# Patient Record
Sex: FEMALE | Race: ASIAN | Employment: UNEMPLOYED | URBAN - METROPOLITAN AREA
[De-identification: names, ages, dates, MRNs, and addresses within clinical notes are randomized per-mention and may not be internally consistent; named-entity substitution may affect disease eponyms.]

---

## 2017-06-22 ENCOUNTER — LAB ENCOUNTER (OUTPATIENT)
Dept: LAB | Age: 57
End: 2017-06-22
Attending: FAMILY MEDICINE
Payer: COMMERCIAL

## 2017-06-22 DIAGNOSIS — R10.84 ABDOMINAL PAIN, GENERALIZED: Primary | ICD-10-CM

## 2017-06-22 DIAGNOSIS — D64.9 ANEMIA: ICD-10-CM

## 2017-06-22 PROCEDURE — 80053 COMPREHEN METABOLIC PANEL: CPT

## 2017-06-22 PROCEDURE — 82150 ASSAY OF AMYLASE: CPT

## 2017-06-22 PROCEDURE — 80061 LIPID PANEL: CPT

## 2017-06-22 PROCEDURE — 83690 ASSAY OF LIPASE: CPT

## 2017-06-22 PROCEDURE — 85025 COMPLETE CBC W/AUTO DIFF WBC: CPT

## 2017-06-22 PROCEDURE — 36415 COLL VENOUS BLD VENIPUNCTURE: CPT

## 2017-06-22 PROCEDURE — 84443 ASSAY THYROID STIM HORMONE: CPT

## 2018-12-05 ENCOUNTER — LAB ENCOUNTER (OUTPATIENT)
Dept: LAB | Age: 58
End: 2018-12-05
Attending: FAMILY MEDICINE
Payer: COMMERCIAL

## 2018-12-05 ENCOUNTER — HOSPITAL ENCOUNTER (OUTPATIENT)
Dept: ULTRASOUND IMAGING | Age: 58
Discharge: HOME | End: 2018-12-05
Attending: FAMILY MEDICINE
Payer: COMMERCIAL

## 2018-12-05 DIAGNOSIS — R10.9 ABDOMINAL PAIN: ICD-10-CM

## 2018-12-05 DIAGNOSIS — I10 HTN (HYPERTENSION): Primary | ICD-10-CM

## 2018-12-05 PROCEDURE — 80053 COMPREHEN METABOLIC PANEL: CPT

## 2018-12-05 PROCEDURE — 76700 US EXAM ABDOM COMPLETE: CPT | Performed by: FAMILY MEDICINE

## 2018-12-05 PROCEDURE — 85025 COMPLETE CBC W/AUTO DIFF WBC: CPT

## 2018-12-05 PROCEDURE — 80061 LIPID PANEL: CPT

## 2019-12-03 RX ORDER — LISINOPRIL 10 MG/1
TABLET ORAL
Qty: 90 TABLET | Refills: 2 | Status: SHIPPED | OUTPATIENT
Start: 2019-12-03 | End: 2019-12-26

## 2019-12-03 RX ORDER — ATORVASTATIN CALCIUM 10 MG/1
TABLET, FILM COATED ORAL
Qty: 90 TABLET | Refills: 2 | Status: SHIPPED | OUTPATIENT
Start: 2019-12-03 | End: 2019-12-26

## 2019-12-03 NOTE — TELEPHONE ENCOUNTER
LOV Never seen in our office    LAST LAB    LAST RX no record of Rx ever given    Next OV No future appointments.       PROTOCOL  Cholesterol Medication Protocol [id number] 1:33 AM   Appointment within past 12 or next 3 months    ALT < 80    ALT resulted wi

## 2019-12-26 ENCOUNTER — HOSPITAL ENCOUNTER (OUTPATIENT)
Dept: GENERAL RADIOLOGY | Age: 59
Discharge: HOME | End: 2019-12-26
Attending: FAMILY MEDICINE
Payer: MEDICAID

## 2019-12-26 ENCOUNTER — LAB ENCOUNTER (OUTPATIENT)
Dept: LAB | Age: 59
End: 2019-12-26
Attending: FAMILY MEDICINE
Payer: MEDICAID

## 2019-12-26 ENCOUNTER — OFFICE VISIT (OUTPATIENT)
Dept: FAMILY MEDICINE CLINIC | Facility: CLINIC | Age: 59
End: 2019-12-26
Payer: MEDICAID

## 2019-12-26 VITALS
TEMPERATURE: 98 F | DIASTOLIC BLOOD PRESSURE: 70 MMHG | HEART RATE: 74 BPM | HEIGHT: 59 IN | WEIGHT: 177 LBS | SYSTOLIC BLOOD PRESSURE: 116 MMHG | BODY MASS INDEX: 35.68 KG/M2 | RESPIRATION RATE: 14 BRPM

## 2019-12-26 DIAGNOSIS — Z12.31 ENCOUNTER FOR SCREENING MAMMOGRAM FOR BREAST CANCER: ICD-10-CM

## 2019-12-26 DIAGNOSIS — I10 ESSENTIAL HYPERTENSION: ICD-10-CM

## 2019-12-26 DIAGNOSIS — M19.90 ARTHRITIS: Primary | ICD-10-CM

## 2019-12-26 DIAGNOSIS — Z12.11 SCREEN FOR COLON CANCER: ICD-10-CM

## 2019-12-26 DIAGNOSIS — M17.0 TRICOMPARTMENT OSTEOARTHRITIS OF BOTH KNEES: Primary | ICD-10-CM

## 2019-12-26 DIAGNOSIS — M19.90 ARTHRITIS: ICD-10-CM

## 2019-12-26 PROBLEM — K21.9 GASTROESOPHAGEAL REFLUX DISEASE WITHOUT ESOPHAGITIS: Status: ACTIVE | Noted: 2019-12-26

## 2019-12-26 PROBLEM — E78.2 MIXED HYPERLIPIDEMIA: Status: ACTIVE | Noted: 2019-12-26

## 2019-12-26 PROCEDURE — 99203 OFFICE O/P NEW LOW 30 MIN: CPT | Performed by: FAMILY MEDICINE

## 2019-12-26 PROCEDURE — 86038 ANTINUCLEAR ANTIBODIES: CPT

## 2019-12-26 PROCEDURE — 84439 ASSAY OF FREE THYROXINE: CPT

## 2019-12-26 PROCEDURE — 82306 VITAMIN D 25 HYDROXY: CPT

## 2019-12-26 PROCEDURE — 86140 C-REACTIVE PROTEIN: CPT

## 2019-12-26 PROCEDURE — 86431 RHEUMATOID FACTOR QUANT: CPT

## 2019-12-26 PROCEDURE — 84550 ASSAY OF BLOOD/URIC ACID: CPT

## 2019-12-26 PROCEDURE — 84443 ASSAY THYROID STIM HORMONE: CPT

## 2019-12-26 PROCEDURE — 85652 RBC SED RATE AUTOMATED: CPT

## 2019-12-26 PROCEDURE — 80053 COMPREHEN METABOLIC PANEL: CPT

## 2019-12-26 PROCEDURE — 72110 X-RAY EXAM L-2 SPINE 4/>VWS: CPT | Performed by: FAMILY MEDICINE

## 2019-12-26 PROCEDURE — 36415 COLL VENOUS BLD VENIPUNCTURE: CPT

## 2019-12-26 PROCEDURE — 73560 X-RAY EXAM OF KNEE 1 OR 2: CPT | Performed by: FAMILY MEDICINE

## 2019-12-26 PROCEDURE — 80061 LIPID PANEL: CPT

## 2019-12-26 PROCEDURE — 86200 CCP ANTIBODY: CPT

## 2019-12-26 PROCEDURE — 85025 COMPLETE CBC W/AUTO DIFF WBC: CPT

## 2019-12-26 RX ORDER — PANTOPRAZOLE SODIUM 20 MG/1
20 TABLET, DELAYED RELEASE ORAL
Qty: 90 TABLET | Refills: 2 | Status: SHIPPED | OUTPATIENT
Start: 2019-12-26 | End: 2020-09-10

## 2019-12-26 RX ORDER — TRAMADOL HYDROCHLORIDE 50 MG/1
50 TABLET ORAL EVERY 6 HOURS PRN
COMMUNITY
End: 2020-01-08

## 2019-12-26 RX ORDER — LISINOPRIL 10 MG/1
TABLET ORAL
Qty: 90 TABLET | Refills: 2 | Status: SHIPPED | OUTPATIENT
Start: 2019-12-26 | End: 2020-01-08 | Stop reason: ALTCHOICE

## 2019-12-26 RX ORDER — ATORVASTATIN CALCIUM 10 MG/1
TABLET, FILM COATED ORAL
Qty: 90 TABLET | Refills: 2 | Status: SHIPPED | OUTPATIENT
Start: 2019-12-26 | End: 2020-05-18

## 2019-12-26 RX ORDER — PANTOPRAZOLE SODIUM 20 MG/1
20 TABLET, DELAYED RELEASE ORAL
COMMUNITY
End: 2019-12-26

## 2019-12-26 RX ORDER — MELOXICAM 15 MG/1
15 TABLET ORAL DAILY
Qty: 90 TABLET | Refills: 1 | Status: SHIPPED | OUTPATIENT
Start: 2019-12-26 | End: 2020-01-13

## 2019-12-26 NOTE — H&P
Wellness Exam    REASON FOR VISIT:    Lu Che is a 60year old female who presents for an [address]     Current Complaints: Ms. Selina Becker is a pleasant 59 y/o F here for her wellness exam  Flu shot: see immunization record  Health Maintenance for: A1C  Glucose (mg/dL)   Date Value   12/05/2018 104 (H)         Gonorrhea Screening if high risk No results found for: GONOCOCCUS    HIV Screening For all adults age 22-65, older adults at increased risk No results found for: HIV    Syphilis Screening visit. No LMP recorded. Constitutional: She appears her stated age, nourished, and pleasant. Vital signs reviewed as noted  Head: Normocephalic and atraumatic. Nose: Nose normal.   Eyes: EOM are normal. Pupils are equal, round, and reactive to light. barriers to learning. Medical education done. Outcome: Patient verbalizes understanding. Educated by: MD     The patient indicates understanding of these issues and agrees to the plan.     SUGGESTED VACCINATIONS - Influenza, Pneumococcal, Zoster, Tetanu

## 2019-12-26 NOTE — PATIENT INSTRUCTIONS
Thank you for choosing [facility] at [place] [address]  To Do: Darrin Gerber  1. Please see age appropriate health prevention below    [facility] is located in [address]. Monday, Tuesday & Friday – 8 a.m. to 4 p.m.   Wednesday, Thursday benefits outweigh those potential risks and we strive to make you healthier and to improve your quality of life.     Referrals, and Radiology Information:    If your insurance requires a referral to a specialist, please allow 5 business days to process your age 39 and women without symptoms at any age who are overweight or obese and have 1 or more additional risk factors for diabetes.  At  least every 3 years   Type 2 diabetes or prediabetes All women diagnosed with gestational diabetes Lifelong testing every provider about your health history.    Depression All women in this age group At routine exams   Gonorrhea Sexually active women at increased risk for infection At routine exams   Hepatitis C Anyone at increased risk; 1 time for those born between 80 and Meningococcal Women at increased risk for infection – talk with your healthcare provider 1 or more doses   Pneumococcal conjugate vaccine (PCV13) and pneumococcal polysaccharide vaccine (PPSV23) Women at increased risk for infection – talk with your heal arthritis. They include:   · Osteoarthritis  · Rheumatoid arthritis  · Gout  · Lupus  If your symptoms are mild, medicines may be enough to ease pain and swelling. For more severe arthritis, you may need surgery.  This can improve the condition of the joint

## 2019-12-26 NOTE — H&P
[facility], [address] [place] [address]    History and Physical    Darrin Gerber Patient Status:  No patient class for patient encounter    1960 MRN [id number]   Location [facility] , [facility] Attending No att. provid Problems Father    • No Known Problems Mother      Social History:  Social History    Tobacco Use      Smoking status: Never Smoker      Smokeless tobacco: Never Used    Alcohol use: Never      Frequency: Never    Drug use: Never    Allergies/Medications: deficit. Psychiatric: She has a normal mood and affect.  Thought content normal.         Results:     Lab Results   Component Value Date    WBC 9.0 12/05/2018    HGB 11.6 (L) 12/05/2018    HCT 34.2 12/05/2018    .0 12/05/2018    CREATSERUM 1.08 (H)

## 2019-12-30 ENCOUNTER — TELEPHONE (OUTPATIENT)
Dept: FAMILY MEDICINE CLINIC | Facility: CLINIC | Age: 59
End: 2019-12-30

## 2019-12-30 NOTE — TELEPHONE ENCOUNTER
Spoke to daughter, only new medication started Meloxicam. She states pt has NOT started the Levothyroxine. Daughter states pt is having swelling since yesterday and INCREASED swelling today. Swelling is under both eyes and in feet and ankles.  States the

## 2019-12-30 NOTE — TELEPHONE ENCOUNTER
Daughter is calling, patient started new medication- Meloxicam. Patient started with swelling of feet and legs. Daughter also says patient has facial swelling. Please call daughter.

## 2019-12-30 NOTE — TELEPHONE ENCOUNTER
Called daughter, spoke to son in law, he states pt is not in distress. He gave me the daughter's cell. Attempted to reach daughter, Iris Castillo, on [phone] twice, but there was only silence, no connection.  Called Son in law back, he states pt and Iris Jonathan are

## 2019-12-30 NOTE — TELEPHONE ENCOUNTER
Additional per Dr. Adwoa Langford: elevated Sed Rate, CRP, can be due to Osteoarthritis, probably not RA. Informed daughter of Dr. Sonu Stinson reviews and confirmed recommendations, daughter expressed understanding and agreement. Task completed.

## 2020-01-08 ENCOUNTER — TELEPHONE (OUTPATIENT)
Dept: ORTHOPEDICS CLINIC | Facility: CLINIC | Age: 60
End: 2020-01-08

## 2020-01-08 ENCOUNTER — TELEPHONE (OUTPATIENT)
Dept: FAMILY MEDICINE CLINIC | Facility: CLINIC | Age: 60
End: 2020-01-08

## 2020-01-08 RX ORDER — HYDROCHLOROTHIAZIDE 25 MG/1
25 TABLET ORAL DAILY
Qty: 90 TABLET | Refills: 0 | Status: SHIPPED | OUTPATIENT
Start: 2020-01-08 | End: 2020-04-02

## 2020-01-08 RX ORDER — GABAPENTIN 300 MG/1
300 CAPSULE ORAL 2 TIMES DAILY
Qty: 60 CAPSULE | Refills: 0 | Status: SHIPPED | OUTPATIENT
Start: 2020-01-08 | End: 2020-05-26

## 2020-01-08 NOTE — TELEPHONE ENCOUNTER
Spoke with pt's daughter, informed of MD recommendations below. Verbalized understanding and agreement. Rx for HCTZ sent to pharmacy. Pt's daughter asking for recommendation on pain.  Pt tried Tramadol but it did not work and has now stopped Meloxicam. P

## 2020-01-08 NOTE — TELEPHONE ENCOUNTER
Attempted to reach pt's daughter, [facility] informing Rx for Gabapentin 300mg sent to pharmacy. Rx sent.

## 2020-01-08 NOTE — TELEPHONE ENCOUNTER
Future Appointments   Date Time Provider Alva Yolanda   1/13/2020  3:00 PM Brittney Friedman MD [facility]   4/16/2020  3:30 PM Serina Reilly DO [id number] [address]

## 2020-01-08 NOTE — TELEPHONE ENCOUNTER
Let us stop lisinopril also. Recommend hydrochlorothiazide 25 mg daily. Continue monitor blood pressure.

## 2020-01-08 NOTE — TELEPHONE ENCOUNTER
Patient stopped Meloxicam and swelling has improved but there is still some facial swelling. Patient blood pressure is elevated, B/P 164/92  Patient is taking Lisinopril 10 mg daily. Reports usual pain in knees, pt has arthritis but no increased pain.  No h

## 2020-01-08 NOTE — TELEPHONE ENCOUNTER
S/w Leonoria, daughter, and informed her that Ishaan Paredes will need x-rays completed prior to her appointment with Dr. Leigh Ann Nogueira on 1/16/2020.   Cristy Arciniega verbalized understanding and stated that she will need to call the office back to move the date of Darrin's michele

## 2020-01-13 ENCOUNTER — OFFICE VISIT (OUTPATIENT)
Dept: ORTHOPEDICS CLINIC | Facility: CLINIC | Age: 60
End: 2020-01-13
Payer: MEDICAID

## 2020-01-13 ENCOUNTER — HOSPITAL ENCOUNTER (OUTPATIENT)
Dept: GENERAL RADIOLOGY | Age: 60
Discharge: HOME | End: 2020-01-13
Attending: ORTHOPAEDIC SURGERY
Payer: MEDICAID

## 2020-01-13 VITALS
WEIGHT: 177 LBS | HEIGHT: 59 IN | OXYGEN SATURATION: 97 % | SYSTOLIC BLOOD PRESSURE: 121 MMHG | BODY MASS INDEX: 35.68 KG/M2 | HEART RATE: 68 BPM | RESPIRATION RATE: 16 BRPM | DIASTOLIC BLOOD PRESSURE: 72 MMHG

## 2020-01-13 DIAGNOSIS — M25.561 PAIN IN BOTH KNEES, UNSPECIFIED CHRONICITY: ICD-10-CM

## 2020-01-13 DIAGNOSIS — M70.61 TROCHANTERIC BURSITIS OF BOTH HIPS: ICD-10-CM

## 2020-01-13 DIAGNOSIS — M25.562 PAIN IN BOTH KNEES, UNSPECIFIED CHRONICITY: ICD-10-CM

## 2020-01-13 DIAGNOSIS — M17.0 PRIMARY OSTEOARTHRITIS OF BOTH KNEES: Primary | ICD-10-CM

## 2020-01-13 DIAGNOSIS — M70.62 TROCHANTERIC BURSITIS OF BOTH HIPS: ICD-10-CM

## 2020-01-13 PROCEDURE — 20610 DRAIN/INJ JOINT/BURSA W/O US: CPT | Performed by: ORTHOPAEDIC SURGERY

## 2020-01-13 PROCEDURE — 73564 X-RAY EXAM KNEE 4 OR MORE: CPT | Performed by: ORTHOPAEDIC SURGERY

## 2020-01-13 PROCEDURE — 99203 OFFICE O/P NEW LOW 30 MIN: CPT | Performed by: ORTHOPAEDIC SURGERY

## 2020-01-13 RX ORDER — TRIAMCINOLONE ACETONIDE 40 MG/ML
80 INJECTION, SUSPENSION INTRA-ARTICULAR; INTRAMUSCULAR ONCE
Status: COMPLETED | OUTPATIENT
Start: 2020-01-13 | End: 2020-01-13

## 2020-01-13 RX ADMIN — TRIAMCINOLONE ACETONIDE 80 MG: 40 INJECTION, SUSPENSION INTRA-ARTICULAR; INTRAMUSCULAR at 15:45:00

## 2020-01-13 NOTE — PATIENT INSTRUCTIONS
What Is Arthritis? Arthritis is a disease that affects the joints. Joints are the parts where bones meet and move. It can affect any joint in your body. There are many types of arthritis.  They include:   · Osteoarthritis  · Rheumatoid arthritis  · Gout  · Cartilage is a smooth substance that protects the ends of your bones and provides cushioning. When you have arthritis, this cartilage breaks down and can no longer protect your bones. This can happen from an autoimmune disease.  Or it can happen from wear a · Strengthening muscles around the joint to reduce the strain on the joint  · Using hot and cold packs on your joints  · Using over-the-counter and prescription medicines  Talk with your healthcare provider about the best treatments for your condition. In people with arthritis, it offers all of those benefits and it can:  · Lessen pain and stiffness  · Strengthen muscles that support your joints  · Help you to be able to do the things you enjoy  A complete program consists of the following 3 types of exe Most people should exercise for at least 30 minutes, most days of the week. You don't have to exercise all at once. Try exercising for 10 minutes, 3 times a day, for example.     Strengthening exercises  Strengthening your muscles helps to protect your join ? Knee bend. Sit in a chair with your legs bent at the knees in front of you. Straighten one leg as much as you can, then bring it back to the floor. Repeat this 5 to 10 times. Then do the same thing with the other leg. ? Ankle stretch.  Sit with your fee · Large  for pencils, garden tools, and other handheld objects    Use mobility and other aids   People with arthritis and other joint problems often use mobility aids to help with walking. For example, they may use canes or walkers.  They may also use · Corticosteroid or steroid injections may ease swelling and pain. The medicine is injected into the joint—for example, the knee or hip. Steroid injections do have risks, so healthcare providers limit the number of injections used in any one joint.    · [place] location at [facility]: [facility] [address]; phone [phone]  o Website: [email or web address]      Date Last Reviewed: 6/1/2018 © 2000-2019 [employer] [address].  [address], [place]

## 2020-01-13 NOTE — PROGRESS NOTES
Per Dr. Leigh Ann Nogueira, draw up 4 mL of 0.5% Marcaine and 1 mL of Kenalog 40 for injection to bilateral knee. SL  Patient provided education handout for cortisone injection.

## 2020-01-13 NOTE — H&P
[facility] New Patient Note    CC: Patient presents with:  Consult: bilateral knee pain x several years- increasing over the past few years. R>L.  pain travels down the hips into the knee. patient describes a stabbing pain.   hx prior aspiration 201 TABLET BY MOUTH IN THE MORNING 90 tablet 2   • Pantoprazole Sodium 20 MG Oral Tab EC Take 1 tablet (20 mg total) by mouth every morning before breakfast. 90 tablet 2     No Known Allergies  Family History   Problem Relation Age of Onset   • No Known Proble and radiology report read. Demonstrates end-stage varus osteoarthritis of the right knee, with complete loss of medial joint space/bone-on-bone, subchondral sclerosis and cysts, osteophyte formation.   Patient does have moderate valgus osteoarthritis on th of trochanteric bursitis, however she may have some contribution from back issues. Lumbar spine x-rays do demonstrate some L4 on L5 anterolisthesis. We did discuss initiation of physical therapy for her hips and her back.   She and her daughter expressed

## 2020-01-13 NOTE — PROCEDURES
After informed consent, the patient's right and left knees were marked, locally anesthetized with skin refrigerant, prepped with topical antiseptic, and injected with a mixture of 1mL 40mg/mL Kenalog and 4mL 0.5% marcaine through the inferolateral portal.

## 2020-01-13 NOTE — PROGRESS NOTES
Per Dr. Jair Green, draw up 4 mL of 0.5% Marcaine and 1 mL of Kenalog 40 for injection to right knee. SL  Patient provided education handout for cortisone injection.       Per Dr. Jair Green, draw up 4 mL of 0.5% Marcaine and 1 mL of Kenalog 40 for injection to

## 2020-01-20 ENCOUNTER — APPOINTMENT (OUTPATIENT)
Dept: PHYSICAL THERAPY | Age: 60
End: 2020-01-20
Attending: ORTHOPAEDIC SURGERY
Payer: MEDICAID

## 2020-01-20 NOTE — PROGRESS NOTES
LOWER EXTREMITY EVALUATION:   Referring Physician: Dr. Qian Vargas  Diagnosis:   Trochanteric bursitis of both hips (M70.61,M70.62)  Primary osteoarthritis of both knees (M17.0)  Date of Service: 1/20/2020     PATIENT SUMMARY   Ehsan Stout is a 63year old L ***/5    DF: R ***/5; L ***/5  PF: R ***/5; L ***/5  INV: R ***/5; L ***/5  EV: R ***/5; L ***/5  Great toe ext: R ***/5; L ***/5     Special tests:   ***    Gait: pt ambulates on level ground with {pt/ot/slp gait:0103}  Balance: SLS: R *** sec, L *** se Date____________________    Certification From: 8/38/7906  To:4/19/2020

## 2020-01-21 ENCOUNTER — APPOINTMENT (OUTPATIENT)
Dept: PHYSICAL THERAPY | Age: 60
End: 2020-01-21
Attending: ORTHOPAEDIC SURGERY
Payer: MEDICAID

## 2020-04-02 RX ORDER — HYDROCHLOROTHIAZIDE 25 MG/1
TABLET ORAL
Qty: 90 TABLET | Refills: 0 | Status: SHIPPED | OUTPATIENT
Start: 2020-04-02 | End: 2020-05-18

## 2020-04-02 NOTE — TELEPHONE ENCOUNTER
Name from pharmacy: HYDROCHLOROTHIAZIDE 25MG TABLETS          Will file in chart as: HYDROCHLOROTHIAZIDE 25 MG Oral Tab         Sig: TAKE 1 TABLET BY MOUTH DAILY    Disp:  90 tablet    Refills:  0 (Pharmacy requested: Not specified)    Start: 4/2/2020    C

## 2020-05-04 ENCOUNTER — TELEPHONE (OUTPATIENT)
Dept: FAMILY MEDICINE CLINIC | Facility: CLINIC | Age: 60
End: 2020-05-04

## 2020-05-04 NOTE — TELEPHONE ENCOUNTER
PA for pantoprazole 20mg has been submitted through [facility].  Waiting on denial/approval

## 2020-05-18 ENCOUNTER — VIRTUAL PHONE E/M (OUTPATIENT)
Dept: FAMILY MEDICINE CLINIC | Facility: CLINIC | Age: 60
End: 2020-05-18
Payer: MEDICAID

## 2020-05-18 DIAGNOSIS — I10 ESSENTIAL HYPERTENSION: Primary | ICD-10-CM

## 2020-05-18 PROCEDURE — 99213 OFFICE O/P EST LOW 20 MIN: CPT | Performed by: FAMILY MEDICINE

## 2020-05-18 RX ORDER — HYDROCHLOROTHIAZIDE 25 MG/1
TABLET ORAL
Qty: 90 TABLET | Refills: 0 | OUTPATIENT
Start: 2020-05-18

## 2020-05-18 RX ORDER — ATORVASTATIN CALCIUM 10 MG/1
TABLET, FILM COATED ORAL
Qty: 90 TABLET | Refills: 1 | Status: SHIPPED | OUTPATIENT
Start: 2020-05-18 | End: 2021-06-23

## 2020-05-18 RX ORDER — HYDROCHLOROTHIAZIDE 25 MG/1
25 TABLET ORAL DAILY
Qty: 90 TABLET | Refills: 1 | Status: SHIPPED | OUTPATIENT
Start: 2020-05-18 | End: 2020-12-29

## 2020-05-18 NOTE — TELEPHONE ENCOUNTER
Please schedule a tele visit with Dr Diego Palacios for medication follow up/refill          Return in about 4 weeks (around 1/23/2020), or if symptoms worsen or fail to improve, for wellness/30.

## 2020-05-18 NOTE — PROGRESS NOTES
HPI:    Patient ID: Brittney Perdomo is a 63year old female. HPI  Ms. Gerber is a 58-year-old female with history of hypertension hyperlipidemia presenting for a follow-up phone visit. She has been doing well and has been taking her medications compliantly. Signed Prescriptions Disp Refills   • hydrochlorothiazide 25 MG Oral Tab 90 tablet 1     Sig: Take 1 tablet (25 mg total) by mouth daily.    • atorvastatin 10 MG Oral Tab 90 tablet 1     Sig: TAKE 1 TABLET BY MOUTH IN THE MORNING       [facility]

## 2020-05-18 NOTE — TELEPHONE ENCOUNTER
Patient has a telepone visit with Dr. Deb Downing today. Patient verbally consents to a virtual/telephone check-in service for the date and time noted above.  Patient understands and accepts financial responsibility for any deductible, co-insurance, and co-pa

## 2020-05-18 NOTE — PROGRESS NOTES
Virtual Telephone Check-In    ALEC Gerber verbally consents to a Virtual/Telephone Check-In visit on 05/18/20. Patient understands and accepts financial responsibility for any deductible, co-insurance and/or co-pays associated with this service.     Dura

## 2020-05-26 RX ORDER — GABAPENTIN 300 MG/1
CAPSULE ORAL
Qty: 180 CAPSULE | Refills: 0 | Status: SHIPPED | OUTPATIENT
Start: 2020-05-26 | End: 2020-07-14 | Stop reason: ALTCHOICE

## 2020-05-26 NOTE — TELEPHONE ENCOUNTER
Requesting : GABAPENTIN 300 MG   LOV: 5/18/20  RTC:   Last Relevant Labs: 12/26/19  Filled: 1/8/20 # 60 with 0 refills    No future appointments.

## 2020-06-09 ENCOUNTER — TELEPHONE (OUTPATIENT)
Dept: ORTHOPEDICS CLINIC | Facility: CLINIC | Age: 60
End: 2020-06-09

## 2020-06-09 NOTE — TELEPHONE ENCOUNTER
Daughter lmom for office to schedule appointment for knee injections for mother. Called back and received voice mail. LMOM for her to call after 8 am in the morning to schedule an appointment with us.

## 2020-06-12 ENCOUNTER — TELEPHONE (OUTPATIENT)
Dept: ORTHOPEDICS CLINIC | Facility: CLINIC | Age: 60
End: 2020-06-12

## 2020-06-12 RX ORDER — TRAMADOL HYDROCHLORIDE 50 MG/1
50 TABLET ORAL EVERY 6 HOURS PRN
Qty: 40 TABLET | Refills: 0 | Status: SHIPPED | OUTPATIENT
Start: 2020-06-12 | End: 2020-12-08

## 2020-06-12 NOTE — TELEPHONE ENCOUNTER
Patient daughter states that her mother is not doing well with the naproxen Ozzy Able), an earlier appointment was suggested, per pts daughter it was refused. They are requesting a stronger medication for the pain.     Future Appointments   Date Time Provider

## 2020-07-06 ENCOUNTER — HOSPITAL ENCOUNTER (EMERGENCY)
Facility: HOSPITAL | Age: 60
Discharge: HOME | End: 2020-07-06
Attending: EMERGENCY MEDICINE
Payer: MEDICAID

## 2020-07-06 ENCOUNTER — HOSPITAL ENCOUNTER (OUTPATIENT)
Dept: GENERAL RADIOLOGY | Facility: HOSPITAL | Age: 60
Discharge: HOME | End: 2020-07-06
Attending: ORTHOPAEDIC SURGERY
Payer: MEDICAID

## 2020-07-06 ENCOUNTER — OFFICE VISIT (OUTPATIENT)
Dept: ORTHOPEDICS CLINIC | Facility: CLINIC | Age: 60
End: 2020-07-06
Payer: MEDICAID

## 2020-07-06 ENCOUNTER — APPOINTMENT (OUTPATIENT)
Dept: MRI IMAGING | Facility: HOSPITAL | Age: 60
End: 2020-07-06
Attending: EMERGENCY MEDICINE
Payer: MEDICAID

## 2020-07-06 VITALS
RESPIRATION RATE: 20 BRPM | TEMPERATURE: 97 F | OXYGEN SATURATION: 94 % | BODY MASS INDEX: 32.28 KG/M2 | SYSTOLIC BLOOD PRESSURE: 167 MMHG | HEIGHT: 61 IN | HEART RATE: 73 BPM | WEIGHT: 171 LBS | DIASTOLIC BLOOD PRESSURE: 87 MMHG

## 2020-07-06 VITALS — HEART RATE: 80 BPM | SYSTOLIC BLOOD PRESSURE: 126 MMHG | DIASTOLIC BLOOD PRESSURE: 80 MMHG | OXYGEN SATURATION: 96 %

## 2020-07-06 DIAGNOSIS — M48.00 SPINAL STENOSIS, UNSPECIFIED SPINAL REGION: Primary | ICD-10-CM

## 2020-07-06 DIAGNOSIS — M25.512 LEFT SHOULDER PAIN, UNSPECIFIED CHRONICITY: ICD-10-CM

## 2020-07-06 DIAGNOSIS — M17.0 PRIMARY OSTEOARTHRITIS OF BOTH KNEES: Primary | ICD-10-CM

## 2020-07-06 DIAGNOSIS — M54.40 BACK PAIN OF LUMBAR REGION WITH SCIATICA: ICD-10-CM

## 2020-07-06 PROCEDURE — 99285 EMERGENCY DEPT VISIT HI MDM: CPT

## 2020-07-06 PROCEDURE — 99284 EMERGENCY DEPT VISIT MOD MDM: CPT

## 2020-07-06 PROCEDURE — 20610 DRAIN/INJ JOINT/BURSA W/O US: CPT | Performed by: ORTHOPAEDIC SURGERY

## 2020-07-06 PROCEDURE — 73030 X-RAY EXAM OF SHOULDER: CPT | Performed by: ORTHOPAEDIC SURGERY

## 2020-07-06 PROCEDURE — 72148 MRI LUMBAR SPINE W/O DYE: CPT | Performed by: EMERGENCY MEDICINE

## 2020-07-06 PROCEDURE — 99213 OFFICE O/P EST LOW 20 MIN: CPT | Performed by: ORTHOPAEDIC SURGERY

## 2020-07-06 RX ORDER — TRIAMCINOLONE ACETONIDE 40 MG/ML
80 INJECTION, SUSPENSION INTRA-ARTICULAR; INTRAMUSCULAR ONCE
Status: COMPLETED | OUTPATIENT
Start: 2020-07-06 | End: 2020-07-06

## 2020-07-06 RX ORDER — CYCLOBENZAPRINE HCL 10 MG
10 TABLET ORAL 3 TIMES DAILY PRN
Qty: 15 TABLET | Refills: 0 | Status: SHIPPED | OUTPATIENT
Start: 2020-07-06

## 2020-07-06 RX ORDER — PREDNISONE 20 MG/1
40 TABLET ORAL ONCE
Status: COMPLETED | OUTPATIENT
Start: 2020-07-06 | End: 2020-07-06

## 2020-07-06 RX ORDER — PREDNISONE 20 MG/1
40 TABLET ORAL DAILY
Qty: 10 TABLET | Refills: 0 | Status: SHIPPED | OUTPATIENT
Start: 2020-07-06 | End: 2020-07-11

## 2020-07-06 RX ADMIN — TRIAMCINOLONE ACETONIDE 80 MG: 40 INJECTION, SUSPENSION INTRA-ARTICULAR; INTRAMUSCULAR at 15:15:00

## 2020-07-06 NOTE — ED INITIAL ASSESSMENT (HPI)
Pt presents to ED with complaint of lower baack pain radiating down both legs. Pt also reports lower extremity swelling x1 month and reports numbness when standing for short periods of time. , Denies loss of bowel/bladder.  Denies weakness

## 2020-07-06 NOTE — PROGRESS NOTES
[facility] Progress Note    Subjective: Patient returns to clinic for bilateral knee injection. She also reports left shoulder pain. For the knees, she had previous injection that gave 3 to 4 months of relief. This was done in January.   She wants tendinitis and subacromial bursitis/impingement, possible referred pain from the neck/cervical spine. Would advise obtaining shoulder x-rays first.  Depending on results, can consider initiation of therapy, possible subacromial injection.   Patient and fam

## 2020-07-06 NOTE — PROCEDURES
After informed consent, the patient's right and left knees were marked, locally anesthetized with skin refrigerant, prepped with topical antiseptic, and injected with a mixture of 1mL 40mg/mL Kenalog, 2mL 1% lidocaine and 2mL 0.5% marcaine through the infe

## 2020-07-07 ENCOUNTER — TELEPHONE (OUTPATIENT)
Dept: SURGERY | Facility: CLINIC | Age: 60
End: 2020-07-07

## 2020-07-07 NOTE — TELEPHONE ENCOUNTER
Discussed with Kenton Mcneil PA-C. Patient can see Kenton Mcneil PA-C today. Otherwise will have to follow up next week with an available PA. Thank you.

## 2020-07-08 ENCOUNTER — TELEPHONE (OUTPATIENT)
Dept: ORTHOPEDICS CLINIC | Facility: CLINIC | Age: 60
End: 2020-07-08

## 2020-07-08 NOTE — TELEPHONE ENCOUNTER
Spoke w daughter over the phone - patient with maintained glenohumeral joint, has some cystic appearance to bone near [facility] insertion. Would advise initiating treatment for pain with direction at shoulder - physical therapy +/- injection (subacromial).  Mahogany Espinosa

## 2020-07-14 ENCOUNTER — OFFICE VISIT (OUTPATIENT)
Dept: SURGERY | Facility: CLINIC | Age: 60
End: 2020-07-14
Payer: MEDICAID

## 2020-07-14 VITALS
DIASTOLIC BLOOD PRESSURE: 68 MMHG | HEIGHT: 59 IN | SYSTOLIC BLOOD PRESSURE: 122 MMHG | BODY MASS INDEX: 35.28 KG/M2 | HEART RATE: 74 BPM | WEIGHT: 175 LBS

## 2020-07-14 DIAGNOSIS — M54.12 CERVICAL RADICULOPATHY: ICD-10-CM

## 2020-07-14 DIAGNOSIS — M51.36 DDD (DEGENERATIVE DISC DISEASE), LUMBAR: ICD-10-CM

## 2020-07-14 DIAGNOSIS — M43.16 SPONDYLOLISTHESIS OF LUMBAR REGION: ICD-10-CM

## 2020-07-14 DIAGNOSIS — M40.202 KYPHOSIS OF CERVICAL REGION, UNSPECIFIED KYPHOSIS TYPE: ICD-10-CM

## 2020-07-14 DIAGNOSIS — R20.2 NUMBNESS AND TINGLING IN LEFT HAND: ICD-10-CM

## 2020-07-14 DIAGNOSIS — M47.816 ARTHRITIS OF FACET JOINT OF LUMBAR SPINE: ICD-10-CM

## 2020-07-14 DIAGNOSIS — M53.3 SACROILIAC PAIN: ICD-10-CM

## 2020-07-14 DIAGNOSIS — M50.30 DDD (DEGENERATIVE DISC DISEASE), CERVICAL: ICD-10-CM

## 2020-07-14 DIAGNOSIS — M48.062 SPINAL STENOSIS, LUMBAR REGION WITH NEUROGENIC CLAUDICATION: ICD-10-CM

## 2020-07-14 DIAGNOSIS — R20.0 NUMBNESS AND TINGLING IN LEFT HAND: ICD-10-CM

## 2020-07-14 DIAGNOSIS — M48.061 LUMBAR FORAMINAL STENOSIS: ICD-10-CM

## 2020-07-14 DIAGNOSIS — M54.16 LUMBAR RADICULOPATHY: Primary | ICD-10-CM

## 2020-07-14 PROCEDURE — 99204 OFFICE O/P NEW MOD 45 MIN: CPT | Performed by: PHYSICIAN ASSISTANT

## 2020-07-14 RX ORDER — GABAPENTIN 100 MG/1
100 CAPSULE ORAL 3 TIMES DAILY
Qty: 90 CAPSULE | Refills: 1 | Status: SHIPPED | OUTPATIENT
Start: 2020-07-14 | End: 2020-10-12 | Stop reason: DRUGHIGH

## 2020-07-14 NOTE — H&P
Patient: Yoselyn Villasenor  Medical Record Number: [id number]  YOB: 1960  PCP: Nedra King MD    Referring Physician: Dr. Citlalli Lovett  Reason for visit: ED follow up, new consult, sciatica    Dear Dr. Citlalli Lovett,  Thank you very much for requesting th flat surfaces, no gait instability, unless having a flare of sciatica. This affects how she walks. Not dropping items. No difficulty opening jars or buttoning a shirt. Patient states takes Aspirin every other day. No blood thinners.  No pacemaker or defib before breakfast. Recheck labs in 6-8 wks 60 tablet 0   • Pantoprazole Sodium 20 MG Oral Tab EC Take 1 tablet (20 mg total) by mouth every morning before breakfast. 90 tablet 2        REVIEW OF SYSTEMS   Comprehensive review of systems done.  Negative excep fat suppression sequences. Images acquired in sagittal and axial planes. PATIENT STATED HISTORY: (As transcribed by Technologist)  Chronic lower back pain with bilateral lower extremity pain. FINDINGS:    There is normal lumbar lordosis.   Grade Paravertebral soft tissues are unremarkable in appearance. CONCLUSION:  There are multilevel degenerative changes present as described above. Findings appear most prominent at L3-4 and L4-5 as described above.   Moderate central canal stenosis is pre with kyphosis and possible spinal stenosis. - Ordered today:    - XR lumbar spine:     - Flex/ext, assess for subluxation    - MRI cervical spine:     - Assess cervical DDD, kyphosis and possible stenosis visualized on  MRI lumbar spine.  Most likel

## 2020-07-14 NOTE — PROGRESS NOTES
Location of Pain:  Pt states that she has issues with lower back pain with radaiitng pain in the bilateral legs. Pt states that she has issues with numbness and tingling in the bilateral feet. Pt states that she has no issues with weakness.  Pt states that

## 2020-07-21 ENCOUNTER — TELEPHONE (OUTPATIENT)
Dept: NEUROLOGY | Facility: CLINIC | Age: 60
End: 2020-07-21

## 2020-07-21 NOTE — TELEPHONE ENCOUNTER
Cervical MRI ([id number]) Denied   Case Number: [id number]    Denial Reason:    Based on [employer] Guidelines Section: SP 14.1 Spinal Deformities (e.g. Scoliosis/Kyphosis), we cannot approve this request. Your records show that you have a curve in y

## 2020-07-22 NOTE — TELEPHONE ENCOUNTER
Per DOE Chavarria at [address] [place] [address] on 714/20:    \"Presents today for a complaint of: ED follow up, new consult, sciatica  Onset: Symptoms began years prior, progressing recently.    Patient seen in the ED 7/6/20, was reporting radiation of pain down the backs concerning signs or symptoms\"    Routed to provider as peer to peer inquiry.

## 2020-07-22 NOTE — TELEPHONE ENCOUNTER
Spoke with Cristina and scheduled peer to peer for   7/27/2020 at 11:30 central standard time with Lethanimason Sandhu. Case Number: [id number]    If any changes need to be made to peer to peer may call Cristina back at phone #: [phone] option #1.

## 2020-07-22 NOTE — TELEPHONE ENCOUNTER
Please try for Monday, the 27th, at 11:30am    Otherwise 3-3:15pm Tuesday the 28th. If 3pm Tuesday, please have Jeannine Russell block my clinic at 3pm time slot.  Thanks, appreciative for assistance

## 2020-07-27 ENCOUNTER — HOSPITAL ENCOUNTER (OUTPATIENT)
Dept: MRI IMAGING | Age: 60
Discharge: HOME | End: 2020-07-27
Attending: PHYSICIAN ASSISTANT
Payer: MEDICAID

## 2020-07-27 ENCOUNTER — HOSPITAL ENCOUNTER (OUTPATIENT)
Dept: GENERAL RADIOLOGY | Age: 60
Discharge: HOME | End: 2020-07-27
Attending: PHYSICIAN ASSISTANT
Payer: MEDICAID

## 2020-07-27 ENCOUNTER — TELEPHONE (OUTPATIENT)
Dept: SURGERY | Facility: CLINIC | Age: 60
End: 2020-07-27

## 2020-07-27 ENCOUNTER — TELEPHONE (OUTPATIENT)
Dept: FAMILY MEDICINE CLINIC | Facility: CLINIC | Age: 60
End: 2020-07-27

## 2020-07-27 DIAGNOSIS — M50.30 DDD (DEGENERATIVE DISC DISEASE), CERVICAL: ICD-10-CM

## 2020-07-27 DIAGNOSIS — R20.2 NUMBNESS AND TINGLING IN LEFT HAND: ICD-10-CM

## 2020-07-27 DIAGNOSIS — R20.0 NUMBNESS AND TINGLING IN LEFT HAND: ICD-10-CM

## 2020-07-27 DIAGNOSIS — M47.816 ARTHRITIS OF FACET JOINT OF LUMBAR SPINE: ICD-10-CM

## 2020-07-27 DIAGNOSIS — M40.202 KYPHOSIS OF CERVICAL REGION, UNSPECIFIED KYPHOSIS TYPE: ICD-10-CM

## 2020-07-27 DIAGNOSIS — M43.16 SPONDYLOLISTHESIS OF LUMBAR REGION: ICD-10-CM

## 2020-07-27 DIAGNOSIS — M48.061 LUMBAR FORAMINAL STENOSIS: ICD-10-CM

## 2020-07-27 DIAGNOSIS — M54.12 CERVICAL RADICULOPATHY: ICD-10-CM

## 2020-07-27 DIAGNOSIS — M51.36 DDD (DEGENERATIVE DISC DISEASE), LUMBAR: ICD-10-CM

## 2020-07-27 DIAGNOSIS — M48.062 SPINAL STENOSIS, LUMBAR REGION WITH NEUROGENIC CLAUDICATION: ICD-10-CM

## 2020-07-27 DIAGNOSIS — M54.16 LUMBAR RADICULOPATHY: ICD-10-CM

## 2020-07-27 PROCEDURE — 72114 X-RAY EXAM L-S SPINE BENDING: CPT | Performed by: PHYSICIAN ASSISTANT

## 2020-07-27 PROCEDURE — 72141 MRI NECK SPINE W/O DYE: CPT | Performed by: PHYSICIAN ASSISTANT

## 2020-07-27 RX ORDER — NICOTINE POLACRILEX 4 MG/1
20 GUM, CHEWING ORAL DAILY
Qty: 90 TABLET | Refills: 2 | Status: SHIPPED | OUTPATIENT
Start: 2020-07-27 | End: 2020-08-04

## 2020-07-27 NOTE — TELEPHONE ENCOUNTER
Peer to peer for MRI cervical spine:    Dr. Maggy Stevens  Physician to provide recommendation, awaiting clinical determination if authorized. Physician to provide further documentation to support MRI cervical spine.       Please advise patient to hold o

## 2020-07-27 NOTE — TELEPHONE ENCOUNTER
Informed pt's daughter that a prescription for omeprazole has been sent to the pharmacy, verbalizes understanding. Encourage to call the office back if this medication is not covered.

## 2020-07-28 NOTE — TELEPHONE ENCOUNTER
Patient completed MRI on 07/27/20 at [facility]     Will wait on determination from P2P

## 2020-07-29 NOTE — TELEPHONE ENCOUNTER
I see that patient completed MRI. She was advised at appointment to wait to complete MRI for approval from authorization team that insurance approved. It is also with her patient instructions.  See TE 7/27 as well that MRI had not been approved but was pend

## 2020-07-29 NOTE — TELEPHONE ENCOUNTER
Peer to Peer Denied for Cervical MRI ([id number]) Denied   Case Number: [id number]     Next Option would be for Appeal (See Below)     APPEALS:     You, your doctor, or someone that you name to act for you, including an , can ask us to change our decisi

## 2020-08-03 NOTE — TELEPHONE ENCOUNTER
Patient will need to fill out the form in order for us to send the appeal to the insurance company, please advise    Letter pended. Will continue to work on this.

## 2020-08-03 NOTE — TELEPHONE ENCOUNTER
Appeal \"Authorized Representative Designation\" form at nurses' desk, initiated and left at  for patient to complete.   Called patient's daughter and informed her that form will be available at  with PSR's for patient to sign and to com

## 2020-08-03 NOTE — TELEPHONE ENCOUNTER
Spoke with patient's daughter Ryan Pinon who returned call and informed her that appeal/peer to peer is to be arranged with DOE Deutsch and [employer] provider. Ryan Pinon also asked about upcoming appointments with Dr. Reginald Conner and Mary Deutsch.   Appointm

## 2020-08-04 ENCOUNTER — OFFICE VISIT (OUTPATIENT)
Dept: PAIN CLINIC | Facility: CLINIC | Age: 60
End: 2020-08-04
Payer: MEDICAID

## 2020-08-04 ENCOUNTER — TELEPHONE (OUTPATIENT)
Dept: PAIN CLINIC | Facility: CLINIC | Age: 60
End: 2020-08-04

## 2020-08-04 ENCOUNTER — TELEPHONE (OUTPATIENT)
Dept: FAMILY MEDICINE CLINIC | Facility: CLINIC | Age: 60
End: 2020-08-04

## 2020-08-04 VITALS — WEIGHT: 175 LBS | BODY MASS INDEX: 34.36 KG/M2 | HEIGHT: 60 IN

## 2020-08-04 DIAGNOSIS — M54.16 LUMBAR RADICULITIS: Primary | ICD-10-CM

## 2020-08-04 PROCEDURE — 99204 OFFICE O/P NEW MOD 45 MIN: CPT | Performed by: ANESTHESIOLOGY

## 2020-08-04 PROCEDURE — 3008F BODY MASS INDEX DOCD: CPT | Performed by: ANESTHESIOLOGY

## 2020-08-04 RX ORDER — NICOTINE POLACRILEX 4 MG/1
20 GUM, CHEWING ORAL DAILY
Qty: 90 TABLET | Refills: 2 | Status: SHIPPED | OUTPATIENT
Start: 2020-08-04 | End: 2020-08-17

## 2020-08-04 NOTE — TELEPHONE ENCOUNTER
Patient daughter's asking for Omeprazole prescription to be resent to [facility] on file. Previous prescription was to sent to [facility], does not use [facility]?

## 2020-08-04 NOTE — PROGRESS NOTES
PHYSICAL EXAM:   Physical Exam   Constitutional:           Patient presents in office today with reported pain in lower back, hips, legs, and numbness in feet    Current pain level reported = 4/10    Last reported dose of traMADol HCl 50 MG Oral Tab glory

## 2020-08-04 NOTE — TELEPHONE ENCOUNTER
Medical clearance needed- No    Implanted cardiac device/SCS/PNS: NA    Pt seen in OV today by Dr. Jerris Cranker and recommended for LESI (X 1). Please begin PA process for procedure(s).      Laterality: NA  Level(s): L5-S1    Pt informed callback will be given whe

## 2020-08-05 ENCOUNTER — TELEPHONE (OUTPATIENT)
Dept: PAIN CLINIC | Facility: CLINIC | Age: 60
End: 2020-08-05

## 2020-08-05 DIAGNOSIS — M54.16 LUMBAR RADICULITIS: Primary | ICD-10-CM

## 2020-08-05 NOTE — PROGRESS NOTES
Name: Ehsan Stout   : 1960   DOS: 2020     Chief complaint: Low back pain    History of present illness:  Ehsan Stout is a 63year old female complaining of  pain in the lower back for 3 years.   The patient underwent physical therapy for 6 weeks (20 mg total) by mouth every morning before breakfast. 90 tablet 2   • Omeprazole 20 MG Oral Tab EC Take 20 mg by mouth daily.  90 tablet 2     Past Surgical History:   Procedure Laterality Date   • EXCIS INFRATENT BRAIN TUMOR        Family History   Proble thyromegaly or lymphadenopathy. No tenderness over the cervical paravertebral muscle and trapezius muscle. Flexion, extension and and lateral rotation of the spine doesn't make any difference in the pain. Spurling’s test is negative.  Gera maneuver is n 5  Gastrocsoleus:     5    5    Deep Tendon Reflexes:             (R)   (L)   Patella:    2   2   Ankle:    1   1  Pathological Reflexes:              (R)   (L)   Babinski:               N                          N   Rodrigez:    N    N   Ankle Clonus:

## 2020-08-05 NOTE — TELEPHONE ENCOUNTER
Prior authorization request completed for: Douglas De Anda #[id number]    Authorization dates: 08/05/20 - 02/01/21  CPT codes approved: 77602  Number of visits/dates of service approved: 1  Physician: Jerris Cranker     Patient can be scheduled

## 2020-08-05 NOTE — TELEPHONE ENCOUNTER
Spoke with Iris Castillo, pts daughter to advise on insurance auth and ability to schedule. Patient scheduled for a pain management procedure, pre-procedure instructions reviewed. Iris Castillo advised of new process for \"virtual check-in\".  Patient advised to call Arya Garrison ? Check in at [facility] ([address] [place] [address]., [place], [place]) outpatient registration in the [facility]. ? Please note-No prescriptions will be written by Pain Clinic in [place] on the day of procedure.  If you require a refill of medications, please c Most insurances are now requiring a preauthorization for all procedures. In the event that your insurance does not authorize your procedure within 48 hours of the scheduled date, your procedure will be cancelled and rescheduled to a later date.   Please c

## 2020-08-12 ENCOUNTER — HOSPITAL ENCOUNTER (OUTPATIENT)
Facility: HOSPITAL | Age: 60
Setting detail: HOSPITAL OUTPATIENT SURGERY
Discharge: HOME | End: 2020-08-12
Attending: ANESTHESIOLOGY | Admitting: ANESTHESIOLOGY
Payer: MEDICAID

## 2020-08-12 ENCOUNTER — APPOINTMENT (OUTPATIENT)
Dept: GENERAL RADIOLOGY | Facility: HOSPITAL | Age: 60
End: 2020-08-12
Attending: ANESTHESIOLOGY
Payer: MEDICAID

## 2020-08-12 VITALS
HEART RATE: 71 BPM | SYSTOLIC BLOOD PRESSURE: 130 MMHG | TEMPERATURE: 98 F | RESPIRATION RATE: 18 BRPM | DIASTOLIC BLOOD PRESSURE: 67 MMHG | OXYGEN SATURATION: 100 %

## 2020-08-12 DIAGNOSIS — M54.16 LUMBAR RADICULITIS: ICD-10-CM

## 2020-08-12 PROCEDURE — 3E0S33Z INTRODUCTION OF ANTI-INFLAMMATORY INTO EPIDURAL SPACE, PERCUTANEOUS APPROACH: ICD-10-PCS | Performed by: ANESTHESIOLOGY

## 2020-08-12 RX ORDER — 0.9 % SODIUM CHLORIDE 0.9 %
VIAL (ML) INJECTION AS NEEDED
Status: DISCONTINUED | OUTPATIENT
Start: 2020-08-12 | End: 2020-08-12

## 2020-08-12 RX ORDER — DIPHENHYDRAMINE HYDROCHLORIDE 50 MG/ML
50 INJECTION INTRAMUSCULAR; INTRAVENOUS ONCE AS NEEDED
Status: DISCONTINUED | OUTPATIENT
Start: 2020-08-12 | End: 2020-08-12

## 2020-08-12 RX ORDER — LIDOCAINE HYDROCHLORIDE 10 MG/ML
INJECTION, SOLUTION EPIDURAL; INFILTRATION; INTRACAUDAL; PERINEURAL AS NEEDED
Status: DISCONTINUED | OUTPATIENT
Start: 2020-08-12 | End: 2020-08-12

## 2020-08-12 RX ORDER — DEXAMETHASONE SODIUM PHOSPHATE 10 MG/ML
INJECTION, SOLUTION INTRAMUSCULAR; INTRAVENOUS AS NEEDED
Status: DISCONTINUED | OUTPATIENT
Start: 2020-08-12 | End: 2020-08-12

## 2020-08-12 RX ORDER — ONDANSETRON 2 MG/ML
4 INJECTION INTRAMUSCULAR; INTRAVENOUS ONCE AS NEEDED
Status: DISCONTINUED | OUTPATIENT
Start: 2020-08-12 | End: 2020-08-12

## 2020-08-12 NOTE — OPERATIVE REPORT
[facility]  Operative Report  2020     Aaron Aguilar Patient Status:  Hospital Outpatient Surgery    1960 MRN [id number]   [facility] ENDOSCOPY Attending No att. providers found   [facility] Day # 0 PCP MD Flash Weems flushed with 1 mL lidocaine. The needle was withdrawn with the stylet intact in situ. The needle's tip was intact. The patient tolerated the procedure very well without significant immediate complication.   The patient's back was cleaned and sterile dres

## 2020-08-12 NOTE — H&P (VIEW-ONLY)
History & Physical Examination    Patient Name: Brandon Berrios  MRN: [id number]  CSN: [id number]  YOB: 1960    Pre-Operative Diagnosis:  Lumbar radiculitis [M54.16]    Present Illness: 56-year-old female patient with chronic low back pain failed c Alcohol use: Never      Frequency: Never      SYSTEM Check if Review is Normal Check if Physical Exam is Normal If not normal, please explain:   HEENT [x ] [x ]    NECK & BACK [x ] [x ]    HEART [x ] [x ]    LUNGS [x ] [x ]    ABDOMEN [x ] [x ]    Edu Reich

## 2020-08-12 NOTE — H&P
History & Physical Examination    Patient Name: Anamaria Moya  MRN: [id number]  CSN: [id number]  YOB: 1960    Pre-Operative Diagnosis:  Lumbar radiculitis [M54.16]    Present Illness: 79-year-old female patient with chronic low back pain failed c Alcohol use: Never      Frequency: Never      SYSTEM Check if Review is Normal Check if Physical Exam is Normal If not normal, please explain:   HEENT [x ] [x ]    NECK & BACK [x ] [x ]    HEART [x ] [x ]    LUNGS [x ] [x ]    ABDOMEN [x ] [x ]    Irineo Hampton

## 2020-08-14 NOTE — TELEPHONE ENCOUNTER
Omeprazole 20 MG Capsules     [facility] DR[facility]ORE [facility], [address] [place] [address],[address] AT [address], [phone], [phone]  _______________________________________    Need capsules not tablets per insurance coverage.    Need als

## 2020-08-17 RX ORDER — OMEPRAZOLE 20 MG/1
20 CAPSULE, DELAYED RELEASE ORAL
Qty: 90 CAPSULE | Refills: 2 | Status: SHIPPED | OUTPATIENT
Start: 2020-08-17 | End: 2020-09-10

## 2020-08-17 NOTE — TELEPHONE ENCOUNTER
New script pended for the omeprazole 20 mg capsules. Insurance will not cover the tablets.   Script for the tablets canc'd    Routed to PCP for approval.

## 2020-08-28 ENCOUNTER — TELEPHONE (OUTPATIENT)
Dept: SURGERY | Facility: CLINIC | Age: 60
End: 2020-08-28

## 2020-08-28 RX ORDER — GABAPENTIN 300 MG/1
CAPSULE ORAL
Qty: 180 CAPSULE | Refills: 0 | Status: SHIPPED | OUTPATIENT
Start: 2020-08-28 | End: 2020-10-09

## 2020-08-28 NOTE — TELEPHONE ENCOUNTER
Patient's daughter called back .  States patient did not experience any relief from injections on 8/12/20     Pain % relief - 0% - 5% pain relief     Functional status- no change     Medication use- no change

## 2020-08-28 NOTE — TELEPHONE ENCOUNTER
Dr. Evita Vila had recommended up to a series of 3  lumbar epidural steroid injections L5-S1 so I think she should get at least 1 more. Can we see if we can get 1 more authorized.   Please let her know it often takes more than 1 to calm down the symptoms suffici

## 2020-08-28 NOTE — TELEPHONE ENCOUNTER
LMTCB       Patient completed one LUMBAR INTERLAMINAR EPIDURAL STEROID INJECTION, LUMBAR 5-SACRAL 1, WITHOUT SEDATION 8/12/20     Per Dr Angel Luis Bella - need to complete 2 more and per Patient's Insurance - requires documentation on Pain % relief, functional status

## 2020-08-28 NOTE — TELEPHONE ENCOUNTER
Pt's daughter Chau Moreno states pt hasn't had any relief from inj 8/12/20. Chau Moreno would like to schedule another [facility] [employer] wants to limit the OVs due to it being a far drive for them. Pls advise.

## 2020-08-28 NOTE — TELEPHONE ENCOUNTER
Requested Prescriptions     Pending Prescriptions Disp Refills   • GABAPENTIN 300 MG Oral Cap [Pharmacy Med Name: GABAPENTIN 300MG CAPSULES] 180 capsule 0     Sig: TAKE ONE CAPSULE BY MOUTH TWICE DAILY       LOV: 5/18/2020   RTC: Return if symptoms worsen

## 2020-08-30 NOTE — LETTER
POSTPARTUM PROGRESS NOTE    PATIENT ID:  NAME:  Tiffanie Rush  MRN:               [id number]  YOB: 1993     27 y.o. female  at 41w1d PO s/p , c/b COVID + from which she is asymptoamtic      Subjective: Patient reports she is well  this morning.  She is tolerating PO without significant nausea or vomiting, she is breastfeeding but hasn't gotten milk yet, voiding, and lochia is less than menses.  She ambulating without presyncopal symptoms and her pain is well controlled.  She denies headache, shortness of breath, fever/chills, urinary symptoms, or vaginal bleeding. She plans to use copper IUD for contraception.    Objective:    Vitals:    20 1230 20 1730 20 2200 20 0600   BP: 108/71 114/67 115/62 116/68   Pulse: 88 72 82 70   Resp: 20 18 18 18   Temp: 36.7 °C (98 °F) 36.3 °C (97.4 °F) 36.6 °C (97.8 °F) 36.7 °C (98 °F)   TempSrc: Oral Oral Oral Oral   SpO2: 98% 98% 98% 96%   Weight:       Height:         General: No acute distress, resting comfortably in bed.  HEENT: normocephalic, nontraumatic, PERRLA, EOMI  Cardiovascular: Heart RRR with no murmurs, rubs or gallops. Distal Pulses 2+  Respiratory: symmetric chest expansion, lungs CTA bilaterally with no wheezes rales or rhonci  Abdomen: soft, mildly tender around incision which is clean, dry and intact, fundus firm, +BS  Genitourinary: lochia light, denies excessive vaginal bleeding  Musculoskeletal: strength 5/5 in four extremities  Neuro: non focal with no numbness, tingling or changes in sensation      Recent Labs     20  0905 20  1209   WBC 8.9 11.8*   RBC 4.38 4.31   HEMOGLOBIN 12.9 12.8   HEMATOCRIT 39.4 38.3   MCV 90.0 88.9   MCH 29.5 29.7   RDW 42.5 41.1   PLATELETCT 215 197   MPV 10.7 10.3   NEUTSPOLYS 71.60  --    LYMPHOCYTES 19.30*  --    MONOCYTES 7.20  --    EOSINOPHILS 0.90  --    BASOPHILS 0.30  --      No results for input(s): SODIUM, POTASSIUM, CHLORIDE, CO2, GLUCOSE, BUN, CPKTOTAL in the  Suri Olivas [address]  [address] [facility] [address], [address] [facility]  Authorization for Surgical Operation and Procedure     Date:___________                                                                                                         Time:__________ last 72 hours.    Current Meds:   Current Facility-Administered Medications   Medication Dose Frequency Provider Last Rate Last Dose   • ondansetron (ZOFRAN ODT) dispertab 4 mg  4 mg Q6HRS PRN Shaw Arreola M.D.        Or   • ondansetron (ZOFRAN) syringe/vial injection 4 mg  4 mg Q6HRS PRN Shaw Arreola M.D.       • oxytocin (PITOCIN) infusion (for postpartum)   mL/hr Continuous Shaw Arreola M.D.   Stopped at 20 1100   • acetaminophen (TYLENOL) tablet 325 mg  325 mg Q4HRS PRN Shaw Arreola M.D.       • acetaminophen (TYLENOL) tablet 650 mg  650 mg Q4HRS PRN hSaw Arreola M.D.   650 mg at 20 0202   • oxyCODONE immediate-release (ROXICODONE) tablet 5 mg  5 mg Q4HRS PRN Shaw Arreola M.D.   5 mg at 20 0220   • LR infusion   PRN Nitin Antoine M.D.       • tetanus-dipth-acell pertussis (Tdap) inj 0.5 mL  0.5 mL Once PRN Nitin Antoine M.D.       • PRN oxytocin (PITOCIN) (20 Units/1000 mL) PRN for excessive uterine bleeding - See Admin Instr  125-999 mL/hr Once PRN Nitin Antoine M.D.       • miSOPROStol (CYTOTEC) tablet 600 mcg  600 mcg Once PRN Nitin Antoine M.D.       • methylergonovine (METHERGINE) injection 0.2 mg  0.2 mg Once PRN Nitin Antoine M.D.       • docusate sodium (COLACE) capsule 100 mg  100 mg BID PRN Nitin Antoine M.D.       • prenatal plus vitamin (STUARTNATAL 1+1) 27-1 MG tablet 1 Tab  1 Tab Daily-08 Nitin Antoine M.D.   1 Tab at 20 0959   • ibuprofen (MOTRIN) tablet 600 mg  600 mg Q6HRS PRN Nitin Antoine M.D.   600 mg at 20 022   Last reviewed on 2020  9:40 AM by Margarita Parsons R.N.          Assessment/Plan:    27 y.o.  is PPD 1 s/p  after IOL for post dates, c/b COVID+. Doing well, meeting post partum milestones.  #Continue routine post partum care.  ?Advance diet as tolerated to regular  ?Pain control: Continue narcotic, Tylenol, and ibuprofen PRN.  Encourage taking ibuprofen 600mg q 6hr alternating  potential risks that can occur: fever and allergic reactions, hemolytic reactions, transmission of diseases such as Hepatitis, AIDS and Cytomegalovirus (CMV) and fluid overload.   In the event that I wish to have an autologous transfusion of my own blood, o attending physician will determine when the applicable recovery period ends for purposes of reinstating the DNAR order.   10. Patients having a sterilization procedure: I understand that if the procedure is successful the results will be permanent and it wi with Tylenol 650mg q 6hrs (so one med is taken every 3 hrs) and using narcotic as necessary for break through post op pain  ?DVT prophylaxis: ambulate as much as possible, SCDs while in bed, enoxaparin  --infant feeding: Bottlefeeding by choice and plans Breastfeeding- lactation consult if necessary  ?Urinating without difficulty  #COVID+.  Asymptomtic, patient desires repeat testing given she has others in the home and would like to know how careful she needs to be - has been >14 d since test positve and >3wks since any symptoms.  Will repeat test.  --enoxaparin prophy  #Immune status: Rubella/Rh/GBS   ?MMR/Rhogam/TDaP as indicated  # well being  ?continue breast feeding/pumping upon infant request, encourage skin-skin  #Post partum birth control method: She was counselled about the various options including OCPs, patch, ring, IUDs, and implant.  --desires copper IUD     a. Allow the anesthesiologist (anesthesia doctor) to give me medicine and do additional procedures as necessary.  Some examples are: Starting or using an “IV” to give me medicine, fluids or blood during my procedure, and having a breathing tube placed to he 7. Regional Anesthesia (“spinal”, “epidural”, & “nerve blocks”): I understand that rare but potential complications include headache, bleeding, infection, seizure, irregular heart rhythms, and nerve injury.     I can change my mind about having anesthesia

## 2020-08-30 NOTE — TELEPHONE ENCOUNTER
Typically, we would ask her to try at least one more LESI but if you think that due to her insurance plan that this will not be approved, we can schedule her for the bilateral SI joint injection.   Thanks

## 2020-08-31 ENCOUNTER — TELEPHONE (OUTPATIENT)
Dept: PAIN CLINIC | Facility: CLINIC | Age: 60
End: 2020-08-31

## 2020-08-31 DIAGNOSIS — M46.1 SACROILIITIS (HCC): Primary | ICD-10-CM

## 2020-08-31 DIAGNOSIS — M47.817 SPONDYLOSIS OF LUMBOSACRAL REGION, UNSPECIFIED SPINAL OSTEOARTHRITIS COMPLICATION STATUS: ICD-10-CM

## 2020-08-31 NOTE — TELEPHONE ENCOUNTER
Medical clearance needed- NO     Implanted cardiac device/SCS/PNS: n/a     Pt recommended for Bilateral SIJI with Dr Katerin Barton . Please begin PA process for procedure(s).      SIJI  Laterality: Bilateral   Level(s): n/a     Pt informed callback will be given wh

## 2020-08-31 NOTE — TELEPHONE ENCOUNTER
Basia Warner, APRN     11:21 AM   Note      Typically, we would ask her to try at least one more LESI but if you think that due to her insurance plan that this will not be approved, we can schedule her for the bilateral SI joint injection.   Thanks

## 2020-09-01 NOTE — TELEPHONE ENCOUNTER
Prior authorization request completed for: Nazia Solis    Authorization #[id number]    Authorization dates: 09/01/20 - 02/28/21  CPT codes approved: 76063  Number of visits/dates of service approved: 1  Physician: Dia Lawrence     Patient can be scheduled

## 2020-09-01 NOTE — TELEPHONE ENCOUNTER
Spoke with dtr Josh Johnson and advised of insurance approval to proceed with injection and is agreeable to scheduling. Patient scheduled for procedure, pre-procedure instructions reviewed. Patient prefers local sedation.  Reviewed sedation instructions including ? Please note-No prescriptions will be written by Pain Clinic in [place] on the day of procedure. If you require a refill of medications, please contact the office 48 hours prior to your procedure.   ? If you have an implanted Spinal Cord or Peripheral Nerve Sti Please contact your insurance carrier to determine what your financial responsibility will be for the procedure(s).       Cancellation/Rescheduling Appointment:   In the event you need to cancel or reschedule your appointment, you must notify the office 24

## 2020-09-02 ENCOUNTER — HOSPITAL ENCOUNTER (OUTPATIENT)
Facility: HOSPITAL | Age: 60
Setting detail: HOSPITAL OUTPATIENT SURGERY
Discharge: HOME | End: 2020-09-02
Attending: ANESTHESIOLOGY | Admitting: ANESTHESIOLOGY
Payer: MEDICAID

## 2020-09-02 ENCOUNTER — TELEPHONE (OUTPATIENT)
Dept: ORTHOPEDICS CLINIC | Facility: CLINIC | Age: 60
End: 2020-09-02

## 2020-09-02 ENCOUNTER — APPOINTMENT (OUTPATIENT)
Dept: GENERAL RADIOLOGY | Facility: HOSPITAL | Age: 60
End: 2020-09-02
Attending: ANESTHESIOLOGY
Payer: MEDICAID

## 2020-09-02 VITALS
TEMPERATURE: 98 F | OXYGEN SATURATION: 96 % | SYSTOLIC BLOOD PRESSURE: 116 MMHG | RESPIRATION RATE: 16 BRPM | DIASTOLIC BLOOD PRESSURE: 62 MMHG | HEART RATE: 77 BPM

## 2020-09-02 DIAGNOSIS — M46.1 SACROILIITIS (HCC): ICD-10-CM

## 2020-09-02 DIAGNOSIS — M47.817 SPONDYLOSIS OF LUMBOSACRAL REGION, UNSPECIFIED SPINAL OSTEOARTHRITIS COMPLICATION STATUS: ICD-10-CM

## 2020-09-02 PROCEDURE — 3E0U3BZ INTRODUCTION OF ANESTHETIC AGENT INTO JOINTS, PERCUTANEOUS APPROACH: ICD-10-PCS | Performed by: ANESTHESIOLOGY

## 2020-09-02 PROCEDURE — 3E0U33Z INTRODUCTION OF ANTI-INFLAMMATORY INTO JOINTS, PERCUTANEOUS APPROACH: ICD-10-PCS | Performed by: ANESTHESIOLOGY

## 2020-09-02 RX ORDER — METHYLPREDNISOLONE ACETATE 40 MG/ML
INJECTION, SUSPENSION INTRA-ARTICULAR; INTRALESIONAL; INTRAMUSCULAR; SOFT TISSUE AS NEEDED
Status: DISCONTINUED | OUTPATIENT
Start: 2020-09-02 | End: 2020-09-02

## 2020-09-02 RX ORDER — LIDOCAINE HYDROCHLORIDE 10 MG/ML
INJECTION, SOLUTION EPIDURAL; INFILTRATION; INTRACAUDAL; PERINEURAL AS NEEDED
Status: DISCONTINUED | OUTPATIENT
Start: 2020-09-02 | End: 2020-09-02

## 2020-09-02 RX ORDER — DIPHENHYDRAMINE HYDROCHLORIDE 50 MG/ML
50 INJECTION INTRAMUSCULAR; INTRAVENOUS ONCE AS NEEDED
Status: DISCONTINUED | OUTPATIENT
Start: 2020-09-02 | End: 2020-09-02

## 2020-09-02 RX ORDER — ONDANSETRON 2 MG/ML
4 INJECTION INTRAMUSCULAR; INTRAVENOUS ONCE AS NEEDED
Status: DISCONTINUED | OUTPATIENT
Start: 2020-09-02 | End: 2020-09-02

## 2020-09-02 NOTE — TELEPHONE ENCOUNTER
Nonsurgical treatments for arthritis include steroid injections, physical therapy, anti-inflammatory medications. If those are not providing sufficient relief then it may be worthwhile to discuss knee replacement surgery.  If they would like to discuss that

## 2020-09-02 NOTE — TELEPHONE ENCOUNTER
Spoke to patient daughter Enrico Weinstein, provided her with all the recommendations below. She verbalized understanding and will call back and let us know how they want to proceed.

## 2020-09-02 NOTE — INTERVAL H&P NOTE
Pre-op Diagnosis: Sacroiliitis (HCC) [M46.1]  Spondylosis of lumbosacral region, unspecified spinal osteoarthritis complication status [V54.142]    The above referenced H&P was reviewed by Derrick Mckeon MD on 9/2/2020, the patient was examined and no si

## 2020-09-02 NOTE — TELEPHONE ENCOUNTER
Patient daughter states that pts right knee is not doing any better, last shots were 7.6. 20. Patient daughter is asking what are the next steps.

## 2020-09-02 NOTE — OPERATIVE REPORT
[facility]  Operative Report  2020     Refugia Nita Patient Status:  Hospital Outpatient Surgery    1960 MRN [id number]   [facility] ENDOSCOPY Attending Mariam Townsend MD   Hosp Day # 0 PCP Brandt Waldron MD     Indication: sacroiliac joint. There was a nice sacroiliac joint arthrogram revealed. After that methylprednisolone 40 mg with 2 cc of 1% lidocaine was injected. The needle was flushed with 1 cc of 1% lidocaine. The needles were removed with stylet in situ.   The injec

## 2020-09-10 ENCOUNTER — TELEPHONE (OUTPATIENT)
Dept: FAMILY MEDICINE CLINIC | Facility: CLINIC | Age: 60
End: 2020-09-10

## 2020-09-10 RX ORDER — FAMOTIDINE 20 MG/1
20 TABLET ORAL 2 TIMES DAILY
Qty: 60 TABLET | Refills: 1 | Status: SHIPPED | OUTPATIENT
Start: 2020-09-10 | End: 2021-03-17 | Stop reason: ALTCHOICE

## 2020-09-10 NOTE — TELEPHONE ENCOUNTER
Pt's daughter is not happy with the recommendation for good Rx and would like a medication in a different class than omeprazole ordered for pt. Will speak to Dr. Frederick Alvarado and call back back.

## 2020-09-10 NOTE — TELEPHONE ENCOUNTER
See TE, pt has not been on Naproxen, will find out what refill is being requested when daughter calls back.

## 2020-09-10 NOTE — TELEPHONE ENCOUNTER
Patient's daughter states the Omeprazole is not covered, it needs to be changed to another medication in a different class. She also states she needs a refill for Naproxen, please advise.

## 2020-09-10 NOTE — TELEPHONE ENCOUNTER
Spoke with Ar Ornelas at the pharmacy regarding omeprazole not being covered. Ar Ornelas stated with the state BCBS they typically only 120 pills in 365 days. No PPIs are covered by [employer] per Ar Ornelas. Good Rx is $12.13 at [facility] for a 90 day supply.

## 2020-10-09 RX ORDER — GABAPENTIN 300 MG/1
CAPSULE ORAL
Qty: 180 CAPSULE | Refills: 0 | Status: SHIPPED | OUTPATIENT
Start: 2020-10-09 | End: 2021-04-13

## 2020-10-09 NOTE — TELEPHONE ENCOUNTER
Name from pharmacy: GABAPENTIN 300MG CAPSULES          Will file in chart as: GABAPENTIN 300 MG Oral Cap    Sig: TAKE ONE CAPSULE BY MOUTH TWICE DAILY    Disp:  180 capsule    Refills:  0 (Pharmacy requested: Not specified)    Start: 10/9/2020    Class: No

## 2020-10-12 RX ORDER — GABAPENTIN 100 MG/1
CAPSULE ORAL
Qty: 90 CAPSULE | Refills: 1 | OUTPATIENT
Start: 2020-10-12

## 2020-10-15 ENCOUNTER — OFFICE VISIT (OUTPATIENT)
Dept: ORTHOPEDICS CLINIC | Facility: CLINIC | Age: 60
End: 2020-10-15
Payer: MEDICAID

## 2020-10-15 VITALS — DIASTOLIC BLOOD PRESSURE: 78 MMHG | OXYGEN SATURATION: 99 % | HEART RATE: 80 BPM | SYSTOLIC BLOOD PRESSURE: 124 MMHG

## 2020-10-15 DIAGNOSIS — M17.0 PRIMARY OSTEOARTHRITIS OF BOTH KNEES: Primary | ICD-10-CM

## 2020-10-15 PROCEDURE — 3074F SYST BP LT 130 MM HG: CPT | Performed by: ORTHOPAEDIC SURGERY

## 2020-10-15 PROCEDURE — 3078F DIAST BP <80 MM HG: CPT | Performed by: ORTHOPAEDIC SURGERY

## 2020-10-15 PROCEDURE — 20610 DRAIN/INJ JOINT/BURSA W/O US: CPT | Performed by: ORTHOPAEDIC SURGERY

## 2020-10-15 RX ORDER — TRIAMCINOLONE ACETONIDE 40 MG/ML
40 INJECTION, SUSPENSION INTRA-ARTICULAR; INTRAMUSCULAR ONCE
Status: DISCONTINUED | OUTPATIENT
Start: 2020-10-15 | End: 2020-10-15

## 2020-10-15 RX ORDER — TRIAMCINOLONE ACETONIDE 40 MG/ML
80 INJECTION, SUSPENSION INTRA-ARTICULAR; INTRAMUSCULAR ONCE
Status: COMPLETED | OUTPATIENT
Start: 2020-10-15 | End: 2020-10-15

## 2020-10-15 RX ADMIN — TRIAMCINOLONE ACETONIDE 80 MG: 40 INJECTION, SUSPENSION INTRA-ARTICULAR; INTRAMUSCULAR at 10:33:00

## 2020-10-15 NOTE — PROCEDURES
Patient had great relief from left knee injection performed 3 months ago up until now. For her right knee, she only had 1 week of relief. Wants to repeat injections for both knees today.     After informed consent, the patient's right and left knees were

## 2020-11-09 NOTE — TELEPHONE ENCOUNTER
Member Name: Ritakev Park  Member ID: [id number]  Tracking Number: [id number]      Dear Ephraim Nick:      We have reviewed the appeal received on 8/4/2020 for the approval of Magnetic Resonance Imaging (MRI),  a special kind of picture of your neck without c

## 2020-12-07 ENCOUNTER — TELEPHONE (OUTPATIENT)
Dept: PAIN CLINIC | Facility: CLINIC | Age: 60
End: 2020-12-07

## 2020-12-07 DIAGNOSIS — M47.812 ARTHROPATHY OF CERVICAL FACET JOINT: ICD-10-CM

## 2020-12-07 DIAGNOSIS — M19.011 PRIMARY OSTEOARTHRITIS OF BOTH SHOULDERS: ICD-10-CM

## 2020-12-07 DIAGNOSIS — M54.16 LUMBAR RADICULITIS: Primary | ICD-10-CM

## 2020-12-07 DIAGNOSIS — M19.012 PRIMARY OSTEOARTHRITIS OF BOTH SHOULDERS: ICD-10-CM

## 2020-12-07 NOTE — TELEPHONE ENCOUNTER
Spoke with patient's daughter, Katie Patel . She stated patient has on and off shoulder pain and has been experiencing new pain in her neck for the past few weeks. Per daughter pain worse at night.      No radiating pain, no reports of numbness or tingling

## 2020-12-07 NOTE — TELEPHONE ENCOUNTER
Pt's daughter calling stating that pt is experiencing new, sever neck pain and that her mother has trouble moving her head.  Before scheduling an appt, she would like to know if this is a result of her mother's stenosis, and if she needs another injection o

## 2020-12-08 RX ORDER — TRAMADOL HYDROCHLORIDE 50 MG/1
50 TABLET ORAL EVERY 6 HOURS PRN
Qty: 90 TABLET | Refills: 0 | Status: SHIPPED | OUTPATIENT
Start: 2020-12-08

## 2020-12-08 NOTE — TELEPHONE ENCOUNTER
Brandy Mtz MD  You 9 hours ago (9:57 PM)     Olga Rush, you can send a prescription for tramadol 50 mg every 6 as needed.  I also need to see the patient for follow-up visit to discuss the injection for the neck.  I injected her lower back in the past.

## 2020-12-09 NOTE — TELEPHONE ENCOUNTER
Left a detailed message on patient's daughter's voicemail informing Dr Ashia Fair response below.      Tramadol 50 mg tab sent to [facility]     Receipt confirmed by pharmacy (12/8/2020 10:52 PM CST)      When returns call please help patient schedule

## 2020-12-23 ENCOUNTER — TELEPHONE (OUTPATIENT)
Dept: PAIN CLINIC | Facility: CLINIC | Age: 60
End: 2020-12-23

## 2020-12-23 RX ORDER — METHOCARBAMOL 500 MG/1
500 TABLET, FILM COATED ORAL 3 TIMES DAILY
Qty: 90 TABLET | Refills: 0 | Status: SHIPPED | OUTPATIENT
Start: 2020-12-23

## 2020-12-23 RX ORDER — METHYLPREDNISOLONE 4 MG/1
TABLET ORAL
Qty: 1 KIT | Refills: 0 | Status: SHIPPED | OUTPATIENT
Start: 2020-12-23 | End: 2021-01-04 | Stop reason: ALTCHOICE

## 2020-12-23 RX ORDER — MELOXICAM 15 MG/1
15 TABLET ORAL DAILY
Qty: 30 TABLET | Refills: 0 | Status: SHIPPED | OUTPATIENT
Start: 2020-12-23 | End: 2021-01-04

## 2020-12-23 NOTE — TELEPHONE ENCOUNTER
Pt's daughter calling stating that her mother is in too much pain to wait until the 7th. Daughter is requesting that her mother skip the OV and be scheduled immediately for a shoulder injection. I informed pt of Dr. Elson Gosselin response in that because this is n

## 2020-12-24 NOTE — TELEPHONE ENCOUNTER
Name: Jessica De La Torre   : 1960   DOS: 2020     Pain Clinic Follow Up Visit:   Jessica De La Torre is a 63year old female who presents for recheck of her chronic low back and neck pain. She is status post lumbar epidural steroid injection.   Her lower yanet before breakfast. Recheck labs in 6-8 wks 60 tablet 0         EXAM:   Severe tenderness over the right cervical paravertebral muscle. Flexion of the neck makes the pain better, extension and lateral rotation of the neck makes the pain worse.   Motor 5 out procedure. I also recommended her to take Medrol Dosepak use as directed, meloxicam 15 mg daily and Robaxin 500 mg p.o. 3 times daily. Orders:No orders of the defined types were placed in this encounter.       Medications filled today:  Requested Prescr

## 2020-12-24 NOTE — TELEPHONE ENCOUNTER
Sailaja So MD  You 13 hours ago (7:31 PM)     Maria Elena Kinsey, I have spoken to the patient and her daughter. Norma Aguilar recommended her to come back for right diagnostic cervical facet joint injection followed by right shoulder joint injection.  I also prescribed

## 2020-12-24 NOTE — TELEPHONE ENCOUNTER
Prior authorization request completed for: Right C4-7 FACET   Authorization #[id number]     Authorization dates: 12/24/20 - 06/22/21  CPT codes approved: 11790,55237,61184  Number of visits/dates of service approved: 1  Physician: Sumit Garcia     Patient can be s

## 2020-12-24 NOTE — TELEPHONE ENCOUNTER
**Please use note attached to this TE for clearance**    Medical clearance needed- No    Implanted cardiac device/SCS/PNS: N/A    Pt evaluated by Dr Denney Primrose and recommended for right diagnostic cervical facet joint injection followed by right shoulder joint i

## 2020-12-29 ENCOUNTER — TELEPHONE (OUTPATIENT)
Dept: PAIN CLINIC | Facility: CLINIC | Age: 60
End: 2020-12-29

## 2020-12-29 RX ORDER — HYDROCHLOROTHIAZIDE 25 MG/1
TABLET ORAL
Qty: 90 TABLET | Refills: 0 | Status: SHIPPED | OUTPATIENT
Start: 2020-12-29 | End: 2021-04-05

## 2020-12-29 NOTE — TELEPHONE ENCOUNTER
Hypertension Medications Protocol [id number]/28/2020 05:55 AM   CMP or BMP in past 12 months Protocol Details    Appointment in past 6 or next 3 months     Last serum creatinine< 2.0      Refill protocol failed because the patient did not meet the protocol c

## 2020-12-29 NOTE — TELEPHONE ENCOUNTER
Lmtcb to reschedule 1/7 appt with Dr. Sumit Garcia per Dr. Sumit Garcia no longer being in the office at that time. I informed pt in message that appt would be cancelled. If pt calls back, please offer pt 1/4 in the afternoon.

## 2021-01-04 ENCOUNTER — OFFICE VISIT (OUTPATIENT)
Dept: PAIN CLINIC | Facility: CLINIC | Age: 61
End: 2021-01-04
Payer: MEDICAID

## 2021-01-04 VITALS
BODY MASS INDEX: 34 KG/M2 | DIASTOLIC BLOOD PRESSURE: 80 MMHG | WEIGHT: 175 LBS | SYSTOLIC BLOOD PRESSURE: 130 MMHG | HEART RATE: 120 BPM

## 2021-01-04 DIAGNOSIS — M17.11 PRIMARY OSTEOARTHRITIS OF RIGHT KNEE: ICD-10-CM

## 2021-01-04 DIAGNOSIS — M47.812 ARTHROPATHY OF CERVICAL FACET JOINT: Primary | ICD-10-CM

## 2021-01-04 DIAGNOSIS — M19.011 PRIMARY OSTEOARTHRITIS OF BOTH SHOULDERS: ICD-10-CM

## 2021-01-04 DIAGNOSIS — M19.012 PRIMARY OSTEOARTHRITIS OF BOTH SHOULDERS: ICD-10-CM

## 2021-01-04 PROCEDURE — 3075F SYST BP GE 130 - 139MM HG: CPT | Performed by: ANESTHESIOLOGY

## 2021-01-04 PROCEDURE — 99215 OFFICE O/P EST HI 40 MIN: CPT | Performed by: ANESTHESIOLOGY

## 2021-01-04 PROCEDURE — 3079F DIAST BP 80-89 MM HG: CPT | Performed by: ANESTHESIOLOGY

## 2021-01-04 NOTE — PROGRESS NOTES
Patient presents in office today with reported pain in neck, both sides radiating to right arm     Current pain level reported = 7-8/10    Last reported dose of Tramadol was yesterday 1/3/21 morning

## 2021-01-05 NOTE — PROGRESS NOTES
Name: Brittney Perdomo   : 1960   DOS: 2021      Pain Clinic Follow Up Visit:   Brittney Perdomo is a 57year old female who presents for recheck of her chronic low back and neck pain. She is status post lumbar epidural steroid injection.   Her lower back daily. 60 tablet 1   • atorvastatin 10 MG Oral Tab TAKE 1 TABLET BY MOUTH IN THE MORNING 90 tablet 1   • cyclobenzaprine 10 MG Oral Tab Take 1 tablet (10 mg total) by mouth 3 (three) times daily as needed for Muscle spasms.  (Patient not taking: Reported on of the left shoulder joint:  Flexion 0-170, abduction 0-160, external rotation 0-70, internal rotation 0-70, abduction and extension 0-50 degrees. Positive sub-acromial bursitis. Positive impingement. Negative drop arm.   Negative Speed's and positive O' MD,1/4/2021, 7:05 PM

## 2021-01-06 ENCOUNTER — TELEPHONE (OUTPATIENT)
Dept: FAMILY MEDICINE CLINIC | Facility: CLINIC | Age: 61
End: 2021-01-06

## 2021-01-06 ENCOUNTER — TELEPHONE (OUTPATIENT)
Dept: NEUROLOGY | Facility: CLINIC | Age: 61
End: 2021-01-06

## 2021-01-06 ENCOUNTER — VIRTUAL PHONE E/M (OUTPATIENT)
Dept: FAMILY MEDICINE CLINIC | Facility: CLINIC | Age: 61
End: 2021-01-06
Payer: MEDICAID

## 2021-01-06 DIAGNOSIS — M17.0 PRIMARY OSTEOARTHRITIS OF BOTH KNEES: Primary | ICD-10-CM

## 2021-01-06 DIAGNOSIS — E03.9 HYPOTHYROIDISM, UNSPECIFIED TYPE: Primary | ICD-10-CM

## 2021-01-06 DIAGNOSIS — I10 ESSENTIAL HYPERTENSION: ICD-10-CM

## 2021-01-06 PROCEDURE — 99214 OFFICE O/P EST MOD 30 MIN: CPT | Performed by: FAMILY MEDICINE

## 2021-01-06 RX ORDER — LISINOPRIL 20 MG/1
20 TABLET ORAL DAILY
Qty: 90 TABLET | Refills: 1 | Status: SHIPPED | OUTPATIENT
Start: 2021-01-06 | End: 2021-06-23

## 2021-01-06 NOTE — TELEPHONE ENCOUNTER
Future Appointments   Date Time Provider Alva Guerrero   1/6/2021  4:00 PM Jonathan Agrawal MD [facility] [address] [facility] [address]     Patient verbally consents to a virtual/telephone check-in service for the date and time noted above.  Patient understands and accepts

## 2021-01-06 NOTE — PROGRESS NOTES
Virtual Telephone Check-In    ALEC Gerber verbally consents to a Virtual/Telephone Check-In visit on 01/06/21. Patient has been referred to the [facility] website at [email or web address] to review the yearly Consent to Treat document.     Patient understands

## 2021-01-06 NOTE — TELEPHONE ENCOUNTER
Scottie Monsivais  [facility] Pain Nurse 19 minutes ago (1:54 PM)     Please advise order in which Injections are going to be done. Provider has order 3 type of Injections.  Thank You       First: Right Diagnostic Cervical Facets  Second: Shoulder Joint Injection  Thir

## 2021-01-06 NOTE — PROGRESS NOTES
HPI:    Patient ID: Amos Lawrence is a 57year old female. HPI  Ms. Gerber is a 56 y/o F with H/O of HTN and arthritis presenting for phone visit. I had mainly spoken to her daughter. Back in 2019 she had routine labs done which showed elevated TSH.   Levot Allergies   PHYSICAL EXAM:   Physical Exam   Constitutional:   Unable to assess due to unavailability              ASSESSMENT/PLAN:   Hypothyroidism, unspecified type  (primary encounter diagnosis)  -Stable; recheck TSH level and if it will be elevated qian

## 2021-01-06 NOTE — PATIENT INSTRUCTIONS
Thank you for choosing Jaquelin Espinoza MD at [place] [address]  To Do: Darrin Gerber  1. Please have labs done as ordered  [facility] is located in [address]. Monday, Tuesday & Friday – 8 a.m. to 4 p.m. Wednesday, Thursday – 7 a.m. to 3 p.m. those potential risks and we strive to make you healthier and to improve your quality of life.     Referrals, and Radiology Information:    If your insurance requires a referral to a specialist, please allow 5 business days to process your referral request.

## 2021-01-07 NOTE — TELEPHONE ENCOUNTER
Spoke with patient dtr Grayson Pineda to reschedule 1/11 injection to 1/13 checking in at 9:15am (10:15am). Grayson Pineda stated transportation is the issue but 1/13 should be fine. Grayson Pineda was appreciative of the call. No further needs.

## 2021-01-07 NOTE — TELEPHONE ENCOUNTER
Spoke with Carine Deal, pts daughter, advised of insurance approval to proceed with injections and is agreeable to scheduling.  Carine Deal was asked to confirm she would like PA started on both the shoulder and knee inj as well, confirmed she would like this to be don ? If you have an implanted Spinal Cord or Peripheral Nerve Stimulator: Please remember to turn device off for procedure. Medication:   Number of days you need to be off for the following medications:  ? Aggrenox 10 days   ?  Agrylin (Anagrelide) 10 d Please schedule a follow up visit within 2 to 4 weeks after your last procedure date   Please call our office with any questions or concerns before or after your procedure at  [phone].   If you are a diabetic, please increase the frequency of you

## 2021-01-07 NOTE — TELEPHONE ENCOUNTER
LMTCB    VM left regarding attempts to contact to schedule and if wanting to proceed to call our office back, office hours and phone number provided.  Does not have [employer] therefore unable to send message

## 2021-01-11 NOTE — TELEPHONE ENCOUNTER
Spoke to Hanna, pt's daughter, who states pt is getting an injection on Wednesday of this week. Hanna states she took one dose of fish oil today and one dose on Saturday.  Hanna reports that pt has been taking fish oil every other day for the past couple of

## 2021-01-12 NOTE — TELEPHONE ENCOUNTER
Medical clearance requested -NO     Prior authorization request completed for: Bilateral Shoulder Joint Injection   Authorization #No Prior Authorization is Required -as long as provider is In [employer] with Will at Wendy COVARRUBIAS/ Dept [phone]

## 2021-01-12 NOTE — TELEPHONE ENCOUNTER
Question Answer Comment   Anesthesia Type Sedation    Provider [facility]    Location Lab    Procedure Other (please add comment)    Medications to hold Bilateral shoulder joint injection under fluoroscopy    Implants No    Medical clearance requested (will send

## 2021-01-12 NOTE — TELEPHONE ENCOUNTER
Spoke with Mikal Bella, pts daughter, advised of insurance approval to proceed with injections and is agreeable to scheduling. Patient scheduled for procedure, pre-procedure instructions reviewed.  Mikal Bella requested a later time however was advised of new schedulin ? Please note-No prescriptions will be written by Pain Clinic in [place] on the day of procedure. If you require a refill of medications, please contact the office 48 hours prior to your procedure.   ? If you have an implanted Spinal Cord or Peripheral Nerve Sti Please contact your insurance carrier to determine what your financial responsibility will be for the procedure(s).       Cancellation/Rescheduling Appointment:   In the event you need to cancel or reschedule your appointment, you must notify the office 24

## 2021-01-18 ENCOUNTER — HOSPITAL ENCOUNTER (OUTPATIENT)
Facility: HOSPITAL | Age: 61
Setting detail: HOSPITAL OUTPATIENT SURGERY
Discharge: HOME | End: 2021-01-18
Attending: ANESTHESIOLOGY | Admitting: ANESTHESIOLOGY
Payer: MEDICAID

## 2021-01-18 ENCOUNTER — APPOINTMENT (OUTPATIENT)
Dept: GENERAL RADIOLOGY | Facility: HOSPITAL | Age: 61
End: 2021-01-18
Attending: ANESTHESIOLOGY
Payer: MEDICAID

## 2021-01-18 VITALS
RESPIRATION RATE: 18 BRPM | SYSTOLIC BLOOD PRESSURE: 145 MMHG | TEMPERATURE: 97 F | DIASTOLIC BLOOD PRESSURE: 78 MMHG | HEART RATE: 81 BPM | OXYGEN SATURATION: 98 %

## 2021-01-18 DIAGNOSIS — M47.812 ARTHROPATHY OF CERVICAL FACET JOINT: ICD-10-CM

## 2021-01-18 PROCEDURE — 3E0U33Z INTRODUCTION OF ANTI-INFLAMMATORY INTO JOINTS, PERCUTANEOUS APPROACH: ICD-10-PCS | Performed by: ANESTHESIOLOGY

## 2021-01-18 RX ORDER — SODIUM CHLORIDE 9 MG/ML
INJECTION INTRAVENOUS AS NEEDED
Status: DISCONTINUED | OUTPATIENT
Start: 2021-01-18 | End: 2021-01-18

## 2021-01-18 RX ORDER — ONDANSETRON 2 MG/ML
4 INJECTION INTRAMUSCULAR; INTRAVENOUS ONCE AS NEEDED
Status: DISCONTINUED | OUTPATIENT
Start: 2021-01-18 | End: 2021-01-18

## 2021-01-18 RX ORDER — DEXAMETHASONE SODIUM PHOSPHATE 10 MG/ML
INJECTION, SOLUTION INTRAMUSCULAR; INTRAVENOUS AS NEEDED
Status: DISCONTINUED | OUTPATIENT
Start: 2021-01-18 | End: 2021-01-18

## 2021-01-18 RX ORDER — SODIUM CHLORIDE, SODIUM LACTATE, POTASSIUM CHLORIDE, CALCIUM CHLORIDE 600; 310; 30; 20 MG/100ML; MG/100ML; MG/100ML; MG/100ML
100 INJECTION, SOLUTION INTRAVENOUS CONTINUOUS
Status: DISCONTINUED | OUTPATIENT
Start: 2021-01-18 | End: 2021-01-18

## 2021-01-18 RX ORDER — DIPHENHYDRAMINE HYDROCHLORIDE 50 MG/ML
50 INJECTION INTRAMUSCULAR; INTRAVENOUS ONCE AS NEEDED
Status: DISCONTINUED | OUTPATIENT
Start: 2021-01-18 | End: 2021-01-18

## 2021-01-18 RX ORDER — LIDOCAINE HYDROCHLORIDE 10 MG/ML
INJECTION, SOLUTION EPIDURAL; INFILTRATION; INTRACAUDAL; PERINEURAL AS NEEDED
Status: DISCONTINUED | OUTPATIENT
Start: 2021-01-18 | End: 2021-01-18

## 2021-01-18 NOTE — OPERATIVE REPORT
[facility]  Operative Report  2021     Massimo Lambert Patient Status:  Hospital Outpatient Surgery    1960 MRN [id number]   [facility] ENDOSCOPY Attending Harmeet Veras MD   Hosp Day # 0 PCP Get Flores MD     Indication: complication. The needle was withdrawn with stylet in situ after being flushed with 0.5 mL lidocaine. The patient tolerated procedure very well. The patient was observed until discharge criteria met.   Discharge instructions were given and patient was re

## 2021-01-18 NOTE — TELEPHONE ENCOUNTER
Pt's daughter calling wanting to reschedule upcoming injection and also to discuss her knee injection. Pt's daughter awaiting call back at [phone].

## 2021-01-18 NOTE — H&P (VIEW-ONLY)
History & Physical Examination    Patient Name: Adelita Lau  MRN: [id number]  CSN: [id number]  YOB: 1960    Pre-Operative Diagnosis:  Arthropathy of cervical facet joint [M47.812]    Present Illness: 78-year-old female patient with chronic neck INJECTION N/A 8/12/2020    Performed by Kacy Veliz MD at [place] [address] INJECTION BILATERAL Bilateral 9/2/2020    Performed by Kacy Veliz MD at [facility] ENDOSCOPY     Family History   Problem Relation Age of Onset   • No Known Pr

## 2021-01-18 NOTE — TELEPHONE ENCOUNTER
Spoke with Gloria Marin, pts daughter, requesting to change the check-in time of the 1/25 injection from 9am to 9:45am (case time 10:45am).      Gloria Marin is requesting the synvisc knee injection be for bilateral knees, not the right knee only (wants them done at the

## 2021-01-18 NOTE — H&P
History & Physical Examination    Patient Name: Brittney Perdomo  MRN: [id number]  CSN: [id number]  YOB: 1960    Pre-Operative Diagnosis:  Arthropathy of cervical facet joint [M47.812]    Present Illness: 74-year-old female patient with chronic neck INJECTION N/A 8/12/2020    Performed by Noel Clarke MD at [facility] 18 INJECTION BILATERAL Bilateral 9/2/2020    Performed by Noel Clarke MD at [facility] ENDOSCOPY     Family History   Problem Relation Age of Onset   • No Known Pr

## 2021-01-20 NOTE — TELEPHONE ENCOUNTER
Spoke with PA to advise we will need auth for left knee synvisc, right knee has already been submitted.

## 2021-01-20 NOTE — TELEPHONE ENCOUNTER
Teressa Richardson MD  You 11 hours ago (8:52 PM)     Bjorn Simmons, it is not recommended to do bilateral knee joint Synvisc injection same day because of the amount of Synvisc is 12 cc.  I could only use 6 cc in 1 daY.     Message text       [address], [address] [facility],

## 2021-01-21 NOTE — TELEPHONE ENCOUNTER
Spoke to Hanna who states two time slots are required in the event that the first one does not happen. A specific doctor is required to complete peer to peer for Synvisc.  Physician reviewer w/ [facility] is not available at [place] as the earliest a

## 2021-01-21 NOTE — TELEPHONE ENCOUNTER
Adri Chapman MD  You 1 hour ago (12:01 PM)     Adelene Feeling, please schedule a peer to peer      Samara Hurt to schedule peer to peer.  Informed that, because this is a specialty drug, two time frames are required on 2 separate dates for a duration o

## 2021-01-21 NOTE — TELEPHONE ENCOUNTER
Synvisc () Denied      Denial Reason:     Your records show a request for a drug called Synvisc. It is used to treat osteoarthritis. This is pain in a joint due to bone rubbing against bone.   Use of this drug is supported if at least two of the foll

## 2021-01-25 ENCOUNTER — APPOINTMENT (OUTPATIENT)
Dept: GENERAL RADIOLOGY | Facility: HOSPITAL | Age: 61
End: 2021-01-25
Attending: ANESTHESIOLOGY
Payer: MEDICAID

## 2021-01-25 ENCOUNTER — HOSPITAL ENCOUNTER (OUTPATIENT)
Facility: HOSPITAL | Age: 61
Setting detail: HOSPITAL OUTPATIENT SURGERY
Discharge: HOME | End: 2021-01-25
Attending: ANESTHESIOLOGY | Admitting: ANESTHESIOLOGY
Payer: MEDICAID

## 2021-01-25 VITALS
OXYGEN SATURATION: 99 % | TEMPERATURE: 99 F | RESPIRATION RATE: 15 BRPM | HEART RATE: 80 BPM | SYSTOLIC BLOOD PRESSURE: 130 MMHG | DIASTOLIC BLOOD PRESSURE: 60 MMHG

## 2021-01-25 DIAGNOSIS — M19.012 PRIMARY OSTEOARTHRITIS OF BOTH SHOULDERS: ICD-10-CM

## 2021-01-25 DIAGNOSIS — M19.011 PRIMARY OSTEOARTHRITIS OF BOTH SHOULDERS: ICD-10-CM

## 2021-01-25 PROCEDURE — 77002 NEEDLE LOCALIZATION BY XRAY: CPT | Performed by: ANESTHESIOLOGY

## 2021-01-25 PROCEDURE — 3E0U33Z INTRODUCTION OF ANTI-INFLAMMATORY INTO JOINTS, PERCUTANEOUS APPROACH: ICD-10-PCS | Performed by: ANESTHESIOLOGY

## 2021-01-25 RX ORDER — DIPHENHYDRAMINE HYDROCHLORIDE 50 MG/ML
50 INJECTION INTRAMUSCULAR; INTRAVENOUS ONCE AS NEEDED
Status: DISCONTINUED | OUTPATIENT
Start: 2021-01-25 | End: 2021-01-25

## 2021-01-25 RX ORDER — METHYLPREDNISOLONE ACETATE 40 MG/ML
INJECTION, SUSPENSION INTRA-ARTICULAR; INTRALESIONAL; INTRAMUSCULAR; SOFT TISSUE AS NEEDED
Status: DISCONTINUED | OUTPATIENT
Start: 2021-01-25 | End: 2021-01-25

## 2021-01-25 RX ORDER — LIDOCAINE HYDROCHLORIDE 10 MG/ML
INJECTION, SOLUTION EPIDURAL; INFILTRATION; INTRACAUDAL; PERINEURAL AS NEEDED
Status: DISCONTINUED | OUTPATIENT
Start: 2021-01-25 | End: 2021-01-25

## 2021-01-25 RX ORDER — ONDANSETRON 2 MG/ML
4 INJECTION INTRAMUSCULAR; INTRAVENOUS ONCE AS NEEDED
Status: DISCONTINUED | OUTPATIENT
Start: 2021-01-25 | End: 2021-01-25

## 2021-01-25 RX ORDER — SODIUM CHLORIDE, SODIUM LACTATE, POTASSIUM CHLORIDE, CALCIUM CHLORIDE 600; 310; 30; 20 MG/100ML; MG/100ML; MG/100ML; MG/100ML
100 INJECTION, SOLUTION INTRAVENOUS CONTINUOUS
Status: DISCONTINUED | OUTPATIENT
Start: 2021-01-25 | End: 2021-01-25

## 2021-01-25 NOTE — TELEPHONE ENCOUNTER
Dr. Shaji Frank called promptly at 1:00 PM, however Dr. HARDY LEGER had not returned from the procedure area. Asked if peer to peer could be postponed or rescheduled. Dr. Shaji Frank [facility]      Contacted

## 2021-01-25 NOTE — OPERATIVE REPORT
[facility]  Operative Report  2021     Amos Lawrence Patient Status:  Hospital Outpatient Surgery    1960 MRN [id number]   [facility] ENDOSCOPY Attending Frannie Carl MD   Hosp Day # 0 PCP Tre Calderón MD     Indication: pain clinic as needed basis.       Madisyn Roth MD

## 2021-01-25 NOTE — INTERVAL H&P NOTE
Pre-op Diagnosis: Primary osteoarthritis of both shoulders [M19.011, M19.012]    The above referenced H&P was reviewed by Mayra Bentley MD on 1/25/2021, the patient was examined and no significant changes have occurred in the patient's condition since t

## 2021-01-25 NOTE — TELEPHONE ENCOUNTER
See TE Dated 01/06/21    Synvisc () Denied      Denial Reason:     Your records show a request for a drug called Synvisc.  It is used to treat osteoarthritis.  This is pain in a joint due to bone rubbing against bone.  Use of this drug is supported if

## 2021-01-26 NOTE — TELEPHONE ENCOUNTER
Aakash Singh MD  You 5 minutes ago (9:12 AM)     Elaine De Leon it is appropriate to due to steroid injection without any problem. Message text        Per Dr Xochitl Cameron - To initiate PA for Bilateral Knee steroid injections     Referral placed.

## 2021-01-26 NOTE — TELEPHONE ENCOUNTER
Spoke with Ilan Fearing to attempt to reschedule peer to peer that was missed yesterday    Informed Romie Begum office did not receive a call from Dr Daphne Kurtz at 3 PM to complete a peer to peer.  Per Romie Begum last day for peer to peer was yesterday and cannot be exten

## 2021-01-26 NOTE — TELEPHONE ENCOUNTER
Left a message for patient's daughter Aditya Both to call office back. When returns call to inform of denial and provider recommendations below     Patient will be contacted to schedule once insurance approval is received for bilateral knee steroid injections.

## 2021-01-29 NOTE — TELEPHONE ENCOUNTER
Medical clearance requested -No     Prior authorization request completed for: Bilateral Knee Steroid Injections  Authorization #No Prior Authorization is Required done Outpatient as long as Provider is In Network.    Spoke with Will at [facility] [place]

## 2021-01-29 NOTE — TELEPHONE ENCOUNTER
Spoke with Katie Patel (pts daughter) advised of insurance approval to proceed with injections and is agreeable to scheduling. Patient scheduled for procedure, pre-procedure instructions reviewed. Patient prefers local sedation.  Reviewed sedation instructions in ? Please note-No prescriptions will be written by Pain Clinic in [place] on the day of procedure. If you require a refill of medications, please contact the office 48 hours prior to your procedure.   ? If you have an implanted Spinal Cord or Peripheral Nerve Sti Please contact your insurance carrier to determine what your financial responsibility will be for the procedure(s).       Cancellation/Rescheduling Appointment:   In the event you need to cancel or reschedule your appointment, you must notify the office 24

## 2021-02-03 NOTE — TELEPHONE ENCOUNTER
Spoke with patients Cesar Jama, requesting to change time of appt to a later time if available. Asuncion Rivera was advised there is a 10:30 check in (case time 11:30 available).  Asuncion Rivera stated she thinks that time would work, she needs to find out from the trans

## 2021-02-08 NOTE — TELEPHONE ENCOUNTER
Pt's daughter calling to cancel today's procedure due to car troubles. Pt's daughter states she will call back later to reschedule.

## 2021-02-22 NOTE — TELEPHONE ENCOUNTER
Spoke with Precious Natalia, requesting to reschedule her mother's knee injections.  Offered dates and times, Nachusamelissa Fisher prefers this Wednesday 2/24 (checking in at 9:30am, case time 10:30am) stated she would call the transportation company to see if the are available this

## 2021-02-24 ENCOUNTER — HOSPITAL ENCOUNTER (OUTPATIENT)
Facility: HOSPITAL | Age: 61
Setting detail: HOSPITAL OUTPATIENT SURGERY
Discharge: HOME | End: 2021-02-24
Attending: ANESTHESIOLOGY | Admitting: ANESTHESIOLOGY
Payer: MEDICAID

## 2021-02-24 ENCOUNTER — APPOINTMENT (OUTPATIENT)
Dept: GENERAL RADIOLOGY | Facility: HOSPITAL | Age: 61
End: 2021-02-24
Attending: ANESTHESIOLOGY
Payer: MEDICAID

## 2021-02-24 VITALS
OXYGEN SATURATION: 100 % | HEART RATE: 77 BPM | TEMPERATURE: 99 F | DIASTOLIC BLOOD PRESSURE: 70 MMHG | SYSTOLIC BLOOD PRESSURE: 140 MMHG | RESPIRATION RATE: 17 BRPM

## 2021-02-24 DIAGNOSIS — M17.0 PRIMARY OSTEOARTHRITIS OF BOTH KNEES: ICD-10-CM

## 2021-02-24 PROCEDURE — 77002 NEEDLE LOCALIZATION BY XRAY: CPT | Performed by: ANESTHESIOLOGY

## 2021-02-24 PROCEDURE — 3E0U33Z INTRODUCTION OF ANTI-INFLAMMATORY INTO JOINTS, PERCUTANEOUS APPROACH: ICD-10-PCS | Performed by: ANESTHESIOLOGY

## 2021-02-24 PROCEDURE — BQ17YZZ FLUOROSCOPY OF RIGHT KNEE USING OTHER CONTRAST: ICD-10-PCS | Performed by: ANESTHESIOLOGY

## 2021-02-24 PROCEDURE — 3E0U3BZ INTRODUCTION OF ANESTHETIC AGENT INTO JOINTS, PERCUTANEOUS APPROACH: ICD-10-PCS | Performed by: ANESTHESIOLOGY

## 2021-02-24 PROCEDURE — BQ18YZZ FLUOROSCOPY OF LEFT KNEE USING OTHER CONTRAST: ICD-10-PCS | Performed by: ANESTHESIOLOGY

## 2021-02-24 RX ORDER — DIPHENHYDRAMINE HYDROCHLORIDE 50 MG/ML
50 INJECTION INTRAMUSCULAR; INTRAVENOUS ONCE AS NEEDED
Status: DISCONTINUED | OUTPATIENT
Start: 2021-02-24 | End: 2021-02-24

## 2021-02-24 RX ORDER — ONDANSETRON 2 MG/ML
4 INJECTION INTRAMUSCULAR; INTRAVENOUS ONCE AS NEEDED
Status: DISCONTINUED | OUTPATIENT
Start: 2021-02-24 | End: 2021-02-24

## 2021-02-24 RX ORDER — LIDOCAINE HYDROCHLORIDE 10 MG/ML
INJECTION, SOLUTION EPIDURAL; INFILTRATION; INTRACAUDAL; PERINEURAL AS NEEDED
Status: DISCONTINUED | OUTPATIENT
Start: 2021-02-24 | End: 2021-02-24

## 2021-02-24 RX ORDER — METHYLPREDNISOLONE ACETATE 40 MG/ML
INJECTION, SUSPENSION INTRA-ARTICULAR; INTRALESIONAL; INTRAMUSCULAR; SOFT TISSUE AS NEEDED
Status: DISCONTINUED | OUTPATIENT
Start: 2021-02-24 | End: 2021-02-24

## 2021-02-24 NOTE — H&P
History & Physical Examination    Patient Name: Constantine Pettit  MRN: [id number]  [facility]: [id number]  YOB: 1960    Pre-Operative Diagnosis:  Primary osteoarthritis of both knees [M17.0]    Present Illness: 21-year-old female patient with chronic knee • EXCIS INFRATENT BRAIN TUMOR     • LUMBAR INTERLAMINAR EPIDURAL INJECTION N/A 8/12/2020    Performed by Sailaja So MD at [address] [place] [address] 9/2/2020    Performed by Sailaja So MD at [facility] ENDOSCOPY

## 2021-02-25 NOTE — OPERATIVE REPORT
[facility]  Operative Report  2021     Jolynn Concepcion Patient Status:  Hospital Outpatient Surgery    1960 MRN [id number]   [facility] ENDOSCOPY Attending No att. providers found   [facility] Day # 0 PCP MD Tree Candelaria Complications: None. Follow up: The patient will be followed in the pain clinic as needed basis.     Ranjeet Webster MD

## 2021-03-08 ENCOUNTER — LAB ENCOUNTER (OUTPATIENT)
Dept: LAB | Age: 61
End: 2021-03-08
Attending: FAMILY MEDICINE
Payer: MEDICAID

## 2021-03-08 ENCOUNTER — TELEPHONE (OUTPATIENT)
Dept: PAIN CLINIC | Facility: CLINIC | Age: 61
End: 2021-03-08

## 2021-03-08 DIAGNOSIS — I10 ESSENTIAL HYPERTENSION: ICD-10-CM

## 2021-03-08 DIAGNOSIS — E03.9 HYPOTHYROIDISM, UNSPECIFIED TYPE: ICD-10-CM

## 2021-03-08 DIAGNOSIS — M19.90 ARTHRITIS: Primary | ICD-10-CM

## 2021-03-08 LAB
ALBUMIN SERPL-MCNC: 3.3 G/DL (ref 3.4–5)
ALBUMIN/GLOB SERPL: 0.8 {RATIO} (ref 1–2)
ALP LIVER SERPL-CCNC: 65 U/L
ALT SERPL-CCNC: 24 U/L
ANION GAP SERPL CALC-SCNC: 6 MMOL/L (ref 0–18)
AST SERPL-CCNC: 15 U/L (ref 15–37)
BASOPHILS # BLD AUTO: 0.02 X10(3) UL (ref 0–0.2)
BASOPHILS NFR BLD AUTO: 0.2 %
BILIRUB SERPL-MCNC: 0.4 MG/DL (ref 0.1–2)
BUN BLD-MCNC: 17 MG/DL (ref 7–18)
BUN/CREAT SERPL: 16.8 (ref 10–20)
CALCIUM BLD-MCNC: 9.3 MG/DL (ref 8.5–10.1)
CHLORIDE SERPL-SCNC: 105 MMOL/L (ref 98–112)
CHOLEST SMN-MCNC: 158 MG/DL (ref ?–200)
CO2 SERPL-SCNC: 29 MMOL/L (ref 21–32)
CREAT BLD-MCNC: 1.01 MG/DL
DEPRECATED RDW RBC AUTO: 43.9 FL (ref 35.1–46.3)
EOSINOPHIL # BLD AUTO: 0.16 X10(3) UL (ref 0–0.7)
EOSINOPHIL NFR BLD AUTO: 1.7 %
ERYTHROCYTE [DISTWIDTH] IN BLOOD BY AUTOMATED COUNT: 14.3 % (ref 11–15)
EST. AVERAGE GLUCOSE BLD GHB EST-MCNC: 140 MG/DL (ref 68–126)
GLOBULIN PLAS-MCNC: 4.2 G/DL (ref 2.8–4.4)
GLUCOSE BLD-MCNC: 111 MG/DL (ref 70–99)
HBA1C MFR BLD HPLC: 6.5 % (ref ?–5.7)
HCT VFR BLD AUTO: 37.4 %
HDLC SERPL-MCNC: 59 MG/DL (ref 40–59)
HGB BLD-MCNC: 12.1 G/DL
IMM GRANULOCYTES # BLD AUTO: 0.04 X10(3) UL (ref 0–1)
IMM GRANULOCYTES NFR BLD: 0.4 %
LDLC SERPL CALC-MCNC: 76 MG/DL (ref ?–100)
LYMPHOCYTES # BLD AUTO: 2.78 X10(3) UL (ref 1–4)
LYMPHOCYTES NFR BLD AUTO: 29.4 %
M PROTEIN MFR SERPL ELPH: 7.5 G/DL (ref 6.4–8.2)
MCH RBC QN AUTO: 27.8 PG (ref 26–34)
MCHC RBC AUTO-ENTMCNC: 32.4 G/DL (ref 31–37)
MCV RBC AUTO: 85.8 FL
MONOCYTES # BLD AUTO: 0.77 X10(3) UL (ref 0.1–1)
MONOCYTES NFR BLD AUTO: 8.1 %
NEUTROPHILS # BLD AUTO: 5.68 X10 (3) UL (ref 1.5–7.7)
NEUTROPHILS # BLD AUTO: 5.68 X10(3) UL (ref 1.5–7.7)
NEUTROPHILS NFR BLD AUTO: 60.2 %
NONHDLC SERPL-MCNC: 99 MG/DL (ref ?–130)
OSMOLALITY SERPL CALC.SUM OF ELEC: 292 MOSM/KG (ref 275–295)
PATIENT FASTING Y/N/NP: YES
PATIENT FASTING Y/N/NP: YES
PLATELET # BLD AUTO: 348 10(3)UL (ref 150–450)
POTASSIUM SERPL-SCNC: 3.4 MMOL/L (ref 3.5–5.1)
RBC # BLD AUTO: 4.36 X10(6)UL
SODIUM SERPL-SCNC: 140 MMOL/L (ref 136–145)
T4 FREE SERPL-MCNC: 1.4 NG/DL (ref 0.8–1.7)
TRIGL SERPL-MCNC: 117 MG/DL (ref 30–149)
TSI SER-ACNC: 3.67 MIU/ML (ref 0.36–3.74)
VLDLC SERPL CALC-MCNC: 23 MG/DL (ref 0–30)
WBC # BLD AUTO: 9.5 X10(3) UL (ref 4–11)

## 2021-03-08 PROCEDURE — 36415 COLL VENOUS BLD VENIPUNCTURE: CPT

## 2021-03-08 PROCEDURE — 84439 ASSAY OF FREE THYROXINE: CPT

## 2021-03-08 PROCEDURE — 80053 COMPREHEN METABOLIC PANEL: CPT

## 2021-03-08 PROCEDURE — 80061 LIPID PANEL: CPT

## 2021-03-08 PROCEDURE — 83036 HEMOGLOBIN GLYCOSYLATED A1C: CPT

## 2021-03-08 PROCEDURE — 85025 COMPLETE CBC W/AUTO DIFF WBC: CPT

## 2021-03-08 PROCEDURE — 84443 ASSAY THYROID STIM HORMONE: CPT

## 2021-03-08 RX ORDER — NAPROXEN 500 MG/1
500 TABLET ORAL 2 TIMES DAILY WITH MEALS
Qty: 60 TABLET | Refills: 0 | Status: SHIPPED | OUTPATIENT
Start: 2021-03-08 | End: 2021-12-07

## 2021-03-08 NOTE — TELEPHONE ENCOUNTER
Spoke to pt's daughter, Theresa Simons, who states pt is currently taking naproxen, tramadol, gabapentin, methocarbamol.  Theresa Simons states pain was improved for only 2-3 days, right knee is painful when she is standing or walking, left knee feels there is a sore that hu

## 2021-03-08 NOTE — TELEPHONE ENCOUNTER
Pt's daughter calling stating that pt's pain in her knees has returned after injections on 2/24. Pt's daughter is asking what the pt can take to help with the pain. Please advise. Please advise. Pt's daughter awaiting call back.

## 2021-03-08 NOTE — TELEPHONE ENCOUNTER
Delfina Mcneil MD  You Just now (11:33 AM)     She can just take naproxen 500 mg twice a day.  If tramadol does not help, she does not need to take it.  The patient also can see a orthopedic surgeon Dr. Corbin Mcardle to see if she is a surgical candidate.

## 2021-03-09 ENCOUNTER — TELEPHONE (OUTPATIENT)
Dept: FAMILY MEDICINE CLINIC | Facility: CLINIC | Age: 61
End: 2021-03-09

## 2021-03-09 DIAGNOSIS — R73.09 ELEVATED HEMOGLOBIN A1C: ICD-10-CM

## 2021-03-09 DIAGNOSIS — R73.01 IFG (IMPAIRED FASTING GLUCOSE): Primary | ICD-10-CM

## 2021-03-10 ENCOUNTER — TELEPHONE (OUTPATIENT)
Dept: FAMILY MEDICINE CLINIC | Facility: CLINIC | Age: 61
End: 2021-03-10

## 2021-03-10 NOTE — TELEPHONE ENCOUNTER
Daughter asking results pt's of autoimmune testing, informed daughter that pt had autoimmune labs done in December 2019 which were negative.  Daughter telling this writer to put order in for autoimmune labs so pt can have labs done prior to appointment as p

## 2021-03-17 ENCOUNTER — HOSPITAL ENCOUNTER (OUTPATIENT)
Dept: GENERAL RADIOLOGY | Age: 61
Discharge: HOME | End: 2021-03-17
Attending: FAMILY MEDICINE
Payer: MEDICAID

## 2021-03-17 ENCOUNTER — OFFICE VISIT (OUTPATIENT)
Dept: FAMILY MEDICINE CLINIC | Facility: CLINIC | Age: 61
End: 2021-03-17
Payer: MEDICAID

## 2021-03-17 ENCOUNTER — LAB ENCOUNTER (OUTPATIENT)
Dept: LAB | Age: 61
End: 2021-03-17
Attending: FAMILY MEDICINE
Payer: MEDICAID

## 2021-03-17 VITALS
WEIGHT: 184 LBS | TEMPERATURE: 98 F | SYSTOLIC BLOOD PRESSURE: 110 MMHG | OXYGEN SATURATION: 99 % | HEART RATE: 90 BPM | DIASTOLIC BLOOD PRESSURE: 60 MMHG | HEIGHT: 58.5 IN | RESPIRATION RATE: 14 BRPM | BODY MASS INDEX: 37.59 KG/M2

## 2021-03-17 DIAGNOSIS — H91.90 HEARING LOSS, UNSPECIFIED HEARING LOSS TYPE, UNSPECIFIED LATERALITY: ICD-10-CM

## 2021-03-17 DIAGNOSIS — M19.90 ARTHRITIS: ICD-10-CM

## 2021-03-17 DIAGNOSIS — E11.65 TYPE 2 DIABETES MELLITUS WITH HYPERGLYCEMIA, WITHOUT LONG-TERM CURRENT USE OF INSULIN (HCC): ICD-10-CM

## 2021-03-17 DIAGNOSIS — K21.9 GASTROESOPHAGEAL REFLUX DISEASE, UNSPECIFIED WHETHER ESOPHAGITIS PRESENT: ICD-10-CM

## 2021-03-17 DIAGNOSIS — R73.09 ELEVATED HEMOGLOBIN A1C: ICD-10-CM

## 2021-03-17 DIAGNOSIS — Z00.00 WELLNESS EXAMINATION: Primary | ICD-10-CM

## 2021-03-17 DIAGNOSIS — R73.01 IFG (IMPAIRED FASTING GLUCOSE): ICD-10-CM

## 2021-03-17 LAB
ALBUMIN SERPL-MCNC: 3.5 G/DL (ref 3.4–5)
ALBUMIN/GLOB SERPL: 0.9 {RATIO} (ref 1–2)
ALP LIVER SERPL-CCNC: 70 U/L
ALT SERPL-CCNC: 22 U/L
ANION GAP SERPL CALC-SCNC: 6 MMOL/L (ref 0–18)
AST SERPL-CCNC: 19 U/L (ref 15–37)
BILIRUB SERPL-MCNC: 0.4 MG/DL (ref 0.1–2)
BUN BLD-MCNC: 25 MG/DL (ref 7–18)
BUN/CREAT SERPL: 22.1 (ref 10–20)
CALCIUM BLD-MCNC: 8.7 MG/DL (ref 8.5–10.1)
CHLORIDE SERPL-SCNC: 104 MMOL/L (ref 98–112)
CO2 SERPL-SCNC: 29 MMOL/L (ref 21–32)
CREAT BLD-MCNC: 1.13 MG/DL
EST. AVERAGE GLUCOSE BLD GHB EST-MCNC: 137 MG/DL (ref 68–126)
GLOBULIN PLAS-MCNC: 4 G/DL (ref 2.8–4.4)
GLUCOSE BLD-MCNC: 100 MG/DL (ref 70–99)
HBA1C MFR BLD HPLC: 6.4 % (ref ?–5.7)
M PROTEIN MFR SERPL ELPH: 7.5 G/DL (ref 6.4–8.2)
OSMOLALITY SERPL CALC.SUM OF ELEC: 292 MOSM/KG (ref 275–295)
PATIENT FASTING Y/N/NP: NO
POTASSIUM SERPL-SCNC: 3.9 MMOL/L (ref 3.5–5.1)
RHEUMATOID FACT SERPL-ACNC: 41 IU/ML (ref ?–15)
SODIUM SERPL-SCNC: 139 MMOL/L (ref 136–145)

## 2021-03-17 PROCEDURE — 3078F DIAST BP <80 MM HG: CPT | Performed by: FAMILY MEDICINE

## 2021-03-17 PROCEDURE — 86038 ANTINUCLEAR ANTIBODIES: CPT

## 2021-03-17 PROCEDURE — 86431 RHEUMATOID FACTOR QUANT: CPT

## 2021-03-17 PROCEDURE — 73130 X-RAY EXAM OF HAND: CPT | Performed by: FAMILY MEDICINE

## 2021-03-17 PROCEDURE — 3074F SYST BP LT 130 MM HG: CPT | Performed by: FAMILY MEDICINE

## 2021-03-17 PROCEDURE — 3008F BODY MASS INDEX DOCD: CPT | Performed by: FAMILY MEDICINE

## 2021-03-17 PROCEDURE — 36415 COLL VENOUS BLD VENIPUNCTURE: CPT

## 2021-03-17 PROCEDURE — 99396 PREV VISIT EST AGE 40-64: CPT | Performed by: FAMILY MEDICINE

## 2021-03-17 PROCEDURE — 83036 HEMOGLOBIN GLYCOSYLATED A1C: CPT

## 2021-03-17 PROCEDURE — 99214 OFFICE O/P EST MOD 30 MIN: CPT | Performed by: FAMILY MEDICINE

## 2021-03-17 PROCEDURE — 80053 COMPREHEN METABOLIC PANEL: CPT

## 2021-03-17 RX ORDER — OMEPRAZOLE 40 MG/1
40 CAPSULE, DELAYED RELEASE ORAL DAILY
Qty: 90 CAPSULE | Refills: 3 | Status: SHIPPED | OUTPATIENT
Start: 2021-03-17 | End: 2021-06-23

## 2021-03-17 RX ORDER — PANTOPRAZOLE SODIUM 20 MG/1
1 TABLET, DELAYED RELEASE ORAL 2 TIMES DAILY
COMMUNITY
Start: 2021-03-08 | End: 2021-03-17

## 2021-03-17 NOTE — PROGRESS NOTES
Wellness Exam    REASON FOR VISIT:    Yoselyn Villasenor is a 57year old female who presents for an [address]     Current Complaints: Ms. Chaz Mtz is here for her wellness exam  Flu shot: see immunization record  Health Maintenance Topics with due you had any immunizations at another office such as Influenza, Hepatitis B, Tetanus, or Pneumococcal?: Yes    At any time do you feel concerned for the safety/well-being of yourself and/or your children, in your home or elsewhere?: No     CAGE:     Cut: Rahul Stanley Cholesterol Screening Recommended screening varies with age, risk and gender LDL Cholesterol (mg/dL)   Date Value   03/08/2021 76       Diabetes Screening  if history of high blood pressure or other  risk factors HgbA1C (%)   Date Value   03/08/2021 6.5 • Arthritis    • Brain tumor (benign) ([facility] [address]    • Esophageal reflux    • Essential hypertension    • Hyperlipidemia       Past Surgical History:   Procedure Laterality Date   • CERVICAL FACET INJECTION Right 1/18/2021    Performed by Aakash Singh MD Cuff Size: adult)   Pulse 90   Temp 97.6 °F (36.4 °C) (Temporal)   Resp 14   Ht 4' 10.5\" (1.486 m)   Wt 184 lb (83.5 kg)   SpO2 99%   BMI 37.80 kg/m²    No LMP recorded.  Patient is postmenopausal.   Constitutional: She appears her stated age, nourished, a examination    Arthritis  -     NABEEL, DIRECT, REFLEX TO 9 ENAS; Future  -     RHEUMATOID ARTHRITIS FACTOR; Future  -     XR HAND (MIN 3 VIEWS), LEFT (CPT=73130); Future  -     XR HAND (MIN 3 VIEWS), RIGHT (CPT=73130);  Future  -     Hakan WALL once then every 10 years   HPV Females 11-26: 3 doses   Zoster (Shingles) 60 and older: one dose   Varicella 2 doses if not immune   MMR 1-2 doses if born after 1957 and not immune     Patient Active Problem List:     Essential hypertension     Mixed hyper

## 2021-03-17 NOTE — PATIENT INSTRUCTIONS
Thank you for choosing Bro Puga MD at [place] [address]  To Do: Darrin Gerber  1. Please see age appropriate health prevention below    [facility] is located in [address]. Monday, Tuesday & Friday – 8 a.m. to 4 p.m.   Wednesday, Thursday benefits outweigh those potential risks and we strive to make you healthier and to improve your quality of life.     Referrals, and Radiology Information:    If your insurance requires a referral to a specialist, please allow 5 business days to process your you need. Screening Who needs it How often   Type 2 diabetes or prediabetes All women beginning at age 39 and women without symptoms at any age who are overweight or obese and have 1 or more additional risk factors for diabetes.  At  least every 3 years your healthcare provider about which tests are best for you. Some people should be screened using a different schedule because of their personal or family health history. Talk with your healthcare provider about your health history.    Depression All women Hepatitis B Women at increased risk for infection 2 or 3 doses (depending on the vaccine) ; second dose should be given 1 month after the first dose; if a third dose, it should be given at least 2 months after the second dose and at least 4 months after Breast cancer and chemoprevention Women at high risk for breast cancer When your risk is known; talk with your healthcare provider   Diet and exercise Women who are overweight or obese When diagnosed, and then at routine exams   Sexually transmitted infe

## 2021-03-19 ENCOUNTER — TELEPHONE (OUTPATIENT)
Dept: FAMILY MEDICINE CLINIC | Facility: CLINIC | Age: 61
End: 2021-03-19

## 2021-03-19 LAB — ANA SCREEN: NEGATIVE

## 2021-03-24 ENCOUNTER — TELEPHONE (OUTPATIENT)
Dept: FAMILY MEDICINE CLINIC | Facility: CLINIC | Age: 61
End: 2021-03-24

## 2021-03-24 DIAGNOSIS — M19.90 ARTHRITIS: Primary | ICD-10-CM

## 2021-03-24 RX ORDER — MELOXICAM 15 MG/1
15 TABLET ORAL DAILY
Qty: 90 TABLET | Refills: 1 | Status: SHIPPED | OUTPATIENT
Start: 2021-03-24 | End: 2021-04-13

## 2021-03-24 NOTE — TELEPHONE ENCOUNTER
Patient's daughter states she has been calling every day for xray results of her hand. She is having hand pain.  She was yelling in the phone, states it's the same thing for another family member but didn't give me the name, I told her I would put the [facility]

## 2021-03-24 NOTE — TELEPHONE ENCOUNTER
Informed Trisha that Meloxicam has been sent to the pharmacy but pt is not to take naproxen while taking meloxicam, Trisha verbalized understanding.  Explained to Yodit Rockwell Dr that meloxicam's primary use is to treat pain from osteo and rheumatoid arthirtis, Trisha gillis

## 2021-03-24 NOTE — TELEPHONE ENCOUNTER
Reviewed xray and labs with pt's daughter, verbalizes understanding. Daughter states mother has appointment with rheumatology 5/7/2021 but pt is experiencing pain and swelling in hands and fingers.  Daughter asking for medication for pt's hands, reviewed pt

## 2021-03-25 ENCOUNTER — TELEPHONE (OUTPATIENT)
Dept: RHEUMATOLOGY | Facility: CLINIC | Age: 61
End: 2021-03-25

## 2021-03-25 NOTE — TELEPHONE ENCOUNTER
Authorized PT referral faxed to [facility], [phone], confirmation received.

## 2021-03-25 NOTE — TELEPHONE ENCOUNTER
Daughter called stating pt having a great deal of pain. PCP ordered labs, results in Epic:  RF 41  NABEEL neg  CMP  HgbA1c     PCP ordered Naproxen; can not take Meloxicam due to swelling    Pt is currently at home. Naproxen is not helping much.   Pain is in

## 2021-04-05 RX ORDER — HYDROCHLOROTHIAZIDE 25 MG/1
25 TABLET ORAL DAILY
Qty: 90 TABLET | Refills: 1 | Status: SHIPPED | OUTPATIENT
Start: 2021-04-05 | End: 2021-06-23

## 2021-04-05 NOTE — TELEPHONE ENCOUNTER
Hypertension Medications Protocol [id number]/05/2021 11:29 AM   CMP or BMP in past 12 months Protocol Details    Last serum creatinine< 2.0     Appointment in past 6 or next 3 months      Refill protocol passed because the patient met the following protocol

## 2021-04-13 ENCOUNTER — OFFICE VISIT (OUTPATIENT)
Dept: FAMILY MEDICINE CLINIC | Facility: CLINIC | Age: 61
End: 2021-04-13
Payer: MEDICAID

## 2021-04-13 ENCOUNTER — TELEPHONE (OUTPATIENT)
Dept: FAMILY MEDICINE CLINIC | Facility: CLINIC | Age: 61
End: 2021-04-13

## 2021-04-13 VITALS
BODY MASS INDEX: 37.59 KG/M2 | SYSTOLIC BLOOD PRESSURE: 130 MMHG | DIASTOLIC BLOOD PRESSURE: 70 MMHG | WEIGHT: 184 LBS | HEIGHT: 58.5 IN | TEMPERATURE: 98 F | HEART RATE: 101 BPM | OXYGEN SATURATION: 99 % | RESPIRATION RATE: 14 BRPM

## 2021-04-13 DIAGNOSIS — M21.41 PES PLANUS OF BOTH FEET: Primary | ICD-10-CM

## 2021-04-13 DIAGNOSIS — M79.89 SWELLING OF BOTH HANDS: Primary | ICD-10-CM

## 2021-04-13 DIAGNOSIS — M21.42 PES PLANUS OF BOTH FEET: Primary | ICD-10-CM

## 2021-04-13 PROCEDURE — 99214 OFFICE O/P EST MOD 30 MIN: CPT | Performed by: FAMILY MEDICINE

## 2021-04-13 PROCEDURE — 3075F SYST BP GE 130 - 139MM HG: CPT | Performed by: FAMILY MEDICINE

## 2021-04-13 PROCEDURE — 3078F DIAST BP <80 MM HG: CPT | Performed by: FAMILY MEDICINE

## 2021-04-13 PROCEDURE — 3008F BODY MASS INDEX DOCD: CPT | Performed by: FAMILY MEDICINE

## 2021-04-13 RX ORDER — GABAPENTIN 300 MG/1
300 CAPSULE ORAL NIGHTLY
Qty: 90 CAPSULE | Refills: 3 | Status: SHIPPED | OUTPATIENT
Start: 2021-04-13 | End: 2021-06-23

## 2021-04-13 RX ORDER — METHYLPREDNISOLONE 4 MG/1
TABLET ORAL
Qty: 1 KIT | Refills: 0 | Status: SHIPPED | OUTPATIENT
Start: 2021-04-13 | End: 2021-06-11

## 2021-04-13 NOTE — PROGRESS NOTES
HPI/Subjective:   Patient ID: Brandon Berrios is a 57year old female. HPI  Ms. Gerber is a 70-year-old female with history of diabetes mellitus, hypertension, arthritis, dyslipidemia here together with her daughter who had help interpret for us.   She has been (two) times daily with meals. 60 tablet 0   • lisinopril 20 MG Oral Tab Take 1 tablet (20 mg total) by mouth daily. 90 tablet 1   • methocarbamol 500 MG Oral Tab Take 1 tablet (500 mg total) by mouth 3 (three) times daily.  90 tablet 0   • traMADol HCl 50 M Skin:     General: Skin is warm. Neurological:      General: No focal deficit present. Mental Status: She is alert. Psychiatric:         Mood and Affect: Mood normal.         Thought Content:  Thought content normal.         Assessment & Plan:

## 2021-05-07 ENCOUNTER — OFFICE VISIT (OUTPATIENT)
Dept: RHEUMATOLOGY | Facility: CLINIC | Age: 61
End: 2021-05-07
Payer: MEDICAID

## 2021-05-07 VITALS
SYSTOLIC BLOOD PRESSURE: 130 MMHG | BODY MASS INDEX: 35.96 KG/M2 | HEART RATE: 88 BPM | WEIGHT: 176 LBS | TEMPERATURE: 97 F | DIASTOLIC BLOOD PRESSURE: 85 MMHG | HEIGHT: 58.5 IN

## 2021-05-07 DIAGNOSIS — R76.8 RHEUMATOID FACTOR POSITIVE: ICD-10-CM

## 2021-05-07 DIAGNOSIS — M05.742 RHEUMATOID ARTHRITIS INVOLVING BOTH HANDS WITH POSITIVE RHEUMATOID FACTOR (HCC): Primary | ICD-10-CM

## 2021-05-07 DIAGNOSIS — Z11.59 NEED FOR HEPATITIS C SCREENING TEST: ICD-10-CM

## 2021-05-07 DIAGNOSIS — M17.12 PRIMARY OSTEOARTHRITIS OF LEFT KNEE: ICD-10-CM

## 2021-05-07 DIAGNOSIS — Z11.4 SCREENING FOR HIV (HUMAN IMMUNODEFICIENCY VIRUS): ICD-10-CM

## 2021-05-07 DIAGNOSIS — Z11.1 SCREENING FOR TUBERCULOSIS: ICD-10-CM

## 2021-05-07 DIAGNOSIS — Z11.59 NEED FOR HEPATITIS B SCREENING TEST: ICD-10-CM

## 2021-05-07 DIAGNOSIS — M22.2X2 PATELLOFEMORAL PAIN SYNDROME OF LEFT KNEE: ICD-10-CM

## 2021-05-07 DIAGNOSIS — M17.0 OSTEOARTHRITIS OF BOTH KNEES, UNSPECIFIED OSTEOARTHRITIS TYPE: ICD-10-CM

## 2021-05-07 DIAGNOSIS — M05.741 RHEUMATOID ARTHRITIS INVOLVING BOTH HANDS WITH POSITIVE RHEUMATOID FACTOR (HCC): Primary | ICD-10-CM

## 2021-05-07 PROCEDURE — 3079F DIAST BP 80-89 MM HG: CPT | Performed by: INTERNAL MEDICINE

## 2021-05-07 PROCEDURE — 99205 OFFICE O/P NEW HI 60 MIN: CPT | Performed by: INTERNAL MEDICINE

## 2021-05-07 PROCEDURE — 3075F SYST BP GE 130 - 139MM HG: CPT | Performed by: INTERNAL MEDICINE

## 2021-05-07 PROCEDURE — 3008F BODY MASS INDEX DOCD: CPT | Performed by: INTERNAL MEDICINE

## 2021-05-07 RX ORDER — FOLIC ACID 1 MG/1
1 TABLET ORAL DAILY
Qty: 90 TABLET | Refills: 3 | Status: SHIPPED | OUTPATIENT
Start: 2021-05-07 | End: 2021-05-07

## 2021-05-07 RX ORDER — PREDNISONE 1 MG/1
TABLET ORAL
Qty: 28 TABLET | Refills: 0 | Status: SHIPPED | OUTPATIENT
Start: 2021-05-07 | End: 2021-05-28

## 2021-05-07 RX ORDER — PREDNISONE 1 MG/1
TABLET ORAL
Qty: 28 TABLET | Refills: 0 | Status: SHIPPED | OUTPATIENT
Start: 2021-05-07 | End: 2021-05-07

## 2021-05-07 RX ORDER — FOLIC ACID 1 MG/1
1 TABLET ORAL DAILY
Qty: 90 TABLET | Refills: 3 | Status: SHIPPED | OUTPATIENT
Start: 2021-05-07 | End: 2021-06-11

## 2021-05-07 RX ORDER — HYDROXYCHLOROQUINE SULFATE 200 MG/1
200 TABLET, FILM COATED ORAL 2 TIMES DAILY
Qty: 180 TABLET | Refills: 3 | Status: SHIPPED | OUTPATIENT
Start: 2021-05-07 | End: 2021-07-21

## 2021-05-07 RX ORDER — HYDROXYCHLOROQUINE SULFATE 200 MG/1
TABLET, FILM COATED ORAL
Qty: 180 TABLET | Refills: 3 | Status: SHIPPED | OUTPATIENT
Start: 2021-05-07 | End: 2021-05-07

## 2021-05-07 NOTE — PROGRESS NOTES
Rheumatology New Patient Note  =====================================================================================================      Date of visit: 5/7/2021  ? Patient presents with:  Establish Care: New pt, referred by PCP for joint pain.  Pt had morning and 3 tabs at night. Do this for x 2 weeks. Then increase to 4 tabs in the morning and 4 tabs at night., Disp: 44 tablet, Rfl: 0  folic acid 1 MG Oral Tab, Take 1 tablet (1 mg total) by mouth daily. , Disp: 90 tablet, Rfl: 3  Elastic Bandages & Sup Vaping Use      Vaping Use: Never used    Alcohol use: Never    Drug use: Never    ?   Allergies:    Meloxicam               SWELLING    Comment:Able to tolerate iburprofen, naproxen,      Objective     05/07/21  1019   BP: 130/85   Pulse: 88   Temp: 97.1 ° CREATSERUM 1.13 (H) 03/17/2021    ANIONGAP 6 03/17/2021    GFR 60 06/22/2017    GFRNAA 52 (L) 03/17/2021    GFRAA 60 03/17/2021    CA 8.7 03/17/2021    OSMOCALC 292 03/17/2021    ALKPHO 70 03/17/2021    AST 19 03/17/2021    ALT 22 03/17/2021    BILT 0.4 03 knee  FINDINGS:  No evidence of acute displaced fracture or dislocation.  Osteopenia.  Stable moderate tricompartmental osteoarthritic changes are again most pronounced in the medial compartment.  Stable nonspecific soft tissue calcifications in the pretib risk of major adverse cardiovascular events (MACE), and risk of mortality. Start methotrexate at 15 mg/week for 2 weeks, then increase to 20 mg/week thereafter split dosing. Folic acid every day.   Hydroxychloroquine 200 mg daily x2 weeks then increase DIFFERENTIAL WITH PLATELET; Future  -     COMP METABOLIC PANEL (14); Future  -     COMPLEMENT C3, SERUM; Future  -     COMPLEMENT C4, SERUM; Future  -     C-REACTIVE PROTEIN; Future  -     SED RATE, WESTERGREN (AUTOMATED);  Future  -     XR CHEST PA + LAT C Gastrointestinal, liver, hematological (blood) and pulmonary toxicity. There is also an increase risk of lymphoma and teratogenicity (harmful to developing fetus).  If patiens wishes to have children, it is advised that the medication be discontinued at [facility]

## 2021-05-07 NOTE — PATIENT INSTRUCTIONS
1) See if you can get records for pathology (for brain)    Methotrexate Every Thursday:    Take 3 tabs in the morning and 3 tabs at night. Do this for x 2 weeks. Then increase to 4 tabs in the morning and 4 tabs at night each week.      Folic acid 1 mg d

## 2021-06-02 ENCOUNTER — TELEPHONE (OUTPATIENT)
Dept: FAMILY MEDICINE CLINIC | Facility: CLINIC | Age: 61
End: 2021-06-02

## 2021-06-02 ENCOUNTER — TELEPHONE (OUTPATIENT)
Dept: ORTHOPEDICS CLINIC | Facility: CLINIC | Age: 61
End: 2021-06-02

## 2021-06-02 NOTE — TELEPHONE ENCOUNTER
Patient request to be seen for bilateral cortisone injections. Patient will be leaving out of the country on 6/15 and request to be seen sooner.  Daughter states that patient had a cortisone injection from different provider Dr. Rosalino Guzman back some time in Febru

## 2021-06-02 NOTE — TELEPHONE ENCOUNTER
Patient's daughter calling, patient will be traveling to [facility] mid-June.  Will like to know what vaccines she would need for international travel, please advise

## 2021-06-02 NOTE — TELEPHONE ENCOUNTER
Spoke with patient's daughter and appt scheduled for B/L knee injections. Appt date/time/location confirmed. No further questions at this time.

## 2021-06-03 NOTE — TELEPHONE ENCOUNTER
Spoke with Carine Deal and recommended following CDC travel vaccine guidelines for [place] but informed Carine Deal pt only has Covid and TDAP vaccines in her record aside from flu vaccines which are not pertinent.  Advised Trisha to contact outside clinics where pt lamar

## 2021-06-03 NOTE — TELEPHONE ENCOUNTER
Pieter Delarosa returned call and states [employer] does not take insurance. Pieter Delarosa states she can get pt vaccinated by the [facility] and [place] but she needs a list of necessary vaccines. Pieter Delarosa asking Dr. Nighat Lucas to provide list of vaccines.  Rodger Rico

## 2021-06-03 NOTE — TELEPHONE ENCOUNTER
See Abby Minor requesting a list of vaccines for travel to [place]. Declined to take pt to travel clinic as they do not take insurance, she will take pt to get vaccinated [facility] [place] but she needs a list of needed vaccines.

## 2021-06-03 NOTE — TELEPHONE ENCOUNTER
LM informing daughter to call the [facility] travel [address] for pt at [phone], advised that they can provide vaccinations for travel.

## 2021-06-04 NOTE — TELEPHONE ENCOUNTER
Daughter states she can't make it to to 6/7 rescheduled appointment. Patient still requesting to be seen before 6/15 . Daughter would like a different date before 6/15. Please advise.     Call her back @ [phone]

## 2021-06-10 ENCOUNTER — TELEPHONE (OUTPATIENT)
Dept: RHEUMATOLOGY | Facility: CLINIC | Age: 61
End: 2021-06-10

## 2021-06-10 ENCOUNTER — LAB ENCOUNTER (OUTPATIENT)
Dept: LAB | Age: 61
End: 2021-06-10
Attending: INTERNAL MEDICINE
Payer: MEDICAID

## 2021-06-10 ENCOUNTER — HOSPITAL ENCOUNTER (OUTPATIENT)
Dept: GENERAL RADIOLOGY | Age: 61
Discharge: HOME | End: 2021-06-10
Attending: INTERNAL MEDICINE
Payer: MEDICAID

## 2021-06-10 DIAGNOSIS — J84.9 ILD (INTERSTITIAL LUNG DISEASE) (HCC): Primary | ICD-10-CM

## 2021-06-10 DIAGNOSIS — M05.9 SEROPOSITIVE RHEUMATOID ARTHRITIS (HCC): ICD-10-CM

## 2021-06-10 DIAGNOSIS — Z11.4 SCREENING FOR HIV (HUMAN IMMUNODEFICIENCY VIRUS): ICD-10-CM

## 2021-06-10 DIAGNOSIS — Z11.59 NEED FOR HEPATITIS B SCREENING TEST: ICD-10-CM

## 2021-06-10 DIAGNOSIS — R76.8 RHEUMATOID FACTOR POSITIVE: ICD-10-CM

## 2021-06-10 DIAGNOSIS — Z11.59 NEED FOR HEPATITIS C SCREENING TEST: ICD-10-CM

## 2021-06-10 PROCEDURE — 86160 COMPLEMENT ANTIGEN: CPT

## 2021-06-10 PROCEDURE — 83883 ASSAY NEPHELOMETRY NOT SPEC: CPT

## 2021-06-10 PROCEDURE — 87389 HIV-1 AG W/HIV-1&-2 AB AG IA: CPT

## 2021-06-10 PROCEDURE — 86038 ANTINUCLEAR ANTIBODIES: CPT

## 2021-06-10 PROCEDURE — 84165 PROTEIN E-PHORESIS SERUM: CPT

## 2021-06-10 PROCEDURE — 71046 X-RAY EXAM CHEST 2 VIEWS: CPT | Performed by: INTERNAL MEDICINE

## 2021-06-10 PROCEDURE — 86803 HEPATITIS C AB TEST: CPT

## 2021-06-10 PROCEDURE — 86706 HEP B SURFACE ANTIBODY: CPT

## 2021-06-10 PROCEDURE — 36415 COLL VENOUS BLD VENIPUNCTURE: CPT

## 2021-06-10 PROCEDURE — 86334 IMMUNOFIX E-PHORESIS SERUM: CPT

## 2021-06-10 PROCEDURE — 87340 HEPATITIS B SURFACE AG IA: CPT

## 2021-06-10 PROCEDURE — 85025 COMPLETE CBC W/AUTO DIFF WBC: CPT

## 2021-06-10 PROCEDURE — 80053 COMPREHEN METABOLIC PANEL: CPT

## 2021-06-10 PROCEDURE — 86140 C-REACTIVE PROTEIN: CPT

## 2021-06-10 PROCEDURE — 85652 RBC SED RATE AUTOMATED: CPT

## 2021-06-10 PROCEDURE — 86704 HEP B CORE ANTIBODY TOTAL: CPT

## 2021-06-10 NOTE — TELEPHONE ENCOUNTER
Call patient. Patient's daughter picked up. No new dyspnea on exertion or shortness of breath. Dyspnea on exertion existed prior to methotrexate. Unlikely that this is methotrexate pneumonitis.   I think that that chest x-ray findings of bibasilar fibro

## 2021-06-11 ENCOUNTER — OFFICE VISIT (OUTPATIENT)
Dept: ORTHOPEDICS CLINIC | Facility: CLINIC | Age: 61
End: 2021-06-11
Payer: MEDICAID

## 2021-06-11 ENCOUNTER — OFFICE VISIT (OUTPATIENT)
Dept: RHEUMATOLOGY | Facility: CLINIC | Age: 61
End: 2021-06-11
Payer: MEDICAID

## 2021-06-11 VITALS
HEART RATE: 76 BPM | DIASTOLIC BLOOD PRESSURE: 60 MMHG | HEIGHT: 58.5 IN | BODY MASS INDEX: 36.37 KG/M2 | TEMPERATURE: 98 F | SYSTOLIC BLOOD PRESSURE: 110 MMHG | WEIGHT: 178 LBS

## 2021-06-11 VITALS — SYSTOLIC BLOOD PRESSURE: 112 MMHG | DIASTOLIC BLOOD PRESSURE: 60 MMHG | HEART RATE: 79 BPM | OXYGEN SATURATION: 99 %

## 2021-06-11 DIAGNOSIS — Z51.81 THERAPEUTIC DRUG MONITORING: ICD-10-CM

## 2021-06-11 DIAGNOSIS — G57.53 TARSAL TUNNEL SYNDROME OF BOTH LOWER EXTREMITIES: ICD-10-CM

## 2021-06-11 DIAGNOSIS — M17.0 PRIMARY OSTEOARTHRITIS OF BOTH KNEES: Primary | ICD-10-CM

## 2021-06-11 DIAGNOSIS — M05.741 RHEUMATOID ARTHRITIS INVOLVING BOTH HANDS WITH POSITIVE RHEUMATOID FACTOR (HCC): Primary | ICD-10-CM

## 2021-06-11 DIAGNOSIS — M05.742 RHEUMATOID ARTHRITIS INVOLVING BOTH HANDS WITH POSITIVE RHEUMATOID FACTOR (HCC): Primary | ICD-10-CM

## 2021-06-11 DIAGNOSIS — R06.00 DYSPNEA ON EXERTION: ICD-10-CM

## 2021-06-11 DIAGNOSIS — J84.9 ILD (INTERSTITIAL LUNG DISEASE) (HCC): ICD-10-CM

## 2021-06-11 PROCEDURE — 3008F BODY MASS INDEX DOCD: CPT | Performed by: INTERNAL MEDICINE

## 2021-06-11 PROCEDURE — 3078F DIAST BP <80 MM HG: CPT | Performed by: INTERNAL MEDICINE

## 2021-06-11 PROCEDURE — 20610 DRAIN/INJ JOINT/BURSA W/O US: CPT | Performed by: ORTHOPAEDIC SURGERY

## 2021-06-11 PROCEDURE — 3078F DIAST BP <80 MM HG: CPT | Performed by: ORTHOPAEDIC SURGERY

## 2021-06-11 PROCEDURE — 99215 OFFICE O/P EST HI 40 MIN: CPT | Performed by: INTERNAL MEDICINE

## 2021-06-11 PROCEDURE — 3074F SYST BP LT 130 MM HG: CPT | Performed by: INTERNAL MEDICINE

## 2021-06-11 PROCEDURE — 3074F SYST BP LT 130 MM HG: CPT | Performed by: ORTHOPAEDIC SURGERY

## 2021-06-11 RX ORDER — METHOTREXATE 2.5 MG/1
20 TABLET ORAL WEEKLY
Qty: 96 TABLET | Refills: 0 | Status: SHIPPED | OUTPATIENT
Start: 2021-06-11 | End: 2021-07-21

## 2021-06-11 RX ORDER — TRIAMCINOLONE ACETONIDE 40 MG/ML
80 INJECTION, SUSPENSION INTRA-ARTICULAR; INTRAMUSCULAR ONCE
Status: COMPLETED | OUTPATIENT
Start: 2021-06-11 | End: 2021-06-11

## 2021-06-11 RX ORDER — FOLIC ACID 1 MG/1
1 TABLET ORAL DAILY
Qty: 90 TABLET | Refills: 3 | Status: SHIPPED | OUTPATIENT
Start: 2021-06-11 | End: 2021-07-01

## 2021-06-11 RX ADMIN — TRIAMCINOLONE ACETONIDE 80 MG: 40 INJECTION, SUSPENSION INTRA-ARTICULAR; INTRAMUSCULAR at 14:33:00

## 2021-06-11 NOTE — PROCEDURES
Patient's daughter states she underwent knee injections in February by Pain management when she had injections for spinal stenosis.     After informed consent, the patient's right and left knees were marked, locally anesthetized with skin refrigerant, prepp

## 2021-06-11 NOTE — PROGRESS NOTES
Rheumatology Follow-up Note  =====================================================================================================  Date of visit: 6/11/2021  ? Patient presents with: Follow - Up: 5wk follow up, labs and imaging completed yesterday.  Nelia Coffey Sulfate 200 MG Oral Tab, Take 1 tablet (200 mg total) by mouth 2 (two) times daily. , Disp: 180 tablet, Rfl: 3  gabapentin 300 MG Oral Cap, Take 1 capsule (300 mg total) by mouth nightly., Disp: 90 capsule, Rfl: 3  hydrochlorothiazide 25 MG Oral Tab, Take 1 use: Never    ? Allergies:    Meloxicam               SWELLING    Comment:Able to tolerate iburprofen, naproxen,      Objective     06/11/21  1119   BP: 110/60   Pulse: 76   Temp: 97.8 °F (36.6 °C)       GEN: NAD, well-nourished. HEENT: Head: NCAT.  Face Lab Results   Component Value Date    ANAS Negative 03/17/2021     No results found for: SSA, SSAUR, ANTISSA, SSA52, SSA60, SSADD, SSB, ANTISSB  No results found for: DSDNA, ANTIDSDNA, SMUD, ANTISM, SM, RNP, ANTIRNP, SMITHRNP  No results found for: BID given less nephrotoxic.   -gave reading about orencia and actemra for treatment of RA-ILD depending on HRCT results. -pt will be leaving out of country for 2-3 months in mid-July, RTC once more prior to patient leaving.    ?  Plan:  Diagnoses and all

## 2021-06-11 NOTE — PATIENT INSTRUCTIONS
1. Try 1000 mg tylenol in the morning and afternoon. Try to see if this can replace naproxen  2. Schedule CT Chest scan. 3. Continue rest of medications as is.

## 2021-06-14 ENCOUNTER — TELEPHONE (OUTPATIENT)
Dept: RHEUMATOLOGY | Facility: CLINIC | Age: 61
End: 2021-06-14

## 2021-06-14 NOTE — TELEPHONE ENCOUNTER
Pa for CT chest pending health care review. Clinical notes and prior imaging faxed to [phone].  Call [facility]#[phone]

## 2021-06-21 ENCOUNTER — LAB ENCOUNTER (OUTPATIENT)
Dept: LAB | Age: 61
End: 2021-06-21
Attending: INTERNAL MEDICINE
Payer: MEDICAID

## 2021-06-21 ENCOUNTER — TELEPHONE (OUTPATIENT)
Dept: RHEUMATOLOGY | Facility: CLINIC | Age: 61
End: 2021-06-21

## 2021-06-21 ENCOUNTER — HOSPITAL ENCOUNTER (OUTPATIENT)
Dept: CT IMAGING | Age: 61
Discharge: HOME | End: 2021-06-21
Attending: INTERNAL MEDICINE
Payer: MEDICAID

## 2021-06-21 DIAGNOSIS — Z11.4 SCREENING FOR HIV (HUMAN IMMUNODEFICIENCY VIRUS): ICD-10-CM

## 2021-06-21 DIAGNOSIS — Z11.59 NEED FOR HEPATITIS B SCREENING TEST: ICD-10-CM

## 2021-06-21 DIAGNOSIS — R91.8 PULMONARY NODULES: Primary | ICD-10-CM

## 2021-06-21 DIAGNOSIS — J84.9 ILD (INTERSTITIAL LUNG DISEASE) (HCC): ICD-10-CM

## 2021-06-21 DIAGNOSIS — Z11.59 NEED FOR HEPATITIS C SCREENING TEST: ICD-10-CM

## 2021-06-21 DIAGNOSIS — R76.8 RHEUMATOID FACTOR POSITIVE: ICD-10-CM

## 2021-06-21 DIAGNOSIS — M05.9 SEROPOSITIVE RHEUMATOID ARTHRITIS (HCC): ICD-10-CM

## 2021-06-21 PROCEDURE — 36415 COLL VENOUS BLD VENIPUNCTURE: CPT

## 2021-06-21 PROCEDURE — 86480 TB TEST CELL IMMUN MEASURE: CPT

## 2021-06-21 PROCEDURE — 71250 CT THORAX DX C-: CPT | Performed by: INTERNAL MEDICINE

## 2021-06-21 NOTE — TELEPHONE ENCOUNTER
Hello please let the patient know that the CAT scan did not show any evidence of RA-ILD (interstitial lung disease). This is a good thing. Some of the airways in the lower lung were mildly enlarged and some of them contain some mucus.   This is likely wh

## 2021-06-22 NOTE — TELEPHONE ENCOUNTER
Pt returned my call, notified per Dr. Margaux Rivera \"CAT scan did not show any evidence of RA-ILD (interstitial lung disease). This is a good thing. Some of the airways in the lower lung were mildly enlarged and some of them contain some mucus.   This is likely

## 2021-06-23 ENCOUNTER — VIRTUAL PHONE E/M (OUTPATIENT)
Dept: FAMILY MEDICINE CLINIC | Facility: CLINIC | Age: 61
End: 2021-06-23
Payer: MEDICAID

## 2021-06-23 DIAGNOSIS — K21.9 GASTROESOPHAGEAL REFLUX DISEASE, UNSPECIFIED WHETHER ESOPHAGITIS PRESENT: ICD-10-CM

## 2021-06-23 DIAGNOSIS — E11.65 TYPE 2 DIABETES MELLITUS WITH HYPERGLYCEMIA, WITHOUT LONG-TERM CURRENT USE OF INSULIN (HCC): Primary | ICD-10-CM

## 2021-06-23 PROCEDURE — 99214 OFFICE O/P EST MOD 30 MIN: CPT | Performed by: FAMILY MEDICINE

## 2021-06-23 RX ORDER — HYDROCHLOROTHIAZIDE 25 MG/1
25 TABLET ORAL DAILY
Qty: 90 TABLET | Refills: 1 | Status: SHIPPED | OUTPATIENT
Start: 2021-06-23 | End: 2021-12-29

## 2021-06-23 RX ORDER — DIPHENHYDRAMINE HYDROCHLORIDE 25 MG/1
CAPSULE, LIQUID FILLED ORAL
Qty: 1 KIT | Refills: 0 | Status: SHIPPED | OUTPATIENT
Start: 2021-06-23

## 2021-06-23 RX ORDER — LISINOPRIL 20 MG/1
20 TABLET ORAL DAILY
Qty: 90 TABLET | Refills: 1 | Status: SHIPPED | OUTPATIENT
Start: 2021-06-23

## 2021-06-23 RX ORDER — DOXYCYCLINE HYCLATE 100 MG
100 TABLET ORAL DAILY
Qty: 120 TABLET | Refills: 0 | Status: SHIPPED | OUTPATIENT
Start: 2021-06-23

## 2021-06-23 RX ORDER — OMEPRAZOLE 40 MG/1
40 CAPSULE, DELAYED RELEASE ORAL DAILY
Qty: 90 CAPSULE | Refills: 1 | Status: SHIPPED | OUTPATIENT
Start: 2021-06-23 | End: 2021-07-01

## 2021-06-23 RX ORDER — GABAPENTIN 300 MG/1
300 CAPSULE ORAL NIGHTLY
Qty: 90 CAPSULE | Refills: 1 | Status: SHIPPED | OUTPATIENT
Start: 2021-06-23

## 2021-06-23 RX ORDER — ATORVASTATIN CALCIUM 10 MG/1
TABLET, FILM COATED ORAL
Qty: 90 TABLET | Refills: 1 | Status: SHIPPED | OUTPATIENT
Start: 2021-06-23

## 2021-06-23 NOTE — PROGRESS NOTES
HPI/Subjective:   Patient ID: Yolanda Arreola is a 57year old female. HPI  Ms. Gerber is a pleasant 57-year-old female presenting for a phone visit today. She and her  will be going to [place] and will be there for the next 3 months.   She basically n total) by mouth 2 (two) times daily with meals. 60 tablet 0   • methocarbamol 500 MG Oral Tab Take 1 tablet (500 mg total) by mouth 3 (three) times daily.  90 tablet 0   • traMADol HCl 50 MG Oral Tab Take 1 tablet (50 mg total) by mouth every 6 (six) hours nightly. • Omeprazole 40 MG Oral Capsule Delayed Release 90 capsule 1     Sig: Take 1 capsule (40 mg total) by mouth daily.    • Blood Glucose Monitoring Suppl (BLOOD GLUCOSE MONITOR SYSTEM) w/Device Does not apply Kit 1 kit 0     Sig: Please check blood

## 2021-06-23 NOTE — PATIENT INSTRUCTIONS
Thank you for choosing Bandar Constantino MD at [place] [address]  To Do: Darrin Gerber  1. Please take meds as directed. [facility] is located in [address]. Monday, Tuesday & Friday – 8 a.m. to 4 p.m. Wednesday, Thursday – 7 a.m. to 3 p.m.   Digna Wood those potential risks and we strive to make you healthier and to improve your quality of life.     Referrals, and Radiology Information:    If your insurance requires a referral to a specialist, please allow 5 business days to process your referral request.

## 2021-06-23 NOTE — PROGRESS NOTES
Virtual Telephone Check-In    ALEC Gerber verbally consents to a Virtual/Telephone Check-In visit on 06/23/21. Patient has been referred to the [facility] website at [email or web address] to review the yearly Consent to Treat document.     Patient understands

## 2021-07-01 ENCOUNTER — TELEPHONE (OUTPATIENT)
Dept: FAMILY MEDICINE CLINIC | Facility: CLINIC | Age: 61
End: 2021-07-01

## 2021-07-01 ENCOUNTER — TELEPHONE (OUTPATIENT)
Dept: RHEUMATOLOGY | Facility: CLINIC | Age: 61
End: 2021-07-01

## 2021-07-01 DIAGNOSIS — M05.741 RHEUMATOID ARTHRITIS INVOLVING BOTH HANDS WITH POSITIVE RHEUMATOID FACTOR (HCC): ICD-10-CM

## 2021-07-01 DIAGNOSIS — K21.9 GASTROESOPHAGEAL REFLUX DISEASE, UNSPECIFIED WHETHER ESOPHAGITIS PRESENT: ICD-10-CM

## 2021-07-01 DIAGNOSIS — M05.742 RHEUMATOID ARTHRITIS INVOLVING BOTH HANDS WITH POSITIVE RHEUMATOID FACTOR (HCC): ICD-10-CM

## 2021-07-01 RX ORDER — FOLIC ACID 1 MG/1
3 TABLET ORAL DAILY
Qty: 270 TABLET | Refills: 3 | Status: SHIPPED | OUTPATIENT
Start: 2021-07-01 | End: 2021-07-21

## 2021-07-01 RX ORDER — BLOOD SUGAR DIAGNOSTIC
STRIP MISCELLANEOUS
Qty: 200 EACH | Refills: 1 | Status: SHIPPED | OUTPATIENT
Start: 2021-07-01

## 2021-07-01 RX ORDER — LANCETS 33 GAUGE
1 EACH MISCELLANEOUS 2 TIMES DAILY
Qty: 200 EACH | Refills: 1 | Status: SHIPPED | OUTPATIENT
Start: 2021-07-01 | End: 2022-07-01

## 2021-07-01 RX ORDER — OMEPRAZOLE 40 MG/1
40 CAPSULE, DELAYED RELEASE ORAL DAILY
Qty: 90 CAPSULE | Refills: 1 | Status: SHIPPED | OUTPATIENT
Start: 2021-07-01 | End: 2021-07-06

## 2021-07-01 NOTE — TELEPHONE ENCOUNTER
Spoke with Klarissa Renee and informed her Rx for strips and lancets have been sent to the pharmacy but omeprazole requires PA as pt's insurance only covers 120 pills per year.  Informed Trisha that they can use [employer] [facility] for pt's insurance and a 90 day supply would

## 2021-07-01 NOTE — TELEPHONE ENCOUNTER
Spoke with pharmacy, pt was prescribed the Onetouch ultra meter and PA is needed for omeprazole as Medicaid only covers 120 capsules per year.

## 2021-07-01 NOTE — TELEPHONE ENCOUNTER
Daughter calling, pharmacy received prescription for glucometer. Needs strips and lancets as well.  Also requesting refill on Omeprazole

## 2021-07-01 NOTE — TELEPHONE ENCOUNTER
Phoned pt daughter, states patient had mouth sores over a week ago. Spoke with Dr. Guera Webb whom was the oncall physician. His recommendation for medication mgmt was to hold methotrexate for one week, decrease hydroxychloroquine to once daily, and increase folic acid to BID. Since following his directions, pt ahs noticed improvement. Mouth sores have cleared up. Would like to know how to proceed going forward.

## 2021-07-01 NOTE — TELEPHONE ENCOUNTER
Phoned pt daughter,  instructions given to resume methotrexate 20 mg's once weekly split dosing. Increase folic acid to 3 mg daily, Dr. Xenia Alvarez put in a new prescription. Okay to continue hydroxychloroquine 1 tablet daily. Voiced understanding.

## 2021-07-01 NOTE — TELEPHONE ENCOUNTER
Please have the patient resume methotrexate 20 mg's once weekly split dosing. Increase folic acid to 3 mg daily, I put in a new prescription. Okay to continue hydroxychloroquine 1 tablet daily.

## 2021-07-06 ENCOUNTER — TELEPHONE (OUTPATIENT)
Dept: FAMILY MEDICINE CLINIC | Facility: CLINIC | Age: 61
End: 2021-07-06

## 2021-07-06 DIAGNOSIS — K21.9 GASTROESOPHAGEAL REFLUX DISEASE, UNSPECIFIED WHETHER ESOPHAGITIS PRESENT: ICD-10-CM

## 2021-07-06 RX ORDER — OMEPRAZOLE 40 MG/1
40 CAPSULE, DELAYED RELEASE ORAL DAILY
Qty: 90 CAPSULE | Refills: 1 | Status: SHIPPED | OUTPATIENT
Start: 2021-07-06

## 2021-07-06 NOTE — TELEPHONE ENCOUNTER
Daughter calling, requesting prescription for Pantoprazole versus Omeprazole. Requesting rx to be sent to [place] on file,[place] has best price available.  Please advise

## 2021-07-06 NOTE — TELEPHONE ENCOUNTER
Please advise on pantoprazole dosage. Medication Quantity Refills Start End   Omeprazole 40 MG Oral Capsule Delayed Release 90 capsule 1 7/1/2021    Sig:   Take 1 capsule (40 mg total) by mouth daily.

## 2021-07-21 ENCOUNTER — LAB ENCOUNTER (OUTPATIENT)
Dept: LAB | Age: 61
End: 2021-07-21
Attending: INTERNAL MEDICINE
Payer: MEDICAID

## 2021-07-21 ENCOUNTER — OFFICE VISIT (OUTPATIENT)
Dept: RHEUMATOLOGY | Facility: CLINIC | Age: 61
End: 2021-07-21
Payer: MEDICAID

## 2021-07-21 ENCOUNTER — TELEPHONE (OUTPATIENT)
Dept: RHEUMATOLOGY | Facility: CLINIC | Age: 61
End: 2021-07-21

## 2021-07-21 VITALS
DIASTOLIC BLOOD PRESSURE: 50 MMHG | WEIGHT: 178 LBS | OXYGEN SATURATION: 99 % | TEMPERATURE: 98 F | BODY MASS INDEX: 34.95 KG/M2 | HEIGHT: 60 IN | HEART RATE: 97 BPM | SYSTOLIC BLOOD PRESSURE: 119 MMHG

## 2021-07-21 DIAGNOSIS — D50.9 MICROCYTIC ANEMIA: ICD-10-CM

## 2021-07-21 DIAGNOSIS — M05.742 RHEUMATOID ARTHRITIS INVOLVING BOTH HANDS WITH POSITIVE RHEUMATOID FACTOR (HCC): Primary | ICD-10-CM

## 2021-07-21 DIAGNOSIS — E53.8 LOW VITAMIN B12 LEVEL: Primary | ICD-10-CM

## 2021-07-21 DIAGNOSIS — M47.896 OTHER OSTEOARTHRITIS OF SPINE, LUMBAR REGION: ICD-10-CM

## 2021-07-21 DIAGNOSIS — M17.0 BILATERAL PRIMARY OSTEOARTHRITIS OF KNEE: ICD-10-CM

## 2021-07-21 DIAGNOSIS — Z79.899 ENCOUNTER FOR LONG-TERM CURRENT USE OF HIGH RISK MEDICATION: ICD-10-CM

## 2021-07-21 DIAGNOSIS — M05.741 RHEUMATOID ARTHRITIS INVOLVING BOTH HANDS WITH POSITIVE RHEUMATOID FACTOR (HCC): ICD-10-CM

## 2021-07-21 DIAGNOSIS — M05.741 RHEUMATOID ARTHRITIS INVOLVING BOTH HANDS WITH POSITIVE RHEUMATOID FACTOR (HCC): Primary | ICD-10-CM

## 2021-07-21 DIAGNOSIS — M05.742 RHEUMATOID ARTHRITIS INVOLVING BOTH HANDS WITH POSITIVE RHEUMATOID FACTOR (HCC): ICD-10-CM

## 2021-07-21 DIAGNOSIS — Z51.81 THERAPEUTIC DRUG MONITORING: ICD-10-CM

## 2021-07-21 DIAGNOSIS — R91.1 PULMONARY NODULE: ICD-10-CM

## 2021-07-21 LAB
ALBUMIN SERPL-MCNC: 3.6 G/DL (ref 3.4–5)
ALBUMIN/GLOB SERPL: 1.1 {RATIO} (ref 1–2)
ALP LIVER SERPL-CCNC: 59 U/L
ALT SERPL-CCNC: 39 U/L
ANION GAP SERPL CALC-SCNC: 6 MMOL/L (ref 0–18)
AST SERPL-CCNC: 22 U/L (ref 15–37)
BASOPHILS # BLD AUTO: 0.02 X10(3) UL (ref 0–0.2)
BASOPHILS NFR BLD AUTO: 0.3 %
BILIRUB SERPL-MCNC: 0.4 MG/DL (ref 0.1–2)
BUN BLD-MCNC: 22 MG/DL (ref 7–18)
BUN/CREAT SERPL: 21.2 (ref 10–20)
CALCIUM BLD-MCNC: 8.9 MG/DL (ref 8.5–10.1)
CHLORIDE SERPL-SCNC: 104 MMOL/L (ref 98–112)
CO2 SERPL-SCNC: 30 MMOL/L (ref 21–32)
CREAT BLD-MCNC: 1.04 MG/DL
CRP SERPL-MCNC: 0.56 MG/DL (ref ?–0.3)
DEPRECATED HBV CORE AB SER IA-ACNC: 123.3 NG/ML
DEPRECATED RDW RBC AUTO: 51.1 FL (ref 35.1–46.3)
EOSINOPHIL # BLD AUTO: 0.12 X10(3) UL (ref 0–0.7)
EOSINOPHIL NFR BLD AUTO: 1.6 %
ERYTHROCYTE [DISTWIDTH] IN BLOOD BY AUTOMATED COUNT: 15.9 % (ref 11–15)
GLOBULIN PLAS-MCNC: 3.4 G/DL (ref 2.8–4.4)
GLUCOSE BLD-MCNC: 112 MG/DL (ref 70–99)
HCT VFR BLD AUTO: 33.5 %
HGB BLD-MCNC: 10.8 G/DL
IMM GRANULOCYTES # BLD AUTO: 0.03 X10(3) UL (ref 0–1)
IMM GRANULOCYTES NFR BLD: 0.4 %
LYMPHOCYTES # BLD AUTO: 2.26 X10(3) UL (ref 1–4)
LYMPHOCYTES NFR BLD AUTO: 29.7 %
M PROTEIN MFR SERPL ELPH: 7 G/DL (ref 6.4–8.2)
MCH RBC QN AUTO: 28.6 PG (ref 26–34)
MCHC RBC AUTO-ENTMCNC: 32.2 G/DL (ref 31–37)
MCV RBC AUTO: 88.9 FL
MONOCYTES # BLD AUTO: 0.4 X10(3) UL (ref 0.1–1)
MONOCYTES NFR BLD AUTO: 5.3 %
NEUTROPHILS # BLD AUTO: 4.77 X10 (3) UL (ref 1.5–7.7)
NEUTROPHILS # BLD AUTO: 4.77 X10(3) UL (ref 1.5–7.7)
NEUTROPHILS NFR BLD AUTO: 62.7 %
OSMOLALITY SERPL CALC.SUM OF ELEC: 294 MOSM/KG (ref 275–295)
PATIENT FASTING Y/N/NP: NO
PLATELET # BLD AUTO: 301 10(3)UL (ref 150–450)
POTASSIUM SERPL-SCNC: 3.7 MMOL/L (ref 3.5–5.1)
RBC # BLD AUTO: 3.77 X10(6)UL
SED RATE-ML: 26 MM/HR
SODIUM SERPL-SCNC: 140 MMOL/L (ref 136–145)
VIT B12 SERPL-MCNC: 248 PG/ML (ref 193–986)
WBC # BLD AUTO: 7.6 X10(3) UL (ref 4–11)

## 2021-07-21 PROCEDURE — 3078F DIAST BP <80 MM HG: CPT | Performed by: INTERNAL MEDICINE

## 2021-07-21 PROCEDURE — 99214 OFFICE O/P EST MOD 30 MIN: CPT | Performed by: INTERNAL MEDICINE

## 2021-07-21 PROCEDURE — 86140 C-REACTIVE PROTEIN: CPT

## 2021-07-21 PROCEDURE — 85025 COMPLETE CBC W/AUTO DIFF WBC: CPT

## 2021-07-21 PROCEDURE — 82607 VITAMIN B-12: CPT

## 2021-07-21 PROCEDURE — 80053 COMPREHEN METABOLIC PANEL: CPT

## 2021-07-21 PROCEDURE — 3074F SYST BP LT 130 MM HG: CPT | Performed by: INTERNAL MEDICINE

## 2021-07-21 PROCEDURE — 85652 RBC SED RATE AUTOMATED: CPT

## 2021-07-21 PROCEDURE — 36415 COLL VENOUS BLD VENIPUNCTURE: CPT

## 2021-07-21 PROCEDURE — 82728 ASSAY OF FERRITIN: CPT

## 2021-07-21 PROCEDURE — 3008F BODY MASS INDEX DOCD: CPT | Performed by: INTERNAL MEDICINE

## 2021-07-21 RX ORDER — METHOTREXATE 2.5 MG/1
20 TABLET ORAL WEEKLY
Qty: 96 TABLET | Refills: 0 | Status: SHIPPED | OUTPATIENT
Start: 2021-07-21 | End: 2021-09-17

## 2021-07-21 RX ORDER — FOLIC ACID 1 MG/1
2 TABLET ORAL DAILY
Qty: 180 TABLET | Refills: 3 | Status: SHIPPED | OUTPATIENT
Start: 2021-07-21

## 2021-07-21 RX ORDER — HYDROXYCHLOROQUINE SULFATE 200 MG/1
200 TABLET, FILM COATED ORAL DAILY
Qty: 90 TABLET | Refills: 3 | Status: SHIPPED | OUTPATIENT
Start: 2021-07-21 | End: 2021-09-17

## 2021-07-21 NOTE — PATIENT INSTRUCTIONS
We will work on home physical therapy    Continue medications as you have been doing     I put in a referral to [facility]. Please call to schedule: phone: [phone] or [phone] (in [facility]).        Purchase Voltaren (diclofenac) 1% gel ov

## 2021-07-21 NOTE — PROGRESS NOTES
Rheumatology Follow-up Note  =====================================================================================================  Date of visit: 6/11/2021  ? Patient presents with: Follow - Up: 5wk follow up, labs and imaging completed yesterday.  Dinora Kirk capsule, Rfl: 1  Glucose Blood (ONETOUCH ULTRA) In Vitro Strip, Test 2 times per day, Disp: 200 each, Rfl: 1  OneTouch Delica Lancets 47A Does not apply Misc, 1 lancet by Finger stick route 2 (two) times a day., Disp: 200 each, Rfl: 1  atorvastatin 10 MG O in the evening. Take 8 tablets (20 mg total) by mouth once a week. Split dose once a day: 4 tabs in the morning and 4 tabs in the evening.      Medications Discontinued During This Encounter  Hydroxychloroquine Sulfate 200 MG Or*  Methotrexate Sodium 2.5 NEPERCENT 50.4 06/10/2021    LYPERCENT 44.1 06/10/2021    MOPERCENT 3.5 06/10/2021    EOPERCENT 1.4 06/10/2021    BAPERCENT 0.4 06/10/2021    NE 2.84 06/10/2021    LYMABS 2.49 06/10/2021    MOABSO 0.20 06/10/2021    EOABSO 0.08 06/10/2021    BAABSO 0.02 =====================================================================================================  Assessment and Plan    Assessment:  Rheumatoid arthritis involving both hands with positive rheumatoid factor (HCC)  (primary encounter diagnosis PHYSICAL THERAPY EXTERNAL    Therapeutic drug monitoring  -     COMP METABOLIC PANEL (14); Future  -     CBC WITH DIFFERENTIAL WITH PLATELET; Future  -     C-REACTIVE PROTEIN; Future  -     SED RATE, WESTERGREN (AUTOMATED);  Future  -     OPHTHALMOLOGY - EX

## 2021-07-22 NOTE — TELEPHONE ENCOUNTER
Please call patient. Vitamin B12 is a bit low, may be causing mild anemia. Will prescribe vitamin B12. Otherwise labs are good.   Continue medication plan as previously discussed

## 2021-07-22 NOTE — PROGRESS NOTES
Phoned [facility] MAN[facility] Cobb, spoke with Gerardo Butts.  Will phone our office with home health number for our office to call to schedule

## 2021-07-22 NOTE — PROGRESS NOTES
[facility] returned my call. LOV note, Order, face sheet and insurance information faxed to [phone].   [facility] phone [phone]

## 2021-07-23 NOTE — TELEPHONE ENCOUNTER
Pts daughter called back and I explained that her mothers vitamin B12 is a bit low and that Dr. Genna Diamond sent a prescription over to the pharmacy to treat. Otherwise the lab work looked good. Pts daughter verbalized understanding.

## 2021-08-19 ENCOUNTER — TELEPHONE (OUTPATIENT)
Dept: RHEUMATOLOGY | Facility: CLINIC | Age: 61
End: 2021-08-19

## 2021-08-19 DIAGNOSIS — M05.741 RHEUMATOID ARTHRITIS INVOLVING BOTH HANDS WITH POSITIVE RHEUMATOID FACTOR (HCC): ICD-10-CM

## 2021-08-19 DIAGNOSIS — M05.742 RHEUMATOID ARTHRITIS INVOLVING BOTH HANDS WITH POSITIVE RHEUMATOID FACTOR (HCC): ICD-10-CM

## 2021-08-19 DIAGNOSIS — B37.0 ORAL CANDIDIASIS: ICD-10-CM

## 2021-08-19 DIAGNOSIS — K12.30 MUCOSITIS: Primary | ICD-10-CM

## 2021-08-19 RX ORDER — LIDOCAINE HYDROCHLORIDE 20 MG/ML
5 SOLUTION OROPHARYNGEAL
Qty: 100 ML | Refills: 0 | Status: SHIPPED | OUTPATIENT
Start: 2021-08-19

## 2021-08-19 RX ORDER — LEUCOVORIN CALCIUM 5 MG/1
5 TABLET ORAL
Qty: 12 TABLET | Refills: 0 | Status: SHIPPED | OUTPATIENT
Start: 2021-08-22 | End: 2021-09-17

## 2021-08-19 NOTE — TELEPHONE ENCOUNTER
Please call and reiterate to pt's daughter. I called pt's daughter. 4th day or ulcers. Also with whitish plaque on tongue. Last methotrexate was Saturday. She notes previously she was on methotrexate without significant issue for few years.   Danielle Renee

## 2021-08-19 NOTE — TELEPHONE ENCOUNTER
Returned call to pt (EC); she reports pt has sores in mouth, tongue. Reports tongue is white. Started 3 days ago. Pt denies any other changes in health or medications. Pt is c/o increased dry mouth and pain due to sores. Can not eat anything.   Kai

## 2021-08-19 NOTE — TELEPHONE ENCOUNTER
Pt's daughter calling the office back, informed daughter of Dr. Teresa Posada message in previous TE. Daughter had some questions regarding the Leucovorin. Daughter wanting to know if pt is to take this once a day or twice a day.  Daughter also wants to know if so

## 2021-08-19 NOTE — TELEPHONE ENCOUNTER
Tried calling daughter back. Left voicemail. Could be combination of this rash in stomatitis from methotrexate. Would have the patient hold next dose of methotrexate until the sores cleared up.   Please prescribe leucovorin 5 mg to be taken the day aft

## 2021-08-20 NOTE — TELEPHONE ENCOUNTER
Phoned pt, notified daughter her per Justo Senters Margaux Rivera note     \"Last methotrexate was Saturday. She notes previously she was on methotrexate without significant issue for few years. Cannot rule out methotrexate related mucositis.   Change folic acid to leucovo

## 2021-09-05 RX ORDER — PANTOPRAZOLE SODIUM 20 MG/1
TABLET, DELAYED RELEASE ORAL
Qty: 60 TABLET | Refills: 0 | Status: SHIPPED | OUTPATIENT
Start: 2021-09-05 | End: 2021-12-13

## 2021-09-17 ENCOUNTER — TELEPHONE (OUTPATIENT)
Dept: RHEUMATOLOGY | Facility: CLINIC | Age: 61
End: 2021-09-17

## 2021-09-17 ENCOUNTER — TELEPHONE (OUTPATIENT)
Dept: FAMILY MEDICINE CLINIC | Facility: CLINIC | Age: 61
End: 2021-09-17

## 2021-09-17 DIAGNOSIS — M05.742 RHEUMATOID ARTHRITIS INVOLVING BOTH HANDS WITH POSITIVE RHEUMATOID FACTOR (HCC): ICD-10-CM

## 2021-09-17 DIAGNOSIS — K12.30 MUCOSITIS: ICD-10-CM

## 2021-09-17 DIAGNOSIS — M05.741 RHEUMATOID ARTHRITIS INVOLVING BOTH HANDS WITH POSITIVE RHEUMATOID FACTOR (HCC): ICD-10-CM

## 2021-09-17 DIAGNOSIS — Z79.899 ENCOUNTER FOR LONG-TERM CURRENT USE OF HIGH RISK MEDICATION: Primary | ICD-10-CM

## 2021-09-17 RX ORDER — LEUCOVORIN CALCIUM 5 MG/1
5 TABLET ORAL
Qty: 12 TABLET | Refills: 0 | Status: SHIPPED | OUTPATIENT
Start: 2021-09-19

## 2021-09-17 RX ORDER — HYDROXYCHLOROQUINE SULFATE 200 MG/1
200 TABLET, FILM COATED ORAL DAILY
Qty: 90 TABLET | Refills: 3 | Status: SHIPPED | OUTPATIENT
Start: 2021-09-17

## 2021-09-17 RX ORDER — METHOTREXATE 2.5 MG/1
20 TABLET ORAL WEEKLY
Qty: 96 TABLET | Refills: 0 | Status: SHIPPED | OUTPATIENT
Start: 2021-09-17 | End: 2021-12-16

## 2021-09-17 NOTE — TELEPHONE ENCOUNTER
Let the patient know that I did put in the prescriptions for 3 months because she is leaving so soon. Please give more advanced notice in the future. Please have the patient get blood work today or over the weekend.   Very important otherwise if she is lamar

## 2021-09-17 NOTE — TELEPHONE ENCOUNTER
Call daughter, Please have her get repeat blood work which I have ordered prior to leaving the country, when is she leaving?  Then I can order 3 month supply    Also please have her get a booster Covid vaccine and flu vaccine, then hold methotrexate for 2 w

## 2021-09-17 NOTE — TELEPHONE ENCOUNTER
LVM for pt and daughter, to have blood tests completed today or over the weekend. Need to monitor for toxicities of medication. Refills sent to her pharmacy.

## 2021-09-17 NOTE — TELEPHONE ENCOUNTER
Pt daughter phoned office, reminded pt to repeat blood work prior to leaving the country. Will be leaving on Monday for out of the country.       Pt has received the Covid Booster and will have the flu vaccine, reminded to hold methotrexate for 2 weeks    P

## 2021-09-18 ENCOUNTER — LAB SERVICES (OUTPATIENT)
Dept: URGENT CARE | Age: 61
End: 2021-09-18

## 2021-09-18 ENCOUNTER — IMMUNIZATION (OUTPATIENT)
Dept: URGENT CARE | Age: 61
End: 2021-09-18

## 2021-09-18 ENCOUNTER — LAB ENCOUNTER (OUTPATIENT)
Dept: LAB | Age: 61
End: 2021-09-18
Attending: INTERNAL MEDICINE
Payer: MEDICAID

## 2021-09-18 DIAGNOSIS — M05.741 RHEUMATOID ARTHRITIS INVOLVING BOTH HANDS WITH POSITIVE RHEUMATOID FACTOR (HCC): ICD-10-CM

## 2021-09-18 DIAGNOSIS — M05.742 RHEUMATOID ARTHRITIS INVOLVING BOTH HANDS WITH POSITIVE RHEUMATOID FACTOR (HCC): ICD-10-CM

## 2021-09-18 DIAGNOSIS — Z79.899 ENCOUNTER FOR LONG-TERM CURRENT USE OF HIGH RISK MEDICATION: ICD-10-CM

## 2021-09-18 DIAGNOSIS — Z01.812 ENCOUNTER FOR SCREENING LABORATORY TESTING FOR COVID-19 VIRUS IN ASYMPTOMATIC PATIENT: Primary | ICD-10-CM

## 2021-09-18 DIAGNOSIS — Z51.81 THERAPEUTIC DRUG MONITORING: ICD-10-CM

## 2021-09-18 DIAGNOSIS — Z11.52 ENCOUNTER FOR SCREENING LABORATORY TESTING FOR COVID-19 VIRUS IN ASYMPTOMATIC PATIENT: Primary | ICD-10-CM

## 2021-09-18 DIAGNOSIS — Z23 NEED FOR VACCINATION: Primary | ICD-10-CM

## 2021-09-18 DIAGNOSIS — D64.9 ANEMIA, UNSPECIFIED TYPE: ICD-10-CM

## 2021-09-18 LAB
ALBUMIN SERPL-MCNC: 3.4 G/DL (ref 3.4–5)
ALBUMIN/GLOB SERPL: 1 {RATIO} (ref 1–2)
ALP LIVER SERPL-CCNC: 58 U/L
ALT SERPL-CCNC: 26 U/L
ANION GAP SERPL CALC-SCNC: 5 MMOL/L (ref 0–18)
AST SERPL-CCNC: 15 U/L (ref 15–37)
BASOPHILS # BLD AUTO: 0.02 X10(3) UL (ref 0–0.2)
BASOPHILS NFR BLD AUTO: 0.3 %
BILIRUB SERPL-MCNC: 0.3 MG/DL (ref 0.1–2)
BUN BLD-MCNC: 24 MG/DL (ref 7–18)
CALCIUM BLD-MCNC: 8.7 MG/DL (ref 8.5–10.1)
CHLORIDE SERPL-SCNC: 107 MMOL/L (ref 98–112)
CO2 SERPL-SCNC: 27 MMOL/L (ref 21–32)
CREAT BLD-MCNC: 1.04 MG/DL
CRP SERPL-MCNC: <0.29 MG/DL (ref ?–0.3)
EOSINOPHIL # BLD AUTO: 0.2 X10(3) UL (ref 0–0.7)
EOSINOPHIL NFR BLD AUTO: 3.2 %
ERYTHROCYTE [DISTWIDTH] IN BLOOD BY AUTOMATED COUNT: 15.4 %
GLOBULIN PLAS-MCNC: 3.5 G/DL (ref 2.8–4.4)
GLUCOSE BLD-MCNC: 137 MG/DL (ref 70–99)
HCT VFR BLD AUTO: 30.1 %
HGB BLD-MCNC: 9.9 G/DL
IMM GRANULOCYTES # BLD AUTO: 0.02 X10(3) UL (ref 0–1)
IMM GRANULOCYTES NFR BLD: 0.3 %
LYMPHOCYTES # BLD AUTO: 2.22 X10(3) UL (ref 1–4)
LYMPHOCYTES NFR BLD AUTO: 35.4 %
MCH RBC QN AUTO: 29.7 PG (ref 26–34)
MCHC RBC AUTO-ENTMCNC: 32.9 G/DL (ref 31–37)
MCV RBC AUTO: 90.4 FL
MONOCYTES # BLD AUTO: 0.45 X10(3) UL (ref 0.1–1)
MONOCYTES NFR BLD AUTO: 7.2 %
NEUTROPHILS # BLD AUTO: 3.36 X10 (3) UL (ref 1.5–7.7)
NEUTROPHILS # BLD AUTO: 3.36 X10(3) UL (ref 1.5–7.7)
NEUTROPHILS NFR BLD AUTO: 53.6 %
OSMOLALITY SERPL CALC.SUM OF ELEC: 294 MOSM/KG (ref 275–295)
PATIENT FASTING Y/N/NP: YES
PLATELET # BLD AUTO: 281 10(3)UL (ref 150–450)
POTASSIUM SERPL-SCNC: 3.5 MMOL/L (ref 3.5–5.1)
PROT SERPL-MCNC: 6.9 G/DL (ref 6.4–8.2)
RBC # BLD AUTO: 3.33 X10(6)UL
SED RATE-ML: 29 MM/HR
SODIUM SERPL-SCNC: 139 MMOL/L (ref 136–145)
WBC # BLD AUTO: 6.3 X10(3) UL (ref 4–11)

## 2021-09-18 PROCEDURE — 86140 C-REACTIVE PROTEIN: CPT

## 2021-09-18 PROCEDURE — U0003 INFECTIOUS AGENT DETECTION BY NUCLEIC ACID (DNA OR RNA); SEVERE ACUTE RESPIRATORY SYNDROME CORONAVIRUS 2 (SARS-COV-2) (CORONAVIRUS DISEASE [COVID-19]), AMPLIFIED PROBE TECHNIQUE, MAKING USE OF HIGH THROUGHPUT TECHNOLOGIES AS DESCRIBED BY CMS-2020-01-R: HCPCS | Performed by: NURSE PRACTITIONER

## 2021-09-18 PROCEDURE — 80053 COMPREHEN METABOLIC PANEL: CPT

## 2021-09-18 PROCEDURE — 36415 COLL VENOUS BLD VENIPUNCTURE: CPT

## 2021-09-18 PROCEDURE — 82728 ASSAY OF FERRITIN: CPT

## 2021-09-18 PROCEDURE — U0005 INFEC AGEN DETEC AMPLI PROBE: HCPCS | Performed by: NURSE PRACTITIONER

## 2021-09-18 PROCEDURE — 83550 IRON BINDING TEST: CPT

## 2021-09-18 PROCEDURE — 90686 IIV4 VACC NO PRSV 0.5 ML IM: CPT | Performed by: NURSE PRACTITIONER

## 2021-09-18 PROCEDURE — 85652 RBC SED RATE AUTOMATED: CPT

## 2021-09-18 PROCEDURE — 82607 VITAMIN B-12: CPT

## 2021-09-18 PROCEDURE — 83540 ASSAY OF IRON: CPT

## 2021-09-18 PROCEDURE — 85025 COMPLETE CBC W/AUTO DIFF WBC: CPT

## 2021-09-19 ENCOUNTER — TELEPHONE (OUTPATIENT)
Dept: RHEUMATOLOGY | Facility: CLINIC | Age: 61
End: 2021-09-19

## 2021-09-19 DIAGNOSIS — D64.9 ANEMIA, UNSPECIFIED TYPE: Primary | ICD-10-CM

## 2021-09-19 DIAGNOSIS — Z51.81 THERAPEUTIC DRUG MONITORING: ICD-10-CM

## 2021-09-19 LAB
DEPRECATED HBV CORE AB SER IA-ACNC: 89.9 NG/ML
IRON SATURATION: 11 %
IRON SERPL-MCNC: 39 UG/DL
SARS-COV-2 RNA RESP QL NAA+PROBE: NOT DETECTED
SERVICE CMNT-IMP: NORMAL
SERVICE CMNT-IMP: NORMAL
TOTAL IRON BINDING CAPACITY: 356 UG/DL (ref 240–450)
TRANSFERRIN SERPL-MCNC: 239 MG/DL (ref 200–360)
VIT B12 SERPL-MCNC: 274 PG/ML (ref 193–986)

## 2021-09-19 NOTE — PROGRESS NOTES
Please let patient know:    Blood count, liver tests, kidney function are stable. Still with a bit of anemia, added on iron studies, repeat b12, folate level. Continue with current medication plan. Have a nice trip!

## 2021-09-20 NOTE — PROGRESS NOTES
Please let patient know that iron deficiency is present, likely cause of anemia. Please prescribe the patient 325 mg ferrous sulfate daily (65 mg Fe) to be taken with largest meal of the day. Vitamin B12 is low.  Low vitamin B12 can cause neuropathy (ne

## 2021-09-20 NOTE — PROGRESS NOTES
Can you let her know to start 1000 mcg of cyanocobalamin daily, can  OTC out of the country as well, otherwise can start when she gets back.

## 2021-12-07 ENCOUNTER — TELEPHONE (OUTPATIENT)
Dept: PAIN CLINIC | Facility: CLINIC | Age: 61
End: 2021-12-07

## 2021-12-07 DIAGNOSIS — M19.90 ARTHRITIS: ICD-10-CM

## 2021-12-07 RX ORDER — NAPROXEN 500 MG/1
TABLET ORAL
Qty: 60 TABLET | Refills: 0 | Status: SHIPPED | OUTPATIENT
Start: 2021-12-07

## 2021-12-07 NOTE — TELEPHONE ENCOUNTER
Pt daughter Danny Flores advised that pt will be going out of town and she will give us a call back to reschedule.

## 2021-12-07 NOTE — TELEPHONE ENCOUNTER
Medication: naproxen 500 MG Oral Tab    Date of last refill: 03/08/21      Last office visit: 01/04/21  Due back to clinic per last office note:  na  Date next office visit scheduled:  none        Last OV note recommendation:   ASSESSMENT AND PLAN:   [employer]

## 2021-12-13 RX ORDER — PANTOPRAZOLE SODIUM 20 MG/1
20 TABLET, DELAYED RELEASE ORAL
Qty: 90 TABLET | Refills: 0 | Status: SHIPPED | OUTPATIENT
Start: 2021-12-13

## 2021-12-29 RX ORDER — HYDROCHLOROTHIAZIDE 25 MG/1
TABLET ORAL
Qty: 90 TABLET | Refills: 1 | Status: SHIPPED | OUTPATIENT
Start: 2021-12-29

## 2022-02-07 ENCOUNTER — TELEPHONE (OUTPATIENT)
Dept: RHEUMATOLOGY | Facility: CLINIC | Age: 62
End: 2022-02-07

## 2022-02-07 NOTE — TELEPHONE ENCOUNTER
Phoned pt daughter, pt was out of the country and now has returned. Requesting refills of all medication. Just picked up refill of methotrexate yesterday, has been out for about 3weeks. Reminded pt daughter to have blood work completed. Pt does not have future appointment. Will clarify appt time with Dr. Kelechi Olivares, no availability at this time.

## 2022-02-08 NOTE — TELEPHONE ENCOUNTER
Please add on pt at 12:30 on Feb 16th at [place], or at 2:30 or 3:30 on Feb 17th. Please get bloodwork (ordered as standing labs) prior to appointment.     All non-methotrexate refills filled by me were for one year or were rx'ed by by PCP

## 2022-02-09 ENCOUNTER — TELEPHONE (OUTPATIENT)
Dept: RHEUMATOLOGY | Facility: CLINIC | Age: 62
End: 2022-02-09

## 2022-03-09 ENCOUNTER — OFFICE VISIT (OUTPATIENT)
Dept: ORTHOPEDICS CLINIC | Facility: CLINIC | Age: 62
End: 2022-03-09
Payer: MEDICAID

## 2022-03-09 VITALS — SYSTOLIC BLOOD PRESSURE: 136 MMHG | DIASTOLIC BLOOD PRESSURE: 74 MMHG | OXYGEN SATURATION: 99 % | HEART RATE: 89 BPM

## 2022-03-09 DIAGNOSIS — M17.0 PRIMARY OSTEOARTHRITIS OF BOTH KNEES: Primary | ICD-10-CM

## 2022-03-09 PROCEDURE — 3078F DIAST BP <80 MM HG: CPT | Performed by: ORTHOPAEDIC SURGERY

## 2022-03-09 PROCEDURE — 3075F SYST BP GE 130 - 139MM HG: CPT | Performed by: ORTHOPAEDIC SURGERY

## 2022-03-09 PROCEDURE — 20610 DRAIN/INJ JOINT/BURSA W/O US: CPT | Performed by: ORTHOPAEDIC SURGERY

## 2022-03-09 RX ORDER — TRIAMCINOLONE ACETONIDE 40 MG/ML
80 INJECTION, SUSPENSION INTRA-ARTICULAR; INTRAMUSCULAR ONCE
Status: COMPLETED | OUTPATIENT
Start: 2022-03-09 | End: 2022-03-09

## 2022-03-09 RX ORDER — HYDROCHLOROTHIAZIDE 25 MG/1
TABLET ORAL
Qty: 90 TABLET | Refills: 1 | Status: SHIPPED | OUTPATIENT
Start: 2022-03-09 | End: 2022-03-28

## 2022-03-09 RX ORDER — NAPROXEN 500 MG/1
500 TABLET ORAL 2 TIMES DAILY PRN
Qty: 60 TABLET | Refills: 0 | Status: SHIPPED | OUTPATIENT
Start: 2022-03-09 | End: 2022-03-16

## 2022-03-09 RX ORDER — HYDROCHLOROTHIAZIDE 25 MG/1
TABLET ORAL
Qty: 90 TABLET | Refills: 1 | Status: SHIPPED | OUTPATIENT
Start: 2022-03-09 | End: 2022-03-16

## 2022-03-09 RX ADMIN — TRIAMCINOLONE ACETONIDE 80 MG: 40 INJECTION, SUSPENSION INTRA-ARTICULAR; INTRAMUSCULAR at 09:15:00

## 2022-03-16 ENCOUNTER — OFFICE VISIT (OUTPATIENT)
Dept: PAIN CLINIC | Facility: CLINIC | Age: 62
End: 2022-03-16
Payer: MEDICAID

## 2022-03-16 VITALS — DIASTOLIC BLOOD PRESSURE: 80 MMHG | SYSTOLIC BLOOD PRESSURE: 110 MMHG | HEART RATE: 71 BPM | OXYGEN SATURATION: 98 %

## 2022-03-16 DIAGNOSIS — M47.816 LUMBAR SPONDYLOSIS: Primary | ICD-10-CM

## 2022-03-16 DIAGNOSIS — M25.511 CHRONIC RIGHT SHOULDER PAIN: ICD-10-CM

## 2022-03-16 DIAGNOSIS — M48.062 SPINAL STENOSIS OF LUMBAR REGION WITH NEUROGENIC CLAUDICATION: ICD-10-CM

## 2022-03-16 DIAGNOSIS — G89.29 CHRONIC RIGHT SHOULDER PAIN: ICD-10-CM

## 2022-03-16 PROCEDURE — 99214 OFFICE O/P EST MOD 30 MIN: CPT | Performed by: PHYSICIAN ASSISTANT

## 2022-03-16 PROCEDURE — 3079F DIAST BP 80-89 MM HG: CPT | Performed by: PHYSICIAN ASSISTANT

## 2022-03-16 PROCEDURE — 3074F SYST BP LT 130 MM HG: CPT | Performed by: PHYSICIAN ASSISTANT

## 2022-03-16 RX ORDER — LIDOCAINE HYDROCHLORIDE 20 MG/ML
1 SOLUTION ORAL; TOPICAL DAILY
COMMUNITY
Start: 2022-01-27

## 2022-03-16 NOTE — PROGRESS NOTES
Patient presents in office today with reported pain in neck radiating into right shoulder and right elbow     Current pain level reported = 8/10    Last reported dose of naproxen this am       Narcotic Contract renewal na    Urine Drug screen na

## 2022-03-18 ENCOUNTER — TELEPHONE (OUTPATIENT)
Dept: NEUROLOGY | Facility: CLINIC | Age: 62
End: 2022-03-18

## 2022-03-18 NOTE — TELEPHONE ENCOUNTER
Prior authorization request completed for: FRANCISCO   Authorization # [id number]  Authorization dates: 3/18/2022 - 5/17/2022   CPT codes approved: 35751  Number of visits/dates of service approved: 1  Physician: Dr. Brandt Sharif   Location: [facility]s to schedule

## 2022-03-18 NOTE — TELEPHONE ENCOUNTER
Prior authorization request completed for: R shoulder injection   Authorization # NO PRIOR AUTHORIZATION REQUIRED  Authorization dates: N/A  CPT codes approved: 62553  Number of visits/dates of service approved: 1  Physician: Dr. Cale Guerrero   Location: [facility]ly Segovia to [facility] and spoke to Ameena Figueroa

## 2022-03-22 NOTE — TELEPHONE ENCOUNTER
Question Answer   Anesthesia Type Local   Provider [facility]   Procedure Lumbar SOFIE   Medications to hold asa   Medical clearance requested (will send to Pain Navigator) No   Patient has Medicare coverage? No     Question Answer Comment   Anesthesia Type Local    Provider [facility]    Procedure Other (please add comment) RIGHT shoulder injection   Medications to hold asa    Medical clearance requested (will send to Pain Navigator) No    Patient has Medicare coverage?  No

## 2022-03-22 NOTE — TELEPHONE ENCOUNTER
Pt daughter is returning call to schedule. She asked that you call her back around 1:45pm.    Please advise.

## 2022-03-24 ENCOUNTER — LAB ENCOUNTER (OUTPATIENT)
Dept: LAB | Age: 62
End: 2022-03-24
Attending: INTERNAL MEDICINE
Payer: MEDICAID

## 2022-03-24 ENCOUNTER — OFFICE VISIT (OUTPATIENT)
Dept: RHEUMATOLOGY | Facility: CLINIC | Age: 62
End: 2022-03-24
Payer: MEDICAID

## 2022-03-24 VITALS
HEIGHT: 60 IN | HEART RATE: 92 BPM | DIASTOLIC BLOOD PRESSURE: 80 MMHG | BODY MASS INDEX: 33.18 KG/M2 | WEIGHT: 169 LBS | OXYGEN SATURATION: 99 % | SYSTOLIC BLOOD PRESSURE: 119 MMHG

## 2022-03-24 DIAGNOSIS — E53.8 LOW VITAMIN B12 LEVEL: ICD-10-CM

## 2022-03-24 DIAGNOSIS — M05.742 RHEUMATOID ARTHRITIS INVOLVING BOTH HANDS WITH POSITIVE RHEUMATOID FACTOR (HCC): Primary | ICD-10-CM

## 2022-03-24 DIAGNOSIS — K29.70 GASTRITIS WITHOUT BLEEDING, UNSPECIFIED CHRONICITY, UNSPECIFIED GASTRITIS TYPE: ICD-10-CM

## 2022-03-24 DIAGNOSIS — M05.741 RHEUMATOID ARTHRITIS INVOLVING BOTH HANDS WITH POSITIVE RHEUMATOID FACTOR (HCC): ICD-10-CM

## 2022-03-24 DIAGNOSIS — M05.741 RHEUMATOID ARTHRITIS INVOLVING BOTH HANDS WITH POSITIVE RHEUMATOID FACTOR (HCC): Primary | ICD-10-CM

## 2022-03-24 DIAGNOSIS — K12.30 MUCOSITIS: ICD-10-CM

## 2022-03-24 DIAGNOSIS — M05.742 RHEUMATOID ARTHRITIS INVOLVING BOTH HANDS WITH POSITIVE RHEUMATOID FACTOR (HCC): ICD-10-CM

## 2022-03-24 LAB
ALBUMIN SERPL-MCNC: 4 G/DL (ref 3.4–5)
ALBUMIN/GLOB SERPL: 1.2 {RATIO} (ref 1–2)
ALT SERPL-CCNC: 26 U/L
ANION GAP SERPL CALC-SCNC: 2 MMOL/L (ref 0–18)
AST SERPL-CCNC: 17 U/L (ref 15–37)
BASOPHILS # BLD AUTO: 0.05 X10(3) UL (ref 0–0.2)
BASOPHILS NFR BLD AUTO: 0.5 %
BILIRUB SERPL-MCNC: 0.4 MG/DL (ref 0.1–2)
BUN BLD-MCNC: 28 MG/DL (ref 7–18)
CALCIUM BLD-MCNC: 9.3 MG/DL (ref 8.5–10.1)
CHLORIDE SERPL-SCNC: 104 MMOL/L (ref 98–112)
CO2 SERPL-SCNC: 31 MMOL/L (ref 21–32)
CREAT BLD-MCNC: 1.27 MG/DL
CRP SERPL-MCNC: 0.36 MG/DL (ref ?–0.3)
EOSINOPHIL # BLD AUTO: 0.17 X10(3) UL (ref 0–0.7)
EOSINOPHIL NFR BLD AUTO: 1.6 %
ERYTHROCYTE [DISTWIDTH] IN BLOOD BY AUTOMATED COUNT: 15.3 %
ERYTHROCYTE [SEDIMENTATION RATE] IN BLOOD: 31 MM/HR
FASTING STATUS PATIENT QL REPORTED: YES
GLOBULIN PLAS-MCNC: 3.4 G/DL (ref 2.8–4.4)
GLUCOSE BLD-MCNC: 110 MG/DL (ref 70–99)
HCT VFR BLD AUTO: 35 %
HGB BLD-MCNC: 11.4 G/DL
IMM GRANULOCYTES # BLD AUTO: 0.07 X10(3) UL (ref 0–1)
IMM GRANULOCYTES NFR BLD: 0.7 %
LYMPHOCYTES # BLD AUTO: 2.69 X10(3) UL (ref 1–4)
LYMPHOCYTES NFR BLD AUTO: 25.5 %
MCH RBC QN AUTO: 29.8 PG (ref 26–34)
MCHC RBC AUTO-ENTMCNC: 32.6 G/DL (ref 31–37)
MCV RBC AUTO: 91.6 FL
MONOCYTES # BLD AUTO: 0.71 X10(3) UL (ref 0.1–1)
MONOCYTES NFR BLD AUTO: 6.7 %
NEUTROPHILS # BLD AUTO: 6.85 X10 (3) UL (ref 1.5–7.7)
NEUTROPHILS # BLD AUTO: 6.85 X10(3) UL (ref 1.5–7.7)
NEUTROPHILS NFR BLD AUTO: 65 %
OSMOLALITY SERPL CALC.SUM OF ELEC: 290 MOSM/KG (ref 275–295)
PLATELET # BLD AUTO: 384 10(3)UL (ref 150–450)
POTASSIUM SERPL-SCNC: 4.3 MMOL/L (ref 3.5–5.1)
PROT SERPL-MCNC: 7.4 G/DL (ref 6.4–8.2)
RBC # BLD AUTO: 3.82 X10(6)UL
SODIUM SERPL-SCNC: 137 MMOL/L (ref 136–145)
VIT B12 SERPL-MCNC: 300 PG/ML (ref 193–986)
WBC # BLD AUTO: 10.5 X10(3) UL (ref 4–11)

## 2022-03-24 PROCEDURE — 80053 COMPREHEN METABOLIC PANEL: CPT

## 2022-03-24 PROCEDURE — 3079F DIAST BP 80-89 MM HG: CPT | Performed by: INTERNAL MEDICINE

## 2022-03-24 PROCEDURE — 99214 OFFICE O/P EST MOD 30 MIN: CPT | Performed by: INTERNAL MEDICINE

## 2022-03-24 PROCEDURE — 86140 C-REACTIVE PROTEIN: CPT

## 2022-03-24 PROCEDURE — 3008F BODY MASS INDEX DOCD: CPT | Performed by: INTERNAL MEDICINE

## 2022-03-24 PROCEDURE — 82607 VITAMIN B-12: CPT

## 2022-03-24 PROCEDURE — 85025 COMPLETE CBC W/AUTO DIFF WBC: CPT

## 2022-03-24 PROCEDURE — 36415 COLL VENOUS BLD VENIPUNCTURE: CPT

## 2022-03-24 PROCEDURE — 85652 RBC SED RATE AUTOMATED: CPT

## 2022-03-24 PROCEDURE — 3074F SYST BP LT 130 MM HG: CPT | Performed by: INTERNAL MEDICINE

## 2022-03-24 RX ORDER — LEUCOVORIN CALCIUM 5 MG/1
5 TABLET ORAL
Qty: 12 TABLET | Refills: 0 | Status: SHIPPED | OUTPATIENT
Start: 2022-03-27

## 2022-03-24 RX ORDER — OMEPRAZOLE 40 MG/1
40 CAPSULE, DELAYED RELEASE ORAL
Qty: 180 CAPSULE | Refills: 0 | Status: SHIPPED | OUTPATIENT
Start: 2022-03-24 | End: 2022-03-28

## 2022-03-24 RX ORDER — PANTOPRAZOLE SODIUM 20 MG/1
20 TABLET, DELAYED RELEASE ORAL
Qty: 180 TABLET | Refills: 0 | Status: CANCELLED | OUTPATIENT
Start: 2022-03-24

## 2022-03-24 NOTE — PATIENT INSTRUCTIONS
Get blood work today. Then if ok will restart methotrexate 20 mg weekly (split into 2 doses) on Saturday  Continue leucovorin once weekly on Sunday. Stop pantoprazole, then start omeprazole 40 mg twice daily 30 minutes before meals, do this for 1 month, then decrease to once daily.

## 2022-03-25 ENCOUNTER — TELEPHONE (OUTPATIENT)
Dept: RHEUMATOLOGY | Facility: CLINIC | Age: 62
End: 2022-03-25

## 2022-03-25 RX ORDER — METHOTREXATE 2.5 MG/1
15 TABLET ORAL WEEKLY
Qty: 78 TABLET | Refills: 0 | Status: SHIPPED | OUTPATIENT
Start: 2022-03-25 | End: 2022-06-24

## 2022-03-25 NOTE — TELEPHONE ENCOUNTER
Phoned pt, spoke with pt daughter. Notified GFR slightly lower than before,  Therefore methotrexate levels may be increased because of this, Dr. Namrata Richmond refilled the methotrexate at 15 mg split dose instead of 20 mg as before. Voiced understanding of information given.

## 2022-03-25 NOTE — TELEPHONE ENCOUNTER
Call pt GFR slightly lower than before,  Therefore methotrexate levels may be increased because of this, I refilled the methotrexate at 15 mg split dose instead of 20 mg as before.

## 2022-03-28 ENCOUNTER — OFFICE VISIT (OUTPATIENT)
Dept: FAMILY MEDICINE CLINIC | Facility: CLINIC | Age: 62
End: 2022-03-28
Payer: MEDICAID

## 2022-03-28 VITALS
HEIGHT: 60 IN | SYSTOLIC BLOOD PRESSURE: 132 MMHG | RESPIRATION RATE: 16 BRPM | HEART RATE: 74 BPM | OXYGEN SATURATION: 99 % | DIASTOLIC BLOOD PRESSURE: 80 MMHG | WEIGHT: 168 LBS | TEMPERATURE: 98 F | BODY MASS INDEX: 32.98 KG/M2

## 2022-03-28 DIAGNOSIS — E11.65 TYPE 2 DIABETES MELLITUS WITH HYPERGLYCEMIA, WITHOUT LONG-TERM CURRENT USE OF INSULIN (HCC): ICD-10-CM

## 2022-03-28 DIAGNOSIS — M05.742 RHEUMATOID ARTHRITIS INVOLVING BOTH HANDS WITH POSITIVE RHEUMATOID FACTOR (HCC): ICD-10-CM

## 2022-03-28 DIAGNOSIS — H91.90 HEARING LOSS, UNSPECIFIED HEARING LOSS TYPE, UNSPECIFIED LATERALITY: ICD-10-CM

## 2022-03-28 DIAGNOSIS — Z12.31 ENCOUNTER FOR MAMMOGRAM TO ESTABLISH BASELINE MAMMOGRAM: ICD-10-CM

## 2022-03-28 DIAGNOSIS — K29.70 GASTRITIS WITHOUT BLEEDING, UNSPECIFIED CHRONICITY, UNSPECIFIED GASTRITIS TYPE: ICD-10-CM

## 2022-03-28 DIAGNOSIS — Z00.00 WELLNESS EXAMINATION: Primary | ICD-10-CM

## 2022-03-28 DIAGNOSIS — M05.741 RHEUMATOID ARTHRITIS INVOLVING BOTH HANDS WITH POSITIVE RHEUMATOID FACTOR (HCC): ICD-10-CM

## 2022-03-28 PROCEDURE — 3008F BODY MASS INDEX DOCD: CPT | Performed by: FAMILY MEDICINE

## 2022-03-28 PROCEDURE — 3079F DIAST BP 80-89 MM HG: CPT | Performed by: FAMILY MEDICINE

## 2022-03-28 PROCEDURE — 3075F SYST BP GE 130 - 139MM HG: CPT | Performed by: FAMILY MEDICINE

## 2022-03-28 PROCEDURE — 99213 OFFICE O/P EST LOW 20 MIN: CPT | Performed by: FAMILY MEDICINE

## 2022-03-28 PROCEDURE — 99396 PREV VISIT EST AGE 40-64: CPT | Performed by: FAMILY MEDICINE

## 2022-03-28 RX ORDER — GABAPENTIN 300 MG/1
300 CAPSULE ORAL NIGHTLY
Qty: 90 CAPSULE | Refills: 1 | Status: SHIPPED | OUTPATIENT
Start: 2022-03-28

## 2022-03-28 RX ORDER — OMEPRAZOLE 40 MG/1
40 CAPSULE, DELAYED RELEASE ORAL
Qty: 180 CAPSULE | Refills: 1 | Status: SHIPPED | OUTPATIENT
Start: 2022-03-28

## 2022-03-28 RX ORDER — HYDROCHLOROTHIAZIDE 25 MG/1
25 TABLET ORAL DAILY
Qty: 90 TABLET | Refills: 1 | Status: SHIPPED | OUTPATIENT
Start: 2022-03-28

## 2022-03-28 RX ORDER — LISINOPRIL 20 MG/1
20 TABLET ORAL DAILY
Qty: 90 TABLET | Refills: 1 | Status: SHIPPED | OUTPATIENT
Start: 2022-03-28

## 2022-03-28 RX ORDER — ATORVASTATIN CALCIUM 10 MG/1
TABLET, FILM COATED ORAL
Qty: 90 TABLET | Refills: 1 | Status: SHIPPED | OUTPATIENT
Start: 2022-03-28

## 2022-03-30 ENCOUNTER — APPOINTMENT (OUTPATIENT)
Dept: GENERAL RADIOLOGY | Facility: HOSPITAL | Age: 62
End: 2022-03-30
Attending: ANESTHESIOLOGY
Payer: MEDICAID

## 2022-03-30 ENCOUNTER — HOSPITAL ENCOUNTER (OUTPATIENT)
Facility: HOSPITAL | Age: 62
Setting detail: HOSPITAL OUTPATIENT SURGERY
Discharge: HOME | End: 2022-03-30
Attending: ANESTHESIOLOGY | Admitting: ANESTHESIOLOGY
Payer: MEDICAID

## 2022-03-30 VITALS
SYSTOLIC BLOOD PRESSURE: 131 MMHG | TEMPERATURE: 97 F | RESPIRATION RATE: 18 BRPM | DIASTOLIC BLOOD PRESSURE: 62 MMHG | HEART RATE: 70 BPM | OXYGEN SATURATION: 98 %

## 2022-03-30 DIAGNOSIS — M48.061 SPINAL STENOSIS OF LUMBAR REGION, UNSPECIFIED WHETHER NEUROGENIC CLAUDICATION PRESENT: ICD-10-CM

## 2022-03-30 LAB — GLUCOSE BLD-MCNC: 92 MG/DL (ref 70–99)

## 2022-03-30 PROCEDURE — 3E0R3BZ INTRODUCTION OF ANESTHETIC AGENT INTO SPINAL CANAL, PERCUTANEOUS APPROACH: ICD-10-PCS | Performed by: ANESTHESIOLOGY

## 2022-03-30 PROCEDURE — 3E0R33Z INTRODUCTION OF ANTI-INFLAMMATORY INTO SPINAL CANAL, PERCUTANEOUS APPROACH: ICD-10-PCS | Performed by: ANESTHESIOLOGY

## 2022-03-30 PROCEDURE — 82962 GLUCOSE BLOOD TEST: CPT

## 2022-03-30 RX ORDER — NICOTINE POLACRILEX 4 MG
15 LOZENGE BUCCAL
Status: DISCONTINUED | OUTPATIENT
Start: 2022-03-30 | End: 2022-03-30

## 2022-03-30 RX ORDER — DEXAMETHASONE SODIUM PHOSPHATE 10 MG/ML
INJECTION, SOLUTION INTRAMUSCULAR; INTRAVENOUS
Status: DISCONTINUED | OUTPATIENT
Start: 2022-03-30 | End: 2022-03-30

## 2022-03-30 RX ORDER — DIPHENHYDRAMINE HYDROCHLORIDE 50 MG/ML
50 INJECTION INTRAMUSCULAR; INTRAVENOUS ONCE AS NEEDED
OUTPATIENT
Start: 2022-03-30 | End: 2022-03-30

## 2022-03-30 RX ORDER — ONDANSETRON 2 MG/ML
4 INJECTION INTRAMUSCULAR; INTRAVENOUS ONCE AS NEEDED
OUTPATIENT
Start: 2022-03-30 | End: 2022-03-30

## 2022-03-30 RX ORDER — SODIUM CHLORIDE 9 MG/ML
INJECTION INTRAVENOUS
Status: DISCONTINUED | OUTPATIENT
Start: 2022-03-30 | End: 2022-03-30

## 2022-03-30 RX ORDER — DEXTROSE MONOHYDRATE 25 G/50ML
50 INJECTION, SOLUTION INTRAVENOUS
Status: DISCONTINUED | OUTPATIENT
Start: 2022-03-30 | End: 2022-03-30

## 2022-03-30 RX ORDER — NICOTINE POLACRILEX 4 MG
30 LOZENGE BUCCAL
Status: DISCONTINUED | OUTPATIENT
Start: 2022-03-30 | End: 2022-03-30

## 2022-03-30 RX ORDER — LIDOCAINE HYDROCHLORIDE 10 MG/ML
INJECTION, SOLUTION EPIDURAL; INFILTRATION; INTRACAUDAL; PERINEURAL
Status: DISCONTINUED | OUTPATIENT
Start: 2022-03-30 | End: 2022-03-30

## 2022-03-30 RX ORDER — INSULIN ASPART 100 [IU]/ML
3 INJECTION, SOLUTION INTRAVENOUS; SUBCUTANEOUS ONCE
Status: DISCONTINUED | OUTPATIENT
Start: 2022-03-30 | End: 2022-03-30

## 2022-03-30 RX ORDER — ASPIRIN 81 MG/1
81 TABLET ORAL DAILY
COMMUNITY

## 2022-03-30 NOTE — OPERATIVE REPORT
[facility]  Operative Report  3/30/2022     Colt Altman Patient Status:  Hospital Outpatient Surgery    1960 MRN [id number]   Location [address] [place] [address] Attending No att. providers found   Hosp Day # 0 PCP Aidee Pham MD     Indication: Erica Winston is a 60year old female patient with chronic low back pain failed conservative treatment with medication and physical therapy was here for lumbar epidural steroid injection. Preoperative Diagnosis:  Spinal stenosis of lumbar region, unspecified whether neurogenic claudication present [M48.061]    Postoperative Diagnosis: Same as above. Procedure performed: Lumbar Interlaminar Epidural Steroid Injection at L5-S1 level under fluoroscopy. Anesthesia: Local.    EBL: Less than 1 ml. Procedure Description:  After reviewing the patient's history and performing a focused physical examination, the diagnosis and positive previous diagnostic tests were confirmed and contraindications such as infection and coagulopathy were ruled out. Following review of allergies and potential side effects and complications, including but not necessarily limited to infection, allergic reaction, local tissue breakdown, nerve injury, post-dural puncture headache and paresis, the patient consented for the procedure. The patient was brought to the procedure room in prone position. After comprehensive monitors were applied, L5-S1 interspace was identified with the help of fluoroscopy. Local anesthetic was given by a 25 gauge 1.5 inch needle with 1% lidocaine in that space level. Thereafter, a 20 gauge Tuohy needle was introduced and advanced under fluoroscopy. The epidural space was accessed by using loss of resistance to air technique. The needle position was confirmed with AP and lateral view under fluoroscopy. Omnipaque 180 was injected in 1 mL volume. A good epidurogram was obtained.   Thereafter, dexamethasone 10 mg with normal saline of 5 mL in total volume of 6 mL was injected through the Tuohy needle. The needle was flushed with 1 mL lidocaine. The needle was withdrawn with the stylet intact in situ. The needle's tip was intact. The patient tolerated the procedure very well without significant immediate complication. The patient's back was cleaned and sterile dressing was applied. The patient was discharged with an instruction to a responsible adult after discharge criteria were met. The patient was advised to contact us should any problems happen. The patient was also informed to go to the emergency room immediately if experiencing severe numbness or weakness in the extremities or experiencing bowel or bladder incontinence. Complications: None. Follow up: The patient was followed in the pain clinic as needed basis.           Michael Birmingham MD

## 2022-04-13 ENCOUNTER — PATIENT MESSAGE (OUTPATIENT)
Dept: RHEUMATOLOGY | Facility: CLINIC | Age: 62
End: 2022-04-13

## 2022-04-13 ENCOUNTER — LAB ENCOUNTER (OUTPATIENT)
Dept: LAB | Age: 62
End: 2022-04-13
Attending: FAMILY MEDICINE
Payer: MEDICAID

## 2022-04-13 DIAGNOSIS — E53.8 VITAMIN B 12 DEFICIENCY: Primary | ICD-10-CM

## 2022-04-13 DIAGNOSIS — Z51.81 THERAPEUTIC DRUG MONITORING: ICD-10-CM

## 2022-04-13 DIAGNOSIS — M05.741 RHEUMATOID ARTHRITIS INVOLVING BOTH HANDS WITH POSITIVE RHEUMATOID FACTOR (HCC): ICD-10-CM

## 2022-04-13 DIAGNOSIS — D64.9 ANEMIA, UNSPECIFIED TYPE: ICD-10-CM

## 2022-04-13 DIAGNOSIS — J84.9 ILD (INTERSTITIAL LUNG DISEASE) (HCC): ICD-10-CM

## 2022-04-13 DIAGNOSIS — M05.742 RHEUMATOID ARTHRITIS INVOLVING BOTH HANDS WITH POSITIVE RHEUMATOID FACTOR (HCC): ICD-10-CM

## 2022-04-13 DIAGNOSIS — Z00.00 WELLNESS EXAMINATION: ICD-10-CM

## 2022-04-13 DIAGNOSIS — E11.65 TYPE 2 DIABETES MELLITUS WITH HYPERGLYCEMIA, WITHOUT LONG-TERM CURRENT USE OF INSULIN (HCC): ICD-10-CM

## 2022-04-13 LAB
ALBUMIN SERPL-MCNC: 3.5 G/DL (ref 3.4–5)
ALBUMIN/GLOB SERPL: 1.1 {RATIO} (ref 1–2)
ALP LIVER SERPL-CCNC: 71 U/L
ALT SERPL-CCNC: 23 U/L
ANION GAP SERPL CALC-SCNC: 2 MMOL/L (ref 0–18)
AST SERPL-CCNC: 21 U/L (ref 15–37)
BASOPHILS # BLD AUTO: 0.02 X10(3) UL (ref 0–0.2)
BASOPHILS NFR BLD AUTO: 0.3 %
BILIRUB SERPL-MCNC: 0.4 MG/DL (ref 0.1–2)
BUN BLD-MCNC: 28 MG/DL (ref 7–18)
CALCIUM BLD-MCNC: 9.2 MG/DL (ref 8.5–10.1)
CHLORIDE SERPL-SCNC: 106 MMOL/L (ref 98–112)
CHOLEST SERPL-MCNC: 168 MG/DL (ref ?–200)
CO2 SERPL-SCNC: 30 MMOL/L (ref 21–32)
CREAT BLD-MCNC: 1.03 MG/DL
CREAT UR-SCNC: 70.6 MG/DL
CRP SERPL-MCNC: 0.87 MG/DL (ref ?–0.3)
EOSINOPHIL # BLD AUTO: 0.17 X10(3) UL (ref 0–0.7)
EOSINOPHIL NFR BLD AUTO: 2.8 %
ERYTHROCYTE [DISTWIDTH] IN BLOOD BY AUTOMATED COUNT: 15.9 %
ERYTHROCYTE [SEDIMENTATION RATE] IN BLOOD: 37 MM/HR
EST. AVERAGE GLUCOSE BLD GHB EST-MCNC: 126 MG/DL (ref 68–126)
FASTING PATIENT LIPID ANSWER: YES
FASTING STATUS PATIENT QL REPORTED: YES
FOLATE SERPL-MCNC: 9.5 NG/ML (ref 8.7–?)
GLOBULIN PLAS-MCNC: 3.2 G/DL (ref 2.8–4.4)
GLUCOSE BLD-MCNC: 101 MG/DL (ref 70–99)
HBA1C MFR BLD: 6 % (ref ?–5.7)
HCT VFR BLD AUTO: 34 %
HDLC SERPL-MCNC: 59 MG/DL (ref 40–59)
HGB BLD-MCNC: 10.4 G/DL
IMM GRANULOCYTES # BLD AUTO: 0.02 X10(3) UL (ref 0–1)
IMM GRANULOCYTES NFR BLD: 0.3 %
LDLC SERPL CALC-MCNC: 94 MG/DL (ref ?–100)
LYMPHOCYTES # BLD AUTO: 1.87 X10(3) UL (ref 1–4)
LYMPHOCYTES NFR BLD AUTO: 30.9 %
MCH RBC QN AUTO: 29.1 PG (ref 26–34)
MCHC RBC AUTO-ENTMCNC: 30.6 G/DL (ref 31–37)
MCV RBC AUTO: 95.2 FL
MICROALBUMIN UR-MCNC: <0.5 MG/DL
MONOCYTES # BLD AUTO: 0.28 X10(3) UL (ref 0.1–1)
MONOCYTES NFR BLD AUTO: 4.6 %
NEUTROPHILS # BLD AUTO: 3.69 X10 (3) UL (ref 1.5–7.7)
NEUTROPHILS # BLD AUTO: 3.69 X10(3) UL (ref 1.5–7.7)
NEUTROPHILS NFR BLD AUTO: 61.1 %
NONHDLC SERPL-MCNC: 109 MG/DL (ref ?–130)
OSMOLALITY SERPL CALC.SUM OF ELEC: 292 MOSM/KG (ref 275–295)
PLATELET # BLD AUTO: 354 10(3)UL (ref 150–450)
POTASSIUM SERPL-SCNC: 5.1 MMOL/L (ref 3.5–5.1)
PROT SERPL-MCNC: 6.7 G/DL (ref 6.4–8.2)
RBC # BLD AUTO: 3.57 X10(6)UL
SODIUM SERPL-SCNC: 138 MMOL/L (ref 136–145)
T4 FREE SERPL-MCNC: 1.4 NG/DL (ref 0.8–1.7)
TRIGL SERPL-MCNC: 78 MG/DL (ref 30–149)
TSI SER-ACNC: 2.56 MIU/ML (ref 0.36–3.74)
VIT B12 SERPL-MCNC: 243 PG/ML (ref 193–986)
VLDLC SERPL CALC-MCNC: 13 MG/DL (ref 0–30)
WBC # BLD AUTO: 6.1 X10(3) UL (ref 4–11)

## 2022-04-13 PROCEDURE — 85652 RBC SED RATE AUTOMATED: CPT | Performed by: INTERNAL MEDICINE

## 2022-04-13 PROCEDURE — 82607 VITAMIN B-12: CPT

## 2022-04-13 PROCEDURE — 82746 ASSAY OF FOLIC ACID SERUM: CPT

## 2022-04-13 PROCEDURE — 83036 HEMOGLOBIN GLYCOSYLATED A1C: CPT

## 2022-04-13 PROCEDURE — 80061 LIPID PANEL: CPT

## 2022-04-13 PROCEDURE — 80053 COMPREHEN METABOLIC PANEL: CPT | Performed by: INTERNAL MEDICINE

## 2022-04-13 PROCEDURE — 36415 COLL VENOUS BLD VENIPUNCTURE: CPT

## 2022-04-13 PROCEDURE — 82043 UR ALBUMIN QUANTITATIVE: CPT

## 2022-04-13 PROCEDURE — 85025 COMPLETE CBC W/AUTO DIFF WBC: CPT | Performed by: INTERNAL MEDICINE

## 2022-04-13 PROCEDURE — 82570 ASSAY OF URINE CREATININE: CPT

## 2022-04-13 PROCEDURE — 86140 C-REACTIVE PROTEIN: CPT | Performed by: INTERNAL MEDICINE

## 2022-04-13 PROCEDURE — 84443 ASSAY THYROID STIM HORMONE: CPT

## 2022-04-13 PROCEDURE — 84439 ASSAY OF FREE THYROXINE: CPT

## 2022-04-14 ENCOUNTER — TELEPHONE (OUTPATIENT)
Dept: RHEUMATOLOGY | Facility: CLINIC | Age: 62
End: 2022-04-14

## 2022-04-14 DIAGNOSIS — D64.9 ANEMIA, UNSPECIFIED TYPE: Primary | ICD-10-CM

## 2022-04-14 DIAGNOSIS — E53.8 LOW VITAMIN B12 LEVEL: ICD-10-CM

## 2022-04-14 DIAGNOSIS — M05.741 RHEUMATOID ARTHRITIS INVOLVING BOTH HANDS WITH POSITIVE RHEUMATOID FACTOR (HCC): ICD-10-CM

## 2022-04-14 DIAGNOSIS — M05.742 RHEUMATOID ARTHRITIS INVOLVING BOTH HANDS WITH POSITIVE RHEUMATOID FACTOR (HCC): ICD-10-CM

## 2022-04-14 NOTE — PROGRESS NOTES
Vitamin B12 is low at 243, goal is above 400. Is patient taking Vit B12(cyanocobalamin 1000 mcg)? If not, she should start taking it.  My previous Rx should still be active

## 2022-04-18 ENCOUNTER — TELEPHONE (OUTPATIENT)
Dept: PAIN CLINIC | Facility: CLINIC | Age: 62
End: 2022-04-18

## 2022-04-18 ENCOUNTER — TELEPHONE (OUTPATIENT)
Dept: RHEUMATOLOGY | Facility: CLINIC | Age: 62
End: 2022-04-18

## 2022-04-18 NOTE — TELEPHONE ENCOUNTER
Pt daughter Elvi Tucker, called requesting to speak to RN. Elvi Tucker states she would like to know when pt can schedule next procedure. Elvi Tucker states pt is experiencing a lot of cramps in her ankle,legs and hands.

## 2022-04-18 NOTE — TELEPHONE ENCOUNTER
Pt's daughter, Tremayne Gonsales calling to discuss scheduling Rt shoulder injection and new symptoms. Pt is experiencing constant cramps throughout the night in her legs and hands. For her legs, it is mostly the left leg, sometimes the right. For her hands, it is mostly the right, sometimes the left. Appt was made to discuss these new symptoms as well as f/u from the Rt shoulder injection. Injection is set for 4-20-22.   F/U is set for 5-17-22

## 2022-04-18 NOTE — TELEPHONE ENCOUNTER
Pt daughter phoned office, was not aware that Dr. Cannon Apt prescribed   cyanocobalamin 1000 MCG Oral Tab 90 tablet 1 7/21/2021     Sig - Route: Take 1 tablet (1,000 mcg total) by mouth daily      Will notify pharmacy, and have pt start medication.

## 2022-04-20 ENCOUNTER — HOSPITAL ENCOUNTER (OUTPATIENT)
Facility: HOSPITAL | Age: 62
Setting detail: HOSPITAL OUTPATIENT SURGERY
Discharge: HOME | End: 2022-04-20
Attending: ANESTHESIOLOGY | Admitting: ANESTHESIOLOGY
Payer: MEDICAID

## 2022-04-20 ENCOUNTER — APPOINTMENT (OUTPATIENT)
Dept: GENERAL RADIOLOGY | Facility: HOSPITAL | Age: 62
End: 2022-04-20
Attending: ANESTHESIOLOGY
Payer: MEDICAID

## 2022-04-20 VITALS
TEMPERATURE: 97 F | RESPIRATION RATE: 16 BRPM | HEIGHT: 60 IN | HEART RATE: 71 BPM | DIASTOLIC BLOOD PRESSURE: 65 MMHG | OXYGEN SATURATION: 97 % | WEIGHT: 168 LBS | BODY MASS INDEX: 32.98 KG/M2 | SYSTOLIC BLOOD PRESSURE: 149 MMHG

## 2022-04-20 DIAGNOSIS — M25.511 CHRONIC RIGHT SHOULDER PAIN: ICD-10-CM

## 2022-04-20 DIAGNOSIS — G89.29 CHRONIC RIGHT SHOULDER PAIN: ICD-10-CM

## 2022-04-20 LAB — GLUCOSE BLD-MCNC: 109 MG/DL (ref 70–99)

## 2022-04-20 PROCEDURE — 77002 NEEDLE LOCALIZATION BY XRAY: CPT | Performed by: ANESTHESIOLOGY

## 2022-04-20 PROCEDURE — 82962 GLUCOSE BLOOD TEST: CPT

## 2022-04-20 PROCEDURE — BW1J1ZZ FLUOROSCOPY OF UPPER EXTREMITY USING LOW OSMOLAR CONTRAST: ICD-10-PCS | Performed by: ANESTHESIOLOGY

## 2022-04-20 PROCEDURE — 3E0U3BZ INTRODUCTION OF ANESTHETIC AGENT INTO JOINTS, PERCUTANEOUS APPROACH: ICD-10-PCS | Performed by: ANESTHESIOLOGY

## 2022-04-20 PROCEDURE — 3E0U33Z INTRODUCTION OF ANTI-INFLAMMATORY INTO JOINTS, PERCUTANEOUS APPROACH: ICD-10-PCS | Performed by: ANESTHESIOLOGY

## 2022-04-20 RX ORDER — METHYLPREDNISOLONE ACETATE 40 MG/ML
INJECTION, SUSPENSION INTRA-ARTICULAR; INTRALESIONAL; INTRAMUSCULAR; SOFT TISSUE
Status: DISCONTINUED | OUTPATIENT
Start: 2022-04-20 | End: 2022-04-20

## 2022-04-20 RX ORDER — LIDOCAINE HYDROCHLORIDE 10 MG/ML
INJECTION, SOLUTION EPIDURAL; INFILTRATION; INTRACAUDAL; PERINEURAL
Status: DISCONTINUED | OUTPATIENT
Start: 2022-04-20 | End: 2022-04-20

## 2022-04-20 RX ORDER — ONDANSETRON 2 MG/ML
4 INJECTION INTRAMUSCULAR; INTRAVENOUS ONCE AS NEEDED
Status: DISCONTINUED | OUTPATIENT
Start: 2022-04-20 | End: 2022-04-20

## 2022-04-20 RX ORDER — DIPHENHYDRAMINE HYDROCHLORIDE 50 MG/ML
50 INJECTION INTRAMUSCULAR; INTRAVENOUS ONCE AS NEEDED
Status: DISCONTINUED | OUTPATIENT
Start: 2022-04-20 | End: 2022-04-20

## 2022-04-22 NOTE — OPERATIVE REPORT
[facility]  Operative Report  2022     Regine Means Patient Status:  Hospital Outpatient Surgery    1960 MRN [id number]   Location [address] [place] [address] Attending No att. providers found   Hosp Day # 0 PCP Amrita Bello MD     Indication: Javier Rm is a 60year old female patient with chronic shoulder pain failed conservative treatment with medication and physical therapy was here for a right shoulder joint injection. Preoperative Diagnosis:  Chronic right shoulder pain [M25.511, G89.29]    Postoperative Diagnosis: Same as above. Procedure performed: Right shoulder joint arthrogram and injection under fluoroscopy. Anesthesia: Local    EBL: Less than 1 ml. Procedure Description:   After reviewing the patient's history and performing a focused physical examination, the diagnosis was confirmed and contraindications such as infection and coagulopathy were ruled out. Following review of potential side effects and complications, including but not necessarily limited to infection, allergic reaction, local tissue breakdown, nerve injury, and paresis, the patient indicated they understood and agreed to proceed. After obtaining the informed consent, the patient was brought to the procedure room and monitored. The vital signs were monitored and recorded by an experienced RN. The patient was brought to the procedure room and placed in supine position. ASA standard monitors were placed. Vitals were noted. NPO status was checked. The right shoulder was prepped and draped. Intravenous sedation was given with propofol. The right shoulder joint was reached with a 22-gauge 3.5 inch spinal needle under direct fluoroscopic vision. 2 cc of dye was injected and there was a nice right shoulder arthrogram revealed. Then, 10 mL 1% Lidocaine mixed with 40 mg Depo-Medrol was injected after repeated aspiration, 5 mL inside the bursa, 5 mL over and in the vicinity of the bursa.        The patient tolerated the procedure very well. The patient has complete understanding of the risks and benefits of the procedure. Complications: None. Follow up: The patient will be followed clinic as needed basis.           Leonard Velasquez MD

## 2022-04-25 NOTE — TELEPHONE ENCOUNTER
From: Daria Bonilla  To: Michaela Spivey MD  Sent: 4/13/2022 10:38 PM CDT  Subject: Please reply soon     Satish,  My mom Darrin Gerber is getting cramps in her leg during the night. These are very painful and she is usually unable to sleep.    Please advice  Thank you   Steven Felder

## 2022-06-16 DIAGNOSIS — M19.90 ARTHRITIS: ICD-10-CM

## 2022-06-16 RX ORDER — NAPROXEN 500 MG/1
TABLET ORAL
Qty: 60 TABLET | Refills: 0 | Status: SHIPPED | OUTPATIENT
Start: 2022-06-16 | End: 2022-06-20

## 2022-06-16 NOTE — TELEPHONE ENCOUNTER
DOS: n/a  Last OV: 3/9/22   Last refill date: 3/9/22     #/refills: 60/0 (most recent rx was d/c'd as a duplicate so dates on current med list is inaccurate)  No future appt

## 2022-06-20 ENCOUNTER — TELEPHONE (OUTPATIENT)
Dept: RHEUMATOLOGY | Facility: CLINIC | Age: 62
End: 2022-06-20

## 2022-06-20 DIAGNOSIS — M19.90 ARTHRITIS: ICD-10-CM

## 2022-06-20 RX ORDER — LANCETS 33 GAUGE
1 EACH MISCELLANEOUS 2 TIMES DAILY
Qty: 200 EACH | Refills: 1 | Status: SHIPPED | OUTPATIENT
Start: 2022-06-20 | End: 2023-06-20

## 2022-06-20 RX ORDER — ASPIRIN 81 MG/1
81 TABLET ORAL DAILY
Qty: 90 TABLET | Refills: 1 | Status: SHIPPED | OUTPATIENT
Start: 2022-06-20

## 2022-06-20 RX ORDER — GABAPENTIN 300 MG/1
300 CAPSULE ORAL NIGHTLY
Qty: 90 CAPSULE | Refills: 1 | Status: SHIPPED | OUTPATIENT
Start: 2022-06-20

## 2022-06-20 RX ORDER — LIDOCAINE HYDROCHLORIDE 20 MG/ML
1 SOLUTION ORAL; TOPICAL DAILY
Qty: 90 TABLET | Refills: 1 | Status: SHIPPED | OUTPATIENT
Start: 2022-06-20

## 2022-06-20 RX ORDER — NAPROXEN 500 MG/1
500 TABLET ORAL 2 TIMES DAILY PRN
Qty: 180 TABLET | Refills: 1 | Status: SHIPPED | OUTPATIENT
Start: 2022-06-20

## 2022-06-20 RX ORDER — HYDROCHLOROTHIAZIDE 25 MG/1
25 TABLET ORAL DAILY
Qty: 90 TABLET | Refills: 1 | Status: SHIPPED | OUTPATIENT
Start: 2022-06-20

## 2022-06-20 RX ORDER — BLOOD SUGAR DIAGNOSTIC
STRIP MISCELLANEOUS
Qty: 200 EACH | Refills: 1 | Status: SHIPPED | OUTPATIENT
Start: 2022-06-20

## 2022-06-20 NOTE — TELEPHONE ENCOUNTER
Will address methotrexate refill at next rtc on 6/23/2022    hydroxychloroquine (plaquenil) caused mouth sores so the patient self-dc'ed this medication

## 2022-06-20 NOTE — TELEPHONE ENCOUNTER
Pt's new pharmacy called and said pt needs her prescriptions transferred to [address], I spoke with [facility] [phone] ext:[phone]. She will need the following medications:    hydroCHLOROthiazide 25 MG Oral Tab    metFORMIN 500 MG Oral Tab     gabapentin 300 MG Oral Cap    methotrexate 2.5 MG Oral Tab    NAPROXEN 500 MG Oral Tab    aspirin 81 MG Oral Tab EC    FEROSUL 325 (65 Fe) MG Oral Tab    OneTouch Delica Lancets 05N Does not apply Misc    Glucose Blood (ONETOUCH ULTRA) In Vitro Strip    Alcohol pads    The pharmacy also wants to know if she should still be taking Hydroxychlorquin 200mg. She said it can also be e-scribed. [address], [address] [place] [address]

## 2022-06-20 NOTE — TELEPHONE ENCOUNTER
Pharmacy called for RF Methotrexate and is pt taking Plaquenil at this time? LOV 3-24-22  Future Appointments   Date Time Provider Alva Guerrero   6/23/2022 12:30 PM Rita Mcdowell MD [facility] [address]     Pharmacist is aware Dr Isaías Staton is out of the office today and will address this tomorrow at some time.

## 2022-06-21 NOTE — TELEPHONE ENCOUNTER
Phoned pt pharmacy, LVM for Jennifer Omer secure mailbox-that pt no longer taking plaquenil due to mouth sores and methotrexate refill will be addressed at next office visit. Instructed to call office with questions or concerns.

## 2022-06-23 ENCOUNTER — TELEMEDICINE (OUTPATIENT)
Dept: RHEUMATOLOGY | Facility: CLINIC | Age: 62
End: 2022-06-23
Payer: MEDICAID

## 2022-06-23 ENCOUNTER — TELEPHONE (OUTPATIENT)
Dept: RHEUMATOLOGY | Facility: CLINIC | Age: 62
End: 2022-06-23

## 2022-06-23 DIAGNOSIS — Z51.81 THERAPEUTIC DRUG MONITORING: ICD-10-CM

## 2022-06-23 DIAGNOSIS — D84.9 IMMUNOCOMPROMISED (HCC): ICD-10-CM

## 2022-06-23 DIAGNOSIS — K12.30 MUCOSITIS: ICD-10-CM

## 2022-06-23 DIAGNOSIS — M05.742 RHEUMATOID ARTHRITIS INVOLVING BOTH HANDS WITH POSITIVE RHEUMATOID FACTOR (HCC): Primary | ICD-10-CM

## 2022-06-23 DIAGNOSIS — M05.741 RHEUMATOID ARTHRITIS INVOLVING BOTH HANDS WITH POSITIVE RHEUMATOID FACTOR (HCC): Primary | ICD-10-CM

## 2022-06-23 DIAGNOSIS — Z23 NEED FOR PNEUMOCOCCAL VACCINE: ICD-10-CM

## 2022-06-23 PROCEDURE — 99214 OFFICE O/P EST MOD 30 MIN: CPT | Performed by: INTERNAL MEDICINE

## 2022-06-23 RX ORDER — METHOTREXATE 2.5 MG/1
15 TABLET ORAL WEEKLY
Qty: 78 TABLET | Refills: 0 | Status: SHIPPED | OUTPATIENT
Start: 2022-06-23 | End: 2022-09-22

## 2022-06-23 RX ORDER — LEUCOVORIN CALCIUM 5 MG/1
5 TABLET ORAL
Qty: 13 TABLET | Refills: 1 | Status: SHIPPED | OUTPATIENT
Start: 2022-06-26 | End: 2022-09-25

## 2022-06-23 NOTE — PATIENT INSTRUCTIONS
Repeat blood work CMP and CBC in 2 weeks in early July before you leave for back stent.  -continue methotrexate 15 mg weekly on Saturday  -Continue leucovorin once weekly on Sunday.      Vaccinations:  Get Covid booster vaccine   Strep Pneumonia vaccines: Get Prevnar vaccine

## 2022-06-23 NOTE — TELEPHONE ENCOUNTER
Phoned Mauricio Paz at [facility], pt no longer taking plaquenil. Methotrexate refill will be discussed at office visit this afternoon.

## 2022-06-28 ENCOUNTER — TELEPHONE (OUTPATIENT)
Dept: FAMILY MEDICINE CLINIC | Facility: CLINIC | Age: 62
End: 2022-06-28

## 2022-06-28 DIAGNOSIS — M19.90 ARTHRITIS: ICD-10-CM

## 2022-06-28 DIAGNOSIS — M05.741 RHEUMATOID ARTHRITIS INVOLVING BOTH HANDS WITH POSITIVE RHEUMATOID FACTOR (HCC): ICD-10-CM

## 2022-06-28 DIAGNOSIS — M05.742 RHEUMATOID ARTHRITIS INVOLVING BOTH HANDS WITH POSITIVE RHEUMATOID FACTOR (HCC): ICD-10-CM

## 2022-06-28 DIAGNOSIS — B37.0 ORAL CANDIDIASIS: ICD-10-CM

## 2022-06-28 DIAGNOSIS — K29.70 GASTRITIS WITHOUT BLEEDING, UNSPECIFIED CHRONICITY, UNSPECIFIED GASTRITIS TYPE: ICD-10-CM

## 2022-06-28 RX ORDER — LANCETS 33 GAUGE
EACH MISCELLANEOUS
Qty: 200 EACH | Refills: 1 | Status: SHIPPED | OUTPATIENT
Start: 2022-06-28

## 2022-06-28 RX ORDER — LISINOPRIL 20 MG/1
TABLET ORAL
Qty: 90 TABLET | Refills: 1 | Status: SHIPPED | OUTPATIENT
Start: 2022-06-28

## 2022-06-28 RX ORDER — OMEPRAZOLE 40 MG/1
40 CAPSULE, DELAYED RELEASE ORAL
Qty: 180 CAPSULE | Refills: 1 | Status: SHIPPED | OUTPATIENT
Start: 2022-06-28

## 2022-06-28 RX ORDER — NAPROXEN 500 MG/1
TABLET ORAL
Qty: 60 TABLET | Refills: 0 | OUTPATIENT
Start: 2022-06-28

## 2022-06-28 NOTE — TELEPHONE ENCOUNTER
The Omeprazole 40mg only would need to [facility] in [facility] She is using a good rx card, and gets the best price there. She needs it to be 90 or 180.

## 2022-06-28 NOTE — TELEPHONE ENCOUNTER
Future Appointments   Date Time Provider Alva Guerrero   6/29/2022 11:00 AM Lucia Olea PA-C [facility]   10/27/2022  2:30 PM Guilherme Turner MD [facility] [address]     LOV 6/23/22  RTO in 4mo    Phoned pt, LVM for pt to call office to confirm refill request

## 2022-06-28 NOTE — TELEPHONE ENCOUNTER
Pt's daughter is calling to get refills for her parents before they go out of the country. She needs a refill of all the meds by July 5th.

## 2022-06-29 ENCOUNTER — OFFICE VISIT (OUTPATIENT)
Dept: ORTHOPEDICS CLINIC | Facility: CLINIC | Age: 62
End: 2022-06-29
Payer: MEDICAID

## 2022-06-29 VITALS — OXYGEN SATURATION: 98 % | HEART RATE: 82 BPM

## 2022-06-29 DIAGNOSIS — M19.90 ARTHRITIS: ICD-10-CM

## 2022-06-29 DIAGNOSIS — M17.0 PRIMARY OSTEOARTHRITIS OF BOTH KNEES: Primary | ICD-10-CM

## 2022-06-29 PROCEDURE — 20610 DRAIN/INJ JOINT/BURSA W/O US: CPT | Performed by: PHYSICIAN ASSISTANT

## 2022-06-29 RX ORDER — LIDOCAINE 4 G/G
1 PATCH TOPICAL EVERY 24 HOURS
Qty: 30 PATCH | Refills: 0 | Status: SHIPPED | OUTPATIENT
Start: 2022-06-29

## 2022-06-29 RX ORDER — TRIAMCINOLONE ACETONIDE 40 MG/ML
80 INJECTION, SUSPENSION INTRA-ARTICULAR; INTRAMUSCULAR ONCE
Status: COMPLETED | OUTPATIENT
Start: 2022-06-29 | End: 2022-06-29

## 2022-06-29 RX ORDER — NAPROXEN 500 MG/1
500 TABLET ORAL 2 TIMES DAILY PRN
Qty: 180 TABLET | Refills: 1 | Status: SHIPPED | OUTPATIENT
Start: 2022-06-29

## 2022-06-29 RX ADMIN — TRIAMCINOLONE ACETONIDE 80 MG: 40 INJECTION, SUSPENSION INTRA-ARTICULAR; INTRAMUSCULAR at 11:19:00

## 2022-06-29 NOTE — PROCEDURES
After informed consent, the patient's right and left knees were marked, locally anesthetized with skin refrigerant, prepped with topical antiseptic, and injected with a mixture of 1mL 40mg/mL Kenalog, 2mL 1% lidocaine and 2mL 0.5% marcaine through the inferolateral portal.  A band-aid was applied. The patient tolerated the procedure well.     Arnold Dominique PA-C  [address] Orthopedic Surgery

## 2022-07-20 DIAGNOSIS — M05.742 RHEUMATOID ARTHRITIS INVOLVING BOTH HANDS WITH POSITIVE RHEUMATOID FACTOR (HCC): ICD-10-CM

## 2022-07-20 DIAGNOSIS — K12.30 MUCOSITIS: ICD-10-CM

## 2022-07-20 DIAGNOSIS — M05.741 RHEUMATOID ARTHRITIS INVOLVING BOTH HANDS WITH POSITIVE RHEUMATOID FACTOR (HCC): ICD-10-CM

## 2022-07-20 DIAGNOSIS — B37.0 ORAL CANDIDIASIS: ICD-10-CM

## 2022-07-20 RX ORDER — LEUCOVORIN CALCIUM 5 MG/1
TABLET ORAL
Qty: 4 TABLET | Refills: 0 | Status: SHIPPED | OUTPATIENT
Start: 2022-07-20

## 2022-09-26 DIAGNOSIS — M05.741 RHEUMATOID ARTHRITIS INVOLVING BOTH HANDS WITH POSITIVE RHEUMATOID FACTOR (HCC): Primary | ICD-10-CM

## 2022-09-26 DIAGNOSIS — M05.742 RHEUMATOID ARTHRITIS INVOLVING BOTH HANDS WITH POSITIVE RHEUMATOID FACTOR (HCC): Primary | ICD-10-CM

## 2022-09-26 RX ORDER — FOLIC ACID 1 MG/1
2 TABLET ORAL DAILY
Qty: 180 TABLET | Refills: 3 | Status: SHIPPED | OUTPATIENT
Start: 2022-09-26 | End: 2023-01-20

## 2022-09-26 NOTE — TELEPHONE ENCOUNTER
Future Appointments   Date Time Provider Alva Yolanda   10/27/2022  2:30 PM Garth Caban MD [facility] [address]     Last office date 6/23/2022  Last labs taken 4/13/2022

## 2022-10-19 RX ORDER — LISINOPRIL 20 MG/1
TABLET ORAL
Qty: 90 TABLET | Refills: 4 | Status: SHIPPED | OUTPATIENT
Start: 2022-10-19

## 2022-11-22 NOTE — ED PROVIDER NOTES
Patient Seen in: [facility] Emergency Department      History   Patient presents with:  Back Pain  Numbness Weakness    Stated Complaint: back pain radiates down legs feet numbess    HPI    58-year-old female presents with back pain that radiates do membranes moist   Neck: supple, no rigidity   Lungs: good air exchange and clear   Heart: regular rate rhythm and no murmur   Abdomen: Soft and nontender. No abdominal masses.   No peritoneal signs   Extremities: no edema, normal peripheral pulses  Back: N eval for radicular symptoms  TECHNIQUE:  Multiplanar T1 and T2 weighted images including fat suppression sequences. Images acquired in sagittal and axial planes.    PATIENT STATED HISTORY: (As transcribed by Technologist)  Chronic lower back pain with bila central canal or neural foraminal stenosis present. Paravertebral soft tissues are unremarkable in appearance. CONCLUSION:  There are multilevel degenerative changes present as described above.   Findings appear most prominent at L3-4 and L4-5 as describe (40 mg total) by mouth daily for 5 days. , Print, Disp-10 tablet, R-0 Resulted

## 2022-11-28 ENCOUNTER — TELEPHONE (OUTPATIENT)
Dept: RHEUMATOLOGY | Facility: CLINIC | Age: 62
End: 2022-11-28

## 2022-11-28 NOTE — TELEPHONE ENCOUNTER
Pt daughter phoned office, requesting refill of methotrexate. Pt out of town and coming [address] to the [address]. Explained pt required to have blood work completed for monitoring of medication. Voiced understanding and will have completed when she returns.

## 2022-12-19 DIAGNOSIS — M05.741 RHEUMATOID ARTHRITIS INVOLVING BOTH HANDS WITH POSITIVE RHEUMATOID FACTOR (HCC): ICD-10-CM

## 2022-12-19 DIAGNOSIS — K12.30 MUCOSITIS: ICD-10-CM

## 2022-12-19 DIAGNOSIS — M05.742 RHEUMATOID ARTHRITIS INVOLVING BOTH HANDS WITH POSITIVE RHEUMATOID FACTOR (HCC): ICD-10-CM

## 2022-12-21 RX ORDER — LEUCOVORIN CALCIUM 5 MG/1
TABLET ORAL
Qty: 4 TABLET | Refills: 0 | Status: SHIPPED | OUTPATIENT
Start: 2022-12-21

## 2022-12-21 NOTE — TELEPHONE ENCOUNTER
Future Appointments   Date Time Provider Alva Guerrero   12/21/2022  4:10 PM Jairon Childress MD EMG [address] [facility] [address]   12/28/2022 11:30 AM Giulia Mercado MD [facility]   1/4/2023 11:30 AM Jarred Dallas PA ENIPain [facility] [place]     Last office visit: 6/23/2022    Last fill: 7/20/2022 4 tab, 0 refills

## 2022-12-27 ENCOUNTER — VIRTUAL PHONE E/M (OUTPATIENT)
Dept: FAMILY MEDICINE CLINIC | Facility: CLINIC | Age: 62
End: 2022-12-27
Payer: MEDICAID

## 2022-12-27 DIAGNOSIS — M54.50 ACUTE LOW BACK PAIN, UNSPECIFIED BACK PAIN LATERALITY, UNSPECIFIED WHETHER SCIATICA PRESENT: Primary | ICD-10-CM

## 2022-12-27 DIAGNOSIS — E11.65 TYPE 2 DIABETES MELLITUS WITH HYPERGLYCEMIA, WITHOUT LONG-TERM CURRENT USE OF INSULIN (HCC): ICD-10-CM

## 2022-12-27 PROCEDURE — 99214 OFFICE O/P EST MOD 30 MIN: CPT | Performed by: FAMILY MEDICINE

## 2022-12-27 NOTE — PROGRESS NOTES
Virtual Telephone Check-In    MARISELAJOEL Gerber verbally consents to a Virtual/Telephone Check-In visit on 12/27/22. Patient has been referred to the [facility] website at [email or web address] to review the yearly Consent to Treat document. Patient understands and accepts financial responsibility for any deductible, co-insurance and/or co-pays associated with this service.     Duration of the service: 15 minutes      Summary of topics discussed:             Amrita Bello MD

## 2022-12-29 ENCOUNTER — LAB ENCOUNTER (OUTPATIENT)
Dept: LAB | Age: 62
End: 2022-12-29
Attending: FAMILY MEDICINE
Payer: MEDICAID

## 2022-12-29 ENCOUNTER — HOSPITAL ENCOUNTER (OUTPATIENT)
Dept: GENERAL RADIOLOGY | Age: 62
Discharge: HOME | End: 2022-12-29
Attending: FAMILY MEDICINE
Payer: MEDICAID

## 2022-12-29 ENCOUNTER — TELEPHONE (OUTPATIENT)
Dept: ORTHOPEDICS CLINIC | Facility: CLINIC | Age: 62
End: 2022-12-29

## 2022-12-29 DIAGNOSIS — E11.65 TYPE 2 DIABETES MELLITUS WITH HYPERGLYCEMIA, WITHOUT LONG-TERM CURRENT USE OF INSULIN (HCC): ICD-10-CM

## 2022-12-29 DIAGNOSIS — M54.50 ACUTE LOW BACK PAIN, UNSPECIFIED BACK PAIN LATERALITY, UNSPECIFIED WHETHER SCIATICA PRESENT: ICD-10-CM

## 2022-12-29 LAB
ALBUMIN SERPL-MCNC: 3.5 G/DL (ref 3.4–5)
ALBUMIN/GLOB SERPL: 1 {RATIO} (ref 1–2)
ALP LIVER SERPL-CCNC: 102 U/L
ALT SERPL-CCNC: 27 U/L
ANION GAP SERPL CALC-SCNC: 7 MMOL/L (ref 0–18)
AST SERPL-CCNC: 27 U/L (ref 15–37)
BASOPHILS # BLD AUTO: 0.01 X10(3) UL (ref 0–0.2)
BASOPHILS NFR BLD AUTO: 0.2 %
BILIRUB SERPL-MCNC: 0.8 MG/DL (ref 0.1–2)
BUN BLD-MCNC: 33 MG/DL (ref 7–18)
CALCIUM BLD-MCNC: 9.3 MG/DL (ref 8.5–10.1)
CHLORIDE SERPL-SCNC: 107 MMOL/L (ref 98–112)
CHOLEST SERPL-MCNC: 142 MG/DL (ref ?–200)
CO2 SERPL-SCNC: 25 MMOL/L (ref 21–32)
CREAT BLD-MCNC: 1.33 MG/DL
EOSINOPHIL # BLD AUTO: 0.1 X10(3) UL (ref 0–0.7)
EOSINOPHIL NFR BLD AUTO: 2 %
ERYTHROCYTE [DISTWIDTH] IN BLOOD BY AUTOMATED COUNT: 14.7 %
EST. AVERAGE GLUCOSE BLD GHB EST-MCNC: 117 MG/DL (ref 68–126)
FASTING PATIENT LIPID ANSWER: YES
FASTING STATUS PATIENT QL REPORTED: YES
GFR SERPLBLD BASED ON 1.73 SQ M-ARVRAT: 45 ML/MIN/1.73M2 (ref 60–?)
GLOBULIN PLAS-MCNC: 3.6 G/DL (ref 2.8–4.4)
GLUCOSE BLD-MCNC: 100 MG/DL (ref 70–99)
HBA1C MFR BLD: 5.7 % (ref ?–5.7)
HCT VFR BLD AUTO: 32.3 %
HDLC SERPL-MCNC: 49 MG/DL (ref 40–59)
HGB BLD-MCNC: 10.5 G/DL
IMM GRANULOCYTES # BLD AUTO: 0.01 X10(3) UL (ref 0–1)
IMM GRANULOCYTES NFR BLD: 0.2 %
LDLC SERPL CALC-MCNC: 74 MG/DL (ref ?–100)
LYMPHOCYTES # BLD AUTO: 1.49 X10(3) UL (ref 1–4)
LYMPHOCYTES NFR BLD AUTO: 29.9 %
MCH RBC QN AUTO: 30.3 PG (ref 26–34)
MCHC RBC AUTO-ENTMCNC: 32.5 G/DL (ref 31–37)
MCV RBC AUTO: 93.4 FL
MONOCYTES # BLD AUTO: 0.1 X10(3) UL (ref 0.1–1)
MONOCYTES NFR BLD AUTO: 2 %
NEUTROPHILS # BLD AUTO: 3.28 X10 (3) UL (ref 1.5–7.7)
NEUTROPHILS # BLD AUTO: 3.28 X10(3) UL (ref 1.5–7.7)
NEUTROPHILS NFR BLD AUTO: 65.7 %
NONHDLC SERPL-MCNC: 93 MG/DL (ref ?–130)
OSMOLALITY SERPL CALC.SUM OF ELEC: 295 MOSM/KG (ref 275–295)
PLATELET # BLD AUTO: 307 10(3)UL (ref 150–450)
POTASSIUM SERPL-SCNC: 4.5 MMOL/L (ref 3.5–5.1)
PROT SERPL-MCNC: 7.1 G/DL (ref 6.4–8.2)
RBC # BLD AUTO: 3.46 X10(6)UL
SODIUM SERPL-SCNC: 139 MMOL/L (ref 136–145)
T4 FREE SERPL-MCNC: 1.5 NG/DL (ref 0.8–1.7)
TRIGL SERPL-MCNC: 103 MG/DL (ref 30–149)
TSI SER-ACNC: 4.94 MIU/ML (ref 0.36–3.74)
VLDLC SERPL CALC-MCNC: 16 MG/DL (ref 0–30)
WBC # BLD AUTO: 5 X10(3) UL (ref 4–11)

## 2022-12-29 PROCEDURE — 36415 COLL VENOUS BLD VENIPUNCTURE: CPT

## 2022-12-29 PROCEDURE — 83036 HEMOGLOBIN GLYCOSYLATED A1C: CPT

## 2022-12-29 PROCEDURE — 84443 ASSAY THYROID STIM HORMONE: CPT

## 2022-12-29 PROCEDURE — 72110 X-RAY EXAM L-2 SPINE 4/>VWS: CPT | Performed by: FAMILY MEDICINE

## 2022-12-29 PROCEDURE — 80061 LIPID PANEL: CPT

## 2022-12-29 PROCEDURE — 85025 COMPLETE CBC W/AUTO DIFF WBC: CPT

## 2022-12-29 PROCEDURE — 84439 ASSAY OF FREE THYROXINE: CPT

## 2022-12-29 PROCEDURE — 80053 COMPREHEN METABOLIC PANEL: CPT

## 2022-12-29 NOTE — TELEPHONE ENCOUNTER
Spoke with daughter who is on hipaa consent. Advised that if appt is procedure only,  xray is not required for visit. Daughter stated understanding. No further questions.

## 2022-12-29 NOTE — TELEPHONE ENCOUNTER
Per appt notes, pt coming in for bilat knee injections. Daughter asking if xrays required for this visit.     LOV 6/29/22 cortisone inj    Last xrays 1/13/2020 OA present

## 2022-12-29 NOTE — TELEPHONE ENCOUNTER
Patient's daughter is wondering if Xrays will be needed for her mother's upcoming appointment. She stated taht her mother is \"in too much pain. \"    Future Appointments   Date Time Provider Alva Guerrero   1/9/2023 11:30 AM Margoth Villela PA-C [facility] [address] [facility]         Please call daughter to advise.

## 2023-01-05 ENCOUNTER — TELEPHONE (OUTPATIENT)
Dept: RHEUMATOLOGY | Facility: CLINIC | Age: 63
End: 2023-01-05

## 2023-01-05 NOTE — TELEPHONE ENCOUNTER
Phoned pt daughter, notified pt blood work completed for PCP. Will need to call PCP for results.  Voiced understanding,

## 2023-01-09 ENCOUNTER — OFFICE VISIT (OUTPATIENT)
Dept: ORTHOPEDICS CLINIC | Facility: CLINIC | Age: 63
End: 2023-01-09
Payer: MEDICAID

## 2023-01-09 VITALS
HEIGHT: 60 IN | WEIGHT: 168 LBS | OXYGEN SATURATION: 99 % | HEART RATE: 70 BPM | BODY MASS INDEX: 32.98 KG/M2 | RESPIRATION RATE: 16 BRPM

## 2023-01-09 DIAGNOSIS — M54.50 ACUTE LOW BACK PAIN, UNSPECIFIED BACK PAIN LATERALITY, UNSPECIFIED WHETHER SCIATICA PRESENT: Primary | ICD-10-CM

## 2023-01-09 DIAGNOSIS — M17.0 PRIMARY OSTEOARTHRITIS OF BOTH KNEES: Primary | ICD-10-CM

## 2023-01-09 PROCEDURE — 3008F BODY MASS INDEX DOCD: CPT | Performed by: PHYSICIAN ASSISTANT

## 2023-01-09 PROCEDURE — 20610 DRAIN/INJ JOINT/BURSA W/O US: CPT | Performed by: PHYSICIAN ASSISTANT

## 2023-01-09 RX ORDER — TRIAMCINOLONE ACETONIDE 40 MG/ML
80 INJECTION, SUSPENSION INTRA-ARTICULAR; INTRAMUSCULAR ONCE
Status: COMPLETED | OUTPATIENT
Start: 2023-01-09 | End: 2023-01-09

## 2023-01-09 RX ADMIN — TRIAMCINOLONE ACETONIDE 80 MG: 40 INJECTION, SUSPENSION INTRA-ARTICULAR; INTRAMUSCULAR at 11:31:00

## 2023-01-09 NOTE — PROCEDURES
After informed consent, the patient's right and left knees were marked, locally anesthetized with skin refrigerant, prepped with topical antiseptic, and injected with a mixture of 1mL 40mg/mL Kenalog, 2mL 1% lidocaine and 2mL 0.5% marcaine through the inferolateral portal.  A band-aid was applied. The patient tolerated the procedure well.     Minesh Hood PA-C  [address] Orthopedic Surgery

## 2023-01-13 ENCOUNTER — TELEPHONE (OUTPATIENT)
Dept: FAMILY MEDICINE CLINIC | Facility: CLINIC | Age: 63
End: 2023-01-13

## 2023-01-13 NOTE — TELEPHONE ENCOUNTER
Patient's daughter states she is having a hard time hearing, what are the options? Anything covered under insurance? Please advise.

## 2023-01-13 NOTE — TELEPHONE ENCOUNTER
Future Appointments   Date Time Provider Alva Yolanda   1/16/2023  5:10 PM Aleks Ferreira MD EMG [address] [facility] [address]   1/20/2023 11:30 AM Timmy Edwards MD [facility] [place] [facility]

## 2023-01-16 ENCOUNTER — TELEMEDICINE (OUTPATIENT)
Dept: FAMILY MEDICINE CLINIC | Facility: CLINIC | Age: 63
End: 2023-01-16
Payer: MEDICAID

## 2023-01-16 DIAGNOSIS — D50.9 IRON DEFICIENCY ANEMIA, UNSPECIFIED IRON DEFICIENCY ANEMIA TYPE: ICD-10-CM

## 2023-01-16 DIAGNOSIS — M25.562 PAIN IN BOTH KNEES, UNSPECIFIED CHRONICITY: ICD-10-CM

## 2023-01-16 DIAGNOSIS — M25.561 PAIN IN BOTH KNEES, UNSPECIFIED CHRONICITY: ICD-10-CM

## 2023-01-16 DIAGNOSIS — E55.9 VITAMIN D DEFICIENCY: ICD-10-CM

## 2023-01-16 DIAGNOSIS — E11.65 TYPE 2 DIABETES MELLITUS WITH HYPERGLYCEMIA, WITHOUT LONG-TERM CURRENT USE OF INSULIN (HCC): ICD-10-CM

## 2023-01-16 DIAGNOSIS — R25.2 LEG CRAMPS: Primary | ICD-10-CM

## 2023-01-18 ENCOUNTER — TELEPHONE (OUTPATIENT)
Dept: FAMILY MEDICINE CLINIC | Facility: CLINIC | Age: 63
End: 2023-01-18

## 2023-01-18 DIAGNOSIS — H91.93 BILATERAL HEARING LOSS, UNSPECIFIED HEARING LOSS TYPE: Primary | ICD-10-CM

## 2023-01-18 NOTE — TELEPHONE ENCOUNTER
Pt's daughter called asking for a recommendation for her mom because she is hard of hearing. She does not know who to see.

## 2023-01-20 ENCOUNTER — OFFICE VISIT (OUTPATIENT)
Dept: RHEUMATOLOGY | Facility: CLINIC | Age: 63
End: 2023-01-20
Payer: MEDICAID

## 2023-01-20 VITALS
BODY MASS INDEX: 29.45 KG/M2 | TEMPERATURE: 97 F | HEIGHT: 61 IN | RESPIRATION RATE: 16 BRPM | DIASTOLIC BLOOD PRESSURE: 80 MMHG | SYSTOLIC BLOOD PRESSURE: 112 MMHG | HEART RATE: 88 BPM | WEIGHT: 156 LBS

## 2023-01-20 DIAGNOSIS — Z51.81 THERAPEUTIC DRUG MONITORING: ICD-10-CM

## 2023-01-20 DIAGNOSIS — M05.741 RHEUMATOID ARTHRITIS INVOLVING BOTH HANDS WITH POSITIVE RHEUMATOID FACTOR (HCC): Primary | ICD-10-CM

## 2023-01-20 DIAGNOSIS — G89.29 CHRONIC MUSCULOSKELETAL PAIN: ICD-10-CM

## 2023-01-20 DIAGNOSIS — D50.9 IRON DEFICIENCY ANEMIA, UNSPECIFIED IRON DEFICIENCY ANEMIA TYPE: ICD-10-CM

## 2023-01-20 DIAGNOSIS — M79.18 CHRONIC MUSCULOSKELETAL PAIN: ICD-10-CM

## 2023-01-20 DIAGNOSIS — K12.30 MUCOSITIS: ICD-10-CM

## 2023-01-20 DIAGNOSIS — M05.742 RHEUMATOID ARTHRITIS INVOLVING BOTH HANDS WITH POSITIVE RHEUMATOID FACTOR (HCC): Primary | ICD-10-CM

## 2023-01-20 DIAGNOSIS — E53.8 LOW VITAMIN B12 LEVEL: ICD-10-CM

## 2023-01-20 PROCEDURE — 3008F BODY MASS INDEX DOCD: CPT | Performed by: INTERNAL MEDICINE

## 2023-01-20 PROCEDURE — 3074F SYST BP LT 130 MM HG: CPT | Performed by: INTERNAL MEDICINE

## 2023-01-20 PROCEDURE — 99214 OFFICE O/P EST MOD 30 MIN: CPT | Performed by: INTERNAL MEDICINE

## 2023-01-20 PROCEDURE — 3079F DIAST BP 80-89 MM HG: CPT | Performed by: INTERNAL MEDICINE

## 2023-01-20 RX ORDER — LEUCOVORIN CALCIUM 5 MG/1
5 TABLET ORAL WEEKLY
Qty: 12 TABLET | Refills: 0 | Status: SHIPPED | OUTPATIENT
Start: 2023-01-20

## 2023-01-20 RX ORDER — DULOXETIN HYDROCHLORIDE 30 MG/1
CAPSULE, DELAYED RELEASE ORAL
Qty: 150 CAPSULE | Refills: 0 | Status: SHIPPED | OUTPATIENT
Start: 2023-01-20 | End: 2023-04-20

## 2023-01-20 RX ORDER — METHOTREXATE 2.5 MG/1
15 TABLET ORAL WEEKLY
Qty: 78 TABLET | Refills: 0 | Status: SHIPPED | OUTPATIENT
Start: 2023-01-20 | End: 2023-04-21

## 2023-01-20 NOTE — PATIENT INSTRUCTIONS
-continue methotrexate 15 mg (6 tablets) on Saturday  -continue leucovorin on Sundays  -STOP folic acid     Start cymbalta (duloxetine) 30 mg daily x 4 weeks, then 60 mg daily. I prescribed it to your pharmacy. See the reading below. Sometimes the medication will give you more energy to complete acts, including suicide, in the first month, this risk goes down after the first month. Be very careful of this. If you have these thoughts, then stop the medication. -repeat blood work for methotrexate monitoring and leg cramping labs in late March 2023.

## 2023-01-31 DIAGNOSIS — M05.741 RHEUMATOID ARTHRITIS INVOLVING BOTH HANDS WITH POSITIVE RHEUMATOID FACTOR (HCC): ICD-10-CM

## 2023-01-31 DIAGNOSIS — K29.70 GASTRITIS WITHOUT BLEEDING, UNSPECIFIED CHRONICITY, UNSPECIFIED GASTRITIS TYPE: ICD-10-CM

## 2023-01-31 DIAGNOSIS — M05.742 RHEUMATOID ARTHRITIS INVOLVING BOTH HANDS WITH POSITIVE RHEUMATOID FACTOR (HCC): ICD-10-CM

## 2023-01-31 RX ORDER — OMEPRAZOLE 40 MG/1
CAPSULE, DELAYED RELEASE ORAL
Qty: 180 CAPSULE | Refills: 1 | Status: SHIPPED | OUTPATIENT
Start: 2023-01-31

## 2023-02-20 DIAGNOSIS — E78.2 MIXED HYPERLIPIDEMIA: Primary | ICD-10-CM

## 2023-02-20 DIAGNOSIS — E55.9 VITAMIN D DEFICIENCY: ICD-10-CM

## 2023-02-21 RX ORDER — LIDOCAINE HYDROCHLORIDE 20 MG/ML
SOLUTION ORAL; TOPICAL
Qty: 90 TABLET | Refills: 4 | Status: SHIPPED | OUTPATIENT
Start: 2023-02-21

## 2023-02-22 ENCOUNTER — PATIENT MESSAGE (OUTPATIENT)
Dept: FAMILY MEDICINE CLINIC | Facility: CLINIC | Age: 63
End: 2023-02-22

## 2023-03-01 NOTE — TELEPHONE ENCOUNTER
Patient states that the Pantoprazole is not covered by the insurance any longer. She will need a different medication sent to pharmacy; Patient unable to give names of other medications that will be covered by the insurance. 0 = swallows foods/liquids without difficulty

## 2023-03-03 ENCOUNTER — TELEPHONE (OUTPATIENT)
Dept: RHEUMATOLOGY | Facility: CLINIC | Age: 63
End: 2023-03-03

## 2023-03-03 NOTE — TELEPHONE ENCOUNTER
Pt daughter phoned office, thinks pt may be having reaction to Cymbalta. Experiencing facial and eyelid swelling over last few weeks every morning. Improves throughout the day. Denies rash, lips or tongue swelling, SOB or other complaints.

## 2023-03-03 NOTE — TELEPHONE ENCOUNTER
Please call patient, if she is taking Cymbalta 30 mg please have her stop Cymbalta. If she is taking Cymbalta 60 mg daily, decrease to Cymbalta 30 mg daily. See if her symptoms go away. They should call us back in 1 week to update us.

## 2023-03-04 DIAGNOSIS — M17.0 PRIMARY OSTEOARTHRITIS OF BOTH KNEES: ICD-10-CM

## 2023-03-04 DIAGNOSIS — M19.90 ARTHRITIS: ICD-10-CM

## 2023-03-06 ENCOUNTER — PATIENT MESSAGE (OUTPATIENT)
Dept: RHEUMATOLOGY | Facility: CLINIC | Age: 63
End: 2023-03-06

## 2023-03-06 RX ORDER — NAPROXEN 500 MG/1
TABLET ORAL
Qty: 180 TABLET | Refills: 1 | OUTPATIENT
Start: 2023-03-06

## 2023-03-07 DIAGNOSIS — M19.90 ARTHRITIS: ICD-10-CM

## 2023-03-07 DIAGNOSIS — M17.0 PRIMARY OSTEOARTHRITIS OF BOTH KNEES: ICD-10-CM

## 2023-03-08 RX ORDER — NAPROXEN 500 MG/1
TABLET ORAL
Qty: 180 TABLET | Refills: 0 | Status: SHIPPED
Start: 2023-03-08

## 2023-04-06 RX ORDER — HYDROCHLOROTHIAZIDE 25 MG/1
TABLET ORAL
Qty: 90 TABLET | Refills: 4 | Status: SHIPPED | OUTPATIENT
Start: 2023-04-06

## 2023-04-06 RX ORDER — LANCETS 33 GAUGE
1 EACH MISCELLANEOUS 2 TIMES DAILY
Qty: 300 EACH | Refills: 2 | Status: SHIPPED | OUTPATIENT
Start: 2023-04-06

## 2023-04-18 ENCOUNTER — TELEPHONE (OUTPATIENT)
Dept: RHEUMATOLOGY | Facility: CLINIC | Age: 63
End: 2023-04-18

## 2023-04-18 DIAGNOSIS — M05.741 RHEUMATOID ARTHRITIS INVOLVING BOTH HANDS WITH POSITIVE RHEUMATOID FACTOR (HCC): ICD-10-CM

## 2023-04-18 DIAGNOSIS — M05.742 RHEUMATOID ARTHRITIS INVOLVING BOTH HANDS WITH POSITIVE RHEUMATOID FACTOR (HCC): ICD-10-CM

## 2023-04-18 RX ORDER — BLOOD SUGAR DIAGNOSTIC
STRIP MISCELLANEOUS
Qty: 50 EACH | Refills: 3 | Status: SHIPPED | OUTPATIENT
Start: 2023-04-18

## 2023-04-18 RX ORDER — METHOTREXATE 2.5 MG/1
15 TABLET ORAL WEEKLY
Qty: 30 TABLET | Refills: 0 | Status: SHIPPED | OUTPATIENT
Start: 2023-04-18 | End: 2023-05-23

## 2023-04-18 NOTE — TELEPHONE ENCOUNTER
Mail order pharmacy called requesting refill of cymbalta. Phoned pt and her son, LVM for call back to clarify dosage pt is currently taking and tolerating.

## 2023-04-18 NOTE — TELEPHONE ENCOUNTER
Called pt son to clarify refill request. Pt no longer taking cymbalta due to facial swelling. Will speak to Dr. Sherly Fabian at office visit regarding going back on gabapentin. Requesting refill of methotrexate. Advised to have blood work completed to monitor for toxicity. Voiced understanding. Labs last completed in December.

## 2023-05-09 ENCOUNTER — OFFICE VISIT (OUTPATIENT)
Facility: CLINIC | Age: 63
End: 2023-05-09
Payer: MEDICAID

## 2023-05-09 VITALS — OXYGEN SATURATION: 99 % | HEART RATE: 77 BPM

## 2023-05-09 DIAGNOSIS — M17.0 PRIMARY OSTEOARTHRITIS OF BOTH KNEES: Primary | ICD-10-CM

## 2023-05-09 PROCEDURE — 20610 DRAIN/INJ JOINT/BURSA W/O US: CPT | Performed by: PHYSICIAN ASSISTANT

## 2023-05-09 RX ORDER — TRIAMCINOLONE ACETONIDE 40 MG/ML
80 INJECTION, SUSPENSION INTRA-ARTICULAR; INTRAMUSCULAR ONCE
Status: COMPLETED | OUTPATIENT
Start: 2023-05-09 | End: 2023-05-09

## 2023-05-09 RX ADMIN — TRIAMCINOLONE ACETONIDE 80 MG: 40 INJECTION, SUSPENSION INTRA-ARTICULAR; INTRAMUSCULAR at 15:25:00

## 2023-05-09 NOTE — PROCEDURES
After informed consent, the patient's right and left knees were marked, locally anesthetized with skin refrigerant, prepped with topical antiseptic, and injected with a mixture of 1mL 40mg/mL Kenalog, 2mL 1% lidocaine and 2mL 0.5% marcaine through the inferolateral portal.  A band-aid was applied. The patient tolerated the procedure well.     Woo Weeks PA-C  [address] Orthopedic Surgery

## 2023-05-10 DIAGNOSIS — M19.90 ARTHRITIS: ICD-10-CM

## 2023-05-10 DIAGNOSIS — M17.0 PRIMARY OSTEOARTHRITIS OF BOTH KNEES: ICD-10-CM

## 2023-05-11 RX ORDER — NAPROXEN 500 MG/1
TABLET ORAL
Qty: 180 TABLET | Refills: 1 | Status: SHIPPED | OUTPATIENT
Start: 2023-05-11

## 2023-05-22 ENCOUNTER — HOSPITAL ENCOUNTER (OUTPATIENT)
Dept: GENERAL RADIOLOGY | Age: 63
Discharge: HOME | End: 2023-05-22
Attending: FAMILY MEDICINE
Payer: MEDICAID

## 2023-05-22 ENCOUNTER — TELEPHONE (OUTPATIENT)
Dept: RHEUMATOLOGY | Facility: CLINIC | Age: 63
End: 2023-05-22

## 2023-05-22 ENCOUNTER — LAB ENCOUNTER (OUTPATIENT)
Dept: LAB | Age: 63
End: 2023-05-22
Attending: FAMILY MEDICINE
Payer: MEDICAID

## 2023-05-22 DIAGNOSIS — M05.741 RHEUMATOID ARTHRITIS INVOLVING BOTH HANDS WITH POSITIVE RHEUMATOID FACTOR (HCC): ICD-10-CM

## 2023-05-22 DIAGNOSIS — M25.562 PAIN IN BOTH KNEES, UNSPECIFIED CHRONICITY: ICD-10-CM

## 2023-05-22 DIAGNOSIS — M25.561 PAIN IN BOTH KNEES, UNSPECIFIED CHRONICITY: ICD-10-CM

## 2023-05-22 DIAGNOSIS — M05.742 RHEUMATOID ARTHRITIS INVOLVING BOTH HANDS WITH POSITIVE RHEUMATOID FACTOR (HCC): Primary | ICD-10-CM

## 2023-05-22 DIAGNOSIS — K12.30 MUCOSITIS: ICD-10-CM

## 2023-05-22 DIAGNOSIS — D50.9 IRON DEFICIENCY ANEMIA, UNSPECIFIED IRON DEFICIENCY ANEMIA TYPE: ICD-10-CM

## 2023-05-22 DIAGNOSIS — E78.2 MIXED HYPERLIPIDEMIA: ICD-10-CM

## 2023-05-22 DIAGNOSIS — E55.9 VITAMIN D DEFICIENCY: ICD-10-CM

## 2023-05-22 DIAGNOSIS — E53.8 LOW VITAMIN B12 LEVEL: ICD-10-CM

## 2023-05-22 DIAGNOSIS — M05.742 RHEUMATOID ARTHRITIS INVOLVING BOTH HANDS WITH POSITIVE RHEUMATOID FACTOR (HCC): ICD-10-CM

## 2023-05-22 DIAGNOSIS — M05.741 RHEUMATOID ARTHRITIS INVOLVING BOTH HANDS WITH POSITIVE RHEUMATOID FACTOR (HCC): Primary | ICD-10-CM

## 2023-05-22 LAB
ALBUMIN SERPL-MCNC: 3.4 G/DL (ref 3.4–5)
ALBUMIN/GLOB SERPL: 1 {RATIO} (ref 1–2)
ALP LIVER SERPL-CCNC: 67 U/L
ALT SERPL-CCNC: 26 U/L
ANION GAP SERPL CALC-SCNC: 5 MMOL/L (ref 0–18)
AST SERPL-CCNC: 15 U/L (ref 15–37)
BASOPHILS # BLD AUTO: 0.03 X10(3) UL (ref 0–0.2)
BASOPHILS NFR BLD AUTO: 0.3 %
BILIRUB SERPL-MCNC: 0.6 MG/DL (ref 0.1–2)
BUN BLD-MCNC: 31 MG/DL (ref 7–18)
CALCIUM BLD-MCNC: 9 MG/DL (ref 8.5–10.1)
CHLORIDE SERPL-SCNC: 107 MMOL/L (ref 98–112)
CHOLEST SERPL-MCNC: 144 MG/DL (ref ?–200)
CO2 SERPL-SCNC: 27 MMOL/L (ref 21–32)
CREAT BLD-MCNC: 0.96 MG/DL
CRP SERPL-MCNC: <0.29 MG/DL (ref ?–0.3)
DEPRECATED HBV CORE AB SER IA-ACNC: 52.5 NG/ML
EOSINOPHIL # BLD AUTO: 0.09 X10(3) UL (ref 0–0.7)
EOSINOPHIL NFR BLD AUTO: 1 %
ERYTHROCYTE [DISTWIDTH] IN BLOOD BY AUTOMATED COUNT: 16.3 %
ERYTHROCYTE [SEDIMENTATION RATE] IN BLOOD: 30 MM/HR
FASTING PATIENT LIPID ANSWER: YES
FASTING STATUS PATIENT QL REPORTED: YES
GFR SERPLBLD BASED ON 1.73 SQ M-ARVRAT: 66 ML/MIN/1.73M2 (ref 60–?)
GLOBULIN PLAS-MCNC: 3.3 G/DL (ref 2.8–4.4)
GLUCOSE BLD-MCNC: 99 MG/DL (ref 70–99)
HCT VFR BLD AUTO: 34.8 %
HDLC SERPL-MCNC: 59 MG/DL (ref 40–59)
HGB BLD-MCNC: 10.9 G/DL
IMM GRANULOCYTES # BLD AUTO: 0.03 X10(3) UL (ref 0–1)
IMM GRANULOCYTES NFR BLD: 0.3 %
IRON SATN MFR SERPL: 22 %
IRON SERPL-MCNC: 74 UG/DL
LDLC SERPL CALC-MCNC: 71 MG/DL (ref ?–100)
LYMPHOCYTES # BLD AUTO: 1.69 X10(3) UL (ref 1–4)
LYMPHOCYTES NFR BLD AUTO: 18.6 %
MCH RBC QN AUTO: 29.6 PG (ref 26–34)
MCHC RBC AUTO-ENTMCNC: 31.3 G/DL (ref 31–37)
MCV RBC AUTO: 94.6 FL
MONOCYTES # BLD AUTO: 0.77 X10(3) UL (ref 0.1–1)
MONOCYTES NFR BLD AUTO: 8.5 %
NEUTROPHILS # BLD AUTO: 6.47 X10 (3) UL (ref 1.5–7.7)
NEUTROPHILS # BLD AUTO: 6.47 X10(3) UL (ref 1.5–7.7)
NEUTROPHILS NFR BLD AUTO: 71.3 %
NONHDLC SERPL-MCNC: 85 MG/DL (ref ?–130)
OSMOLALITY SERPL CALC.SUM OF ELEC: 295 MOSM/KG (ref 275–295)
PLATELET # BLD AUTO: 334 10(3)UL (ref 150–450)
POTASSIUM SERPL-SCNC: 4.2 MMOL/L (ref 3.5–5.1)
PROT SERPL-MCNC: 6.7 G/DL (ref 6.4–8.2)
RBC # BLD AUTO: 3.68 X10(6)UL
SODIUM SERPL-SCNC: 139 MMOL/L (ref 136–145)
TIBC SERPL-MCNC: 334 UG/DL (ref 240–450)
TRANSFERRIN SERPL-MCNC: 224 MG/DL (ref 200–360)
TRIGL SERPL-MCNC: 70 MG/DL (ref 30–149)
TSI SER-ACNC: 3.73 MIU/ML (ref 0.36–3.74)
VIT B12 SERPL-MCNC: 1133 PG/ML (ref 193–986)
VLDLC SERPL CALC-MCNC: 11 MG/DL (ref 0–30)
WBC # BLD AUTO: 9.1 X10(3) UL (ref 4–11)

## 2023-05-22 PROCEDURE — 82607 VITAMIN B-12: CPT

## 2023-05-22 PROCEDURE — 36415 COLL VENOUS BLD VENIPUNCTURE: CPT

## 2023-05-22 PROCEDURE — 80061 LIPID PANEL: CPT

## 2023-05-22 PROCEDURE — 83540 ASSAY OF IRON: CPT

## 2023-05-22 PROCEDURE — 82728 ASSAY OF FERRITIN: CPT

## 2023-05-22 PROCEDURE — 80053 COMPREHEN METABOLIC PANEL: CPT

## 2023-05-22 PROCEDURE — 73560 X-RAY EXAM OF KNEE 1 OR 2: CPT | Performed by: FAMILY MEDICINE

## 2023-05-22 PROCEDURE — 84443 ASSAY THYROID STIM HORMONE: CPT

## 2023-05-22 PROCEDURE — 83550 IRON BINDING TEST: CPT

## 2023-05-22 PROCEDURE — 85025 COMPLETE CBC W/AUTO DIFF WBC: CPT

## 2023-05-22 PROCEDURE — 86140 C-REACTIVE PROTEIN: CPT

## 2023-05-22 PROCEDURE — 82306 VITAMIN D 25 HYDROXY: CPT

## 2023-05-22 PROCEDURE — 85652 RBC SED RATE AUTOMATED: CPT

## 2023-05-22 RX ORDER — METHOTREXATE 2.5 MG/1
15 TABLET ORAL WEEKLY
Qty: 30 TABLET | Refills: 1 | Status: SHIPPED | OUTPATIENT
Start: 2023-05-22 | End: 2023-06-26

## 2023-05-23 LAB — VIT D+METAB SERPL-MCNC: 28.8 NG/ML (ref 30–100)

## 2023-05-24 DIAGNOSIS — M17.0 PRIMARY OSTEOARTHRITIS OF BOTH KNEES: Primary | ICD-10-CM

## 2023-06-08 DIAGNOSIS — M05.741 RHEUMATOID ARTHRITIS INVOLVING BOTH HANDS WITH POSITIVE RHEUMATOID FACTOR (HCC): ICD-10-CM

## 2023-06-08 DIAGNOSIS — M05.742 RHEUMATOID ARTHRITIS INVOLVING BOTH HANDS WITH POSITIVE RHEUMATOID FACTOR (HCC): ICD-10-CM

## 2023-06-09 NOTE — TELEPHONE ENCOUNTER
Future Appointments   Date Time Provider Alva Guerrero   8/10/2023  1:00 PM Timmy Edwards MD [facility] [address]     Last OV: 1/20/23  Last Fill: 4/18/23, 30 tabs, 0 refills  Last Labs: 5/22/23    Prescription that was sent over on 5/22/23 has been canceled. Pt does not use that pharmacy.

## 2023-06-20 RX ORDER — MAGNESIUM 200 MG
TABLET ORAL
Qty: 90 TABLET | Refills: 0 | Status: SHIPPED | OUTPATIENT
Start: 2023-06-20

## 2023-06-20 NOTE — TELEPHONE ENCOUNTER
Future Appointments   Date Time Provider Alva Guerrero   8/10/2023  1:00 PM Mortimer Mallick, MD [facility] [address]     Last OV: 1/20/23  Last Fill: 4/25/22, 90 tabs, 1 refills  Last Labs: 5/22/23

## 2023-07-13 ENCOUNTER — TELEPHONE (OUTPATIENT)
Dept: ORTHOPEDICS CLINIC | Facility: CLINIC | Age: 63
End: 2023-07-13

## 2023-07-13 DIAGNOSIS — M17.0 PRIMARY OSTEOARTHRITIS OF BOTH KNEES: Primary | ICD-10-CM

## 2023-07-13 NOTE — TELEPHONE ENCOUNTER
Spoke with daughter to confirm that gel is ordered. Advised can take up to 3-4 weeks to arrive. Daughter stated understanding. Daughter states that pt has been taking naproxen which used to work when pt got injections but currently is not helping, patient is always in pain. Asking if a different medication can be prescribed to kerri in [place] (on file.) denied any kidney or liver issues. Component      Latest Ref Rng 5/22/2023   Glucose      70 - 99 mg/dL 99    Sodium      136 - 145 mmol/L 139    Potassium      3.5 - 5.1 mmol/L 4.2    Chloride      98 - 112 mmol/L 107    Carbon Dioxide, Total      21.0 - 32.0 mmol/L 27.0    ANION GAP      0 - 18 mmol/L 5    BUN      7 - 18 mg/dL 31 (H)    CREATININE      0.55 - 1.02 mg/dL 0.96    CALCIUM      8.5 - 10.1 mg/dL 9.0    CALCULATED OSMOLALITY      275 - 295 mOsm/kg 295    eGFR-Cr      >=60 mL/min/1.73m2 66    AST (SGOT)      15 - 37 U/L 15    ALT (SGPT)      13 - 56 U/L 26    ALKALINE PHOSPHATASE      50 - 130 U/L 67    Total Bilirubin      0.1 - 2.0 mg/dL 0.6    PROTEIN, TOTAL      6.4 - 8.2 g/dL 6.7    Albumin      3.4 - 5.0 g/dL 3.4    Globulin      2.8 - 4.4 g/dL 3.3    A/G Ratio      1.0 - 2.0  1.0    Patient Fasting for CMP?  Yes       Legend:  (H) High

## 2023-07-13 NOTE — TELEPHONE ENCOUNTER
- Patients daughter requesting gel injections for bilateral knees    LOV: 5/9/23  Last steroid injection 5/9/23    Synvisc 1 pending

## 2023-07-13 NOTE — TELEPHONE ENCOUNTER
Daughter called stating that she doesn't think the cortisone injections (05/09/23) worked as the patient is still in a lot of pain and pain meds are not really helping. Daughter wanted to see if the gel injections would be the next step. Please advise. No f/u scheduled at this time.

## 2023-07-14 RX ORDER — CELECOXIB 200 MG/1
200 CAPSULE ORAL
Qty: 30 CAPSULE | Refills: 0 | Status: SHIPPED | OUTPATIENT
Start: 2023-07-14

## 2023-07-14 NOTE — TELEPHONE ENCOUNTER
Called patient back reached daughter .     Informed her of instructions listed per pat    She understood and had no further questions at this time

## 2023-07-14 NOTE — TELEPHONE ENCOUNTER
Prescription of Celebrex sent to patient's pharmacy. She should stop taking the Naproxen once she starts taking the Celebrex. Thank you!

## 2023-07-22 DIAGNOSIS — M05.741 RHEUMATOID ARTHRITIS INVOLVING BOTH HANDS WITH POSITIVE RHEUMATOID FACTOR (HCC): ICD-10-CM

## 2023-07-22 DIAGNOSIS — M05.742 RHEUMATOID ARTHRITIS INVOLVING BOTH HANDS WITH POSITIVE RHEUMATOID FACTOR (HCC): ICD-10-CM

## 2023-07-22 DIAGNOSIS — K29.70 GASTRITIS WITHOUT BLEEDING, UNSPECIFIED CHRONICITY, UNSPECIFIED GASTRITIS TYPE: ICD-10-CM

## 2023-07-23 RX ORDER — OMEPRAZOLE 40 MG/1
20 CAPSULE, DELAYED RELEASE ORAL
Qty: 180 CAPSULE | Refills: 1 | Status: SHIPPED | OUTPATIENT
Start: 2023-07-23 | End: 2023-07-24

## 2023-07-24 ENCOUNTER — TELEPHONE (OUTPATIENT)
Dept: FAMILY MEDICINE CLINIC | Facility: CLINIC | Age: 63
End: 2023-07-24

## 2023-07-24 RX ORDER — OMEPRAZOLE 20 MG/1
20 CAPSULE, DELAYED RELEASE ORAL
Qty: 180 CAPSULE | Refills: 1 | Status: SHIPPED | OUTPATIENT
Start: 2023-07-24

## 2023-07-24 NOTE — TELEPHONE ENCOUNTER
Pharmacy calling with request to change order sent for Omeprazole 40 MG Oral Capsule Delayed Release . This is a capsule and cannot be split in half. Dr. Garrett Persons will need to order the capsule in 20 MG, with directions to take 2 a day, for a total of 40 MG a day. Please revise.

## 2023-07-25 DIAGNOSIS — K12.30 MUCOSITIS: ICD-10-CM

## 2023-07-25 DIAGNOSIS — M05.741 RHEUMATOID ARTHRITIS INVOLVING BOTH HANDS WITH POSITIVE RHEUMATOID FACTOR (HCC): ICD-10-CM

## 2023-07-25 DIAGNOSIS — M05.742 RHEUMATOID ARTHRITIS INVOLVING BOTH HANDS WITH POSITIVE RHEUMATOID FACTOR (HCC): ICD-10-CM

## 2023-07-25 RX ORDER — LEUCOVORIN CALCIUM 5 MG/1
5 TABLET ORAL WEEKLY
Qty: 12 TABLET | Refills: 0 | Status: SHIPPED | OUTPATIENT
Start: 2023-07-25

## 2023-07-25 NOTE — TELEPHONE ENCOUNTER
Future Appointments   Date Time Provider Alva Guerrero   8/10/2023  1:00 PM Bunny De Souza MD EM[facility] [address]     LOV   1/20/2023      Last refill  1/20/2023  12 tabs, 0 refills

## 2023-07-28 ENCOUNTER — TELEPHONE (OUTPATIENT)
Dept: ORTHOPEDICS CLINIC | Facility: CLINIC | Age: 63
End: 2023-07-28

## 2023-07-28 NOTE — TELEPHONE ENCOUNTER
Lvm for patient to call back and schedule Injections with  or DOE Montgomery. Patient needs to be scheduled 2-3 -weeks ahead to assure that the medication will be there for the appt  Please forward TE back to me with Date,Time and Location.     Priscila Schmidt

## 2023-08-01 NOTE — TELEPHONE ENCOUNTER
Future Appointments   Date Time Provider Alva Guerrero   8/15/2023 11:30 AM Roxy Castrejon PA-C [facility] [address] [facility]     Patient is scheduled for DANDRE Knee Visco Inj at [facility] with Steven Pagan, as per previous message.

## 2023-08-07 ENCOUNTER — OFFICE VISIT (OUTPATIENT)
Dept: PAIN CLINIC | Facility: CLINIC | Age: 63
End: 2023-08-07
Payer: MEDICAID

## 2023-08-07 VITALS
DIASTOLIC BLOOD PRESSURE: 78 MMHG | SYSTOLIC BLOOD PRESSURE: 118 MMHG | HEART RATE: 74 BPM | WEIGHT: 165 LBS | OXYGEN SATURATION: 97 % | BODY MASS INDEX: 31 KG/M2

## 2023-08-07 DIAGNOSIS — M25.511 CHRONIC RIGHT SHOULDER PAIN: ICD-10-CM

## 2023-08-07 DIAGNOSIS — G89.29 CHRONIC RIGHT SHOULDER PAIN: ICD-10-CM

## 2023-08-07 DIAGNOSIS — M48.062 SPINAL STENOSIS OF LUMBAR REGION WITH NEUROGENIC CLAUDICATION: Primary | ICD-10-CM

## 2023-08-07 PROCEDURE — 99214 OFFICE O/P EST MOD 30 MIN: CPT | Performed by: PHYSICIAN ASSISTANT

## 2023-08-07 PROCEDURE — 3074F SYST BP LT 130 MM HG: CPT | Performed by: PHYSICIAN ASSISTANT

## 2023-08-07 PROCEDURE — 3078F DIAST BP <80 MM HG: CPT | Performed by: PHYSICIAN ASSISTANT

## 2023-08-07 NOTE — PROGRESS NOTES
Patient presents in office today with reported pain in low back w/radiculopathy, neck and right shoulder    Current pain level reported = 6/10, 8/10 without medication    Last reported dose of Naproxen this morning      Narcotic Contract renewal none    Urine Drug screen none

## 2023-08-08 ENCOUNTER — TELEPHONE (OUTPATIENT)
Dept: PAIN CLINIC | Facility: CLINIC | Age: 63
End: 2023-08-08

## 2023-08-08 DIAGNOSIS — M19.011 PRIMARY OSTEOARTHRITIS OF RIGHT SHOULDER: Primary | ICD-10-CM

## 2023-08-08 DIAGNOSIS — M54.16 LUMBAR RADICULITIS: ICD-10-CM

## 2023-08-08 NOTE — TELEPHONE ENCOUNTER
Prior authorization request completed for: [address] [facility] # [id number]  Authorization dates: 8/8/2023-10/7/2023  CPT codes approved: 56971  Number of visits/dates of service approved: 1  Physician: Odilon Garcia  Location: [facility]      Patient can be scheduled. Routed to Navigator.

## 2023-08-08 NOTE — TELEPHONE ENCOUNTER
Prior authorization request completed for: Right Shoulder Injection   Authorization # No Prior Authorization is Required   Spoke with Harshad Infante at [facility] [phone]  Call Reference #: [id number]      Authorization dates: n/a  CPT codes approved: 83625  Number of visits/dates of service approved: 1  Physician: Yumiko Velazco   Location: [facility]       Patient can be scheduled. Routed to Navigator.

## 2023-08-15 ENCOUNTER — OFFICE VISIT (OUTPATIENT)
Facility: CLINIC | Age: 63
End: 2023-08-15
Payer: MEDICAID

## 2023-08-15 DIAGNOSIS — M17.0 PRIMARY OSTEOARTHRITIS OF BOTH KNEES: Primary | ICD-10-CM

## 2023-08-15 PROCEDURE — 20610 DRAIN/INJ JOINT/BURSA W/O US: CPT | Performed by: PHYSICIAN ASSISTANT

## 2023-08-15 NOTE — PROCEDURES
Patient's daughter did ask about surgical intervention, and I recommended scheduling a surgical discussion appointment with Dr. Lance Najera if they are interested in the future. After informed consent, the patient's right and left knees were marked, locally anesthetized with skin refrigerant, prepped with topical antiseptic, and injected with 6mL of 48mg/6mL Synvisc One through the inferolateral portal.  A band-aid was applied. The patient tolerated the procedure well.     Melani Torres PA-C  [address] Orthopedic Surgery

## 2023-08-29 ENCOUNTER — HOSPITAL ENCOUNTER (OUTPATIENT)
Facility: HOSPITAL | Age: 63
Setting detail: HOSPITAL OUTPATIENT SURGERY
Discharge: HOME | End: 2023-08-29
Attending: ANESTHESIOLOGY | Admitting: ANESTHESIOLOGY
Payer: MEDICAID

## 2023-08-29 ENCOUNTER — APPOINTMENT (OUTPATIENT)
Dept: GENERAL RADIOLOGY | Facility: HOSPITAL | Age: 63
End: 2023-08-29
Attending: ANESTHESIOLOGY
Payer: MEDICAID

## 2023-08-29 ENCOUNTER — TELEPHONE (OUTPATIENT)
Dept: FAMILY MEDICINE CLINIC | Facility: CLINIC | Age: 63
End: 2023-08-29

## 2023-08-29 VITALS
SYSTOLIC BLOOD PRESSURE: 139 MMHG | OXYGEN SATURATION: 99 % | HEART RATE: 75 BPM | TEMPERATURE: 98 F | DIASTOLIC BLOOD PRESSURE: 65 MMHG | RESPIRATION RATE: 16 BRPM

## 2023-08-29 LAB — GLUCOSE BLD-MCNC: 121 MG/DL (ref 70–99)

## 2023-08-29 PROCEDURE — 3E0R33Z INTRODUCTION OF ANTI-INFLAMMATORY INTO SPINAL CANAL, PERCUTANEOUS APPROACH: ICD-10-PCS | Performed by: ANESTHESIOLOGY

## 2023-08-29 PROCEDURE — 62323 NJX INTERLAMINAR LMBR/SAC: CPT | Performed by: ANESTHESIOLOGY

## 2023-08-29 RX ORDER — NICOTINE POLACRILEX 4 MG
15 LOZENGE BUCCAL
Status: DISCONTINUED | OUTPATIENT
Start: 2023-08-29 | End: 2023-08-29

## 2023-08-29 RX ORDER — DEXTROSE MONOHYDRATE 25 G/50ML
50 INJECTION, SOLUTION INTRAVENOUS
Status: DISCONTINUED | OUTPATIENT
Start: 2023-08-29 | End: 2023-08-29

## 2023-08-29 RX ORDER — LIDOCAINE HYDROCHLORIDE 10 MG/ML
INJECTION, SOLUTION EPIDURAL; INFILTRATION; INTRACAUDAL; PERINEURAL
Status: DISCONTINUED | OUTPATIENT
Start: 2023-08-29 | End: 2023-08-29

## 2023-08-29 RX ORDER — DEXAMETHASONE SODIUM PHOSPHATE 10 MG/ML
INJECTION, SOLUTION INTRAMUSCULAR; INTRAVENOUS
Status: DISCONTINUED | OUTPATIENT
Start: 2023-08-29 | End: 2023-08-29

## 2023-08-29 RX ORDER — ATORVASTATIN CALCIUM 10 MG/1
TABLET, FILM COATED ORAL
Qty: 90 TABLET | Refills: 0 | Status: SHIPPED | OUTPATIENT
Start: 2023-08-29

## 2023-08-29 RX ORDER — SODIUM CHLORIDE 9 MG/ML
INJECTION INTRAVENOUS
Status: DISCONTINUED | OUTPATIENT
Start: 2023-08-29 | End: 2023-08-29

## 2023-08-29 RX ORDER — INSULIN ASPART 100 [IU]/ML
3 INJECTION, SOLUTION INTRAVENOUS; SUBCUTANEOUS ONCE
Status: DISCONTINUED | OUTPATIENT
Start: 2023-08-29 | End: 2023-08-29

## 2023-08-29 RX ORDER — NICOTINE POLACRILEX 4 MG
30 LOZENGE BUCCAL
Status: DISCONTINUED | OUTPATIENT
Start: 2023-08-29 | End: 2023-08-29

## 2023-08-30 ENCOUNTER — TELEPHONE (OUTPATIENT)
Dept: PAIN CLINIC | Facility: CLINIC | Age: 63
End: 2023-08-30

## 2023-09-05 DIAGNOSIS — M17.0 PRIMARY OSTEOARTHRITIS OF BOTH KNEES: Primary | ICD-10-CM

## 2023-09-05 RX ORDER — DICLOFENAC SODIUM 30 MG/G
GEL TOPICAL
Refills: 0 | OUTPATIENT
Start: 2023-09-05

## 2023-09-05 NOTE — TELEPHONE ENCOUNTER
- Patient requesting topical medication rather than pills (patient tired of swallowing pills)   - Lidocaine patch   - Volterin gel  (Pending in 1% and 3%)    Also requesting steroids if appropriate. Hx. diabetes    - Requesting scripts as patient would like medication that is covered by insurance due to financial restraints. LOV: 8/15/23  Synvic One - 08/15/23 - not effective  Previous steroids - wearing off quickly    Pain  Bilateral knees (right primarily)  8/10    Current treatment: Celebrex switches between naproxen, ice/heat, elevation,     Recommended:   Elevate the affected limb higher than the heart. Place a pillow under the calves to elevate swollen ankles. Wrapping using an Ace bandage  Apply heat to area (if no known injury); use caution when applying heat and diabetic. Take your usual pain medication (aspirin, acetaminophen, ibuprofen). Follow the instructions on the label. Avoid Tylenol if liver disease is present. Rest and decrease weight-bearing activities  For known injury apply ice pack to joint for 30 mins every 2 hours for first 24-48 hrs. Do not apply ice directly on skin; place a washcloth or other cloth barrier between ice and skin.   Avoid prolonged standing (for lower extremities)

## 2023-09-21 DIAGNOSIS — E53.8 LOW VITAMIN B12 LEVEL: Primary | ICD-10-CM

## 2023-09-21 RX ORDER — MAGNESIUM 200 MG
1 TABLET ORAL DAILY
Qty: 90 TABLET | Refills: 0 | Status: SHIPPED | OUTPATIENT
Start: 2023-09-21

## 2023-09-21 NOTE — TELEPHONE ENCOUNTER
Future Appointments   Date Time Provider Alva Fryi   10/26/2023  3:00 PM Christiano Koch MD EM[facility] [address]     LOV  1/20/2023    Last refill  6/20/2023   90 tabs, 0 refills

## 2023-09-22 ENCOUNTER — TELEPHONE (OUTPATIENT)
Dept: RHEUMATOLOGY | Facility: CLINIC | Age: 63
End: 2023-09-22

## 2023-09-22 DIAGNOSIS — M05.742 RHEUMATOID ARTHRITIS INVOLVING BOTH HANDS WITH POSITIVE RHEUMATOID FACTOR (HCC): ICD-10-CM

## 2023-09-22 DIAGNOSIS — M05.741 RHEUMATOID ARTHRITIS INVOLVING BOTH HANDS WITH POSITIVE RHEUMATOID FACTOR (HCC): Primary | ICD-10-CM

## 2023-09-22 DIAGNOSIS — M05.741 RHEUMATOID ARTHRITIS INVOLVING BOTH HANDS WITH POSITIVE RHEUMATOID FACTOR (HCC): ICD-10-CM

## 2023-09-22 DIAGNOSIS — M05.742 RHEUMATOID ARTHRITIS INVOLVING BOTH HANDS WITH POSITIVE RHEUMATOID FACTOR (HCC): Primary | ICD-10-CM

## 2023-09-22 DIAGNOSIS — Z51.81 THERAPEUTIC DRUG MONITORING: ICD-10-CM

## 2023-09-22 NOTE — TELEPHONE ENCOUNTER
Patient's daughter called the office asking for a refill of her methotrexate. The daughter states that the patient is completely out and she needs to take it on Saturday so they need a refill asap.      LOV 01/20/23  Future Appointments   Date Time Provider Alva Guerrero   10/26/2023  3:00 PM Ailyn Tran MD EM[facility] [address]      LF 06/09/23  CBC, CMP 05/22/2023

## 2023-09-22 NOTE — TELEPHONE ENCOUNTER
Call patient. Patient has not been seen in quite some time. Patient was due for methotrexate monitoring blood work in August 2023. Comprehensive metabolic panel (CMP) and complete blood count with differential (CBC w/ diff). Get this ASAP.    2-week fill of methotrexate provided for now

## 2023-09-25 NOTE — TELEPHONE ENCOUNTER
Called pts son and left a message stating the pts methotrexate was refilled, but that pt needs to get her labs done as soon as she is able

## 2023-09-25 NOTE — TELEPHONE ENCOUNTER
Pt daughter called office, requesting refill of methotrexate. Explained refill sent to [facility]. Requesting prescription to [facility]. Father no longer able to  medication. Advised to have blood work completed asap for future refills. Voices understanding.

## 2023-10-05 ENCOUNTER — LAB ENCOUNTER (OUTPATIENT)
Dept: LAB | Age: 63
End: 2023-10-05
Attending: INTERNAL MEDICINE
Payer: MEDICAID

## 2023-10-05 ENCOUNTER — TELEPHONE (OUTPATIENT)
Dept: RHEUMATOLOGY | Facility: CLINIC | Age: 63
End: 2023-10-05

## 2023-10-05 DIAGNOSIS — M05.742 RHEUMATOID ARTHRITIS INVOLVING BOTH HANDS WITH POSITIVE RHEUMATOID FACTOR (HCC): Primary | ICD-10-CM

## 2023-10-05 DIAGNOSIS — M05.742 RHEUMATOID ARTHRITIS INVOLVING BOTH HANDS WITH POSITIVE RHEUMATOID FACTOR (HCC): ICD-10-CM

## 2023-10-05 DIAGNOSIS — M05.741 RHEUMATOID ARTHRITIS INVOLVING BOTH HANDS WITH POSITIVE RHEUMATOID FACTOR (HCC): ICD-10-CM

## 2023-10-05 DIAGNOSIS — Z51.81 THERAPEUTIC DRUG MONITORING: ICD-10-CM

## 2023-10-05 DIAGNOSIS — M05.741 RHEUMATOID ARTHRITIS INVOLVING BOTH HANDS WITH POSITIVE RHEUMATOID FACTOR (HCC): Primary | ICD-10-CM

## 2023-10-05 LAB
ALBUMIN SERPL-MCNC: 3.2 G/DL (ref 3.4–5)
ALBUMIN/GLOB SERPL: 0.9 {RATIO} (ref 1–2)
ALP LIVER SERPL-CCNC: 75 U/L
ALT SERPL-CCNC: 24 U/L
ANION GAP SERPL CALC-SCNC: 3 MMOL/L (ref 0–18)
AST SERPL-CCNC: 20 U/L (ref 15–37)
BASOPHILS # BLD AUTO: 0.04 X10(3) UL (ref 0–0.2)
BASOPHILS NFR BLD AUTO: 0.5 %
BILIRUB SERPL-MCNC: 0.4 MG/DL (ref 0.1–2)
BUN BLD-MCNC: 17 MG/DL (ref 7–18)
CALCIUM BLD-MCNC: 9.3 MG/DL (ref 8.5–10.1)
CHLORIDE SERPL-SCNC: 103 MMOL/L (ref 98–112)
CO2 SERPL-SCNC: 30 MMOL/L (ref 21–32)
CREAT BLD-MCNC: 1.04 MG/DL
EGFRCR SERPLBLD CKD-EPI 2021: 60 ML/MIN/1.73M2 (ref 60–?)
EOSINOPHIL # BLD AUTO: 0.14 X10(3) UL (ref 0–0.7)
EOSINOPHIL NFR BLD AUTO: 1.9 %
ERYTHROCYTE [DISTWIDTH] IN BLOOD BY AUTOMATED COUNT: 14.8 %
FASTING STATUS PATIENT QL REPORTED: YES
GLOBULIN PLAS-MCNC: 3.4 G/DL (ref 2.8–4.4)
GLUCOSE BLD-MCNC: 99 MG/DL (ref 70–99)
HCT VFR BLD AUTO: 35.1 %
HGB BLD-MCNC: 11.5 G/DL
IMM GRANULOCYTES # BLD AUTO: 0.03 X10(3) UL (ref 0–1)
IMM GRANULOCYTES NFR BLD: 0.4 %
LYMPHOCYTES # BLD AUTO: 2.02 X10(3) UL (ref 1–4)
LYMPHOCYTES NFR BLD AUTO: 27 %
MCH RBC QN AUTO: 29.2 PG (ref 26–34)
MCHC RBC AUTO-ENTMCNC: 32.8 G/DL (ref 31–37)
MCV RBC AUTO: 89.1 FL
MONOCYTES # BLD AUTO: 0.98 X10(3) UL (ref 0.1–1)
MONOCYTES NFR BLD AUTO: 13.1 %
NEUTROPHILS # BLD AUTO: 4.28 X10 (3) UL (ref 1.5–7.7)
NEUTROPHILS # BLD AUTO: 4.28 X10(3) UL (ref 1.5–7.7)
NEUTROPHILS NFR BLD AUTO: 57.1 %
OSMOLALITY SERPL CALC.SUM OF ELEC: 284 MOSM/KG (ref 275–295)
PLATELET # BLD AUTO: 318 10(3)UL (ref 150–450)
POTASSIUM SERPL-SCNC: 3.9 MMOL/L (ref 3.5–5.1)
PROT SERPL-MCNC: 6.6 G/DL (ref 6.4–8.2)
RBC # BLD AUTO: 3.94 X10(6)UL
SODIUM SERPL-SCNC: 136 MMOL/L (ref 136–145)
WBC # BLD AUTO: 7.5 X10(3) UL (ref 4–11)

## 2023-10-05 PROCEDURE — 80053 COMPREHEN METABOLIC PANEL: CPT

## 2023-10-05 PROCEDURE — 85025 COMPLETE CBC W/AUTO DIFF WBC: CPT

## 2023-10-05 PROCEDURE — 36415 COLL VENOUS BLD VENIPUNCTURE: CPT

## 2023-10-05 RX ORDER — METHOTREXATE 2.5 MG/1
15 TABLET ORAL WEEKLY
Qty: 24 TABLET | Refills: 0 | Status: SHIPPED | OUTPATIENT
Start: 2023-10-05 | End: 2023-10-27

## 2023-10-09 ENCOUNTER — TELEPHONE (OUTPATIENT)
Dept: RHEUMATOLOGY | Facility: CLINIC | Age: 63
End: 2023-10-09

## 2023-10-09 DIAGNOSIS — M05.742 RHEUMATOID ARTHRITIS INVOLVING BOTH HANDS WITH POSITIVE RHEUMATOID FACTOR (HCC): Primary | ICD-10-CM

## 2023-10-09 DIAGNOSIS — M05.741 RHEUMATOID ARTHRITIS INVOLVING BOTH HANDS WITH POSITIVE RHEUMATOID FACTOR (HCC): Primary | ICD-10-CM

## 2023-10-09 RX ORDER — PREDNISONE 5 MG/1
5 TABLET ORAL DAILY
Qty: 14 TABLET | Refills: 0 | Status: SHIPPED | OUTPATIENT
Start: 2023-10-09 | End: 2023-10-10

## 2023-10-09 NOTE — TELEPHONE ENCOUNTER
Spoke to pt daughter and explained that Dr. Pari Barlow has not seen pt. Offered the appt for the 16th-- but not able to make [place] due to transportation. Pt has appt for the 26th and will keep that appt.

## 2023-10-09 NOTE — TELEPHONE ENCOUNTER
I have not seen this patient in 9 months. Can make appointment with her on 10/16/2023 with me for recheck. Blood count, liver tests, kidney function are stable.

## 2023-10-09 NOTE — TELEPHONE ENCOUNTER
Patients daughter called - pt pain increased on Sat and Sun. Pain in Hands. Palms are swollen, neck, shoulders. Labs are ready for interpretation as well.

## 2023-10-10 ENCOUNTER — TELEPHONE (OUTPATIENT)
Dept: RHEUMATOLOGY | Facility: CLINIC | Age: 63
End: 2023-10-10

## 2023-10-10 DIAGNOSIS — M05.741 RHEUMATOID ARTHRITIS INVOLVING BOTH HANDS WITH POSITIVE RHEUMATOID FACTOR (HCC): ICD-10-CM

## 2023-10-10 DIAGNOSIS — M05.742 RHEUMATOID ARTHRITIS INVOLVING BOTH HANDS WITH POSITIVE RHEUMATOID FACTOR (HCC): ICD-10-CM

## 2023-10-10 RX ORDER — METHOTREXATE 2.5 MG/1
15 TABLET ORAL WEEKLY
Qty: 24 TABLET | Refills: 0 | Status: SHIPPED | OUTPATIENT
Start: 2023-10-10 | End: 2023-11-01

## 2023-10-10 RX ORDER — PREDNISONE 5 MG/1
5 TABLET ORAL DAILY
Qty: 14 TABLET | Refills: 0 | Status: SHIPPED | OUTPATIENT
Start: 2023-10-10

## 2023-10-10 NOTE — TELEPHONE ENCOUNTER
Called pts daughter and left a voicemail stating that Dr. Carolina Vanegas sent over a fill of prednisone to the pharmacy

## 2023-10-10 NOTE — TELEPHONE ENCOUNTER
[facility] pharmacy faxed office, requesting refill for Methotrexate. Spoke to pt daughter, requesting that prescription goes to "[facility]. No longer using [facility], and would pharmacy removed.      Future Appointments   Date Time Provider Alva Yolanda   10/26/2023  3:00 PM Bridget Cali MD [facility] [address]     Labs completed 10/5/23  LOV 1/20/23  RTO in 4mo

## 2023-10-13 ENCOUNTER — TELEPHONE (OUTPATIENT)
Dept: ORTHOPEDICS CLINIC | Facility: CLINIC | Age: 63
End: 2023-10-13

## 2023-10-13 DIAGNOSIS — M17.0 PRIMARY OSTEOARTHRITIS OF BOTH KNEES: Primary | ICD-10-CM

## 2023-10-13 NOTE — TELEPHONE ENCOUNTER
LOV 08/15/23  [facility] bilat knees  OA in both knees & RA    Patient's daughter Trisha called for advice for her Mother's extreme pain in both knees. 9/10 at times screaming out in pain.   Please advise.  Thank you!    Gel injection 8/15/23 did not help  2.   10/10/23 Prednisone 5mg x14 days  helped a little  3.    Taking Naproxen and Ibuprofen (advised to not take both because they are both   NSAIDs, advised taking tylenol),  4.   Did not get diclofenac gel because not covered by insurance (advised it is OTC and not very expensive)  5.  Can she get steroid injections sooner?  6.  Would like recommendations aside from more medications (reminded to get  OTC  diclofenac gel)

## 2023-10-13 NOTE — TELEPHONE ENCOUNTER
I am not sure if her insurance will pay for knee braces, but I did place a dme order for them. She might be able to use the prescription at a medical supply store.

## 2023-10-13 NOTE — TELEPHONE ENCOUNTER
Talked to daughter of pt with Anthony's recommendation of wear   OTC  hinged knee braces for extra support, daughter asked if she would get a prescription for the braces because her parents don't work and have little money.  Trisha will call to set up appointment for steroid injections as soon as possible. Will insurance pay for bilat kneee braces? If so please write a script.  Thank you!

## 2023-10-16 NOTE — TELEPHONE ENCOUNTER
Daughter Trisha called.  She was told the DME order has been placed and to get us information on where she wants it faxed.  She states she will call back with the information.

## 2023-10-22 DIAGNOSIS — M05.741 RHEUMATOID ARTHRITIS INVOLVING BOTH HANDS WITH POSITIVE RHEUMATOID FACTOR (HCC): ICD-10-CM

## 2023-10-22 DIAGNOSIS — K12.30 MUCOSITIS: ICD-10-CM

## 2023-10-22 DIAGNOSIS — M05.742 RHEUMATOID ARTHRITIS INVOLVING BOTH HANDS WITH POSITIVE RHEUMATOID FACTOR (HCC): ICD-10-CM

## 2023-10-26 ENCOUNTER — OFFICE VISIT (OUTPATIENT)
Dept: RHEUMATOLOGY | Facility: CLINIC | Age: 63
End: 2023-10-26

## 2023-10-26 VITALS
DIASTOLIC BLOOD PRESSURE: 68 MMHG | TEMPERATURE: 98 F | SYSTOLIC BLOOD PRESSURE: 150 MMHG | HEART RATE: 105 BPM | BODY MASS INDEX: 31.41 KG/M2 | WEIGHT: 166.38 LBS | HEIGHT: 61 IN | OXYGEN SATURATION: 97 % | RESPIRATION RATE: 16 BRPM

## 2023-10-26 DIAGNOSIS — M05.742 RHEUMATOID ARTHRITIS INVOLVING BOTH HANDS WITH POSITIVE RHEUMATOID FACTOR (HCC): ICD-10-CM

## 2023-10-26 DIAGNOSIS — G89.29 CHRONIC PAIN OF BOTH SHOULDERS: ICD-10-CM

## 2023-10-26 DIAGNOSIS — M05.741 RHEUMATOID ARTHRITIS INVOLVING BOTH HANDS WITH POSITIVE RHEUMATOID FACTOR (HCC): ICD-10-CM

## 2023-10-26 DIAGNOSIS — M79.18 CHRONIC MUSCULOSKELETAL PAIN: Primary | ICD-10-CM

## 2023-10-26 DIAGNOSIS — D84.821 IMMUNODEFICIENCY DUE TO DRUG THERAPY: ICD-10-CM

## 2023-10-26 DIAGNOSIS — M25.512 CHRONIC PAIN OF BOTH SHOULDERS: ICD-10-CM

## 2023-10-26 DIAGNOSIS — G89.29 CHRONIC MUSCULOSKELETAL PAIN: Primary | ICD-10-CM

## 2023-10-26 DIAGNOSIS — Z13.820 SCREENING FOR OSTEOPOROSIS: ICD-10-CM

## 2023-10-26 DIAGNOSIS — M47.812 OSTEOARTHRITIS OF CERVICAL SPINE, UNSPECIFIED SPINAL OSTEOARTHRITIS COMPLICATION STATUS: ICD-10-CM

## 2023-10-26 DIAGNOSIS — K12.30 MUCOSITIS: ICD-10-CM

## 2023-10-26 DIAGNOSIS — M25.511 CHRONIC PAIN OF BOTH SHOULDERS: ICD-10-CM

## 2023-10-26 DIAGNOSIS — M17.0 OSTEOARTHRITIS OF BOTH KNEES, UNSPECIFIED OSTEOARTHRITIS TYPE: ICD-10-CM

## 2023-10-26 DIAGNOSIS — M48.062 SPINAL STENOSIS OF LUMBAR REGION WITH NEUROGENIC CLAUDICATION: ICD-10-CM

## 2023-10-26 DIAGNOSIS — Z79.899 IMMUNODEFICIENCY DUE TO DRUG THERAPY: ICD-10-CM

## 2023-10-26 PROCEDURE — 99214 OFFICE O/P EST MOD 30 MIN: CPT | Performed by: INTERNAL MEDICINE

## 2023-10-26 PROCEDURE — 3008F BODY MASS INDEX DOCD: CPT | Performed by: INTERNAL MEDICINE

## 2023-10-26 PROCEDURE — 3077F SYST BP >= 140 MM HG: CPT | Performed by: INTERNAL MEDICINE

## 2023-10-26 PROCEDURE — 3078F DIAST BP <80 MM HG: CPT | Performed by: INTERNAL MEDICINE

## 2023-10-26 RX ORDER — METHOTREXATE 2.5 MG/1
TABLET ORAL
Qty: 78 TABLET | Refills: 0 | Status: SHIPPED | OUTPATIENT
Start: 2023-10-26

## 2023-10-26 RX ORDER — LEUCOVORIN CALCIUM 5 MG/1
5 TABLET ORAL WEEKLY
Qty: 12 TABLET | Refills: 0 | OUTPATIENT
Start: 2023-10-26

## 2023-10-26 RX ORDER — LEUCOVORIN CALCIUM 5 MG/1
5 TABLET ORAL WEEKLY
Qty: 12 TABLET | Refills: 0 | Status: SHIPPED | OUTPATIENT
Start: 2023-10-26

## 2023-10-26 NOTE — PATIENT INSTRUCTIONS
-continue methotrexate 15 mg on Saturdays  -leucovorin on Sundays  -repeat blood work in January 2024 and April 2024. -Acetaminophen (Tylenol):  Take up to two 650 mg extended release capsules in the morning (around 8 AM) and two in the early afternoon (around 4 PM)    ==============================================================================================================    Easy TaiChi - join in - a 9-minute Daily Practice:   [place]    Dong Chi 5 Minutes a Day Module 01 - easy for beginners  [email or web address]

## 2023-10-28 DIAGNOSIS — K12.30 MUCOSITIS: ICD-10-CM

## 2023-10-28 DIAGNOSIS — M05.741 RHEUMATOID ARTHRITIS INVOLVING BOTH HANDS WITH POSITIVE RHEUMATOID FACTOR (HCC): ICD-10-CM

## 2023-10-28 DIAGNOSIS — M05.742 RHEUMATOID ARTHRITIS INVOLVING BOTH HANDS WITH POSITIVE RHEUMATOID FACTOR (HCC): ICD-10-CM

## 2023-10-30 RX ORDER — METHOTREXATE 2.5 MG/1
TABLET ORAL
Qty: 24 TABLET | Refills: 0 | OUTPATIENT
Start: 2023-10-30

## 2023-10-30 NOTE — TELEPHONE ENCOUNTER
LOV: 10/26/2023  Future Appointments   Date Time Provider Alva Guerrero   11/1/2023 11:20 AM Monty Sanchez PA-C [facility] [address] [facility]   11/27/2023 11:40 AM Chelita Portillo MD [facility] [address] [facility]   4/25/2024  3:30 PM Abdulaziz Corrigan MD [facility] [address]   LF: 10/26/2023   QTY: 78    Refills: 0    LABS:   Component      Latest Ref Rng 10/5/2023   WBC      4.0 - 11.0 x10(3) uL 7.5    RBC      3.80 - 5.30 x10(6)uL 3.94    Hemoglobin      12.0 - 16.0 g/dL 11.5 (L)    Hematocrit      35.0 - 48.0 % 35.1    Platelet Count      849.4 - 450.0 10(3)uL 318.0    MCV      80.0 - 100.0 fL 89.1    MCH      26.0 - 34.0 pg 29.2    MCHC      31.0 - 37.0 g/dL 32.8    RDW      % 14.8    Prelim Neutrophil Abs      1.50 - 7.70 x10 (3) uL 4.28    Neutrophils Absolute      1.50 - 7.70 x10(3) uL 4.28    Lymphocytes Absolute      1.00 - 4.00 x10(3) uL 2.02    Monocytes Absolute      0.10 - 1.00 x10(3) uL 0.98    Eosinophils Absolute      0.00 - 0.70 x10(3) uL 0.14    Basophils Absolute      0.00 - 0.20 x10(3) uL 0.04    Immature Granulocyte Absolute      0.00 - 1.00 x10(3) uL 0.03    Neutrophils %      % 57.1    Lymphocytes %      % 27.0    Monocytes %      % 13.1    Eosinophils %      % 1.9    Basophils %      % 0.5    Immature Granulocyte %      % 0.4    Glucose      70 - 99 mg/dL 99    Sodium      136 - 145 mmol/L 136    Potassium      3.5 - 5.1 mmol/L 3.9    Chloride      98 - 112 mmol/L 103    Carbon Dioxide, Total      21.0 - 32.0 mmol/L 30.0    ANION GAP      0 - 18 mmol/L 3    BUN      7 - 18 mg/dL 17    CREATININE      0.55 - 1.02 mg/dL 1.04 (H)    CALCIUM      8.5 - 10.1 mg/dL 9.3    CALCULATED OSMOLALITY      275 - 295 mOsm/kg 284    EGFR      >=60 mL/min/1.73m2 60    AST (SGOT)      15 - 37 U/L 20    ALT (SGPT)      13 - 56 U/L 24    ALKALINE PHOSPHATASE      50 - 130 U/L 75    Total Bilirubin      0.1 - 2.0 mg/dL 0.4    PROTEIN, TOTAL      6.4 - 8.2 g/dL 6.6    Albumin      3.4 - 5.0 g/dL 3.2 (L) Globulin      2.8 - 4.4 g/dL 3.4    A/G Ratio      1.0 - 2.0  0.9 (L)    Patient Fasting for CMP?  Yes       Legend:  (L) Low  (H) High

## 2023-11-01 ENCOUNTER — OFFICE VISIT (OUTPATIENT)
Dept: ORTHOPEDICS CLINIC | Facility: CLINIC | Age: 63
End: 2023-11-01
Payer: MEDICAID

## 2023-11-01 DIAGNOSIS — M17.0 PRIMARY OSTEOARTHRITIS OF BOTH KNEES: Primary | ICD-10-CM

## 2023-11-01 DIAGNOSIS — M05.742 RHEUMATOID ARTHRITIS INVOLVING BOTH HANDS WITH POSITIVE RHEUMATOID FACTOR (HCC): ICD-10-CM

## 2023-11-01 DIAGNOSIS — K12.30 MUCOSITIS: ICD-10-CM

## 2023-11-01 DIAGNOSIS — M05.741 RHEUMATOID ARTHRITIS INVOLVING BOTH HANDS WITH POSITIVE RHEUMATOID FACTOR (HCC): ICD-10-CM

## 2023-11-01 PROCEDURE — 20610 DRAIN/INJ JOINT/BURSA W/O US: CPT | Performed by: PHYSICIAN ASSISTANT

## 2023-11-01 RX ORDER — TRIAMCINOLONE ACETONIDE 40 MG/ML
80 INJECTION, SUSPENSION INTRA-ARTICULAR; INTRAMUSCULAR ONCE
Status: COMPLETED | OUTPATIENT
Start: 2023-11-01 | End: 2023-11-01

## 2023-11-01 RX ADMIN — TRIAMCINOLONE ACETONIDE 80 MG: 40 INJECTION, SUSPENSION INTRA-ARTICULAR; INTRAMUSCULAR at 11:20:00

## 2023-11-01 NOTE — PROCEDURES
After informed consent, the patient's right and left knees were marked, locally anesthetized with skin refrigerant, prepped with topical antiseptic, and injected with a mixture of 1mL 40mg/mL Kenalog, 2mL 1% lidocaine and 2mL 0.5% marcaine through the inferolateral portal.  A band-aid was applied. The patient tolerated the procedure well.     Elizabeth Redd PA-C  [address] Orthopedic Surgery

## 2023-11-03 RX ORDER — METHOTREXATE 2.5 MG/1
TABLET ORAL
Qty: 24 TABLET | Refills: 3 | OUTPATIENT
Start: 2023-11-03

## 2023-11-03 RX ORDER — LEUCOVORIN CALCIUM 5 MG/1
5 TABLET ORAL WEEKLY
Qty: 4 TABLET | Refills: 1 | OUTPATIENT
Start: 2023-11-03

## 2023-11-10 DIAGNOSIS — M05.741 RHEUMATOID ARTHRITIS INVOLVING BOTH HANDS WITH POSITIVE RHEUMATOID FACTOR (HCC): ICD-10-CM

## 2023-11-10 DIAGNOSIS — M05.742 RHEUMATOID ARTHRITIS INVOLVING BOTH HANDS WITH POSITIVE RHEUMATOID FACTOR (HCC): ICD-10-CM

## 2023-11-10 DIAGNOSIS — K12.30 MUCOSITIS: ICD-10-CM

## 2023-11-10 RX ORDER — LEUCOVORIN CALCIUM 5 MG/1
5 TABLET ORAL WEEKLY
Qty: 12 TABLET | Refills: 0 | Status: SHIPPED | OUTPATIENT
Start: 2023-11-10

## 2023-11-10 NOTE — TELEPHONE ENCOUNTER
Last office visit: 10/26/2023    Next Rheum Apt:4/25/2024 Nicole Ayala MD    Last fill: 10/26/2023  12 tabs, 0 refills     Labs:   Lab Results   Component Value Date    CREATSERUM 1.04 (H) 10/05/2023    GFR 60 06/22/2017    ALKPHO 75 10/05/2023    AST 20 10/05/2023    ALT 24 10/05/2023    BILT 0.4 10/05/2023    TP 6.6 10/05/2023    ALB 3.2 (L) 10/05/2023       Lab Results   Component Value Date    WBC 7.5 10/05/2023    HGB 11.5 (L) 10/05/2023    .0 10/05/2023    NEPRELIM 4.28 10/05/2023    NEPERCENT 57.1 10/05/2023    LYPERCENT 27.0 10/05/2023    NE 4.28 10/05/2023    LYMABS 2.02 10/05/2023

## 2023-11-19 RX ORDER — ATORVASTATIN CALCIUM 10 MG/1
TABLET, FILM COATED ORAL
Qty: 90 TABLET | Refills: 0 | Status: SHIPPED | OUTPATIENT
Start: 2023-11-19

## 2023-12-18 DIAGNOSIS — R79.89 LOW VITAMIN B12 LEVEL: ICD-10-CM

## 2023-12-18 RX ORDER — MAGNESIUM 200 MG
1 TABLET ORAL DAILY
Qty: 90 TABLET | Refills: 3 | Status: SHIPPED | OUTPATIENT
Start: 2023-12-18

## 2023-12-18 NOTE — TELEPHONE ENCOUNTER
Future Appointments   Date Time Provider Alva Guerrero   4/25/2024  3:30 PM Piper Yang MD [facility] [address]     Last office visit: 10/26/2023    Last fill: 9/21/2023 90 tab, 0 refill    Last lab:   Component      Latest Ref Rng 5/22/2023   Vitamin B12      193 - 986 pg/mL 1,133 (H)       Legend:  (H) High

## 2023-12-30 ENCOUNTER — MOBILE ENCOUNTER (OUTPATIENT)
Dept: WOUND CARE | Facility: HOSPITAL | Age: 63
End: 2023-12-30

## 2023-12-30 RX ORDER — AMOXICILLIN AND CLAVULANATE POTASSIUM 500; 125 MG/1; MG/1
1 TABLET, FILM COATED ORAL 2 TIMES DAILY
Qty: 20 TABLET | Refills: 0 | Status: SHIPPED | OUTPATIENT
Start: 2023-12-30 | End: 2024-01-09

## 2023-12-30 NOTE — PROGRESS NOTES
Daughter called stating mother had a bad tooth ache and thinks she has infection. She would like antibiotics called in if possible. She tried to reach her dentist and he was not available till Tuesday. Will send over Augmentin 500mg twice a day for 10 days. Patient should contact dentist next week. She may use Tylenol or ibuprofen for pain.

## 2024-01-17 DIAGNOSIS — K12.30 MUCOSITIS: ICD-10-CM

## 2024-01-17 DIAGNOSIS — M05.742 RHEUMATOID ARTHRITIS INVOLVING BOTH HANDS WITH POSITIVE RHEUMATOID FACTOR (HCC): ICD-10-CM

## 2024-01-17 DIAGNOSIS — M05.741 RHEUMATOID ARTHRITIS INVOLVING BOTH HANDS WITH POSITIVE RHEUMATOID FACTOR (HCC): ICD-10-CM

## 2024-01-17 RX ORDER — LEUCOVORIN CALCIUM 5 MG/1
5 TABLET ORAL WEEKLY
Qty: 12 TABLET | Refills: 3 | Status: SHIPPED | OUTPATIENT
Start: 2024-01-17

## 2024-01-23 RX ORDER — OMEPRAZOLE 20 MG/1
20 CAPSULE, DELAYED RELEASE ORAL 2 TIMES DAILY
Qty: 180 CAPSULE | Refills: 1 | Status: SHIPPED | OUTPATIENT
Start: 2024-01-23

## 2024-01-27 RX ORDER — LIDOCAINE HYDROCHLORIDE 20 MG/ML
5 SOLUTION OROPHARYNGEAL
Qty: 100 ML | Refills: 1 | Status: SHIPPED | OUTPATIENT
Start: 2024-01-27

## 2024-01-27 RX ORDER — FOLIC ACID 1 MG/1
2 TABLET ORAL DAILY
Qty: 60 TABLET | Refills: 2 | Status: SHIPPED | OUTPATIENT
Start: 2024-01-27

## 2024-01-27 NOTE — TELEPHONE ENCOUNTER
Spoke with Trisha, daughter of patient Mrs. Clifford.  Mrs. Clifford is suffering with severe mouth sores.  Taking folic acid 1 a day, leucovorin once a week, on methotrexate 6 tablets/week.  Has rheumatoid arthritis.  Due to mouth sores unable to eat.  Told to double up on folic acid 2 a day.  Hold the methotrexate.  Talk to Dr. Fuller on Monday.  Viscous Xylocaine ordered take 1 teaspoon every 3-4 hours.  Medication sent to [facility] in [place].  Dr. Alfaro

## 2024-01-29 ENCOUNTER — TELEPHONE (OUTPATIENT)
Dept: RHEUMATOLOGY | Facility: CLINIC | Age: 64
End: 2024-01-29

## 2024-01-29 NOTE — TELEPHONE ENCOUNTER
Spoke to pt daughter, called office regarding pt. Pt having co mouth sores. Spoke to oncall Dr. Alfaro ans was prescribed Lidocaine rinse. Appt made to see Dr. Fuller in office.   Future Appointments   Date Time Provider Department Center   2/1/2024  2:00 PM Estela Fuller MD [id number] [address]   4/25/2024  3:30 PM Estela Fuller MD [facility] [address]

## 2024-02-01 ENCOUNTER — OFFICE VISIT (OUTPATIENT)
Dept: RHEUMATOLOGY | Facility: CLINIC | Age: 64
End: 2024-02-01
Payer: MEDICAID

## 2024-02-01 VITALS
TEMPERATURE: 98 F | OXYGEN SATURATION: 98 % | SYSTOLIC BLOOD PRESSURE: 144 MMHG | BODY MASS INDEX: 31.41 KG/M2 | DIASTOLIC BLOOD PRESSURE: 70 MMHG | WEIGHT: 166.38 LBS | RESPIRATION RATE: 16 BRPM | HEART RATE: 87 BPM | HEIGHT: 61 IN

## 2024-02-01 DIAGNOSIS — M05.742 RHEUMATOID ARTHRITIS INVOLVING BOTH HANDS WITH POSITIVE RHEUMATOID FACTOR (HCC): Primary | ICD-10-CM

## 2024-02-01 DIAGNOSIS — M17.0 OSTEOARTHRITIS OF BOTH KNEES, UNSPECIFIED OSTEOARTHRITIS TYPE: ICD-10-CM

## 2024-02-01 DIAGNOSIS — M25.512 CHRONIC PAIN OF BOTH SHOULDERS: ICD-10-CM

## 2024-02-01 DIAGNOSIS — M48.062 SPINAL STENOSIS OF LUMBAR REGION WITH NEUROGENIC CLAUDICATION: ICD-10-CM

## 2024-02-01 DIAGNOSIS — G89.29 CHRONIC PAIN OF BOTH SHOULDERS: ICD-10-CM

## 2024-02-01 DIAGNOSIS — M05.741 RHEUMATOID ARTHRITIS INVOLVING BOTH HANDS WITH POSITIVE RHEUMATOID FACTOR (HCC): Primary | ICD-10-CM

## 2024-02-01 DIAGNOSIS — D84.821 IMMUNODEFICIENCY DUE TO DRUG THERAPY  (HCC): ICD-10-CM

## 2024-02-01 DIAGNOSIS — M47.812 OSTEOARTHRITIS OF CERVICAL SPINE, UNSPECIFIED SPINAL OSTEOARTHRITIS COMPLICATION STATUS: ICD-10-CM

## 2024-02-01 DIAGNOSIS — M25.511 CHRONIC PAIN OF BOTH SHOULDERS: ICD-10-CM

## 2024-02-01 DIAGNOSIS — Z79.899 IMMUNODEFICIENCY DUE TO DRUG THERAPY  (HCC): ICD-10-CM

## 2024-02-01 DIAGNOSIS — K13.79 RECURRENT ORAL ULCERS: ICD-10-CM

## 2024-02-01 RX ORDER — HYDROXYCHLOROQUINE SULFATE 200 MG/1
200 TABLET, FILM COATED ORAL DAILY
Qty: 90 TABLET | Refills: 1 | Status: SHIPPED | OUTPATIENT
Start: 2024-02-01

## 2024-02-01 RX ORDER — METHYLPREDNISOLONE ACETATE 80 MG/ML
80 INJECTION, SUSPENSION INTRA-ARTICULAR; INTRALESIONAL; INTRAMUSCULAR; SOFT TISSUE ONCE
Status: COMPLETED | OUTPATIENT
Start: 2024-02-01 | End: 2024-02-01

## 2024-02-01 RX ORDER — IBUPROFEN 600 MG/1
600 TABLET ORAL 3 TIMES DAILY PRN
COMMUNITY
Start: 2023-11-06

## 2024-02-01 RX ADMIN — METHYLPREDNISOLONE ACETATE 80 MG: 80 INJECTION, SUSPENSION INTRA-ARTICULAR; INTRALESIONAL; INTRAMUSCULAR; SOFT TISSUE at 14:39:00

## 2024-02-01 NOTE — PATIENT INSTRUCTIONS
-depomedrol 80 mg intramuscular today for active RA.   -continue methotrexate 15 mg on Saturdays  -leucovorin on Sundays  -folic acid daily   -restart hydroxychloroquine (plaquenil) 200 mg (1 pill daily)    -repeat blood work in NOW and in April 2024.     Will set up home physical therapy    -Acetaminophen (Tylenol): Take two 650 mg extended release/Arthritis capsules in the morning (around 8 AM) and in the early afternoon (around 4 PM)  --do not take more than 4 pills daily

## 2024-02-01 NOTE — PROGRESS NOTES
Rheumatology Follow-up Note  =====================================================================================================    Chief Complaint:   RA    PCP  John Botello MD    =====================================================================================================  HPI    Darrin Gerber is a 64 year old female     Diagnosed with RA 7 years ago at [facility]. Was on methotrexate in the past. Has been off medications 1-2 years.  More inflammed in the hands now. This is happening every day. Steroid tapers in the past, helped, but a few days later it comes back.   Seen by her PCP Dr. Botello on April 13 was given a Medrol Dosepack and started on gabapentin 300 mg nightly. Prn naproxen helps a bit. .   Recent bilateral knee corticosteroid injections by pain service in February 2021   Gets injections: shoulders, LESI, EMILY. These help somewhat.   ==============================================================================================================  Visit: 07/21/21  Overall, RA doing well.   Got CSI b/l by ortho for knees. Knees ok at rest, pain with walking thinking about surgery.   Back is painful   Hard to do physical therapy onsite because of transportation issues  Couldn't go to [place] (flight cancelled) because of Covid situation  Meds:  methotrexate 20 mg/week split dosing on Saturday  folic acid 2 mg every day.    Hydroxychloroquine 200 mg daily   Naproxen once every 2-3 days  ==============================================================================================================  Visit: 03/24/22  Off methotrexate x 3 months, lost to f/u because she did not get blood work. lumbar radiculopathy/pain: Getting more LESI with pain service. Getting bilateral shoulder injections  Knee OA: s/p knee CSI, this is helped.  Had significant mouth ulcers with hydroxychloroquine which improved after stopping.  Vitamin B12: Ran out of repletion.  Meds:  methotrexate 20 mg/week split  dosing on Saturday (out x 3 months)   Leucovorin.  5 point ROS negative except noted above  I had reviewed past medical and family histories together with allergy and medication lists documented.  ==============================================================================================================  Visit: 06/23/22  Video visit today.  Pain is good.   Knee pain is still bothersome. Seeing orthopedic for knee CSI.   Medications:  methotrexate 15 mg weekly  Leucovorin weekly  ==============================================================================================================  Visit: 01/20/23  Here with son  Since last clinic visit the patient continues on methotrexate 15 mg weekly and leucovorin the day after methotrexate.  RA is doing well.  Notes cramping in the feet in the evening.  Denies any shortness of breath.  Obtaining CSI for knee OA from ortho.  Right knee improved.  Left knee still painful.  Medications  Naproxen 220 mg daily   ==============================================================================================================  Visit: 10/26/23  Rheumatoid arthritis is doing well.  However the other joints are not doing quite as well.  Noted cervical/lumbar DJD s/p epidural injection with minimal improvement per pain service.  Right shoulder injected with partial improvement.  Bilateral knees have been injected which did not help much.  Patient is tired of so many cortisone injections, wants to hold off  -No shortness of breath  Medications/therapies:  Methotrexate 15 mg weekly  Leucovorin weekly  ==============================================================================================================  Today's Visit: 02/01/24  Here with daughter  More mouth sores recently. In the past 2 weeks. Was prescribed viscous lidocaine.  Oral ulcers have improved  Shoulder/neck pain more recently.  More hand pain as well.  -Notes some inconsistent adherence to methotrexate or  recently.  May have missed 3 doses or more.  In last few months    Medications:  Methotrexate 15 mg weekly  Leucovorin once weekly      5 point ROS negative except noted above  I had reviewed past medical and family histories together with allergy and medication lists documented.    Medications:  Outpatient Medications Marked as Taking for the 2/1/24 encounter (Office Visit) with Estela Fuller MD   Medication Sig Dispense Refill    ibuprofen 600 MG Oral Tab Take 1 tablet (600 mg total) by mouth 3 (three) times daily as needed.      hydroxychloroquine 200 MG Oral Tab Take 1 tablet (200 mg total) by mouth daily. 90 tablet 1    Lidocaine Viscous HCl 2 % Mouth/Throat Solution Take 5 mL by mouth every 3 (three) hours as needed for Pain. For mouth sores. 100 mL 1    omeprazole 20 MG Oral Capsule Delayed Release Take 1 capsule (20 mg total) by mouth 2 (two) times daily. 180 capsule 1    leucovorin 5 MG Oral Tab Take 1 tablet (5 mg total) by mouth once a week. ON SUNDAYS 12 tablet 3    Cyanocobalamin (VITAMIN B-12) 1000 MCG Sublingual SL Tab Take 1 tablet by mouth daily. 90 tablet 3    atorvastatin 10 MG Oral Tab TAKE 1 TABLET BY MOUTH IN THE MORNING 90 tablet 0    methotrexate 2.5 MG Oral Tab TAKE SIX TABLETS BY MOUTH ONCE A WEEK 78 tablet 0    diclofenac 1 % External Gel Apply 2 g topically 4 (four) times daily. 350 g 0    NAPROXEN 500 MG Oral Tab TAKE ONE TABLET BY MOUTH TWICE A DAY AS NEEDED 180 tablet 1    METFORMIN 500 MG Oral Tab TAKE ONE TABLET BY MOUTH NIGHTLY 90 tablet 4    HYDROCHLOROTHIAZIDE 25 MG Oral Tab TAKE ONE TABLET BY MOUTH DAILY 90 tablet 4    FEROSUL 325 (65 Fe) MG Oral Tab TAKE ONE TABLET BY MOUTH DAILY 90 tablet 4    aspirin 81 MG Oral Tab EC Take 1 tablet (81 mg total) by mouth daily. 90 tablet 1     Modified Medications    No medications on file     There are no discontinued medications.    Allergies:  Allergies   Allergen Reactions    Meloxicam SWELLING     Able to tolerate iburprofen, naproxen,         Objective    Vitals:    02/01/24 1403   BP: 144/70   Pulse: 87   Resp: 16   Temp: 97.7 °F (36.5 °C)   SpO2: 98%   Weight: 166 lb 6.4 oz (75.5 kg)   Height: 5' 1\" (1.549 m)     GEN: NAD, well-nourished.   HEENT: Head: NCAT. Face: No lesions. Eyes: Conjunctiva clear.   PULM:  easy effort  Extremities: No cyanosis, edema or deformities.   Neurologic: Strength, CN2-12 grossly intact   Skin: No lesions or rashes.  MSK: 28 joint count performed. No evidence of synovitis in mcp, pip, dip, wrist, elbows, shoulders, hips, knees, ankles, mtp unless otherwise noted. Full ROM of elbows, wrists, knees.       PtGA: 8.5  SJ: 2  TJ: 5   MDGA: 2.5    CDAI: 18      Labs:  Additional Labs:    Lab Results   Component Value Date    WBC 7.5 10/05/2023    RBC 3.94 10/05/2023    HGB 11.5 (L) 10/05/2023    HCT 35.1 10/05/2023    .0 10/05/2023    MCV 89.1 10/05/2023    MCH 29.2 10/05/2023    MCHC 32.8 10/05/2023    RDW 14.8 10/05/2023    NEPRELIM 4.28 10/05/2023    NEPERCENT 57.1 10/05/2023    LYPERCENT 27.0 10/05/2023    MOPERCENT 13.1 10/05/2023    EOPERCENT 1.9 10/05/2023    BAPERCENT 0.5 10/05/2023    NE 4.28 10/05/2023    LYMABS 2.02 10/05/2023    MOABSO 0.98 10/05/2023    EOABSO 0.14 10/05/2023    BAABSO 0.04 10/05/2023     Lab Results   Component Value Date    GLU 99 10/05/2023    BUN 17 10/05/2023    BUNCREA 21.2 (H) 07/21/2021    CREATSERUM 1.04 (H) 10/05/2023    ANIONGAP 3 10/05/2023    GFR 60 06/22/2017    GFRNAA 58 (L) 04/13/2022    GFRAA 67 04/13/2022    CA 9.3 10/05/2023    OSMOCALC 284 10/05/2023    ALKPHO 75 10/05/2023    AST 20 10/05/2023    ALT 24 10/05/2023    BILT 0.4 10/05/2023    TP 6.6 10/05/2023    ALB 3.2 (L) 10/05/2023    GLOBULIN 3.4 10/05/2023     10/05/2023    K 3.9 10/05/2023     10/05/2023    CO2 30.0 10/05/2023       12/2022: Creatinine 1.33, rest of CMP is normal.   WBC 5.0, hemoglobin 10.5, platelets 307  Radiology review:         6/2021 CT chest     Impression   CONCLUSION:          1. No active cardiopulmonary process identified.  No specific findings to suggest significant interstitial lung disease.       2. Scattered noncalcified pulmonary nodules in the lungs measuring up to 6 mm as above. Followup as per Fleischner criteria is suggested.       3. Mild bronchiectasis in the lower lobes with minimal scattered mucus plugging.          =====================================================================================================  Assessment and Plan    Assessment:  1. Rheumatoid arthritis involving both hands with positive rheumatoid factor (HCC)    2. Spinal stenosis of lumbar region with neurogenic claudication    3. Osteoarthritis of both knees, unspecified osteoarthritis type    4. Chronic pain of both shoulders    5. Osteoarthritis of cervical spine, unspecified spinal osteoarthritis complication status    6. Recurrent oral ulcers    7. Immunodeficiency due to drug therapy  (HCC)        #seropositive (+RF) rheumatoid arthritis diagnosed in 2014.   -Today, CDAI is 18, indicating moderate disease activity.  Bulk of disease activity measurement is due to patient global.  Patient notes inconsistent to methotrexate in the last few months.    #Chronic musculoskeletal pain: Cervical/lumbar DJD, bilateral GH osteoarthritis with rotator cuff impingement, bilateral knee osteoarthritis  Knee and back arthralgias more related to chronic damage than active inflammation. Actual age may be 10 years higher than reported age per patient daughter.    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    High risk medication labs including CMP and CBC w/ diff reviewed from 10/2023. Results are stable.     Plan:  -depomedrol 80 mg intramuscular today for active RA.   -continue methotrexate 15 mg on Saturdays; discussed importance of adherence.  -leucovorin on Sundays  -folic acid daily   -restart hydroxychloroquine (plaquenil) 200 mg (1  pill daily).  Patient had previously stopped hydroxychloroquine due to oral ulcers.  However she continues to have oral ulcers so I think the issue is due to something else.  -repeat blood work in NOW and in April 2024.  For methotrexate monitoring.  Discussed the importance of every 3-month monitoring for methotrexate.    Will set up home physical therapy: For the above mechanical issues    -Referred to pain management per patient request  -Repeat NABEEL testing given oral ulcers and concern for lupus.    -Add on additional lab testing for oral ulcer etiology  -Acetaminophen (Tylenol): Take two 650 mg extended release/Arthritis capsules in the morning (around 8 AM) and in the early afternoon (around 4 PM)  --do not take more than 4 pills daily    Has f/u in April 2024    Plan:  Diagnoses and all orders for this visit:    Rheumatoid arthritis involving both hands with positive rheumatoid factor (HCC)  -     Comp Metabolic Panel (14); Future  -     CBC With Differential With Platelet; Future  -     C-Reactive Protein; Future  -     Sed Rate, Westergren (Automated); Future  -     TSH W Reflex To Free T4; Future  -     B12 AND FOLATE; Future  -     Iron And Tibc; Future  -     Ferritin; Future  -     hydroxychloroquine 200 MG Oral Tab; Take 1 tablet (200 mg total) by mouth daily.  -     methylPREDNISolone acetate (DEPO-medrol) 80 MG/ML injection 80 mg  -     Pain Management Referral - In Network  -     Physical Therapy Referral - External  -     Connective Tissue Disease (NABEEL) Screen, Reflex Specific Antibody; Future    Spinal stenosis of lumbar region with neurogenic claudication  -     Physical Therapy Referral - External  -     Connective Tissue Disease (NABEEL) Screen, Reflex Specific Antibody; Future    Osteoarthritis of both knees, unspecified osteoarthritis type  -     Physical Therapy Referral - External  -     Connective Tissue Disease (NABEEL) Screen, Reflex Specific Antibody; Future    Chronic pain of both  shoulders  -     Physical Therapy Referral - External  -     Connective Tissue Disease (NABEEL) Screen, Reflex Specific Antibody; Future    Osteoarthritis of cervical spine, unspecified spinal osteoarthritis complication status  -     Physical Therapy Referral - External  -     Connective Tissue Disease (NABEEL) Screen, Reflex Specific Antibody; Future    Recurrent oral ulcers  -     Comp Metabolic Panel (14); Future  -     CBC With Differential With Platelet; Future  -     C-Reactive Protein; Future  -     Sed Rate, Westergren (Automated); Future  -     TSH W Reflex To Free T4; Future  -     B12 AND FOLATE; Future  -     Iron And Tibc; Future  -     Ferritin; Future  -     hydroxychloroquine 200 MG Oral Tab; Take 1 tablet (200 mg total) by mouth daily.  -     methylPREDNISolone acetate (DEPO-medrol) 80 MG/ML injection 80 mg  -     Pain Management Referral - In Network  -     Physical Therapy Referral - External  -     Connective Tissue Disease (NABEEL) Screen, Reflex Specific Antibody; Future    Immunodeficiency due to drug therapy  (HCC)  -     Comp Metabolic Panel (14); Future  -     CBC With Differential With Platelet; Future  -     C-Reactive Protein; Future  -     Sed Rate, Westergren (Automated); Future  -     TSH W Reflex To Free T4; Future  -     B12 AND FOLATE; Future  -     Iron And Tibc; Future  -     Ferritin; Future  -     hydroxychloroquine 200 MG Oral Tab; Take 1 tablet (200 mg total) by mouth daily.  -     methylPREDNISolone acetate (DEPO-medrol) 80 MG/ML injection 80 mg  -     Pain Management Referral - In Network  -     Physical Therapy Referral - External  -     Connective Tissue Disease (NABEEL) Screen, Reflex Specific Antibody; Future          No follow-ups on file.      The above plan of care, diagnosis, orders, and follow-up were discussed with the patient. Questions related to this recommended plan of care were answered.    Communication to the referring provider, PCP, and/or the patient's other  physicians relevant to this encounter was sent.    Thank you for referring this delightful patient to me. Please feel free to contact me with any questions.     This report was performed utilizing speech recognition software technology. Despite proofreading, speech recognition errors could escape detection. If a word or phrase is confusing or out of context, please do not hesitate to call for   clarification.     Kind regards    Estela Fuller MD  [facility]

## 2024-02-05 DIAGNOSIS — K12.30 MUCOSITIS: ICD-10-CM

## 2024-02-05 DIAGNOSIS — M05.742 RHEUMATOID ARTHRITIS INVOLVING BOTH HANDS WITH POSITIVE RHEUMATOID FACTOR (HCC): ICD-10-CM

## 2024-02-05 DIAGNOSIS — M05.741 RHEUMATOID ARTHRITIS INVOLVING BOTH HANDS WITH POSITIVE RHEUMATOID FACTOR (HCC): ICD-10-CM

## 2024-02-05 NOTE — TELEPHONE ENCOUNTER
Last office visit: 2/1/2024    Next Rheum Apt:4/25/2024 Estela Fuller MD    Last fill: 10/26/2023 78 tab, 0 refills    Labs:   Lab Results   Component Value Date    CREATSERUM 1.04 (H) 10/05/2023    GFR 60 06/22/2017    ALKPHO 75 10/05/2023    AST 20 10/05/2023    ALT 24 10/05/2023    BILT 0.4 10/05/2023    TP 6.6 10/05/2023    ALB 3.2 (L) 10/05/2023       Lab Results   Component Value Date    WBC 7.5 10/05/2023    HGB 11.5 (L) 10/05/2023    .0 10/05/2023    NEPRELIM 4.28 10/05/2023    NEPERCENT 57.1 10/05/2023    LYPERCENT 27.0 10/05/2023    NE 4.28 10/05/2023    LYMABS 2.02 10/05/2023

## 2024-02-06 ENCOUNTER — LAB ENCOUNTER (OUTPATIENT)
Dept: LAB | Age: 64
End: 2024-02-06
Attending: INTERNAL MEDICINE
Payer: MEDICAID

## 2024-02-06 DIAGNOSIS — M48.062 SPINAL STENOSIS OF LUMBAR REGION WITH NEUROGENIC CLAUDICATION: ICD-10-CM

## 2024-02-06 DIAGNOSIS — M05.741 RHEUMATOID ARTHRITIS INVOLVING BOTH HANDS WITH POSITIVE RHEUMATOID FACTOR (HCC): ICD-10-CM

## 2024-02-06 DIAGNOSIS — M47.812 OSTEOARTHRITIS OF CERVICAL SPINE, UNSPECIFIED SPINAL OSTEOARTHRITIS COMPLICATION STATUS: ICD-10-CM

## 2024-02-06 DIAGNOSIS — M05.742 RHEUMATOID ARTHRITIS INVOLVING BOTH HANDS WITH POSITIVE RHEUMATOID FACTOR (HCC): ICD-10-CM

## 2024-02-06 DIAGNOSIS — K13.79 RECURRENT ORAL ULCERS: ICD-10-CM

## 2024-02-06 DIAGNOSIS — G89.29 CHRONIC PAIN OF BOTH SHOULDERS: ICD-10-CM

## 2024-02-06 DIAGNOSIS — M25.512 CHRONIC PAIN OF BOTH SHOULDERS: ICD-10-CM

## 2024-02-06 DIAGNOSIS — M25.511 CHRONIC PAIN OF BOTH SHOULDERS: ICD-10-CM

## 2024-02-06 DIAGNOSIS — Z79.899 IMMUNODEFICIENCY DUE TO DRUG THERAPY  (HCC): ICD-10-CM

## 2024-02-06 DIAGNOSIS — K12.30 MUCOSITIS: ICD-10-CM

## 2024-02-06 DIAGNOSIS — M17.0 OSTEOARTHRITIS OF BOTH KNEES, UNSPECIFIED OSTEOARTHRITIS TYPE: ICD-10-CM

## 2024-02-06 DIAGNOSIS — D84.821 IMMUNODEFICIENCY DUE TO DRUG THERAPY  (HCC): ICD-10-CM

## 2024-02-06 LAB
ALBUMIN SERPL-MCNC: 3.3 G/DL (ref 3.4–5)
ALBUMIN/GLOB SERPL: 0.8 {RATIO} (ref 1–2)
ALP LIVER SERPL-CCNC: 84 U/L
ALT SERPL-CCNC: 32 U/L
ANION GAP SERPL CALC-SCNC: 1 MMOL/L (ref 0–18)
AST SERPL-CCNC: 17 U/L (ref 15–37)
BASOPHILS # BLD AUTO: 0.03 X10(3) UL (ref 0–0.2)
BASOPHILS NFR BLD AUTO: 0.4 %
BILIRUB SERPL-MCNC: 0.4 MG/DL (ref 0.1–2)
BUN BLD-MCNC: 17 MG/DL (ref 9–23)
CALCIUM BLD-MCNC: 9.3 MG/DL (ref 8.5–10.1)
CHLORIDE SERPL-SCNC: 102 MMOL/L (ref 98–112)
CO2 SERPL-SCNC: 33 MMOL/L (ref 21–32)
CREAT BLD-MCNC: 0.93 MG/DL
CRP SERPL-MCNC: 1.46 MG/DL (ref ?–0.3)
DEPRECATED HBV CORE AB SER IA-ACNC: 94.6 NG/ML
EGFRCR SERPLBLD CKD-EPI 2021: 68 ML/MIN/1.73M2 (ref 60–?)
EOSINOPHIL # BLD AUTO: 0.08 X10(3) UL (ref 0–0.7)
EOSINOPHIL NFR BLD AUTO: 1.1 %
ERYTHROCYTE [DISTWIDTH] IN BLOOD BY AUTOMATED COUNT: 14.5 %
ERYTHROCYTE [SEDIMENTATION RATE] IN BLOOD: 55 MM/HR
FASTING STATUS PATIENT QL REPORTED: YES
FOLATE SERPL-MCNC: 60.1 NG/ML (ref 8.7–?)
GLOBULIN PLAS-MCNC: 3.9 G/DL (ref 2.8–4.4)
GLUCOSE BLD-MCNC: 93 MG/DL (ref 70–99)
HCT VFR BLD AUTO: 35.9 %
HGB BLD-MCNC: 11.9 G/DL
IMM GRANULOCYTES # BLD AUTO: 0.03 X10(3) UL (ref 0–1)
IMM GRANULOCYTES NFR BLD: 0.4 %
IRON SATN MFR SERPL: 26 %
IRON SERPL-MCNC: 88 UG/DL
LYMPHOCYTES # BLD AUTO: 1.8 X10(3) UL (ref 1–4)
LYMPHOCYTES NFR BLD AUTO: 25.3 %
MCH RBC QN AUTO: 29.6 PG (ref 26–34)
MCHC RBC AUTO-ENTMCNC: 33.1 G/DL (ref 31–37)
MCV RBC AUTO: 89.3 FL
MONOCYTES # BLD AUTO: 0.66 X10(3) UL (ref 0.1–1)
MONOCYTES NFR BLD AUTO: 9.3 %
NEUTROPHILS # BLD AUTO: 4.52 X10 (3) UL (ref 1.5–7.7)
NEUTROPHILS # BLD AUTO: 4.52 X10(3) UL (ref 1.5–7.7)
NEUTROPHILS NFR BLD AUTO: 63.5 %
OSMOLALITY SERPL CALC.SUM OF ELEC: 283 MOSM/KG (ref 275–295)
PLATELET # BLD AUTO: 364 10(3)UL (ref 150–450)
POTASSIUM SERPL-SCNC: 3.2 MMOL/L (ref 3.5–5.1)
PROT SERPL-MCNC: 7.2 G/DL (ref 6.4–8.2)
RBC # BLD AUTO: 4.02 X10(6)UL
SODIUM SERPL-SCNC: 136 MMOL/L (ref 136–145)
T4 FREE SERPL-MCNC: 1.4 NG/DL (ref 0.8–1.7)
TIBC SERPL-MCNC: 341 UG/DL (ref 240–450)
TRANSFERRIN SERPL-MCNC: 229 MG/DL (ref 200–360)
TSI SER-ACNC: 4.37 MIU/ML (ref 0.36–3.74)
VIT B12 SERPL-MCNC: 1201 PG/ML (ref 193–986)
WBC # BLD AUTO: 7.1 X10(3) UL (ref 4–11)

## 2024-02-06 PROCEDURE — 82746 ASSAY OF FOLIC ACID SERUM: CPT

## 2024-02-06 PROCEDURE — 83550 IRON BINDING TEST: CPT

## 2024-02-06 PROCEDURE — 36415 COLL VENOUS BLD VENIPUNCTURE: CPT

## 2024-02-06 PROCEDURE — 85652 RBC SED RATE AUTOMATED: CPT

## 2024-02-06 PROCEDURE — 86225 DNA ANTIBODY NATIVE: CPT

## 2024-02-06 PROCEDURE — 82728 ASSAY OF FERRITIN: CPT

## 2024-02-06 PROCEDURE — 85025 COMPLETE CBC W/AUTO DIFF WBC: CPT

## 2024-02-06 PROCEDURE — 86140 C-REACTIVE PROTEIN: CPT

## 2024-02-06 PROCEDURE — 84439 ASSAY OF FREE THYROXINE: CPT

## 2024-02-06 PROCEDURE — 82607 VITAMIN B-12: CPT

## 2024-02-06 PROCEDURE — 86038 ANTINUCLEAR ANTIBODIES: CPT

## 2024-02-06 PROCEDURE — 80053 COMPREHEN METABOLIC PANEL: CPT

## 2024-02-06 PROCEDURE — 83540 ASSAY OF IRON: CPT

## 2024-02-06 PROCEDURE — 84443 ASSAY THYROID STIM HORMONE: CPT

## 2024-02-06 NOTE — TELEPHONE ENCOUNTER
Spoke to patient's daughter regarding the below message:    Call pt, overdue for labs. Get them now and I can refill.         Patient's daughter verbalized understanding and stated that she will arrange transportation for patient and have her go next week to have her labs done. Patient's daughter is asking if Dr would send in a one week refill for patient so that she does not miss a dose of medication.

## 2024-02-07 ENCOUNTER — TELEPHONE (OUTPATIENT)
Dept: RHEUMATOLOGY | Facility: CLINIC | Age: 64
End: 2024-02-07

## 2024-02-07 DIAGNOSIS — M05.741 RHEUMATOID ARTHRITIS INVOLVING BOTH HANDS WITH POSITIVE RHEUMATOID FACTOR (HCC): Primary | ICD-10-CM

## 2024-02-07 DIAGNOSIS — M05.742 RHEUMATOID ARTHRITIS INVOLVING BOTH HANDS WITH POSITIVE RHEUMATOID FACTOR (HCC): Primary | ICD-10-CM

## 2024-02-07 LAB
DSDNA IGG SERPL IA-ACNC: 2.3 IU/ML
ENA AB SER QL IA: 0.2 UG/L
ENA AB SER QL IA: NEGATIVE

## 2024-02-07 RX ORDER — METHOTREXATE 2.5 MG/1
TABLET ORAL
Qty: 12 TABLET | Refills: 2 | OUTPATIENT
Start: 2024-02-07

## 2024-02-07 RX ORDER — PREDNISONE 5 MG/1
5 TABLET ORAL DAILY
Qty: 30 TABLET | Refills: 0 | Status: SHIPPED | OUTPATIENT
Start: 2024-02-07 | End: 2024-02-07

## 2024-02-07 RX ORDER — PREDNISONE 5 MG/1
5 TABLET ORAL DAILY
Qty: 30 TABLET | Refills: 0 | Status: SHIPPED | OUTPATIENT
Start: 2024-02-07

## 2024-02-07 RX ORDER — METHOTREXATE 2.5 MG/1
15 TABLET ORAL WEEKLY
Qty: 78 TABLET | Refills: 0 | Status: SHIPPED | OUTPATIENT
Start: 2024-02-07 | End: 2024-05-08

## 2024-02-07 RX ORDER — METHOTREXATE 2.5 MG/1
15 TABLET ORAL WEEKLY
Qty: 78 TABLET | Refills: 0 | Status: SHIPPED | OUTPATIENT
Start: 2024-02-07 | End: 2024-02-07

## 2024-02-07 NOTE — TELEPHONE ENCOUNTER
Have pt restart methotrexate 15 mg weekly. Restart hydroxychloroquine (plaquenil) as discussed.  Start taking prednisone 5 mg daily for the next month.  Then stop.  TSH slightly elevated. Recommend talking to PCP about this issue, rest of blood work is okay besides a slightly low potassium.  Increase potassium intake slightly.  Here is more reading about higher potassium foods. Eat a bit more, but don't over do it. [email or web address]

## 2024-02-07 NOTE — TELEPHONE ENCOUNTER
Pt daughter called office, pt saw Dr. Fuller last week. Pt received a steroid injection at that time. States pt has been having complaints of pain to her bilateral hands and fingers. Pt had blood work completed yesterday. Wondering if there is anything she could do.

## 2024-02-07 NOTE — TELEPHONE ENCOUNTER
Called pt, spoke to pt daughter. Reviewed Dr. Fuller's message.     \"restart methotrexate 15 mg weekly. Restart hydroxychloroquine (plaquenil) as discussed.  Start taking prednisone 5 mg daily for the next month.  Then stop.  TSH slightly elevated. Recommend talking to PCP about this issue, rest of blood work is okay besides a slightly low potassium.  Increase potassium intake slightly.  Here is more reading about higher potassium foods. Eat a bit more, but don't over do it.\"    Asking appropriate questions and voices understanding. [facility]

## 2024-02-14 RX ORDER — ATORVASTATIN CALCIUM 10 MG/1
TABLET, FILM COATED ORAL
Qty: 90 TABLET | Refills: 0 | Status: SHIPPED | OUTPATIENT
Start: 2024-02-14

## 2024-02-22 DIAGNOSIS — M05.741 RHEUMATOID ARTHRITIS INVOLVING BOTH HANDS WITH POSITIVE RHEUMATOID FACTOR (HCC): ICD-10-CM

## 2024-02-22 DIAGNOSIS — M05.742 RHEUMATOID ARTHRITIS INVOLVING BOTH HANDS WITH POSITIVE RHEUMATOID FACTOR (HCC): ICD-10-CM

## 2024-02-23 NOTE — TELEPHONE ENCOUNTER
Last office visit: 2/1/2024    Next Rheum Apt:4/25/2024 Estela Fuller MD    Last fill: 2/7/2024  30 tabs, 0 refills     Labs:   Lab Results   Component Value Date    CREATSERUM 0.93 02/06/2024    GFR 60 06/22/2017    ALKPHO 84 02/06/2024    AST 17 02/06/2024    ALT 32 02/06/2024    BILT 0.4 02/06/2024    TP 7.2 02/06/2024    ALB 3.3 (L) 02/06/2024       Lab Results   Component Value Date    WBC 7.1 02/06/2024    HGB 11.9 (L) 02/06/2024    .0 02/06/2024    NEPRELIM 4.52 02/06/2024    NEPERCENT 63.5 02/06/2024    LYPERCENT 25.3 02/06/2024    NE 4.52 02/06/2024    LYMABS 1.80 02/06/2024

## 2024-02-26 RX ORDER — PREDNISONE 5 MG/1
5 TABLET ORAL DAILY
Qty: 30 TABLET | Refills: 0 | OUTPATIENT
Start: 2024-02-26

## 2024-02-29 DIAGNOSIS — M05.742 RHEUMATOID ARTHRITIS INVOLVING BOTH HANDS WITH POSITIVE RHEUMATOID FACTOR (HCC): ICD-10-CM

## 2024-02-29 DIAGNOSIS — M05.741 RHEUMATOID ARTHRITIS INVOLVING BOTH HANDS WITH POSITIVE RHEUMATOID FACTOR (HCC): ICD-10-CM

## 2024-02-29 RX ORDER — PREDNISONE 5 MG/1
5 TABLET ORAL DAILY
Qty: 30 TABLET | Refills: 4 | OUTPATIENT
Start: 2024-02-29

## 2024-03-18 ENCOUNTER — OFFICE VISIT (OUTPATIENT)
Dept: ORTHOPEDICS CLINIC | Facility: CLINIC | Age: 64
End: 2024-03-18
Payer: MEDICAID

## 2024-03-18 VITALS — WEIGHT: 166 LBS | BODY MASS INDEX: 31.34 KG/M2 | HEIGHT: 61 IN

## 2024-03-18 DIAGNOSIS — M17.0 PRIMARY OSTEOARTHRITIS OF BOTH KNEES: Primary | ICD-10-CM

## 2024-03-18 RX ORDER — TRIAMCINOLONE ACETONIDE 40 MG/ML
80 INJECTION, SUSPENSION INTRA-ARTICULAR; INTRAMUSCULAR ONCE
Status: COMPLETED | OUTPATIENT
Start: 2024-03-18 | End: 2024-03-18

## 2024-03-18 RX ADMIN — TRIAMCINOLONE ACETONIDE 80 MG: 40 INJECTION, SUSPENSION INTRA-ARTICULAR; INTRAMUSCULAR at 14:36:00

## 2024-03-18 NOTE — PROCEDURES
After informed consent, the patient's right and left knees were marked, locally anesthetized with skin refrigerant, prepped with topical antiseptic, and injected with a mixture of 1mL 40mg/mL Kenalog, 2mL 1% lidocaine and 2mL 0.5% marcaine through the inferolateral portal.  A band-aid was applied.  The patient tolerated the procedure well.    Anthony Parks PA-C  [facility]

## 2024-03-21 ENCOUNTER — TELEPHONE (OUTPATIENT)
Dept: ORTHOPEDICS CLINIC | Facility: CLINIC | Age: 64
End: 2024-03-21

## 2024-03-21 NOTE — TELEPHONE ENCOUNTER
Give steroid more time to start working. It has only been 3 days. If not better at end of next week, follow up with me in clinic to discuss next steps.

## 2024-03-21 NOTE — TELEPHONE ENCOUNTER
Daughter called and states that mom had the injection and right now she is in too much pain. Please advise on what should she do at this point. Thanks,    Patient may be reached at [phone]

## 2024-03-22 NOTE — TELEPHONE ENCOUNTER
Patient's daughter called again and states that the opposite side of the Knees is inflamed and very painful more painful, limited mobility. Please advise. Thanks.

## 2024-03-26 ENCOUNTER — TELEPHONE (OUTPATIENT)
Dept: RHEUMATOLOGY | Facility: CLINIC | Age: 64
End: 2024-03-26

## 2024-03-26 NOTE — TELEPHONE ENCOUNTER
Called pts daughter and informed her how to hold methotrexate with the shingrix vaccine. Pts daughter verbalized understanding.

## 2024-03-28 ENCOUNTER — TELEPHONE (OUTPATIENT)
Dept: RHEUMATOLOGY | Facility: CLINIC | Age: 64
End: 2024-03-28

## 2024-03-28 RX ORDER — OMEPRAZOLE 40 MG/1
40 CAPSULE, DELAYED RELEASE ORAL DAILY
Qty: 90 CAPSULE | Refills: 0 | Status: SHIPPED | OUTPATIENT
Start: 2024-03-28 | End: 2025-03-23

## 2024-03-28 RX ORDER — FAMOTIDINE 20 MG/1
20 TABLET, FILM COATED ORAL NIGHTLY PRN
Qty: 30 TABLET | Refills: 2 | Status: SHIPPED | OUTPATIENT
Start: 2024-03-28

## 2024-03-28 NOTE — TELEPHONE ENCOUNTER
Talked with patient's daughter.  Mrs. Gerber having stomach upset.  Will increase omeprazole to 40 mg in the morning.  Had Pepcid 20 mg at night.  Part of the problem is she is fasting for Ramadan.  Prescription sent to [facility].  Dr. Alfaro covering Dr. Fuller

## 2024-03-28 NOTE — TELEPHONE ENCOUNTER
Other (Patients daughter left a voicemail stating pt is experiencing very bad symptoms that are not being controlled with the medication prescribed by Dr. Fuller. She would like a call back at [phone].)     Patient is currently taking     Methotrexate 6 tablets weekly  Prednisone 5 mg one tablet daily  Hydroxychloroquine 200 mg one tablet daily    Please advise

## 2024-04-02 ENCOUNTER — VIRTUAL PHONE E/M (OUTPATIENT)
Dept: FAMILY MEDICINE CLINIC | Facility: CLINIC | Age: 64
End: 2024-04-02
Payer: MEDICAID

## 2024-04-02 ENCOUNTER — TELEPHONE (OUTPATIENT)
Dept: FAMILY MEDICINE CLINIC | Facility: CLINIC | Age: 64
End: 2024-04-02

## 2024-04-02 DIAGNOSIS — I10 ESSENTIAL HYPERTENSION: ICD-10-CM

## 2024-04-02 DIAGNOSIS — E55.9 VITAMIN D DEFICIENCY: ICD-10-CM

## 2024-04-02 DIAGNOSIS — E11.65 TYPE 2 DIABETES MELLITUS WITH HYPERGLYCEMIA, WITHOUT LONG-TERM CURRENT USE OF INSULIN (HCC): Primary | ICD-10-CM

## 2024-04-02 PROCEDURE — 99442 PHONE E/M BY PHYS 11-20 MIN: CPT | Performed by: FAMILY MEDICINE

## 2024-04-02 RX ORDER — FAMOTIDINE 20 MG/1
20 TABLET, FILM COATED ORAL NIGHTLY PRN
Qty: 30 TABLET | Refills: 2 | Status: SHIPPED | OUTPATIENT
Start: 2024-04-02

## 2024-04-02 RX ORDER — OMEPRAZOLE 40 MG/1
40 CAPSULE, DELAYED RELEASE ORAL DAILY
Qty: 90 CAPSULE | Refills: 1 | Status: SHIPPED | OUTPATIENT
Start: 2024-04-02 | End: 2025-03-28

## 2024-04-02 RX ORDER — HYDROCHLOROTHIAZIDE 25 MG/1
25 TABLET ORAL DAILY
Qty: 90 TABLET | Refills: 1 | Status: SHIPPED | OUTPATIENT
Start: 2024-04-02

## 2024-04-02 RX ORDER — CIPROFLOXACIN 250 MG/1
250 TABLET, FILM COATED ORAL 2 TIMES DAILY
Qty: 20 TABLET | Refills: 0 | Status: SHIPPED | OUTPATIENT
Start: 2024-04-02 | End: 2024-04-12

## 2024-04-02 RX ORDER — ATOVAQUONE AND PROGUANIL HYDROCHLORIDE 250; 100 MG/1; MG/1
1 TABLET, FILM COATED ORAL DAILY
Qty: 90 TABLET | Refills: 1 | Status: SHIPPED | OUTPATIENT
Start: 2024-04-02

## 2024-04-02 RX ORDER — ATORVASTATIN CALCIUM 10 MG/1
TABLET, FILM COATED ORAL
Qty: 90 TABLET | Refills: 0 | Status: SHIPPED | OUTPATIENT
Start: 2024-04-02

## 2024-04-02 NOTE — PROGRESS NOTES
Subjective:   Patient ID: Darrin Gerber is a 64 year old female.    HPI  Ms. Gerber is a 64-year-old female with history of diabetes mellitus, rheumatoid arthritis, hypertension, hyperlipidemia presenting today for phone visit.  I had mainly spoken to her daughter.  She and her  will be going to [place] for the next 3 months this month.  Daughter is requesting laboratory test before daily and also requesting medication refills for the next 3 months while there in [place].  Daughter reports that mom is feeling fine and does not have any concerns or complaints.  She is also requesting antimalarial medications and antibiotic for traveler's diarrhea. I had reviewed past medical and family histories together with allergy and medication lists documented.    History/Other:   Review of Systems   Constitutional:  Negative for fatigue and fever.   Respiratory:  Negative for cough and shortness of breath.    Cardiovascular:  Negative for chest pain.   Gastrointestinal:  Negative for abdominal pain, diarrhea, nausea and vomiting.     Current Outpatient Medications   Medication Sig Dispense Refill    ciprofloxacin 250 MG Oral Tab Take 1 tablet (250 mg total) by mouth 2 (two) times daily for 10 days. 20 tablet 0    Atovaquone-Proguanil HCl 250-100 MG Oral Tab Take 1 tablet by mouth daily. 90 tablet 1    atorvastatin 10 MG Oral Tab TAKE 1 TABLET BY MOUTH IN THE MORNING 90 tablet 0    famotidine (PEPCID) 20 MG Oral Tab Take 1 tablet (20 mg total) by mouth nightly as needed for Heartburn. 30 tablet 2    hydroCHLOROthiazide 25 MG Oral Tab Take 1 tablet (25 mg total) by mouth daily. 90 tablet 1    metFORMIN 500 MG Oral Tab Take 1 tablet (500 mg total) by mouth nightly. 90 tablet 1    Omeprazole 40 MG Oral Capsule Delayed Release Take 1 capsule (40 mg total) by mouth daily. 90 capsule 1    methotrexate 2.5 MG Oral Tab Take 6 tablets (15 mg total) by mouth once a week. 78 tablet 0    predniSONE 5 MG Oral Tab Take 1 tablet (5 mg  total) by mouth daily. 30 tablet 0    ibuprofen 600 MG Oral Tab Take 1 tablet (600 mg total) by mouth 3 (three) times daily as needed.      hydroxychloroquine 200 MG Oral Tab Take 1 tablet (200 mg total) by mouth daily. 90 tablet 1    Lidocaine Viscous HCl 2 % Mouth/Throat Solution Take 5 mL by mouth every 3 (three) hours as needed for Pain. For mouth sores. 100 mL 1    folic acid 1 MG Oral Tab Take 2 tablets (2 mg total) by mouth daily. (Patient not taking: Reported on 2/1/2024) 60 tablet 2    leucovorin 5 MG Oral Tab Take 1 tablet (5 mg total) by mouth once a week. ON SUNDAYS 12 tablet 3    Cyanocobalamin (VITAMIN B-12) 1000 MCG Sublingual SL Tab Take 1 tablet by mouth daily. 90 tablet 3    diclofenac 1 % External Gel Apply 2 g topically 4 (four) times daily. 350 g 0    NAPROXEN 500 MG Oral Tab TAKE ONE TABLET BY MOUTH TWICE A DAY AS NEEDED 180 tablet 1    Glucose Blood (ONETOUCH ULTRA) In Vitro Strip USE TO TEST TWICE A DAY (Patient not taking: Reported on 2/1/2024) 50 each 3    Lancets (ONETOUCH DELICA PLUS [id number]) Does not apply Misc 1 Lancet by Finger stick route 2 (two) times daily. (Patient not taking: Reported on 2/1/2024) 300 each 2    FEROSUL 325 (65 Fe) MG Oral Tab TAKE ONE TABLET BY MOUTH DAILY 90 tablet 4    Blood Glucose Monitoring Suppl (BLOOD GLUCOSE MONITOR SYSTEM) w/Device Does not apply Kit Please check blood glucose twice a day (Patient not taking: Reported on 2/1/2024) 1 kit 0    Elastic Bandages & Supports (NEOPRENE KNEE BRACE) Does not apply Misc 1 Application by Does not apply route daily. Left neoprene knee brace for patellofemoral pain syndrome (Patient not taking: Reported on 2/1/2024) 1 each 0     Allergies:  Allergies   Allergen Reactions    Meloxicam SWELLING     Able to tolerate iburprofen, naproxen,        Objective:   Physical Exam  N/A  Assessment & Plan:   1. Type 2 diabetes mellitus with hyperglycemia, without long-term current use of insulin (HCC)    2. Vitamin D deficiency     3. Essential hypertension    -Laboratory test done ordered  - We will notify her once we get test results and provide with recommendations  - Medications sent to corresponding pharmacy  She will schedule an appointment after the come back from [place]    This note was prepared using Dragon Medical voice recognition dictation software. As a result errors may occur. When identified these errors have been corrected. While every attempt is made to correct errors during dictation discrepancies may still exist            Orders Placed This Encounter   Procedures    CBC W Differential W Platelet [E]    Comp Metabolic Panel (14) [E]    Lipid Panel [E]    TSH and Free T4 [E]    Hemoglobin A1C [E]    Microalb/Creat Ratio, Random Urine [E]    Vitamin D [E]       Meds This Visit:  Requested Prescriptions     Signed Prescriptions Disp Refills    ciprofloxacin 250 MG Oral Tab 20 tablet 0     Sig: Take 1 tablet (250 mg total) by mouth 2 (two) times daily for 10 days.    Atovaquone-Proguanil HCl 250-100 MG Oral Tab 90 tablet 1     Sig: Take 1 tablet by mouth daily.    atorvastatin 10 MG Oral Tab 90 tablet 0     Sig: TAKE 1 TABLET BY MOUTH IN THE MORNING    famotidine (PEPCID) 20 MG Oral Tab 30 tablet 2     Sig: Take 1 tablet (20 mg total) by mouth nightly as needed for Heartburn.    hydroCHLOROthiazide 25 MG Oral Tab 90 tablet 1     Sig: Take 1 tablet (25 mg total) by mouth daily.    metFORMIN 500 MG Oral Tab 90 tablet 1     Sig: Take 1 tablet (500 mg total) by mouth nightly.    Omeprazole 40 MG Oral Capsule Delayed Release 90 capsule 1     Sig: Take 1 capsule (40 mg total) by mouth daily.       Imaging & Referrals:  [facility]

## 2024-04-02 NOTE — TELEPHONE ENCOUNTER
Tried calling [facility] pharmacy, couldn't get a live person on the phone.    Omeprazole dosing hasn't changed, 40mg sent today

## 2024-04-02 NOTE — PROGRESS NOTES
Virtual Telephone Check-In    Darrin Gerber verbally consents to a Virtual/Telephone Check-In visit on 04/02/24.  Patient has been referred to the [facility] website at [email or web address] to review the yearly Consent to Treat document.    Patient understands and accepts financial responsibility for any deductible, co-insurance and/or co-pays associated with this service.    Duration of the service: 15 minutes      Summary of topics discussed:             John Botello MD

## 2024-04-03 ENCOUNTER — TELEPHONE (OUTPATIENT)
Dept: RHEUMATOLOGY | Facility: CLINIC | Age: 64
End: 2024-04-03

## 2024-04-03 DIAGNOSIS — K12.30 MUCOSITIS: ICD-10-CM

## 2024-04-03 DIAGNOSIS — M05.741 RHEUMATOID ARTHRITIS INVOLVING BOTH HANDS WITH POSITIVE RHEUMATOID FACTOR (HCC): ICD-10-CM

## 2024-04-03 DIAGNOSIS — M05.742 RHEUMATOID ARTHRITIS INVOLVING BOTH HANDS WITH POSITIVE RHEUMATOID FACTOR (HCC): ICD-10-CM

## 2024-04-04 ENCOUNTER — TELEPHONE (OUTPATIENT)
Dept: RHEUMATOLOGY | Facility: CLINIC | Age: 64
End: 2024-04-04

## 2024-04-04 NOTE — TELEPHONE ENCOUNTER
Phoned pt, daughter, KIET for her to return our call. Dr. Fuller out of the office until 4/15/24. Per Dr. Fuller LOV note pt to have blood work completed in April     LOV: 2/1/24  Future Appointments   Date Time Provider Department Center   4/25/2024  3:30 PM Estela Fuller MD [id number] [address]   LABS:  Component      Latest Ref Rng 2/6/2024   WBC      4.0 - 11.0 x10(3) uL 7.1    RBC      3.80 - 5.30 x10(6)uL 4.02    Hemoglobin      12.0 - 16.0 g/dL 11.9 (L)    Hematocrit      35.0 - 48.0 % 35.9    Platelet Count      150.0 - 450.0 10(3)uL 364.0    MCV      80.0 - 100.0 fL 89.3    MCH      26.0 - 34.0 pg 29.6    MCHC      31.0 - 37.0 g/dL 33.1    RDW      % 14.5    Prelim Neutrophil Abs      1.50 - 7.70 x10 (3) uL 4.52    Neutrophils Absolute      1.50 - 7.70 x10(3) uL 4.52    Lymphocytes Absolute      1.00 - 4.00 x10(3) uL 1.80    Monocytes Absolute      0.10 - 1.00 x10(3) uL 0.66    Eosinophils Absolute      0.00 - 0.70 x10(3) uL 0.08    Basophils Absolute      0.00 - 0.20 x10(3) uL 0.03    Immature Granulocyte Absolute      0.00 - 1.00 x10(3) uL 0.03    Neutrophils %      % 63.5    Lymphocytes %      % 25.3    Monocytes %      % 9.3    Eosinophils %      % 1.1    Basophils %      % 0.4    Immature Granulocyte %      % 0.4    Glucose      70 - 99 mg/dL 93    Sodium      136 - 145 mmol/L 136    Potassium      3.5 - 5.1 mmol/L 3.2 (L)    Chloride      98 - 112 mmol/L 102    Carbon Dioxide, Total      21.0 - 32.0 mmol/L 33.0 (H)    ANION GAP      0 - 18 mmol/L 1    BUN      9 - 23 mg/dL 17    CREATININE      0.55 - 1.02 mg/dL 0.93    CALCIUM      8.5 - 10.1 mg/dL 9.3    CALCULATED OSMOLALITY      275 - 295 mOsm/kg 283    EGFR      >=60 mL/min/1.73m2 68    AST (SGOT)      15 - 37 U/L 17    ALT (SGPT)      13 - 56 U/L 32    ALKALINE PHOSPHATASE      50 - 130 U/L 84    Total Bilirubin      0.1 - 2.0 mg/dL 0.4    PROTEIN, TOTAL      6.4 - 8.2 g/dL 7.2    Albumin      3.4 - 5.0 g/dL 3.3 (L)    Globulin      2.8 - 4.4  g/dL 3.9    A/G Ratio      1.0 - 2.0  0.8 (L)    Patient Fasting for CMP? Yes       Legend:  (L) Low  (H) High

## 2024-04-05 ENCOUNTER — LAB ENCOUNTER (OUTPATIENT)
Dept: LAB | Age: 64
End: 2024-04-05
Attending: FAMILY MEDICINE
Payer: MEDICAID

## 2024-04-05 DIAGNOSIS — E11.65 TYPE 2 DIABETES MELLITUS WITH HYPERGLYCEMIA, WITHOUT LONG-TERM CURRENT USE OF INSULIN (HCC): ICD-10-CM

## 2024-04-05 DIAGNOSIS — M05.741 RHEUMATOID ARTHRITIS INVOLVING BOTH HANDS WITH POSITIVE RHEUMATOID FACTOR (HCC): ICD-10-CM

## 2024-04-05 DIAGNOSIS — K12.30 MUCOSITIS: ICD-10-CM

## 2024-04-05 DIAGNOSIS — M05.742 RHEUMATOID ARTHRITIS INVOLVING BOTH HANDS WITH POSITIVE RHEUMATOID FACTOR (HCC): ICD-10-CM

## 2024-04-05 DIAGNOSIS — E55.9 VITAMIN D DEFICIENCY: ICD-10-CM

## 2024-04-05 LAB
ALBUMIN SERPL-MCNC: 3.4 G/DL (ref 3.4–5)
ALBUMIN/GLOB SERPL: 0.9 {RATIO} (ref 1–2)
ALP LIVER SERPL-CCNC: 74 U/L
ALT SERPL-CCNC: 32 U/L
ANION GAP SERPL CALC-SCNC: 5 MMOL/L (ref 0–18)
AST SERPL-CCNC: 19 U/L (ref 15–37)
BASOPHILS # BLD AUTO: 0.03 X10(3) UL (ref 0–0.2)
BASOPHILS NFR BLD AUTO: 0.5 %
BILIRUB SERPL-MCNC: 0.5 MG/DL (ref 0.1–2)
BUN BLD-MCNC: 23 MG/DL (ref 9–23)
CALCIUM BLD-MCNC: 9.3 MG/DL (ref 8.5–10.1)
CHLORIDE SERPL-SCNC: 103 MMOL/L (ref 98–112)
CHOLEST SERPL-MCNC: 160 MG/DL (ref ?–200)
CO2 SERPL-SCNC: 30 MMOL/L (ref 21–32)
CREAT BLD-MCNC: 1.32 MG/DL
EGFRCR SERPLBLD CKD-EPI 2021: 45 ML/MIN/1.73M2 (ref 60–?)
EOSINOPHIL # BLD AUTO: 0.08 X10(3) UL (ref 0–0.7)
EOSINOPHIL NFR BLD AUTO: 1.3 %
ERYTHROCYTE [DISTWIDTH] IN BLOOD BY AUTOMATED COUNT: 14.3 %
FASTING PATIENT LIPID ANSWER: YES
FASTING STATUS PATIENT QL REPORTED: YES
GLOBULIN PLAS-MCNC: 3.6 G/DL (ref 2.8–4.4)
GLUCOSE BLD-MCNC: 102 MG/DL (ref 70–99)
HCT VFR BLD AUTO: 33.2 %
HDLC SERPL-MCNC: 79 MG/DL (ref 40–59)
HGB BLD-MCNC: 11.3 G/DL
IMM GRANULOCYTES # BLD AUTO: 0.02 X10(3) UL (ref 0–1)
IMM GRANULOCYTES NFR BLD: 0.3 %
LDLC SERPL CALC-MCNC: 69 MG/DL (ref ?–100)
LYMPHOCYTES # BLD AUTO: 1.97 X10(3) UL (ref 1–4)
LYMPHOCYTES NFR BLD AUTO: 32.8 %
MCH RBC QN AUTO: 30.2 PG (ref 26–34)
MCHC RBC AUTO-ENTMCNC: 34 G/DL (ref 31–37)
MCV RBC AUTO: 88.8 FL
MONOCYTES # BLD AUTO: 0.62 X10(3) UL (ref 0.1–1)
MONOCYTES NFR BLD AUTO: 10.3 %
NEUTROPHILS # BLD AUTO: 3.28 X10 (3) UL (ref 1.5–7.7)
NEUTROPHILS # BLD AUTO: 3.28 X10(3) UL (ref 1.5–7.7)
NEUTROPHILS NFR BLD AUTO: 54.8 %
NONHDLC SERPL-MCNC: 81 MG/DL (ref ?–130)
OSMOLALITY SERPL CALC.SUM OF ELEC: 290 MOSM/KG (ref 275–295)
PLATELET # BLD AUTO: 302 10(3)UL (ref 150–450)
POTASSIUM SERPL-SCNC: 3.7 MMOL/L (ref 3.5–5.1)
PROT SERPL-MCNC: 7 G/DL (ref 6.4–8.2)
RBC # BLD AUTO: 3.74 X10(6)UL
SODIUM SERPL-SCNC: 138 MMOL/L (ref 136–145)
T4 FREE SERPL-MCNC: 1.4 NG/DL (ref 0.8–1.7)
TRIGL SERPL-MCNC: 57 MG/DL (ref 30–149)
TSI SER-ACNC: 3.63 MIU/ML (ref 0.36–3.74)
VIT D+METAB SERPL-MCNC: 41.4 NG/ML (ref 30–100)
VLDLC SERPL CALC-MCNC: 9 MG/DL (ref 0–30)
WBC # BLD AUTO: 6 X10(3) UL (ref 4–11)

## 2024-04-05 PROCEDURE — 80053 COMPREHEN METABOLIC PANEL: CPT

## 2024-04-05 PROCEDURE — 83036 HEMOGLOBIN GLYCOSYLATED A1C: CPT

## 2024-04-05 PROCEDURE — 84443 ASSAY THYROID STIM HORMONE: CPT

## 2024-04-05 PROCEDURE — 36415 COLL VENOUS BLD VENIPUNCTURE: CPT

## 2024-04-05 PROCEDURE — 80061 LIPID PANEL: CPT

## 2024-04-05 PROCEDURE — 84439 ASSAY OF FREE THYROXINE: CPT

## 2024-04-05 PROCEDURE — 85025 COMPLETE CBC W/AUTO DIFF WBC: CPT

## 2024-04-05 PROCEDURE — 82306 VITAMIN D 25 HYDROXY: CPT

## 2024-04-06 ENCOUNTER — TELEPHONE (OUTPATIENT)
Dept: FAMILY MEDICINE CLINIC | Facility: CLINIC | Age: 64
End: 2024-04-06

## 2024-04-06 LAB
EST. AVERAGE GLUCOSE BLD GHB EST-MCNC: 134 MG/DL (ref 68–126)
HBA1C MFR BLD: 6.3 % (ref ?–5.7)

## 2024-04-06 NOTE — TELEPHONE ENCOUNTER
Received incoming fax from pharmacy on Atovaquone-Proguanil HCl 250-100 MG, needs PA. Will place fax in MA folder Key: [id number]

## 2024-04-08 ENCOUNTER — TELEPHONE (OUTPATIENT)
Dept: RHEUMATOLOGY | Facility: CLINIC | Age: 64
End: 2024-04-08

## 2024-04-08 RX ORDER — LEUCOVORIN CALCIUM 5 MG/1
5 TABLET ORAL WEEKLY
Qty: 12 TABLET | Refills: 3 | Status: SHIPPED | OUTPATIENT
Start: 2024-04-08

## 2024-04-08 RX ORDER — METHOTREXATE 2.5 MG/1
12.5 TABLET ORAL WEEKLY
Qty: 65 TABLET | Refills: 0 | Status: SHIPPED | OUTPATIENT
Start: 2024-04-08 | End: 2024-07-08

## 2024-04-08 NOTE — TELEPHONE ENCOUNTER
0 - 18 mmol/L 5 1 3 5 7 2 2   BUN  9 - 23 mg/dL 23 17 17 R 31 High  R 33 High  R 28 High  R 28 High  R   Creatinine  0.55 - 1.02 mg/dL 1.32 High  0.93 1.04 High  0.96 1.33 High  1.03 High  1.27       Patient's kidney function bumped up to 1.32 to    Patient is on ibuprofen 600 mg 3 times daily prescription.  How often is she taking this?    Methotrexate 15 mg once a week    Would hold methotrexate this week    Minimize NSAIDs    Will let Dr. Fuller decide next week if he wants to reduce methotrexate    Depending on plan will likely need CMP rechecked in 3 to 4 weeks    Avoid alcohol and stay hydrated

## 2024-04-08 NOTE — TELEPHONE ENCOUNTER
Per Dr. Spencer in separate TE:  0 - 18 mmol/L 5 1 3 5 7 2 2   BUN  9 - 23 mg/dL 23 17 17 R 31 High  R 33 High  R 28 High  R 28 High  R   Creatinine  0.55 - 1.02 mg/dL 1.32 High  0.93 1.04 High  0.96 1.33 High  1.03 High  1.27         Patient's kidney function bumped up to 1.32 to     Patient is on ibuprofen 600 mg 3 times daily prescription.  How often is she taking this?     Methotrexate 15 mg once a week     Would hold methotrexate this week     Minimize NSAIDs     Will let Dr. Fuller decide next week if he wants to reduce methotrexate     Depending on plan will likely need CMP rechecked in 3 to 4 weeks     Avoid alcohol and stay hydrated             Called pts son and left a detailed voicemail. Sending my chart message as well. Pending methotrexate for 5 tabs weeklyper Dr. Zhou's verbal instructions after getting a page during working hours.

## 2024-04-09 RX ORDER — ATORVASTATIN CALCIUM 10 MG/1
TABLET, FILM COATED ORAL
Qty: 90 TABLET | Refills: 0 | Status: SHIPPED | OUTPATIENT
Start: 2024-04-09

## 2024-04-24 RX ORDER — LIDOCAINE HYDROCHLORIDE 20 MG/ML
SOLUTION ORAL; TOPICAL
Qty: 90 TABLET | Refills: 4 | OUTPATIENT
Start: 2024-04-24

## 2024-06-20 ENCOUNTER — LAB ENCOUNTER (OUTPATIENT)
Dept: LAB | Age: 64
End: 2024-06-20
Attending: INTERNAL MEDICINE

## 2024-06-20 DIAGNOSIS — M05.742 RHEUMATOID ARTHRITIS INVOLVING BOTH HANDS WITH POSITIVE RHEUMATOID FACTOR (HCC): ICD-10-CM

## 2024-06-20 DIAGNOSIS — K12.30 MUCOSITIS: ICD-10-CM

## 2024-06-20 DIAGNOSIS — E11.65 TYPE 2 DIABETES MELLITUS WITH HYPERGLYCEMIA, WITHOUT LONG-TERM CURRENT USE OF INSULIN (HCC): ICD-10-CM

## 2024-06-20 DIAGNOSIS — M05.741 RHEUMATOID ARTHRITIS INVOLVING BOTH HANDS WITH POSITIVE RHEUMATOID FACTOR (HCC): ICD-10-CM

## 2024-06-20 LAB
CREAT UR-SCNC: 155 MG/DL
MICROALBUMIN UR-MCNC: 1.08 MG/DL
MICROALBUMIN/CREAT 24H UR-RTO: 7 UG/MG (ref ?–30)

## 2024-06-20 PROCEDURE — 82570 ASSAY OF URINE CREATININE: CPT

## 2024-06-20 PROCEDURE — 82043 UR ALBUMIN QUANTITATIVE: CPT

## 2024-06-24 ENCOUNTER — OFFICE VISIT (OUTPATIENT)
Dept: PAIN CLINIC | Facility: CLINIC | Age: 64
End: 2024-06-24

## 2024-06-24 VITALS — HEART RATE: 98 BPM | SYSTOLIC BLOOD PRESSURE: 108 MMHG | OXYGEN SATURATION: 96 % | DIASTOLIC BLOOD PRESSURE: 62 MMHG

## 2024-06-24 DIAGNOSIS — M19.011 PRIMARY OSTEOARTHRITIS OF BOTH SHOULDERS: ICD-10-CM

## 2024-06-24 DIAGNOSIS — M19.012 PRIMARY OSTEOARTHRITIS OF BOTH SHOULDERS: ICD-10-CM

## 2024-06-24 DIAGNOSIS — M48.062 SPINAL STENOSIS OF LUMBAR REGION WITH NEUROGENIC CLAUDICATION: Primary | ICD-10-CM

## 2024-06-24 PROCEDURE — 99214 OFFICE O/P EST MOD 30 MIN: CPT | Performed by: ANESTHESIOLOGY

## 2024-06-24 NOTE — PROGRESS NOTES
Name: Darrin Gerber   : 1960   DOS: 2024     Pain Clinic Follow Up Visit:     Chief Complaint   Patient presents with    Follow - Up     Neck, shoulder pain       Darrin Gerber is a 64 year old female with a history of chronic pain in multiple locations.  The patient has lumbar degenerative disc disease leading to claudication symptoms.  Has been treated with intralaminar epidural steroid injection with good symptomatic improvement.  This was last performed in 2023.  The patient now has return of symptoms and desires repeat injection.    Patient also has a history of bilateral knee pain.  This is managed through orthopedics.    Patient also complains of bilateral shoulder pain right greater than left.    Pt denies any chills, fever, or weakness. There is no bladder or bowel incontinence associated with the pain.    REVIEW OF SYSTEMS:  A ten point review of systems was performed with pertinent positives and negatives in the HPI.    Allergies   Allergen Reactions    Meloxicam SWELLING     Able to tolerate iburprofen, naproxen,        Current Outpatient Medications   Medication Sig Dispense Refill    atorvastatin 10 MG Oral Tab TAKE 1 TABLET BY MOUTH EVERY MORNING 90 tablet 0    methotrexate 2.5 MG Oral Tab Take 5 tablets (12.5 mg total) by mouth once a week. 65 tablet 0    leucovorin 5 MG Oral Tab Take 1 tablet (5 mg total) by mouth once a week. ON SUNDAYS 12 tablet 3    nystatin 607568 UNIT/ML Mouth/Throat Suspension TAKE 5ML BY MOUTH IN THE MORNING AT NOON IN THE EVENING AND AT BEDTIME FOR 10 DAYS 240 mL 0    Atovaquone-Proguanil HCl 250-100 MG Oral Tab Take 1 tablet by mouth daily. 90 tablet 1    famotidine (PEPCID) 20 MG Oral Tab Take 1 tablet (20 mg total) by mouth nightly as needed for Heartburn. 30 tablet 2    hydroCHLOROthiazide 25 MG Oral Tab Take 1 tablet (25 mg total) by mouth daily. 90 tablet 1    metFORMIN 500 MG Oral Tab Take 1 tablet (500 mg total) by mouth nightly. 90 tablet 1     Omeprazole 40 MG Oral Capsule Delayed Release Take 1 capsule (40 mg total) by mouth daily. 90 capsule 1    predniSONE 5 MG Oral Tab Take 1 tablet (5 mg total) by mouth daily. 30 tablet 0    ibuprofen 600 MG Oral Tab Take 1 tablet (600 mg total) by mouth 3 (three) times daily as needed.      hydroxychloroquine 200 MG Oral Tab Take 1 tablet (200 mg total) by mouth daily. 90 tablet 1    Lidocaine Viscous HCl 2 % Mouth/Throat Solution Take 5 mL by mouth every 3 (three) hours as needed for Pain. For mouth sores. 100 mL 1    folic acid 1 MG Oral Tab Take 2 tablets (2 mg total) by mouth daily. 60 tablet 2    Cyanocobalamin (VITAMIN B-12) 1000 MCG Sublingual SL Tab Take 1 tablet by mouth daily. 90 tablet 3    diclofenac 1 % External Gel Apply 2 g topically 4 (four) times daily. 350 g 0    NAPROXEN 500 MG Oral Tab TAKE ONE TABLET BY MOUTH TWICE A DAY AS NEEDED 180 tablet 1    Glucose Blood (ONETOUCH ULTRA) In Vitro Strip USE TO TEST TWICE A DAY 50 each 3    Lancets (ONETOUCH DELICA PLUS [id number]) Does not apply Misc 1 Lancet by Finger stick route 2 (two) times daily. 300 each 2    FEROSUL 325 (65 Fe) MG Oral Tab TAKE ONE TABLET BY MOUTH DAILY 90 tablet 4    Blood Glucose Monitoring Suppl (BLOOD GLUCOSE MONITOR SYSTEM) w/Device Does not apply Kit Please check blood glucose twice a day 1 kit 0    Elastic Bandages & Supports (NEOPRENE KNEE BRACE) Does not apply Misc 1 Application by Does not apply route daily. Left neoprene knee brace for patellofemoral pain syndrome 1 each 0         EXAM:   /62 (BP Location: Right arm, Patient Position: Sitting, Cuff Size: adult)   Pulse 98   SpO2 96%   General:  Patient is a(n) 64 year old year old female in no acute distress.  Neurologic:: WNL-Orientation to time, place and person, normal mood & affect, concentration & attention span intact.   Inspection:  Ambulates with well-coordinated, fluid, non-antalgic gait.  Gait is normal.  Neck: Upper EXTR range of motion intact  Cranial  nerve: Grossly intact  Respiratory: Nonlabored  Back: Gait intact      IMAGES:     Knee x-ray reviewed with tricompartmental disease    ASSESSMENT AND PLAN:     1. Spinal stenosis of lumbar region with neurogenic claudication    2. Primary osteoarthritis of both shoulders      The patient is a 64-year-old female with a longstanding history of chronic pain.  Has history of spinal stenosis symptoms.  Recommend trial of intralaminar epidural steroid injection given excellent symptomatic relief from previous injection.    Patient also complains of knee pain which will defer to orthopedics    Patient also complains of bilateral shoulder pain.  Has had good relief from intra-articular shoulder injection.  Offered repeat injection.    Orders:  Orders Placed This Encounter   Procedures    [facility] PAIN NAVIGATOR    [facility] PAIN NAVIGATOR         Radiology orders and consultations:None  The patient indicates understanding of these issues and agrees to the plan.  No follow-ups on file.    Carlo Shaw MD, 6/24/2024, 12:42 PM

## 2024-06-24 NOTE — PATIENT INSTRUCTIONS
Refill policies:    Allow 2-3 business days for refills; controlled substances may take longer.  Contact your pharmacy at least 5 days prior to running out of medication and have them send an electronic request or submit request through the “request refill” option in your [employer] account.  Refills are not addressed on weekends; covering physicians do not authorize routine medications on weekends.  No narcotics or controlled substances are refilled after noon on Fridays or by on call physicians.  By law, narcotics must be electronically prescribed.  A 30 day supply with no refills is the maximum allowed.  If your prescription is due for a refill, you may be due for a follow up appointment.  To best provide you care, patients receiving routine medications need to be seen at least once a year.  Patients receiving narcotic/controlled substance medications need to be seen at least once every 3 months.  In the event that your preferred pharmacy does not have the requested medication in stock (e.g. Backordered), it is your responsibility to find another pharmacy that has the requested medication available.  We will gladly send a new prescription to that pharmacy at your request.    Scheduling Tests:    If your physician has ordered radiology tests such as MRI or CT scans, please contact Central Scheduling at [phone] right away to schedule the test.  Once scheduled, the [facility] Centralized Referral Team will work with your insurance carrier to obtain pre-certification or prior authorization.  Depending on your insurance carrier, approval may take 3-10 days.  It is highly recommended patients assure they have received an authorization before having a test performed.  If test is done without insurance authorization, patient may be responsible for the entire amount billed.      Precertification and Prior Authorizations:  If your physician has recommended that you have a procedure or additional testing performed the [facility]  Centralized Referral Team will contact your insurance carrier to obtain pre-certification or prior authorization.    You are strongly encouraged to contact your insurance carrier to verify that your procedure/test has been approved and is a COVERED benefit.  Although the [facility] Centralized Referral Team does its due diligence, the insurance carrier gives the disclaimer that \"Although the procedure is authorized, this does not guarantee payment.\"    Ultimately the patient is responsible for payment.   Thank you for your understanding in this matter.  Paperwork Completion:  If you require FMLA or disability paperwork for your recovery, please make sure to either drop it off or have it faxed to our office at [phone]. Be sure the form has your name and date of birth on it.  The form will be faxed to our Forms Department and they will complete it for you.  There is a 25$ fee for all forms that need to be filled out.  Please be aware there is a 10-14 day turnaround time.  You will need to sign a release of information (CLAUDIO) form if your paperwork does not come with one.  You may call the Forms Department with any questions at [phone].  Their fax number is [phone].

## 2024-06-24 NOTE — PROGRESS NOTES
Patient presents in office today with reported pain in bilateral neck and shoulders (right shoulder worse), bilateral low back and legs, bilateral knees    Current pain level reported = 8-9/10    Last reported dose of n/a      Narcotic Contract renewal n/a    Urine Drug screen n/a

## 2024-06-25 ENCOUNTER — TELEPHONE (OUTPATIENT)
Dept: PAIN CLINIC | Facility: CLINIC | Age: 64
End: 2024-06-25

## 2024-06-25 DIAGNOSIS — M48.062 SPINAL STENOSIS OF LUMBAR REGION WITH NEUROGENIC CLAUDICATION: Primary | ICD-10-CM

## 2024-06-25 NOTE — TELEPHONE ENCOUNTER
Prior authorization request completed for: FRANCISCO  Authorization # [id number]  Authorization dates: 6/25/24-8/24/24  CPT codes approved: 66018  Number of visits/dates of service approved: 1  Physician: MIKE  Location: [facility]    Patient can be scheduled. Routed to Navigator.

## 2024-06-26 ENCOUNTER — TELEPHONE (OUTPATIENT)
Dept: PAIN CLINIC | Facility: CLINIC | Age: 64
End: 2024-06-26

## 2024-06-26 DIAGNOSIS — M19.012 PRIMARY OSTEOARTHRITIS OF BOTH SHOULDERS: Primary | ICD-10-CM

## 2024-06-26 DIAGNOSIS — M19.011 PRIMARY OSTEOARTHRITIS OF BOTH SHOULDERS: Primary | ICD-10-CM

## 2024-06-26 NOTE — TELEPHONE ENCOUNTER
Prior authorization request completed for: bilateral shoulder injection   Authorization # no auth needed  Pre-D: no  Exclusions/Restrictions: no  Covered Benefit: yes   Authorization dates: n/a  CPT codes approved: 20610  Number of visits/dates of service approved: 1  Physician: tiarra  Location: [facility]   Call Ref#: [id number]  Representative Name: [facility]  Insurance Carrier: [phone]    Patient can be scheduled. Routed to Navigator.

## 2024-07-01 RX ORDER — LIDOCAINE HYDROCHLORIDE 20 MG/ML
SOLUTION ORAL; TOPICAL
Qty: 90 TABLET | Refills: 12 | OUTPATIENT
Start: 2024-07-01

## 2024-07-01 NOTE — TELEPHONE ENCOUNTER
Spoke with patient's daughter Trisha (Ok'd per HIPAA) to schedule patient's injection.      Patient's daughter advised of insurance approval to proceed with injections and is agreeable to scheduling. Patient scheduled for procedure, pre-procedure instructions reviewed. Patient prefers Local sedation. Reviewed sedation instructions including No Fasting & No  Required. Patient's daughter encouraged but not required to hold Ibuprofen & Naproxen for 24 hours prior to procedure. Patient's daughter verbalized understanding of instructions, no further needs at this time.       [facility] PAIN CLINIC  PRE-PROCEDURE INSTRUCTIONS WITHOUT SEDATION    Procedure: Bilateral Shoulder Injections       Appointment Date: 07/25/2024  Check-In Time: 12:00 PM      Prior to the procedure:  Please update us prior to the procedure if you are experiencing any symptoms of infection such as cough, fever, chills, urinary symptoms, or have recently been prescribed antibiotics, have open wounds, have recently had surgery or dental procedures.    Day of Procedure:  **Drivers will be required for patients who receive prescriptions for Valium.    NO FASTING REQUIRED  Please bring your Insurance Card, Photo ID, List of Current Medications and Referral (if applicable) to your appointment.  Please park in the [employer] and follow the signs to the [facility].  Check in at [facility] ([address] [place] [address]) outpatient registration in the [employer] [facility].  Please note-No prescriptions will be written by Pain Clinic in [place] on the day of procedure. If you require a refill of medications, please contact the office 48 hours prior to your procedure.  If you have an implanted Spinal Cord or Peripheral Nerve Stimulator: Please remember to turn device off for procedure.        Medication Hold:    Number of days you need to be off for the following medications:    Aggrenox 10 days   Agrylin (Anagrelide) 10 days  Brilinta  (Ticagrelor) 7 days  Imbruvica (Ibrutinib) 3 days   Enbrel (Etanercept) 24 hours   Fragmin (Dalteparin) 24 hours   Pletal (Cilostazol) 7 days  Effient (Prasugrel) 7 days  Pradaxa 10 days  Trental 7 days  Eliquis (Apixaban) 3 days  Xarelto (Rivaroxaban) 3 days  Lovenox (Enoxaparin) 24 hours  Aspirin  Greater than 81mg but less than 325mg   5 days  325mg and greater                  7 days  Coumadin       5 days  Procedure may be cancelled if INR is elevated.   Excedrin (with aspirin) 7 days  Plavix (Clopidogrel)                            7 days    NSAIDs: 24 hours preferred      Ibuprofen (Motrin, Advil, Vicoprofen), Naproxen (Naprosyn, Aleve), Piroxcam (Feldene), Meloxicam (Mobic), Oxaprozin (Daypro), Diclofenac (Voltaren), Indomethacin (Indocin), Etodolac (Lodine), Nabumetone (Relafen), Celebrex (Celecoxib)           HERBAL SUPPLEMENTS  5 days preferred  Fish oil, krill oil, Omega-3, Vascepa, Vitamin E, Turmeric, Garlic                       Insurance Authorization:   Most insurances are now requiring a preauthorization for all procedures.  In the event that your insurance does not authorize your procedure within 48 hours of the scheduled date, your procedure will be cancelled and rescheduled to a later date.  Please contact your insurance carrier to determine what your financial responsibility will be for the procedure(s).      Cancellation/Rescheduling Appointment:   In the event you need to cancel or reschedule your appointment, you must notify the office 24 hours prior.    Post-procedure instructions:        Please schedule a follow up visit within 2 to 4 weeks after your last procedure date   Please call our office with any questions or concerns before or after your procedure at  [phone].  If you are a diabetic, please increase the frequency of your glucose monitoring after the procedure as this may cause a temporary increase in your blood sugar.  Contact your primary care physician if your blood sugar  rises as you may require some medication adjustment.  It is normal to have increased pain at injection site for up to 3-5 days after procedure, you can use heat or ice (20 minutes on 20 minutes off) for comfort.    **To hear a recorded version of these instructions, please call [phone] and follow the prompts.  **Para escuchar las instrucciones en Español, por favor de llamar el naida [phone] opción 4.

## 2024-07-01 NOTE — TELEPHONE ENCOUNTER
Spoke with patient's daughter Trisha (Ok'd per HIPAA) to schedule patient's injection.     Patient's daughter advised of insurance approval to proceed with injections and is agreeable to scheduling. Patient scheduled for procedure, pre-procedure instructions reviewed. Patient prefers Local sedation. Reviewed sedation instructions including No Fasting & No  Required. Patient's daughter encouraged but not required to hold Ibuprofen & Naproxen for 24 hours prior to procedure. Patient's daughter verbalized understanding of instructions, no further needs at this time.        [facility] PAIN CLINIC  PRE-PROCEDURE INSTRUCTIONS WITHOUT SEDATION    Procedure: LESI       Appointment Date: 07/18/2024  Check-In Time: 12:00 PM        Prior to the procedure:  Please update us prior to the procedure if you are experiencing any symptoms of infection such as cough, fever, chills, urinary symptoms, or have recently been prescribed antibiotics, have open wounds, have recently had surgery or dental procedures.    Day of Procedure:  **Drivers will be required for patients who receive prescriptions for Valium.    NO FASTING REQUIRED  Please bring your Insurance Card, Photo ID, List of Current Medications and Referral (if applicable) to your appointment.  Please park in the [employer] and follow the signs to the [facility] [facility].  Check in at [facility] ([address] [place] [address]) outpatient registration in the [employer] [facility].  Please note-No prescriptions will be written by Pain Clinic in [place] on the day of procedure. If you require a refill of medications, please contact the office 48 hours prior to your procedure.  If you have an implanted Spinal Cord or Peripheral Nerve Stimulator: Please remember to turn device off for procedure.        Medication Hold:    Number of days you need to be off for the following medications:    Aggrenox 10 days   Agrylin (Anagrelide) 10 days  Brilinta (Ticagrelor) 7  days  Imbruvica (Ibrutinib) 3 days   Enbrel (Etanercept) 24 hours   Fragmin (Dalteparin) 24 hours   Pletal (Cilostazol) 7 days  Effient (Prasugrel) 7 days  Pradaxa 10 days  Trental 7 days  Eliquis (Apixaban) 3 days  Xarelto (Rivaroxaban) 3 days  Lovenox (Enoxaparin) 24 hours  Aspirin  Greater than 81mg but less than 325mg   5 days  325mg and greater                  7 days  Coumadin       5 days  Procedure may be cancelled if INR is elevated.   Excedrin (with aspirin) 7 days  Plavix (Clopidogrel)                            7 days    NSAIDs: 24 hours preferred      Ibuprofen (Motrin, Advil, Vicoprofen), Naproxen (Naprosyn, Aleve), Piroxcam (Feldene), Meloxicam (Mobic), Oxaprozin (Daypro), Diclofenac (Voltaren), Indomethacin (Indocin), Etodolac (Lodine), Nabumetone (Relafen), Celebrex (Celecoxib)           HERBAL SUPPLEMENTS  5 days preferred  Fish oil, krill oil, Omega-3, Vascepa, Vitamin E, Turmeric, Garlic                       Insurance Authorization:   Most insurances are now requiring a preauthorization for all procedures.  In the event that your insurance does not authorize your procedure within 48 hours of the scheduled date, your procedure will be cancelled and rescheduled to a later date.  Please contact your insurance carrier to determine what your financial responsibility will be for the procedure(s).      Cancellation/Rescheduling Appointment:   In the event you need to cancel or reschedule your appointment, you must notify the office 24 hours prior.    Post-procedure instructions:        Please schedule a follow up visit within 2 to 4 weeks after your last procedure date   Please call our office with any questions or concerns before or after your procedure at  [phone].  If you are a diabetic, please increase the frequency of your glucose monitoring after the procedure as this may cause a temporary increase in your blood sugar.  Contact your primary care physician if your blood sugar rises as you may  require some medication adjustment.  It is normal to have increased pain at injection site for up to 3-5 days after procedure, you can use heat or ice (20 minutes on 20 minutes off) for comfort.    **To hear a recorded version of these instructions, please call [phone] and follow the prompts.  **Para escuchar las instrucciones en Español, por favor de llamar el naiad [phone] opción 4.

## 2024-07-15 ENCOUNTER — OFFICE VISIT (OUTPATIENT)
Facility: CLINIC | Age: 64
End: 2024-07-15
Payer: MEDICAID

## 2024-07-15 VITALS — WEIGHT: 166 LBS | BODY MASS INDEX: 31.34 KG/M2 | HEIGHT: 61 IN

## 2024-07-15 DIAGNOSIS — M17.0 PRIMARY OSTEOARTHRITIS OF BOTH KNEES: Primary | ICD-10-CM

## 2024-07-15 RX ORDER — TRIAMCINOLONE ACETONIDE 40 MG/ML
80 INJECTION, SUSPENSION INTRA-ARTICULAR; INTRAMUSCULAR ONCE
Status: COMPLETED | OUTPATIENT
Start: 2024-07-15 | End: 2024-07-15

## 2024-07-15 RX ADMIN — TRIAMCINOLONE ACETONIDE 80 MG: 40 INJECTION, SUSPENSION INTRA-ARTICULAR; INTRAMUSCULAR at 11:44:00

## 2024-07-15 NOTE — PROCEDURES
Patient reports she would like to discuss surgery with Dr. Bautista.  Encouraged her to schedule surgical discussion appointment.    Risks and benefits of knee injection discussed with the patient, with risks including but not limited to pain and swelling at the injection site and/or within the knee joint, infection, elevation in blood pressure and/or glucose levels, facial flushing. After informed consent, the patient's right and left knees were marked, locally anesthetized with skin refrigerant, prepped with topical antiseptic, and injected with a mixture of 1mL 40mg/mL Kenalog, 2mL 1% lidocaine and 2mL 0.5% marcaine through the inferolateral portal.  A band-aid was applied.  The patient tolerated the procedure well.    Anthony Parks PA-C  [facility] Orthopedic Surgery

## 2024-07-18 ENCOUNTER — APPOINTMENT (OUTPATIENT)
Dept: GENERAL RADIOLOGY | Facility: HOSPITAL | Age: 64
End: 2024-07-18
Attending: ANESTHESIOLOGY
Payer: MEDICAID

## 2024-07-18 ENCOUNTER — HOSPITAL ENCOUNTER (OUTPATIENT)
Facility: HOSPITAL | Age: 64
Setting detail: HOSPITAL OUTPATIENT SURGERY
Discharge: HOME | End: 2024-07-18
Attending: ANESTHESIOLOGY | Admitting: ANESTHESIOLOGY
Payer: MEDICAID

## 2024-07-18 VITALS
WEIGHT: 166 LBS | HEIGHT: 61 IN | TEMPERATURE: 99 F | DIASTOLIC BLOOD PRESSURE: 67 MMHG | SYSTOLIC BLOOD PRESSURE: 156 MMHG | BODY MASS INDEX: 31.34 KG/M2 | OXYGEN SATURATION: 97 % | RESPIRATION RATE: 16 BRPM | HEART RATE: 69 BPM

## 2024-07-18 LAB — GLUCOSE BLD-MCNC: 99 MG/DL (ref 70–99)

## 2024-07-18 PROCEDURE — 62323 NJX INTERLAMINAR LMBR/SAC: CPT | Performed by: ANESTHESIOLOGY

## 2024-07-18 PROCEDURE — 3E0R33Z INTRODUCTION OF ANTI-INFLAMMATORY INTO SPINAL CANAL, PERCUTANEOUS APPROACH: ICD-10-PCS | Performed by: ANESTHESIOLOGY

## 2024-07-18 RX ORDER — ASPIRIN 81 MG/1
81 TABLET ORAL DAILY
COMMUNITY

## 2024-07-18 RX ORDER — SODIUM CHLORIDE 9 MG/ML
INJECTION, SOLUTION INTRAMUSCULAR; INTRAVENOUS; SUBCUTANEOUS
Status: DISCONTINUED | OUTPATIENT
Start: 2024-07-18 | End: 2024-07-18

## 2024-07-18 RX ORDER — LIDOCAINE HYDROCHLORIDE 10 MG/ML
INJECTION, SOLUTION EPIDURAL; INFILTRATION; INTRACAUDAL; PERINEURAL
Status: DISCONTINUED | OUTPATIENT
Start: 2024-07-18 | End: 2024-07-18

## 2024-07-18 RX ORDER — DEXAMETHASONE SODIUM PHOSPHATE 10 MG/ML
INJECTION, SOLUTION INTRAMUSCULAR; INTRAVENOUS
Status: DISCONTINUED | OUTPATIENT
Start: 2024-07-18 | End: 2024-07-18

## 2024-07-18 NOTE — H&P
History & Physical Examination    Patient Name: Darrin Gerber  MRN: [id number]  CSN: [id number]  YOB: 1960    Pre-Operative Diagnosis:  Spinal stenosis of lumbar region with neurogenic claudication [M48.062]    Present Illness: Spinal stenosis    ASA: 3  MP class: 1  Sedation: Local      Facility-Administered Medications Prior to Admission   Medication Dose Route Frequency Provider Last Rate Last Admin    [COMPLETED] triamcinolone acetonide (Kenalog-40) 40 MG/ML injection 80 mg  80 mg Intra-articular Once Anthony Parks PA-C   80 mg at 07/15/24 1144     Medications Prior to Admission   Medication Sig Dispense Refill Last Dose    aspirin 81 MG Oral Tab EC Take 1 tablet (81 mg total) by mouth daily.   2024    ibuprofen 600 MG Oral Tab Take 1 tablet (600 mg total) by mouth 3 (three) times daily as needed.   Past Month    NAPROXEN 500 MG Oral Tab TAKE ONE TABLET BY MOUTH TWICE A DAY AS NEEDED 180 tablet 1 Past Month    atorvastatin 10 MG Oral Tab TAKE 1 TABLET BY MOUTH EVERY MORNING 90 tablet 0     [] methotrexate 2.5 MG Oral Tab Take 5 tablets (12.5 mg total) by mouth once a week. 65 tablet 0     leucovorin 5 MG Oral Tab Take 1 tablet (5 mg total) by mouth once a week. ON SUNDAYS 12 tablet 3     nystatin 337754 UNIT/ML Mouth/Throat Suspension TAKE 5ML BY MOUTH IN THE MORNING AT NOON IN THE EVENING AND AT BEDTIME FOR 10 DAYS 240 mL 0     Atovaquone-Proguanil HCl 250-100 MG Oral Tab Take 1 tablet by mouth daily. 90 tablet 1     famotidine (PEPCID) 20 MG Oral Tab Take 1 tablet (20 mg total) by mouth nightly as needed for Heartburn. 30 tablet 2     hydroCHLOROthiazide 25 MG Oral Tab Take 1 tablet (25 mg total) by mouth daily. 90 tablet 1     metFORMIN 500 MG Oral Tab Take 1 tablet (500 mg total) by mouth nightly. 90 tablet 1     Omeprazole 40 MG Oral Capsule Delayed Release Take 1 capsule (40 mg total) by mouth daily. 90 capsule 1     predniSONE 5 MG Oral Tab Take 1 tablet (5 mg total) by  mouth daily. 30 tablet 0     hydroxychloroquine 200 MG Oral Tab Take 1 tablet (200 mg total) by mouth daily. 90 tablet 1     Lidocaine Viscous HCl 2 % Mouth/Throat Solution Take 5 mL by mouth every 3 (three) hours as needed for Pain. For mouth sores. 100 mL 1     folic acid 1 MG Oral Tab Take 2 tablets (2 mg total) by mouth daily. 60 tablet 2     Cyanocobalamin (VITAMIN B-12) 1000 MCG Sublingual SL Tab Take 1 tablet by mouth daily. 90 tablet 3     diclofenac 1 % External Gel Apply 2 g topically 4 (four) times daily. 350 g 0     Glucose Blood (ONETOUCH ULTRA) In Vitro Strip USE TO TEST TWICE A DAY 50 each 3     Lancets (ONETOUCH DELICA PLUS [id number]) Does not apply Misc 1 Lancet by Finger stick route 2 (two) times daily. 300 each 2     FEROSUL 325 (65 Fe) MG Oral Tab TAKE ONE TABLET BY MOUTH DAILY 90 tablet 4     Blood Glucose Monitoring Suppl (BLOOD GLUCOSE MONITOR SYSTEM) w/Device Does not apply Kit Please check blood glucose twice a day 1 kit 0     Elastic Bandages & Supports (NEOPRENE KNEE BRACE) Does not apply Misc 1 Application by Does not apply route daily. Left neoprene knee brace for patellofemoral pain syndrome 1 each 0      No current facility-administered medications for this encounter.       Allergies:   Allergies   Allergen Reactions    Meloxicam SWELLING     Able to tolerate iburprofen, naproxen,        Past Medical History:    Arthritis    Brain tumor (benign) (HCC)    Esophageal reflux    Essential hypertension    Hyperlipidemia     Past Surgical History:   Procedure Laterality Date    Excis infratent brain tumor       Family History   Problem Relation Age of Onset    No Known Problems Father     No Known Problems Mother      Social History     Tobacco Use    Smoking status: Never    Smokeless tobacco: Never   Substance Use Topics    Alcohol use: Never       SYSTEM Check if Review is Normal Check if Physical Exam is Normal If not normal, please explain:   HEENT [x ] [x ]    NECK & BACK [x ] [x ]     HEART [x ] [x ]    LUNGS [x ] [x ]    ABDOMEN [x ] [x ]    UROGENITAL [x ] [x ]    EXTREMITIES [x ] [x ]    OTHER        [ x ] I have discussed the risks and benefits and alternatives with the patient/family.  They understand and agree to proceed with plan of care.  [ x ] I have reviewed the History and Physical done within the last 30 days.  Any changes noted above.    Carlo Shaw MD

## 2024-07-18 NOTE — DISCHARGE INSTRUCTIONS
Home Care Instructions Following Your Pain Procedure     Darrin,  It has been a pleasure to have you as our patient. To help you at home, you must follow these general discharge instructions. We will review these with you before you are discharged. It is our hope that you have a complete and uneventful recovery from our procedure.     General Instructions:  What to Expect:  Bandages from your procedure today can be removed when you get home.  Please avoid soaking and/or swimming for 24 hours.  Showering is okay  It is normal to have increased pain symptoms and/or pain at injection site for up to 3-5 days after procedure, you can use heat or ice (20 minutes on 20 minutes off) for comfort.  You may experience some temporary side effects which may include restlessness or insomnia, flushing of the face, or heart palpitations.  Please contact the provider if these symptoms do not resolve within 3-4 days.  Lightheadedness or nausea may occur and should resolve within 24 to 48 hours.  If you develop a headache after treatment, rest, drink fluids (with caffeine, if possible) and take mild over-the-counter pain medication.  If the headache does not improve with the above treatment, contact the physician.  Home Medications:  Resume all previously prescribed medication.  Please avoid taking NSAIDs (Non-Steriodal Anti-Inflammatory Drugs) such as:  Ibuprofen ( Advil, Motrin) Aleve (Naproxen), Diclofenac, Meloxicam for 6 hours after procedure.   If you are on Coumadin (Warfarin) or any other anti-coagulant (or \"blood thinning\") medication such as Plavix (Clopidogrel), Xarelto (Rivaroxaban), Eliquis (Apixaban), Effient (Prasugrel) etc., restart on the following day from the procedure unless otherwise directed by your provider.  If you are a diabetic, please increase the frequency of your glucose monitoring after the procedure as steroids may cause a temporary (2-3 day) increase in your blood sugar.  Contact your primary care  physician if your blood sugar remains elevated as you may require some medication adjustment.  Diet:  Resume your regular diet as tolerated.  Activity:  We recommend that you relax and rest during the rest of your procedure day.  If you feel weakness in your arms or legs do not drive.  Follow-up Appointment  Please schedule a follow-up visit within 3 to 4 weeks after your last procedure date.  Question or Concerns:  Feel free to call our office with any questions or concerns at [phone] (option #2)    Darrin  Thank you for coming to [facility] for your procedure.  The nurses try very hard to make sure you receive the best care possible.  Your trust in them as well as us is greatly appreciated.    Thanks so much,   Dr. Carlo Shaw

## 2024-07-18 NOTE — OPERATIVE REPORT
[facility]  Operative Report  2024     Darrin Gerber Patient Status:  Hospital Outpatient Surgery    1960 MRN [id number]   Location [facility] PAIN CENTER Attending Carlo Shaw MD   Hosp Day # 0 PCP John Botello MD     Indication: Darrin is a 64 year old female spinal stenosis    Preoperative Diagnosis:  Spinal stenosis of lumbar region with neurogenic claudication [M48.062]    Postoperative Diagnosis: Same as above.    Procedure performed: LUMBAR INTERLAMINAR EPIDURAL INJECTION with local    Anesthesia: Local  .    EBL: Less than 1 ml.    Procedure Description:  After reviewing the patient's history and performing a focused physical examination, the diagnosis and positive previous diagnostic tests were confirmed and contraindications such as infection and coagulopathy were ruled out.  Following review of allergies and potential side effects and complications, including but not necessarily limited to infection, allergic reaction, local tissue breakdown, nerve injury, post-dural puncture headache and paresis, the patient consented for the procedure.    The patient was brought to the procedure room in prone position.    After sterile prep of the lumbar spine, the L5-S1 interspace was identified with the help of fluoroscopy. Local anesthetic was given by a 25 gauge 1.5 inch needle with 1% lidocaine in that space level.  Thereafter, a 20 gauge Tuohy needle was introduced and advanced under fluoroscopy.  The epidural space was accessed by using loss of resistance to air technique.  The needle position was confirmed with AP and lateral view under fluoroscopy.  Omnipaque 180 was injected in 1 mL volume. A good epidurogram was obtained.  Thereafter, dexamethasone 10 mg with normal saline of 5 mL in total volume of 6 mL was injected through the Tuohy needle.  The needle was flushed with 1 mL lidocaine.  The needle was withdrawn with the stylet intact in situ.  The needle's tip was  intact.  The patient tolerated the procedure very well without significant immediate complication.  The patient's back was cleaned and sterile dressing was applied.  The patient was discharged with an instruction to a responsible adult after discharge criteria were met.  The patient was advised to contact us should any problems happen.  The patient was also informed to go to the emergency room immediately if experiencing severe numbness or weakness in the extremities or experiencing bowel or bladder incontinence.            Complications: None.    Follow up: The patient was followed in the pain clinic as needed basis.          Carlo Shaw MD

## 2024-07-22 ENCOUNTER — TELEPHONE (OUTPATIENT)
Dept: FAMILY MEDICINE CLINIC | Facility: CLINIC | Age: 64
End: 2024-07-22

## 2024-07-22 DIAGNOSIS — E11.65 TYPE 2 DIABETES MELLITUS WITH HYPERGLYCEMIA, WITHOUT LONG-TERM CURRENT USE OF INSULIN (HCC): Primary | ICD-10-CM

## 2024-07-22 RX ORDER — LANCETS 33 GAUGE
1 EACH MISCELLANEOUS 2 TIMES DAILY
Qty: 300 EACH | Refills: 2 | Status: SHIPPED | OUTPATIENT
Start: 2024-07-22

## 2024-07-22 RX ORDER — BLOOD SUGAR DIAGNOSTIC
STRIP MISCELLANEOUS
Qty: 100 EACH | Refills: 3 | Status: SHIPPED | OUTPATIENT
Start: 2024-07-22

## 2024-07-22 NOTE — TELEPHONE ENCOUNTER
Patient is requesting refills to be sent to [facility] in [place], [place]  Patient's Daughter states Mail order never received las request to fill.  Please send to local [facility].       Disp Refills Start End    Glucose Blood (ONETOUCH ULTRA) In Vitro Strip 50 each 3 4/18/2023 --    Sig: USE TO TEST TWICE A DAY    Sent to pharmacy as: OneTouch Ultra In Vitro Strip (Glucose Blood)         Disp Refills Start End    Lancets (ONETOUCH DELICA PLUS [id number]) Does not apply Misc 300 each 2 4/6/2023 --    Sig - Route: 1 Lancet by Finger stick route 2 (two) times daily. - Finger stick    Sent to pharmacy as: OneTouch Delica Plus [id number]        Also Mail order will fill eye drops needed for dry eye.  Please send rx     [facility] - [place], [place] - [address] [place] [address], [address] [phone], [phone]

## 2024-07-23 ENCOUNTER — TELEPHONE (OUTPATIENT)
Dept: RHEUMATOLOGY | Facility: CLINIC | Age: 64
End: 2024-07-23

## 2024-07-23 DIAGNOSIS — Z79.899 IMMUNODEFICIENCY DUE TO DRUG THERAPY (HCC): ICD-10-CM

## 2024-07-23 DIAGNOSIS — B37.0 THRUSH: Primary | ICD-10-CM

## 2024-07-23 DIAGNOSIS — D84.821 IMMUNODEFICIENCY DUE TO DRUG THERAPY (HCC): ICD-10-CM

## 2024-07-23 RX ORDER — FLUCONAZOLE 100 MG/1
TABLET ORAL
Qty: 15 TABLET | Refills: 0 | Status: SHIPPED | OUTPATIENT
Start: 2024-07-23 | End: 2024-08-06

## 2024-07-24 DIAGNOSIS — D50.9 IRON DEFICIENCY ANEMIA, UNSPECIFIED IRON DEFICIENCY ANEMIA TYPE: Primary | ICD-10-CM

## 2024-07-24 RX ORDER — FERROUS SULFATE 325(65) MG
1 TABLET ORAL DAILY
Qty: 90 TABLET | Refills: 3 | Status: SHIPPED | OUTPATIENT
Start: 2024-07-24

## 2024-07-25 ENCOUNTER — APPOINTMENT (OUTPATIENT)
Dept: GENERAL RADIOLOGY | Facility: HOSPITAL | Age: 64
End: 2024-07-25
Attending: ANESTHESIOLOGY
Payer: MEDICAID

## 2024-07-25 ENCOUNTER — HOSPITAL ENCOUNTER (OUTPATIENT)
Facility: HOSPITAL | Age: 64
Setting detail: HOSPITAL OUTPATIENT SURGERY
Discharge: HOME | End: 2024-07-25
Attending: ANESTHESIOLOGY | Admitting: ANESTHESIOLOGY
Payer: MEDICAID

## 2024-07-25 VITALS
SYSTOLIC BLOOD PRESSURE: 137 MMHG | TEMPERATURE: 98 F | WEIGHT: 166 LBS | BODY MASS INDEX: 31.34 KG/M2 | RESPIRATION RATE: 16 BRPM | HEART RATE: 67 BPM | HEIGHT: 61 IN | OXYGEN SATURATION: 100 % | DIASTOLIC BLOOD PRESSURE: 73 MMHG

## 2024-07-25 LAB — GLUCOSE BLD-MCNC: 108 MG/DL (ref 70–99)

## 2024-07-25 PROCEDURE — 77002 NEEDLE LOCALIZATION BY XRAY: CPT | Performed by: ANESTHESIOLOGY

## 2024-07-25 PROCEDURE — 3E0U3BZ INTRODUCTION OF ANESTHETIC AGENT INTO JOINTS, PERCUTANEOUS APPROACH: ICD-10-PCS | Performed by: ANESTHESIOLOGY

## 2024-07-25 PROCEDURE — 20610 DRAIN/INJ JOINT/BURSA W/O US: CPT | Performed by: ANESTHESIOLOGY

## 2024-07-25 PROCEDURE — 3E0U33Z INTRODUCTION OF ANTI-INFLAMMATORY INTO JOINTS, PERCUTANEOUS APPROACH: ICD-10-PCS | Performed by: ANESTHESIOLOGY

## 2024-07-25 RX ORDER — METHYLPREDNISOLONE ACETATE 40 MG/ML
INJECTION, SUSPENSION INTRA-ARTICULAR; INTRALESIONAL; INTRAMUSCULAR; SOFT TISSUE
Status: DISCONTINUED | OUTPATIENT
Start: 2024-07-25 | End: 2024-07-25

## 2024-07-25 RX ORDER — LIDOCAINE HYDROCHLORIDE 10 MG/ML
INJECTION, SOLUTION EPIDURAL; INFILTRATION; INTRACAUDAL; PERINEURAL
Status: DISCONTINUED | OUTPATIENT
Start: 2024-07-25 | End: 2024-07-25

## 2024-07-25 NOTE — DISCHARGE INSTRUCTIONS
Home Care Instructions Following Your Pain Procedure     Darrin,  It has been a pleasure to have you as our patient. To help you at home, you must follow these general discharge instructions. We will review these with you before you are discharged. It is our hope that you have a complete and uneventful recovery from our procedure.     General Instructions:  What to Expect:  Bandages from your procedure today can be removed when you get home.  Please avoid soaking and/or swimming for 24 hours.  Showering is okay  It is normal to have increased pain symptoms and/or pain at injection site for up to 3-5 days after procedure, you can use heat or ice (20 minutes on 20 minutes off) for comfort.  You may experience some temporary side effects which may include restlessness or insomnia, flushing of the face, or heart palpitations.  Please contact the provider if these symptoms do not resolve within 3-4 days.  Lightheadedness or nausea may occur and should resolve within 24 to 48 hours.  If you develop a headache after treatment, rest, drink fluids (with caffeine, if possible) and take mild over-the-counter pain medication.  If the headache does not improve with the above treatment, contact the physician.  Home Medications:  Resume all previously prescribed medication.  Please avoid taking NSAIDs (Non-Steriodal Anti-Inflammatory Drugs) such as:  Ibuprofen ( Advil, Motrin) Aleve (Naproxen), Diclofenac, Meloxicam for 6 hours after procedure.   If you are on Coumadin (Warfarin) or any other anti-coagulant (or \"blood thinning\") medication such as Plavix (Clopidogrel), Xarelto (Rivaroxaban), Eliquis (Apixaban), Effient (Prasugrel) etc., restart on the following day from the procedure unless otherwise directed by your provider.  If you are a diabetic, please increase the frequency of your glucose monitoring after the procedure as steroids may cause a temporary (2-3 day) increase in your blood sugar.  Contact your primary care  physician if your blood sugar remains elevated as you may require some medication adjustment.  Diet:  Resume your regular diet as tolerated.  Activity:  We recommend that you relax and rest during the rest of your procedure day.  If you feel weakness in your arms or legs do not drive.  Follow-up Appointment  Please schedule a follow-up visit within 3 to 4 weeks after your last procedure date.  Question or Concerns:  Feel free to call our office with any questions or concerns at [phone] (option #2)    Darrin  Thank you for coming to [facility] for your procedure.  The nurses try very hard to make sure you receive the best care possible.  Your trust in them as well as us is greatly appreciated.    Thanks so much,   Dr. Carlo Shaw

## 2024-07-25 NOTE — OPERATIVE REPORT
[facility]  Operative Report  2024     Darrin Gerber Patient Status:  [facility]patient Surgery    1960 MRN [id number]   Location [facility] PAIN CENTER Attending Carlo Shaw MD   Hosp Day # 0 PCP John Botello MD     Indication: Darrin is a 64 year old female with a history of osteoarthritis of the shoulder    Preoperative Diagnosis:  Primary osteoarthritis of both shoulders [M19.011, M19.012]    Postoperative Diagnosis: Same as above.    Procedure performed: Bilateral SHOULDER JOINT INJECTION with local    Anesthesia: Local      EBL: Less than 1 ml.    Procedure Description:  After reviewing the patient’s history and performing a focused physical examination, the diagnosis was confirmed and contraindications such as infection and coagulopathy were ruled out.  Following review of potential side effects and complications, including but not necessarily limited to infection, allergic reaction, local tissue breakdown, nerve injury, and paresis, the patient indicated they understood and agreed to proceed.       The patient was brought to the procedure room and placed in supine position. After prepping and draping, the right shoulder joint was identified with the help of fluoroscopy.  A 22-gauge 3.5-inch spinal needle was used to enter the joint after local infiltration with lidocaine.  Then 0.5 cc dye was injected and a arthrogram was revealed.  After that, 20 mg Depo-Medrol mixed with 3 cc 1% Lidocaine was injected.  The needle was then withdrawn tip intact.  This was repeated in similar fashion on the contralateral side.  The patient tolerated the procedure very well. The patient had complete understanding of the risks and benefits of the procedure.      Complications: None.    Follow up: Clinic    Carlo Shaw MD

## 2024-07-25 NOTE — H&P
History & Physical Examination    Patient Name: Darrin Gerber  MRN: [id number]  CSN: [id number]  YOB: 1960    Pre-Operative Diagnosis:  Primary osteoarthritis of both shoulders [M19.011, M19.012]    Present Illness: Primary osteoarthritis of the left shoulder    ASA: 2  MP class: 1  Sedation: Local     Facility-Administered Medications Prior to Admission   Medication Dose Route Frequency Provider Last Rate Last Admin    [COMPLETED] triamcinolone acetonide (Kenalog-40) 40 MG/ML injection 80 mg  80 mg Intra-articular Once Anthony Parks PA-C   80 mg at 07/15/24 1144     Medications Prior to Admission   Medication Sig Dispense Refill Last Dose    hydroCHLOROthiazide 25 MG Oral Tab Take 1 tablet (25 mg total) by mouth daily. 90 tablet 1 2024    metFORMIN 500 MG Oral Tab Take 1 tablet (500 mg total) by mouth nightly. 90 tablet 1 2024    Ferrous Sulfate (FEROSUL) 325 (65 Fe) MG Oral Tab Take 1 tablet (325 mg total) by mouth daily. 90 tablet 3     fluconazole 100 MG Oral Tab Take 2 tablets (200 mg total) by mouth daily for 1 day, THEN 1 tablet (100 mg total) daily for 13 days. 15 tablet 0     Glucose Blood (ONETOUCH ULTRA) In Vitro Strip USE TO TEST TWICE A  each 3     Lancets (ONETOUCH DELICA PLUS [id number]) Does not apply Misc 1 Lancet by Finger stick route 2 (two) times daily. 300 each 2     aspirin 81 MG Oral Tab EC Take 1 tablet (81 mg total) by mouth daily.   2024    atorvastatin 10 MG Oral Tab TAKE 1 TABLET BY MOUTH EVERY MORNING 90 tablet 0     [] methotrexate 2.5 MG Oral Tab Take 5 tablets (12.5 mg total) by mouth once a week. 65 tablet 0     leucovorin 5 MG Oral Tab Take 1 tablet (5 mg total) by mouth once a week. ON SUNDAYS 12 tablet 3     nystatin 583844 UNIT/ML Mouth/Throat Suspension TAKE 5ML BY MOUTH IN THE MORNING AT NOON IN THE EVENING AND AT BEDTIME FOR 10 DAYS 240 mL 0     Atovaquone-Proguanil HCl 250-100 MG Oral Tab Take 1 tablet by mouth daily. 90 tablet  1     famotidine (PEPCID) 20 MG Oral Tab Take 1 tablet (20 mg total) by mouth nightly as needed for Heartburn. 30 tablet 2     Omeprazole 40 MG Oral Capsule Delayed Release Take 1 capsule (40 mg total) by mouth daily. 90 capsule 1     predniSONE 5 MG Oral Tab Take 1 tablet (5 mg total) by mouth daily. 30 tablet 0 Unknown    ibuprofen 600 MG Oral Tab Take 1 tablet (600 mg total) by mouth 3 (three) times daily as needed.   Unknown    hydroxychloroquine 200 MG Oral Tab Take 1 tablet (200 mg total) by mouth daily. 90 tablet 1     Lidocaine Viscous HCl 2 % Mouth/Throat Solution Take 5 mL by mouth every 3 (three) hours as needed for Pain. For mouth sores. 100 mL 1     folic acid 1 MG Oral Tab Take 2 tablets (2 mg total) by mouth daily. 60 tablet 2     Cyanocobalamin (VITAMIN B-12) 1000 MCG Sublingual SL Tab Take 1 tablet by mouth daily. 90 tablet 3     diclofenac 1 % External Gel Apply 2 g topically 4 (four) times daily. 350 g 0     NAPROXEN 500 MG Oral Tab TAKE ONE TABLET BY MOUTH TWICE A DAY AS NEEDED 180 tablet 1 Unknown    Blood Glucose Monitoring Suppl (BLOOD GLUCOSE MONITOR SYSTEM) w/Device Does not apply Kit Please check blood glucose twice a day 1 kit 0     Elastic Bandages & Supports (NEOPRENE KNEE BRACE) Does not apply Misc 1 Application by Does not apply route daily. Left neoprene knee brace for patellofemoral pain syndrome 1 each 0      No current facility-administered medications for this encounter.       Allergies:   Allergies   Allergen Reactions    Meloxicam SWELLING     Able to tolerate iburprofen, naproxen,        Past Medical History:    Arthritis    Brain tumor (benign) (HCC)    Esophageal reflux    Essential hypertension    Hyperlipidemia     Past Surgical History:   Procedure Laterality Date    Excis infratent brain tumor       Family History   Problem Relation Age of Onset    No Known Problems Father     No Known Problems Mother      Social History     Tobacco Use    Smoking status: Never     Smokeless tobacco: Never   Substance Use Topics    Alcohol use: Never       SYSTEM Check if Review is Normal Check if Physical Exam is Normal If not normal, please explain:   HEENT [x ] [x ]    NECK & BACK [x ] [x ]    HEART [x ] [x ]    LUNGS [x ] [x ]    ABDOMEN [x ] [x ]    UROGENITAL [x ] [x ]    EXTREMITIES [x ] [x ]    OTHER        [ x ] I have discussed the risks and benefits and alternatives with the patient/family.  They understand and agree to proceed with plan of care.  [ x ] I have reviewed the History and Physical done within the last 30 days.  Any changes noted above.    Carlo Shaw MD

## 2024-07-26 ENCOUNTER — TELEPHONE (OUTPATIENT)
Dept: PAIN CLINIC | Facility: CLINIC | Age: 64
End: 2024-07-26

## 2024-08-01 ENCOUNTER — OFFICE VISIT (OUTPATIENT)
Dept: RHEUMATOLOGY | Facility: CLINIC | Age: 64
End: 2024-08-01
Payer: MEDICAID

## 2024-08-01 VITALS
HEIGHT: 61 IN | TEMPERATURE: 98 F | BODY MASS INDEX: 30.78 KG/M2 | OXYGEN SATURATION: 97 % | SYSTOLIC BLOOD PRESSURE: 100 MMHG | HEART RATE: 82 BPM | DIASTOLIC BLOOD PRESSURE: 70 MMHG | RESPIRATION RATE: 16 BRPM | WEIGHT: 163 LBS

## 2024-08-01 DIAGNOSIS — M48.062 SPINAL STENOSIS OF LUMBAR REGION WITH NEUROGENIC CLAUDICATION: ICD-10-CM

## 2024-08-01 DIAGNOSIS — M25.511 CHRONIC PAIN OF BOTH SHOULDERS: ICD-10-CM

## 2024-08-01 DIAGNOSIS — M05.741 RHEUMATOID ARTHRITIS INVOLVING BOTH HANDS WITH POSITIVE RHEUMATOID FACTOR (HCC): Primary | ICD-10-CM

## 2024-08-01 DIAGNOSIS — G89.29 CHRONIC MUSCULOSKELETAL PAIN: ICD-10-CM

## 2024-08-01 DIAGNOSIS — M05.742 RHEUMATOID ARTHRITIS INVOLVING BOTH HANDS WITH POSITIVE RHEUMATOID FACTOR (HCC): Primary | ICD-10-CM

## 2024-08-01 DIAGNOSIS — M79.18 CHRONIC MUSCULOSKELETAL PAIN: ICD-10-CM

## 2024-08-01 DIAGNOSIS — Z79.899 IMMUNODEFICIENCY DUE TO DRUG THERAPY (HCC): ICD-10-CM

## 2024-08-01 DIAGNOSIS — M25.512 CHRONIC PAIN OF BOTH SHOULDERS: ICD-10-CM

## 2024-08-01 DIAGNOSIS — M17.0 OSTEOARTHRITIS OF BOTH KNEES, UNSPECIFIED OSTEOARTHRITIS TYPE: ICD-10-CM

## 2024-08-01 DIAGNOSIS — M47.812 OSTEOARTHRITIS OF CERVICAL SPINE, UNSPECIFIED SPINAL OSTEOARTHRITIS COMPLICATION STATUS: ICD-10-CM

## 2024-08-01 DIAGNOSIS — D84.821 IMMUNODEFICIENCY DUE TO DRUG THERAPY (HCC): ICD-10-CM

## 2024-08-01 DIAGNOSIS — G89.29 CHRONIC PAIN OF BOTH SHOULDERS: ICD-10-CM

## 2024-08-01 PROCEDURE — 99214 OFFICE O/P EST MOD 30 MIN: CPT | Performed by: INTERNAL MEDICINE

## 2024-08-01 NOTE — PATIENT INSTRUCTIONS
Try chair yoga    Stop hydroxychloroquine (plaquenil) per patient request.     -increase methotrexate 2.5 mg: from 6 pills to 7 pills on Saturdays; 4 pills in the AM and 3 pills in the PM.   -leucovorin on Sundays  -folic acid daily     -repeat blood work in NOW and in late October 2024 and late Jan 2025.  For methotrexate monitoring.

## 2024-08-01 NOTE — PROGRESS NOTES
Rheumatology Follow-up Note  =====================================================================================================    Chief Complaint:   RA    PCP  John Botello MD    =====================================================================================================  HPI    Darrin Gerber is a 64 year old female     Diagnosed with RA 7 years ago at [facility] Was on methotrexate in the past. Has been off medications 1-2 years.  More inflammed in the hands now. This is happening every day. Steroid tapers in the past, helped, but a few days later it comes back.   Seen by her PCP Dr. Botello on April 13 was given a Medrol Dosepack and started on gabapentin 300 mg nightly. Prn naproxen helps a bit. .   Recent bilateral knee corticosteroid injections by pain service in February 2021   Gets injections: shoulders, LESI, EMILY. These help somewhat.   ==============================================================================================================  Visit: 02/01/24  Here with daughter  More mouth sores recently. In the past 2 weeks. Was prescribed viscous lidocaine.  Oral ulcers have improved  Shoulder/neck pain more recently.  More hand pain as well.  -Notes some inconsistent adherence to methotrexate or recently.  May have missed 3 doses or more.  In last few months  Medications:  Methotrexate 15 mg weekly  Leucovorin once weekly  ==============================================================================================================  Today's Visit: 08/01/24    Recent thrush, doing better.  Took fluconazole, stopped 3 to 4 days ago.  Thrush now resolved.  Continues on methotrexate 15 mg weekly, leucovorin once weekly, hydroxychloroquine 1 pill daily  -Was not able to do home physical therapy since the last visit.  Did get bilateral knee injections and shoulder injections recently with partial relief  -1 day of the week she has some pain and stiffness in the morning.  The rest  of the week she is good.    5 point ROS negative except noted above  I had reviewed past medical and family histories together with allergy and medication lists documented.    Medications:  Outpatient Medications Marked as Taking for the 8/1/24 encounter (Office Visit) with Estela Fuller MD   Medication Sig Dispense Refill    Ferrous Sulfate (FEROSUL) 325 (65 Fe) MG Oral Tab Take 1 tablet (325 mg total) by mouth daily. 90 tablet 3    Glucose Blood (ONETOUCH ULTRA) In Vitro Strip USE TO TEST TWICE A  each 3    Lancets (ONETOUCH DELICA PLUS [id number]) Does not apply Misc 1 Lancet by Finger stick route 2 (two) times daily. 300 each 2    aspirin 81 MG Oral Tab EC Take 1 tablet (81 mg total) by mouth daily.      atorvastatin 10 MG Oral Tab TAKE 1 TABLET BY MOUTH EVERY MORNING 90 tablet 0    leucovorin 5 MG Oral Tab Take 1 tablet (5 mg total) by mouth once a week. ON SUNDAYS 12 tablet 3    nystatin 532912 UNIT/ML Mouth/Throat Suspension TAKE 5ML BY MOUTH IN THE MORNING AT NOON IN THE EVENING AND AT BEDTIME FOR 10 DAYS 240 mL 0    Atovaquone-Proguanil HCl 250-100 MG Oral Tab Take 1 tablet by mouth daily. 90 tablet 1    hydroCHLOROthiazide 25 MG Oral Tab Take 1 tablet (25 mg total) by mouth daily. 90 tablet 1    metFORMIN 500 MG Oral Tab Take 1 tablet (500 mg total) by mouth nightly. 90 tablet 1    Omeprazole 40 MG Oral Capsule Delayed Release Take 1 capsule (40 mg total) by mouth daily. 90 capsule 1    predniSONE 5 MG Oral Tab Take 1 tablet (5 mg total) by mouth daily. 30 tablet 0    ibuprofen 600 MG Oral Tab Take 1 tablet (600 mg total) by mouth 3 (three) times daily as needed.      hydroxychloroquine 200 MG Oral Tab Take 1 tablet (200 mg total) by mouth daily. 90 tablet 1    Lidocaine Viscous HCl 2 % Mouth/Throat Solution Take 5 mL by mouth every 3 (three) hours as needed for Pain. For mouth sores. 100 mL 1    folic acid 1 MG Oral Tab Take 2 tablets (2 mg total) by mouth daily. 60 tablet 2    Cyanocobalamin  (VITAMIN B-12) 1000 MCG Sublingual SL Tab Take 1 tablet by mouth daily. 90 tablet 3    diclofenac 1 % External Gel Apply 2 g topically 4 (four) times daily. 350 g 0    NAPROXEN 500 MG Oral Tab TAKE ONE TABLET BY MOUTH TWICE A DAY AS NEEDED 180 tablet 1    Blood Glucose Monitoring Suppl (BLOOD GLUCOSE MONITOR SYSTEM) w/Device Does not apply Kit Please check blood glucose twice a day 1 kit 0    Elastic Bandages & Supports (NEOPRENE KNEE BRACE) Does not apply Misc 1 Application by Does not apply route daily. Left neoprene knee brace for patellofemoral pain syndrome 1 each 0     Modified Medications    No medications on file     Medications Discontinued During This Encounter   Medication Reason    famotidine (PEPCID) 20 MG Oral Tab        Allergies:  Allergies   Allergen Reactions    Meloxicam SWELLING     Able to tolerate iburprofen, naproxen,        Objective    Vitals:    08/01/24 1617   BP: 100/70   Pulse: 82   Resp: 16   Temp: 98.1 °F (36.7 °C)   SpO2: 97%   Weight: 163 lb (73.9 kg)   Height: 5' 1\" (1.549 m)     GEN: NAD, well-nourished.   HEENT: Head: NCAT. Face: No lesions. Eyes: Conjunctiva clear.   PULM:  easy effort  Extremities: No cyanosis, edema or deformities.   Neurologic: Strength, CN2-12 grossly intact   Skin: No lesions or rashes.  MSK: 28 joint count performed. No evidence of synovitis in mcp, pip, dip, wrist, elbows, shoulders, hips, knees, ankles, mtp unless otherwise noted. Full ROM of elbows, wrists, knees.     PtGA: 8  SJ: 0  TJ: 0   MDGA: 0    CDAI 8    Antalgic gait      Labs:  Additional Labs:    Lab Results   Component Value Date    WBC 6.0 04/05/2024    RBC 3.74 (L) 04/05/2024    HGB 11.3 (L) 04/05/2024    HCT 33.2 (L) 04/05/2024    .0 04/05/2024    MCV 88.8 04/05/2024    MCH 30.2 04/05/2024    MCHC 34.0 04/05/2024    RDW 14.3 04/05/2024    NEPRELIM 3.28 04/05/2024    NEPERCENT 54.8 04/05/2024    LYPERCENT 32.8 04/05/2024    MOPERCENT 10.3 04/05/2024    EOPERCENT 1.3 04/05/2024     BAPERCENT 0.5 04/05/2024    NE 3.28 04/05/2024    LYMABS 1.97 04/05/2024    MOABSO 0.62 04/05/2024    EOABSO 0.08 04/05/2024    BAABSO 0.03 04/05/2024     Lab Results   Component Value Date     (H) 04/05/2024    BUN 23 04/05/2024    BUNCREA 21.2 (H) 07/21/2021    CREATSERUM 1.32 (H) 04/05/2024    ANIONGAP 5 04/05/2024    GFR 60 06/22/2017    GFRNAA 58 (L) 04/13/2022    GFRAA 67 04/13/2022    CA 9.3 04/05/2024    OSMOCALC 290 04/05/2024    ALKPHO 74 04/05/2024    AST 19 04/05/2024    ALT 32 04/05/2024    BILT 0.5 04/05/2024    TP 7.0 04/05/2024    ALB 3.4 04/05/2024    GLOBULIN 3.6 04/05/2024     04/05/2024    K 3.7 04/05/2024     04/05/2024    CO2 30.0 04/05/2024 12/2022: Creatinine 1.33, rest of CMP is normal.   WBC 5.0, hemoglobin 10.5, platelets 307  Radiology review:  XR FLUOROSCOPIC GUIDANCE NEEDLE PLACEMENT (CPT=77002)    Result Date: 7/25/2024  CONCLUSION:  Please see the procedure/operative report for further details.    LOCATION:  [facility]    Dictated by (CST): Stromberg, LeRoy, MD on 7/25/2024 at 2:43 PM     Finalized by (CST): Stromberg, LeRoy, MD on 7/25/2024 at 2:43 PM         6/2021 CT chest     Impression   CONCLUSION:         1. No active cardiopulmonary process identified.  No specific findings to suggest significant interstitial lung disease.       2. Scattered noncalcified pulmonary nodules in the lungs measuring up to 6 mm as above. Followup as per Fleischner criteria is suggested.       3. Mild bronchiectasis in the lower lobes with minimal scattered mucus plugging.          =====================================================================================================  Assessment and Plan    Assessment:  1. Rheumatoid arthritis involving both hands with positive rheumatoid factor (HCC)    2. 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 Spinal stenosis of lumbar region with neurogenic claudication    4. Osteoarthritis of both knees, unspecified osteoarthritis  type    5. Osteoarthritis of cervical spine, unspecified spinal osteoarthritis complication status    6. Chronic pain of both shoulders    7. Chronic musculoskeletal pain        #seropositive (+RF) rheumatoid arthritis diagnosed in 2014.   -Today, CDAI is 8 indicating low disease activity.  No swollen or tender joints noted today.    #Chronic musculoskeletal pain: Cervical/lumbar DJD, bilateral GH osteoarthritis with rotator cuff impingement, bilateral knee osteoarthritis  Knee and back arthralgias more related to chronic damage than active inflammation. Actual age may be 10 years higher than reported age per patient daughter.  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    High risk medication labs including CMP and CBC w/ diff reviewed from 4/2024. Results are stable.     Plan:    Try chair yoga    Stop hydroxychloroquine (plaquenil) per patient request.     -increase methotrexate 2.5 mg: from 6 pills to 7 pills on Saturdays; 4 pills in the AM and 3 pills in the PM.   -leucovorin on Sundays  -folic acid daily     -repeat blood work in NOW and in late October 2024 and late Jan 2025.  For methotrexate monitoring.     Will set up home physical therapy: For the above mechanical issues    -Acetaminophen (Tylenol): Take two 650 mg extended release/Arthritis capsules in the morning (around 8 AM) and in the early afternoon (around 4 PM)  --do not take more than 4 pills daily    Rtc December 2024    Plan:  Diagnoses and all orders for this visit:    Rheumatoid arthritis involving both hands with positive rheumatoid factor (HCC)  -     Comp Metabolic Panel (14); Future  -     CBC With Differential With Platelet; Future  -     C-Reactive Protein; Future  -     Sed Rate, Westergren (Automated); Future  -     Comp Metabolic Panel (14); Future  -     Comp Metabolic Panel (14); Future  -     CBC With  Differential With Platelet; Future  -     CBC With Differential With Platelet; Future  -     Physical Therapy Referral - External    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)  -     Comp Metabolic Panel (14); Future  -     CBC With Differential With Platelet; Future  -     C-Reactive Protein; Future  -     Sed Rate, Westergren (Automated); Future  -     Comp Metabolic Panel (14); Future  -     Comp Metabolic Panel (14); Future  -     CBC With Differential With Platelet; Future  -     CBC With Differential With Platelet; Future  -     Physical Therapy Referral - External    Spinal stenosis of lumbar region with neurogenic claudication  -     Physical Therapy Referral - External    Osteoarthritis of both knees, unspecified osteoarthritis type  -     Physical Therapy Referral - External    Osteoarthritis of cervical spine, unspecified spinal osteoarthritis complication status  -     Physical Therapy Referral - External    Chronic pain of both shoulders  -     Physical Therapy Referral - External    Chronic musculoskeletal pain  -     Physical Therapy Referral - External            No follow-ups on file.      The above plan of care, diagnosis, orders, and follow-up were discussed with the patient. Questions related to this recommended plan of care were answered.    Communication to the referring provider, PCP, and/or the patient's other physicians relevant to this encounter was sent.    Thank you for referring this delightful patient to me. Please feel free to contact me with any questions.     This report was performed utilizing speech recognition software technology. Despite proofreading, speech recognition errors could escape detection. If a word or phrase is confusing or out of context, please do not hesitate to call for   clarification.     Kind regards    Estela Fuller MD  [facility]

## 2024-08-02 ENCOUNTER — TELEPHONE (OUTPATIENT)
Dept: FAMILY MEDICINE CLINIC | Facility: CLINIC | Age: 64
End: 2024-08-02

## 2024-08-02 ENCOUNTER — TELEPHONE (OUTPATIENT)
Dept: ORTHOPEDICS CLINIC | Facility: CLINIC | Age: 64
End: 2024-08-02

## 2024-08-02 DIAGNOSIS — M17.0 PRIMARY OSTEOARTHRITIS OF BOTH KNEES: Primary | ICD-10-CM

## 2024-08-02 RX ORDER — TIZANIDINE 4 MG/1
4 TABLET ORAL EVERY 8 HOURS PRN
Qty: 30 TABLET | Refills: 0 | Status: SHIPPED | OUTPATIENT
Start: 2024-08-02

## 2024-08-02 NOTE — TELEPHONE ENCOUNTER
Patient got asia knee shots back on 7/15 by Anthony Parks.  Patient's daughter said mom has severe sharp pain in her knees.   Patient's daughter would like to know what should she do for her mom.  Also she has an upcoming appointment later this month and wanted to know if can be seen sooner. Please advise.

## 2024-08-02 NOTE — TELEPHONE ENCOUNTER
Spoke to daughter. Patient has had leg cramps the last 2 to 3 nights. Patient's duaghter requesting medications. Advised patient an appointment/virtual visit would be needed to further discuss and evaluate. Monday virtual visits offered for Dr. Botello and Justin. Patient refused.

## 2024-08-02 NOTE — TELEPHONE ENCOUNTER
Can you prescribe anything for severe muscle spasms to legs especially at night?    Spoke with daughter Osiris, reviewed RICE, rest, icing, compression and elevating the knees.  She is taking extra strength Tylenol and Naproxen.  Had some questions about the knee replacement surgery.  Patient is also having sometimes severe cramps to both legs, especially at night.  Put patient on the wait list for a sooner appointment.

## 2024-08-02 NOTE — TELEPHONE ENCOUNTER
Patient's daughter would like to speak to a nurse re: cramps at night. Declined a VV for Monday.

## 2024-08-07 NOTE — TELEPHONE ENCOUNTER
Called and got in tough with Trisha daughter in law per release.  She was told medication was sent to the pharmacy and education given on prn use.

## 2024-08-19 ENCOUNTER — OFFICE VISIT (OUTPATIENT)
Facility: CLINIC | Age: 64
End: 2024-08-19
Payer: MEDICAID

## 2024-08-19 VITALS — WEIGHT: 136.19 LBS | HEIGHT: 60 IN | BODY MASS INDEX: 26.74 KG/M2

## 2024-08-19 DIAGNOSIS — M17.0 PRIMARY OSTEOARTHRITIS OF BOTH KNEES: Primary | ICD-10-CM

## 2024-08-19 PROCEDURE — 99214 OFFICE O/P EST MOD 30 MIN: CPT | Performed by: ORTHOPAEDIC SURGERY

## 2024-08-19 NOTE — PROGRESS NOTES
[facility] Progress Note    Subjective: Patient returns for discussion of both her right and left knees.  She is with daughter today.  They state that nonsurgical treatment such as injections and medications are not really helping as the used to.  She is more limited due to the knees, feels that symptoms have been getting progressively worse.  She is limited in her ability to walk and perform activities of daily living.    Objective: Patient appears comfortable.  No effusion to either knee.  Range of motion of both knees about 5 degrees short of full, 110 flexion.      Labs: Hemoglobin A1c from April 2024 was 6.3      Assessment/Plan: 64-year-old female with symptomatic radiographically severe bilateral knee osteoarthritis.  The patient has reasonably attempted nonsurgical treatments as mentioned above and remains limited in activities of daily living secondary to pain from knee arthritis.  I discussed risks and benefits of surgery, with risks including but not limited to infection, blood clots, fracture, bleeding/need for blood transfusion, injury to blood vessels and nerves, loosening or failure of components, stiffness, continued pain, need for further intervention or revision surgery.  Additionally I discussed expectations with regards to the postoperative recovery timeframe, the possibility of mechanical clicking with the knee, numbness on the lateral side of the knee/leg from sacrifice of the infrapatellar branch of the saphenous nerve, and potential for difficulty/pain with kneeling and performing deep flexion activities.  The patient understands this discussion and elects to proceed with knee replacement surgery.  She would like to proceed with the right knee first, staged them with the left knee afterwards.  She will need primary care evaluation and clearance.  She is on methotrexate, occasional hydroxychloroquine for rheumatology.  This does not need to be stopped.  We will recheck hemoglobin A1c.   Updated x-rays have been ordered to be performed prior to surgery, including right knee films, left knee films, and full-length lower extremity standing films.  She will need primary care evaluation and clearance within 30 days of the surgery date.  Binder was provided today.    Jacky Bautista MD, FAAOS  [facility]  Phone [phone]  Fax [phone]

## 2024-08-19 NOTE — PROGRESS NOTES
SURGERY SCHEDULING SHEET    Darrin Gerber  1/1/1960  [id number]    Procedure: Right total knee arthroplasty    CPT: 96979    Diagnosis: Right knee osteoarthritis    Anesthesia: Choice    Length of Surgery: 2 hours    Disposition: Inpatient    Instruments: Medacta TKA    Assist: If case scheduled on Thurs/Fri, then Anthony Parks first assist. If case scheduled on Tues, then Epifanio Kaylynnleocarlin ([phone]) first assist. If Epifanio unavailable, then please communicate to Anthony Parks/Jazzmine/Shamar to cancel Anthony's clinic for that day and have Anthony first assist. If Anthony unavailable, contact Jacinto Burch for first assist. If Epifanio and Jacinto unavailable, then contact [facility] for first assist.    Pre-op Testing: CBC, CMP, PT/INR, PTT, TYPE AND SCREEN, MRSA/MSSA THROUGH [facility] PAT, and HEMOGLOBIN A1C THROUGH [facility] PAT    Clearance: MEDICAL/PCP    Post op: 2 weeks postop appointment with Anthony Parks    Surgery date specifics: Next available    Jacky Bautista MD, FAAOS  [facility]  Phone: [phone]  Fax: [phone]

## 2024-08-19 NOTE — PROGRESS NOTES
SURGERY SCHEDULING SHEET     Darrin Gerber  1/1/1960  [id number]     Procedure: Left total knee arthroplasty     CPT: 55163     Diagnosis: Left knee osteoarthritis     Anesthesia: Choice     Length of Surgery: 2 hours     Disposition: Inpatient     Instruments: Medacta TKA     Assist: If case scheduled on Thurs/Fri, then Anthony Parks first assist. If case scheduled on Tues, then Epifanio Kaylynnarelis ([phone]) first assist. If Epifanio unavailable, then please communicate to Anthony Parks/Jazzmine/Shamar to cancel Anthony's clinic for that day and have Anthony first assist. If Anthony unavailable, contact Jacinto Burch for first assist. If Epifanio and Jacinto unavailable, then contact [facility] for first assist.     Pre-op Testing: CBC, CMP, PT/INR, PTT, TYPE AND SCREEN, MRSA/MSSA THROUGH EDW PAT     Clearance: MEDICAL/PCP     Post op: 2 weeks postop appointment with Anthony Parks     Surgery date specifics: At least 2 months after right knee replacement     Jacky Bautista MD, FAAOS  [facility]  Phone: [phone]  Fax: [phone]

## 2024-08-20 ENCOUNTER — TELEPHONE (OUTPATIENT)
Dept: ORTHOPEDICS CLINIC | Facility: CLINIC | Age: 64
End: 2024-08-20

## 2024-08-20 ENCOUNTER — TELEPHONE (OUTPATIENT)
Dept: RHEUMATOLOGY | Facility: CLINIC | Age: 64
End: 2024-08-20

## 2024-08-20 DIAGNOSIS — M17.12 PRIMARY OSTEOARTHRITIS OF LEFT KNEE: ICD-10-CM

## 2024-08-20 DIAGNOSIS — M17.0 PRIMARY OSTEOARTHRITIS OF BOTH KNEES: Primary | ICD-10-CM

## 2024-08-20 NOTE — TELEPHONE ENCOUNTER
Date of Surgery:    2/6/2024    Post Op Appt:  2/18/2024 PLFD    Case ID: [id number]     Notes:               SURGERY SCHEDULING SHEET     Darrin Gerber  1/1/1960  [id number]     Procedure: Right total knee arthroplasty     CPT: 98031     Diagnosis: Right knee osteoarthritis     Anesthesia: Choice     Length of Surgery: 2 hours     Disposition: Inpatient     Instruments: Medacta TKA     Assist: If case scheduled on Thurs/Fri, then Anthony Parks first assist. If case scheduled on Tues, then Epifanio Avendano ([phone]) first assist. If Epifanio unavailable, then please communicate to Anthony Parks/Jazzmine/Shamar to cancel Anthony's clinic for that day and have Anthony first assist. If Anthony unavailable, contact Jacinto Burch for first assist. If Epifanio and Jacinto unavailable, then contact [facility] for first assist.     Pre-op Testing: CBC, CMP, PT/INR, PTT, TYPE AND SCREEN, MRSA/MSSA THROUGH EDW PAT, and HEMOGLOBIN A1C THROUGH EDW PAT     Clearance: MEDICAL/PCP     Post op: 2 weeks postop appointment with Anthony Parks     Surgery date specifics: Next available     Jacky Bautista MD, [facility]  [facility]  Phone: [phone]  Fax: [phone]      What Type Of Note Output Would You Prefer (Optional)?: Standard Output Have Your Spot(S) Been Treated In The Past?: has not been treated Hpi Title: Evaluation of Skin Lesions

## 2024-08-20 NOTE — TELEPHONE ENCOUNTER
Date of Surgery:   4/8/2025    Post Op Appt:  4/22/2025    Case ID: [id number]     Notes:               SURGERY SCHEDULING SHEET     Darrin Gerber  1/1/1960  [id number]     Procedure: Left total knee arthroplasty     CPT: 71589     Diagnosis: Left knee osteoarthritis     Anesthesia: Choice     Length of Surgery: 2 hours     Disposition: Inpatient     Instruments: Medacta TKA     Assist: If case scheduled on Thurs/Fri, then Anthony Parks first assist. If case scheduled on Tues, then Epifanio Avendano ([phone]) first assist. If Epifanio unavailable, then please communicate to Anthony Parks/Jazzmine/Shamar to cancel Anthony's clinic for that day and have Anthony first assist. If Anthony unavailable, contact Jacinto Burch for first assist. If Epifanio and Jacinto unavailable, then contact [facility] for first assist.     Pre-op Testing: CBC, CMP, PT/INR, PTT, TYPE AND SCREEN, MRSA/MSSA THROUGH EDW PAT     Clearance: MEDICAL/PCP     Post op: 2 weeks postop appointment with Anthony Parks     Surgery date specifics: At least 2 months after right knee replacement     Jacky Bautista MD, FAAOS  [facility]  Phone: [phone]  Fax: [phone]

## 2024-08-20 NOTE — TELEPHONE ENCOUNTER
Spoke with patients daughter and we scheduled surgery, post op and went over preoperative procedures. All questions asked    PCP Clearance sent

## 2024-08-21 DIAGNOSIS — M05.742 RHEUMATOID ARTHRITIS INVOLVING BOTH HANDS WITH POSITIVE RHEUMATOID FACTOR (HCC): Primary | ICD-10-CM

## 2024-08-21 DIAGNOSIS — M05.741 RHEUMATOID ARTHRITIS INVOLVING BOTH HANDS WITH POSITIVE RHEUMATOID FACTOR (HCC): Primary | ICD-10-CM

## 2024-08-21 RX ORDER — METHOTREXATE 2.5 MG/1
17.5 TABLET ORAL WEEKLY
Qty: 91 TABLET | Refills: 0 | OUTPATIENT
Start: 2024-08-21

## 2024-08-21 NOTE — TELEPHONE ENCOUNTER
Last office visit: 8/1/2024    Next Rheum Apt:12/26/2024 Estela Fuller MD    Last fill: unsure. Per last office note, pt to switch to 7 tabs weekly. Pending as such    Labs:   Lab Results   Component Value Date    CREATSERUM 1.32 (H) 04/05/2024    GFR 60 06/22/2017    ALKPHO 74 04/05/2024    AST 19 04/05/2024    ALT 32 04/05/2024    BILT 0.5 04/05/2024    TP 7.0 04/05/2024    ALB 3.4 04/05/2024       Lab Results   Component Value Date    WBC 6.0 04/05/2024    HGB 11.3 (L) 04/05/2024    .0 04/05/2024    NEPRELIM 3.28 04/05/2024    NEPERCENT 54.8 04/05/2024    LYPERCENT 32.8 04/05/2024    NE 3.28 04/05/2024    LYMABS 1.97 04/05/2024

## 2024-08-22 NOTE — TELEPHONE ENCOUNTER
Phoned pt, LVM for pt that Dr. Fuller denied request for Methotrexate. Explained she is over due for blood work and to have blood work completed asap.

## 2024-08-23 ENCOUNTER — LAB ENCOUNTER (OUTPATIENT)
Dept: LAB | Age: 64
End: 2024-08-23
Attending: INTERNAL MEDICINE
Payer: MEDICAID

## 2024-08-23 DIAGNOSIS — D84.821 IMMUNODEFICIENCY DUE TO DRUG THERAPY (HCC): ICD-10-CM

## 2024-08-23 DIAGNOSIS — Z79.899 IMMUNODEFICIENCY DUE TO DRUG THERAPY (HCC): ICD-10-CM

## 2024-08-23 DIAGNOSIS — M05.742 RHEUMATOID ARTHRITIS INVOLVING BOTH HANDS WITH POSITIVE RHEUMATOID FACTOR (HCC): ICD-10-CM

## 2024-08-23 DIAGNOSIS — M05.741 RHEUMATOID ARTHRITIS INVOLVING BOTH HANDS WITH POSITIVE RHEUMATOID FACTOR (HCC): ICD-10-CM

## 2024-08-23 LAB
ALBUMIN SERPL-MCNC: 4 G/DL (ref 3.2–4.8)
ALBUMIN/GLOB SERPL: 1.4 {RATIO} (ref 1–2)
ALP LIVER SERPL-CCNC: 84 U/L
ALT SERPL-CCNC: 27 U/L
ANION GAP SERPL CALC-SCNC: 1 MMOL/L (ref 0–18)
AST SERPL-CCNC: 25 U/L (ref ?–34)
BASOPHILS # BLD AUTO: 0.03 X10(3) UL (ref 0–0.2)
BASOPHILS NFR BLD AUTO: 0.3 %
BILIRUB SERPL-MCNC: 0.4 MG/DL (ref 0.2–1.1)
BUN BLD-MCNC: 18 MG/DL (ref 9–23)
CALCIUM BLD-MCNC: 9.2 MG/DL (ref 8.7–10.4)
CHLORIDE SERPL-SCNC: 101 MMOL/L (ref 98–112)
CO2 SERPL-SCNC: 33 MMOL/L (ref 21–32)
CREAT BLD-MCNC: 1.08 MG/DL
CRP SERPL-MCNC: 1 MG/DL (ref ?–0.5)
EGFRCR SERPLBLD CKD-EPI 2021: 57 ML/MIN/1.73M2 (ref 60–?)
EOSINOPHIL # BLD AUTO: 0.04 X10(3) UL (ref 0–0.7)
EOSINOPHIL NFR BLD AUTO: 0.5 %
ERYTHROCYTE [DISTWIDTH] IN BLOOD BY AUTOMATED COUNT: 14.4 %
ERYTHROCYTE [SEDIMENTATION RATE] IN BLOOD: 31 MM/HR
FASTING STATUS PATIENT QL REPORTED: NO
GLOBULIN PLAS-MCNC: 2.9 G/DL (ref 2–3.5)
GLUCOSE BLD-MCNC: 129 MG/DL (ref 70–99)
HCT VFR BLD AUTO: 33.3 %
HGB BLD-MCNC: 11.2 G/DL
IMM GRANULOCYTES # BLD AUTO: 0.04 X10(3) UL (ref 0–1)
IMM GRANULOCYTES NFR BLD: 0.5 %
LYMPHOCYTES # BLD AUTO: 2.82 X10(3) UL (ref 1–4)
LYMPHOCYTES NFR BLD AUTO: 32.2 %
MCH RBC QN AUTO: 30.4 PG (ref 26–34)
MCHC RBC AUTO-ENTMCNC: 33.6 G/DL (ref 31–37)
MCV RBC AUTO: 90.5 FL
MONOCYTES # BLD AUTO: 0.65 X10(3) UL (ref 0.1–1)
MONOCYTES NFR BLD AUTO: 7.4 %
NEUTROPHILS # BLD AUTO: 5.18 X10 (3) UL (ref 1.5–7.7)
NEUTROPHILS # BLD AUTO: 5.18 X10(3) UL (ref 1.5–7.7)
NEUTROPHILS NFR BLD AUTO: 59.1 %
OSMOLALITY SERPL CALC.SUM OF ELEC: 284 MOSM/KG (ref 275–295)
PLATELET # BLD AUTO: 337 10(3)UL (ref 150–450)
POTASSIUM SERPL-SCNC: 3.9 MMOL/L (ref 3.5–5.1)
PROT SERPL-MCNC: 6.9 G/DL (ref 5.7–8.2)
RBC # BLD AUTO: 3.68 X10(6)UL
SODIUM SERPL-SCNC: 135 MMOL/L (ref 136–145)
WBC # BLD AUTO: 8.8 X10(3) UL (ref 4–11)

## 2024-08-23 PROCEDURE — 36415 COLL VENOUS BLD VENIPUNCTURE: CPT

## 2024-08-23 PROCEDURE — 85652 RBC SED RATE AUTOMATED: CPT

## 2024-08-23 PROCEDURE — 86140 C-REACTIVE PROTEIN: CPT

## 2024-08-23 PROCEDURE — 80053 COMPREHEN METABOLIC PANEL: CPT

## 2024-08-23 PROCEDURE — 85025 COMPLETE CBC W/AUTO DIFF WBC: CPT

## 2024-08-23 RX ORDER — METHOTREXATE 2.5 MG/1
15 TABLET ORAL WEEKLY
Qty: 24 TABLET | Refills: 1 | OUTPATIENT
Start: 2024-08-23

## 2024-08-24 ENCOUNTER — MOBILE ENCOUNTER (OUTPATIENT)
Dept: RHEUMATOLOGY | Facility: CLINIC | Age: 64
End: 2024-08-24

## 2024-08-24 DIAGNOSIS — M17.0 PRIMARY OSTEOARTHRITIS OF BOTH KNEES: ICD-10-CM

## 2024-08-24 DIAGNOSIS — M05.741 RHEUMATOID ARTHRITIS INVOLVING BOTH HANDS WITH POSITIVE RHEUMATOID FACTOR (HCC): Primary | ICD-10-CM

## 2024-08-24 DIAGNOSIS — M05.742 RHEUMATOID ARTHRITIS INVOLVING BOTH HANDS WITH POSITIVE RHEUMATOID FACTOR (HCC): Primary | ICD-10-CM

## 2024-08-24 RX ORDER — PREDNISONE 5 MG/1
5 TABLET ORAL DAILY
Qty: 30 TABLET | Refills: 0 | Status: SHIPPED | OUTPATIENT
Start: 2024-08-24

## 2024-08-25 ENCOUNTER — TELEPHONE (OUTPATIENT)
Dept: RHEUMATOLOGY | Facility: CLINIC | Age: 64
End: 2024-08-25

## 2024-08-25 DIAGNOSIS — M05.741 RHEUMATOID ARTHRITIS INVOLVING BOTH HANDS WITH POSITIVE RHEUMATOID FACTOR (HCC): Primary | ICD-10-CM

## 2024-08-25 DIAGNOSIS — M05.742 RHEUMATOID ARTHRITIS INVOLVING BOTH HANDS WITH POSITIVE RHEUMATOID FACTOR (HCC): Primary | ICD-10-CM

## 2024-08-25 RX ORDER — METHOTREXATE 2.5 MG/1
15 TABLET ORAL WEEKLY
Qty: 78 TABLET | Refills: 0 | Status: SHIPPED | OUTPATIENT
Start: 2024-08-25 | End: 2024-11-24

## 2024-08-30 NOTE — TELEPHONE ENCOUNTER
Spoke with patients daughter and we scheduled surgery, post op and went over pre operative procedures. All questions answered.    PCP CLEARANCE SENT

## 2024-09-01 ENCOUNTER — MED REC SCAN ONLY (OUTPATIENT)
Dept: FAMILY MEDICINE CLINIC | Facility: CLINIC | Age: 64
End: 2024-09-01

## 2024-09-04 ENCOUNTER — TELEPHONE (OUTPATIENT)
Dept: RHEUMATOLOGY | Facility: CLINIC | Age: 64
End: 2024-09-04

## 2024-09-04 DIAGNOSIS — B37.0 THRUSH: Primary | ICD-10-CM

## 2024-09-04 RX ORDER — NYSTATIN 100000/ML
5 SUSPENSION, ORAL (FINAL DOSE FORM) ORAL 4 TIMES DAILY
Qty: 473 ML | Refills: 2 | Status: SHIPPED | OUTPATIENT
Start: 2024-09-04

## 2024-09-05 RX ORDER — ATORVASTATIN CALCIUM 10 MG/1
TABLET, FILM COATED ORAL
Qty: 90 TABLET | Refills: 0 | Status: SHIPPED | OUTPATIENT
Start: 2024-09-05

## 2024-09-10 DIAGNOSIS — M05.741 RHEUMATOID ARTHRITIS INVOLVING BOTH HANDS WITH POSITIVE RHEUMATOID FACTOR (HCC): ICD-10-CM

## 2024-09-10 DIAGNOSIS — M05.742 RHEUMATOID ARTHRITIS INVOLVING BOTH HANDS WITH POSITIVE RHEUMATOID FACTOR (HCC): ICD-10-CM

## 2024-09-10 DIAGNOSIS — Z79.899 IMMUNODEFICIENCY DUE TO DRUG THERAPY (HCC): ICD-10-CM

## 2024-09-10 DIAGNOSIS — D84.821 IMMUNODEFICIENCY DUE TO DRUG THERAPY (HCC): ICD-10-CM

## 2024-09-10 DIAGNOSIS — K13.79 RECURRENT ORAL ULCERS: ICD-10-CM

## 2024-09-10 RX ORDER — HYDROXYCHLOROQUINE SULFATE 200 MG/1
200 TABLET, FILM COATED ORAL DAILY
Qty: 90 TABLET | Refills: 4 | OUTPATIENT
Start: 2024-09-10

## 2024-09-10 NOTE — TELEPHONE ENCOUNTER
Future Appointments   Date Time Provider Department Center   10/15/2024 11:40 AM Anthony Parks PA-C [facility] [address]   12/26/2024  1:40 PM Estela Fuller MD [facility] [address]   2/18/2025 11:00 AM Anthony Parks PA-C [facility] [address]   3/24/2025 11:00 AM Jacky Bautista MD [facility] [address]   4/22/2025 11:20 AM Anthony Parks PA-C [facility] [address]   5/19/2025 11:20 AM Jacky Bautista MD [facility] [address]     Last office visit: 8/1/2024    Last fill: 2/1/2024 90 tab, 1 refill

## 2024-09-17 DIAGNOSIS — M17.0 PRIMARY OSTEOARTHRITIS OF BOTH KNEES: ICD-10-CM

## 2024-09-17 DIAGNOSIS — M05.741 RHEUMATOID ARTHRITIS INVOLVING BOTH HANDS WITH POSITIVE RHEUMATOID FACTOR (HCC): Primary | ICD-10-CM

## 2024-09-17 DIAGNOSIS — M05.742 RHEUMATOID ARTHRITIS INVOLVING BOTH HANDS WITH POSITIVE RHEUMATOID FACTOR (HCC): Primary | ICD-10-CM

## 2024-09-17 RX ORDER — FOLIC ACID 1 MG/1
2 TABLET ORAL DAILY
Qty: 180 TABLET | Refills: 3 | Status: SHIPPED | OUTPATIENT
Start: 2024-09-17

## 2024-09-17 NOTE — TELEPHONE ENCOUNTER
Future Appointments   Date Time Provider Department Center   10/15/2024 11:40 AM Anthony Parks PA-C [facility] [address]   12/26/2024  1:40 PM Estela Fuller MD [facility] [address]   2/18/2025 11:00 AM Anthony Parks PA-C [facility] [address]   3/24/2025 11:00 AM Jacky Bautista MD [facility] [address]   4/22/2025 11:20 AM Anthony Parks PA-C [facility] [address]   5/19/2025 11:20 AM Jacky Bautista MD [facility] [address]     Last office visit; 8/1/2024    Last fill: 1/27/2024 60 tab, 2 refills    Pt currently on leucovorin weekly as well as folic acid daily

## 2024-09-17 NOTE — TELEPHONE ENCOUNTER
Tizanidine    DOS: 2/6/24 TKA right  4/8/25 TKA left  Last OV: 8/19/24  Last refill date: 8/15/24 #/refills: 30/0  Upcoming appt:   Future Appointments   Date Time Provider Department Center   10/15/2024 11:40 AM Anthony Parks PA-C [facility] [address]   2/18/2025 11:00 AM Anthony Parks PA-C [facility] [address]   3/24/2025 11:00 AM Jacky Bautista MD [facility] [address]   4/22/2025 11:20 AM Anthony Parks PA-C [facility] [address]   5/19/2025 11:20 AM Jacky Bautista MD [facility] [address]

## 2024-09-25 ENCOUNTER — OFFICE VISIT (OUTPATIENT)
Dept: PAIN CLINIC | Facility: CLINIC | Age: 64
End: 2024-09-25
Payer: MEDICAID

## 2024-09-25 VITALS
DIASTOLIC BLOOD PRESSURE: 82 MMHG | OXYGEN SATURATION: 98 % | SYSTOLIC BLOOD PRESSURE: 134 MMHG | BODY MASS INDEX: 27 KG/M2 | WEIGHT: 136 LBS | HEART RATE: 82 BPM

## 2024-09-25 DIAGNOSIS — M19.012 PRIMARY OSTEOARTHRITIS OF BOTH SHOULDERS: ICD-10-CM

## 2024-09-25 DIAGNOSIS — M19.011 PRIMARY OSTEOARTHRITIS OF BOTH SHOULDERS: ICD-10-CM

## 2024-09-25 DIAGNOSIS — M54.12 CERVICAL RADICULITIS: Primary | ICD-10-CM

## 2024-09-25 PROCEDURE — 99214 OFFICE O/P EST MOD 30 MIN: CPT | Performed by: ANESTHESIOLOGY

## 2024-09-25 RX ORDER — FAMOTIDINE 20 MG/1
TABLET, FILM COATED ORAL
COMMUNITY
Start: 2024-09-17

## 2024-09-25 NOTE — PROGRESS NOTES
Patient presents in office today with reported pain in upper right arm    Current pain level reported = 10/10    Last reported dose of Ibuprofen and Tylenol today    Narcotic Contract renewal NA  Urine Drug screen NA

## 2024-09-25 NOTE — PROGRESS NOTES
Name: Darrin Gerber   : 1960   DOS: 2024     Pain Clinic Follow Up Visit:     Chief Complaint   Patient presents with    Follow - Up     FU regarding arm pain       Darrin Gerber is a 64 year old female with a history of primary osteoarthritis of both shoulders here for follow-up.  The patient complains of pain in the right shoulder with limited abduction.  Also has numbness and tingling in the right forearm.    Pt denies any chills, fever, or weakness. There is no bladder or bowel incontinence associated with the pain.    REVIEW OF SYSTEMS:  A ten point review of systems was performed with pertinent positives and negatives in the HPI.    Allergies   Allergen Reactions    Meloxicam SWELLING     Able to tolerate iburprofen, naproxen,        Current Outpatient Medications   Medication Sig Dispense Refill    famotidine 20 MG Oral Tab       folic acid 1 MG Oral Tab Take 2 tablets (2 mg total) by mouth daily. 180 tablet 3    tiZANidine 4 MG Oral Tab Take 1 tablet (4 mg total) by mouth every 8 (eight) hours as needed. 30 tablet 0    atorvastatin 10 MG Oral Tab TAKE 1 TABLET BY MOUTH IN THE MORNING 90 tablet 0    nystatin 332146 UNIT/ML Mouth/Throat Suspension Take 5 mL (500,000 Units total) by mouth 4 (four) times daily. 473 mL 2    methotrexate 2.5 MG Oral Tab Take 6 tablets (15 mg total) by mouth once a week. 78 tablet 0    predniSONE 5 MG Oral Tab Take 1 tablet (5 mg total) by mouth daily. Prednisone 15 mg daily for three days, 10 mg for seven days, 5 mg for seven days 30 tablet 0    Ferrous Sulfate (FEROSUL) 325 (65 Fe) MG Oral Tab Take 1 tablet (325 mg total) by mouth daily. 90 tablet 3    Glucose Blood (ONETOUCH ULTRA) In Vitro Strip USE TO TEST TWICE A  each 3    Lancets (ONETOUCH DELICA PLUS [id number]) Does not apply Misc 1 Lancet by Finger stick route 2 (two) times daily. 300 each 2    aspirin 81 MG Oral Tab EC Take 1 tablet (81 mg total) by mouth daily.      leucovorin 5 MG Oral Tab Take 1 tablet  (5 mg total) by mouth once a week. ON SUNDAYS 12 tablet 3    nystatin 900336 UNIT/ML Mouth/Throat Suspension TAKE 5ML BY MOUTH IN THE MORNING AT NOON IN THE EVENING AND AT BEDTIME FOR 10 DAYS 240 mL 0    Atovaquone-Proguanil HCl 250-100 MG Oral Tab Take 1 tablet by mouth daily. 90 tablet 1    hydroCHLOROthiazide 25 MG Oral Tab Take 1 tablet (25 mg total) by mouth daily. 90 tablet 1    metFORMIN 500 MG Oral Tab Take 1 tablet (500 mg total) by mouth nightly. 90 tablet 1    Omeprazole 40 MG Oral Capsule Delayed Release Take 1 capsule (40 mg total) by mouth daily. 90 capsule 1    ibuprofen 600 MG Oral Tab Take 1 tablet (600 mg total) by mouth 3 (three) times daily as needed.      hydroxychloroquine 200 MG Oral Tab Take 1 tablet (200 mg total) by mouth daily. 90 tablet 1    Lidocaine Viscous HCl 2 % Mouth/Throat Solution Take 5 mL by mouth every 3 (three) hours as needed for Pain. For mouth sores. 100 mL 1    Cyanocobalamin (VITAMIN B-12) 1000 MCG Sublingual SL Tab Take 1 tablet by mouth daily. 90 tablet 3    diclofenac 1 % External Gel Apply 2 g topically 4 (four) times daily. 350 g 0    NAPROXEN 500 MG Oral Tab TAKE ONE TABLET BY MOUTH TWICE A DAY AS NEEDED 180 tablet 1    Blood Glucose Monitoring Suppl (BLOOD GLUCOSE MONITOR SYSTEM) w/Device Does not apply Kit Please check blood glucose twice a day 1 kit 0    Elastic Bandages & Supports (NEOPRENE KNEE BRACE) Does not apply Misc 1 Application by Does not apply route daily. Left neoprene knee brace for patellofemoral pain syndrome 1 each 0         EXAM:   /82 (BP Location: Left arm, Patient Position: Sitting)   Pulse 82   Wt 136 lb (61.7 kg)   SpO2 98%   BMI 26.56 kg/m²   General:  Patient is a(n) 64 year old year old female in no acute distress.  Neurologic:: WNL-Orientation to time, place and person, normal mood & affect, concentration & attention span intact.   Inspection:  Ambulates with well-coordinated, fluid, non-antalgic gait.  Gait is normal.  Neck:  Full range of motion in the neck.  Limited abduction right shoulder  Cranial nerves: Grossly intact  Respiratory: Nonlabored  Back: Gait intact  IMAGES:       Cervical MRI reviewed with evidence of moderate stenosis C5-6  ASSESSMENT AND PLAN:     1. Cervical radiculitis    2. Primary osteoarthritis of both shoulders      The patient is a 64-year-old female with a longstanding history of shoulder pain.  She also complains of numbness and tingling in the right forearm.  Suspect a combination of symptoms including cervical radiculitis and osteoarthritis of the right shoulder.  Recommended cervical epidural steroid injection.  Also prescribed cream.    Orders:  Orders Placed This Encounter   Procedures    [facility] PAIN NAVIGATOR         Radiology orders and consultations:None  The patient indicates understanding of these issues and agrees to the plan.  No follow-ups on file.    Carlo Shaw MD, 9/25/2024, 5:15 PM

## 2024-09-25 NOTE — PATIENT INSTRUCTIONS
Refill policies:    Allow 2-3 business days for refills; controlled substances may take longer.  Contact your pharmacy at least 5 days prior to running out of medication and have them send an electronic request or submit request through the “request refill” option in your [employer] account.  Refills are not addressed on weekends; covering physicians do not authorize routine medications on weekends.  No narcotics or controlled substances are refilled after noon on Fridays or by on call physicians.  By law, narcotics must be electronically prescribed.  A 30 day supply with no refills is the maximum allowed.  If your prescription is due for a refill, you may be due for a follow up appointment.  To best provide you care, patients receiving routine medications need to be seen at least once a year.  Patients receiving narcotic/controlled substance medications need to be seen at least once every 3 months.  In the event that your preferred pharmacy does not have the requested medication in stock (e.g. Backordered), it is your responsibility to find another pharmacy that has the requested medication available.  We will gladly send a new prescription to that pharmacy at your request.    Scheduling Tests:    If your physician has ordered radiology tests such as MRI or CT scans, please contact Central Scheduling at [phone] right away to schedule the test.  Once scheduled, the [facility] Centralized Referral Team will work with your insurance carrier to obtain pre-certification or prior authorization.  Depending on your insurance carrier, approval may take 3-10 days.  It is highly recommended patients assure they have received an authorization before having a test performed.  If test is done without insurance authorization, patient may be responsible for the entire amount billed.      Precertification and Prior Authorizations:  If your physician has recommended that you have a procedure or additional testing performed the [facility]  Centralized Referral Team will contact your insurance carrier to obtain pre-certification or prior authorization.    You are strongly encouraged to contact your insurance carrier to verify that your procedure/test has been approved and is a COVERED benefit.  Although the [facility] Centralized Referral Team does its due diligence, the insurance carrier gives the disclaimer that \"Although the procedure is authorized, this does not guarantee payment.\"    Ultimately the patient is responsible for payment.   Thank you for your understanding in this matter.  Paperwork Completion:  If you require FMLA or disability paperwork for your recovery, please make sure to either drop it off or have it faxed to our office at [phone]. Be sure the form has your name and date of birth on it.  The form will be faxed to our Forms Department and they will complete it for you.  There is a 25$ fee for all forms that need to be filled out.  Please be aware there is a 10-14 day turnaround time.  You will need to sign a release of information (CLAUDIO) form if your paperwork does not come with one.  You may call the Forms Department with any questions at [phone].  Their fax number is [phone].

## 2024-09-26 ENCOUNTER — TELEPHONE (OUTPATIENT)
Dept: PAIN CLINIC | Facility: CLINIC | Age: 64
End: 2024-09-26

## 2024-09-26 DIAGNOSIS — M54.12 CERVICAL RADICULITIS: Primary | ICD-10-CM

## 2024-09-26 NOTE — TELEPHONE ENCOUNTER
Spoke with patient's daughter Trisha (Ok'd per HIPAA) to schedule injection.     Patient's daughter advised of insurance approval to proceed with injections and is agreeable to scheduling. Patient scheduled for procedure, pre-procedure instructions reviewed. Patient prefers Local sedation. Reviewed sedation instructions including No Fasting & No  Required. Patient's daughter encouraged but not required to have patient hold ASA 81 mg,Ibuprofen or Naproxen for 24 hours prior to procedure. Patient's daughter verbalized understanding of instructions, no further needs at this time.        [facility] PAIN CLINIC  PRE-PROCEDURE INSTRUCTIONS WITHOUT SEDATION    Procedure: EMILY       Appointment Date: 10/15/2024  Check-In Time: 11:30 AM    Follow-Up Date/Time: Trisha will call back to schedule.    Prior to the procedure:  Please update us prior to the procedure if you are experiencing any symptoms of infection such as cough, fever, chills, urinary symptoms, or have recently been prescribed antibiotics, have open wounds, have recently had surgery or dental procedures.    Day of Procedure:  **Drivers will be required for patients who receive prescriptions for Valium.    NO FASTING REQUIRED  Please bring your Insurance Card, Photo ID, List of Current Medications and Referral (if applicable) to your appointment.  Please park in the [employer] and follow the signs to the [facility] [employer].  Check in at [facility] ([address] [place] [address]) outpatient registration in the [employer].  Please note-No prescriptions will be written by Pain Clinic in OR on the day of procedure. If you require a refill of medications, please contact the office 48 hours prior to your procedure.  If you have an implanted Spinal Cord or Peripheral Nerve Stimulator: Please remember to turn device off for procedure.        Medication Hold:    Number of days you need to be off for the following medications:    Aggrenox 10  days   Agrylin (Anagrelide) 10 days  Brilinta (Ticagrelor) 7 days  Imbruvica (Ibrutinib) 3 days   Enbrel (Etanercept) 24 hours   Fragmin (Dalteparin) 24 hours   Pletal (Cilostazol) 7 days  Effient (Prasugrel) 7 days  Pradaxa 10 days  Trental 7 days  Eliquis (Apixaban) 3 days  Xarelto (Rivaroxaban) 3 days  Lovenox (Enoxaparin) 24 hours  Aspirin  Greater than 81mg but less than 325mg   5 days  325mg and greater                  7 days  Coumadin       5 days  Procedure may be cancelled if INR is elevated.   Excedrin (with aspirin) 7 days  Plavix (Clopidogrel)                            7 days    NSAIDs: 24 hours preferred      Ibuprofen (Motrin, Advil, Vicoprofen), Naproxen (Naprosyn, Aleve), Piroxcam (Feldene), Meloxicam (Mobic), Oxaprozin (Daypro), Diclofenac (Voltaren), Indomethacin (Indocin), Etodolac (Lodine), Nabumetone (Relafen), Celebrex (Celecoxib)           HERBAL SUPPLEMENTS  5 days preferred  Fish oil, krill oil, Omega-3, Vascepa, Vitamin E, Turmeric, Garlic                       Insurance Authorization:   Most insurances are now requiring a preauthorization for all procedures.  In the event that your insurance does not authorize your procedure within 48 hours of the scheduled date, your procedure will be cancelled and rescheduled to a later date.  Please contact your insurance carrier to determine what your financial responsibility will be for the procedure(s).      Cancellation/Rescheduling Appointment:   In the event you need to cancel or reschedule your appointment, you must notify the office 24 hours prior.    Post-procedure instructions:        Please schedule a follow up visit within 2 to 4 weeks after your last procedure date   Please call our office with any questions or concerns before or after your procedure at  [phone].  If you are a diabetic, please increase the frequency of your glucose monitoring after the procedure as this may cause a temporary increase in your blood sugar.  Contact  your primary care physician if your blood sugar rises as you may require some medication adjustment.  It is normal to have increased pain at injection site for up to 3-5 days after procedure, you can use heat or ice (20 minutes on 20 minutes off) for comfort.    **To hear a recorded version of these instructions, please call [phone] and follow the prompts.  **Para escuchar las instrucciones en Español, por favor de llamar el naida [phone] opción 4.

## 2024-09-26 NOTE — TELEPHONE ENCOUNTER
Prior authorization request completed for: EMILY  Authorization # [id number]  Authorization dates: 9/26/24-11/25/24  CPT codes approved: 12395  Number of visits/dates of service approved: 1  Physician: tiarra  Location: [facility]    Patient can be scheduled. Routed to Navigator.

## 2024-10-01 ENCOUNTER — MED REC SCAN ONLY (OUTPATIENT)
Dept: FAMILY MEDICINE CLINIC | Facility: CLINIC | Age: 64
End: 2024-10-01

## 2024-10-10 ENCOUNTER — TELEPHONE (OUTPATIENT)
Dept: PAIN CLINIC | Facility: CLINIC | Age: 64
End: 2024-10-10

## 2024-10-10 DIAGNOSIS — M48.062 SPINAL STENOSIS OF LUMBAR REGION WITH NEUROGENIC CLAUDICATION: Primary | ICD-10-CM

## 2024-10-10 RX ORDER — TRAMADOL HYDROCHLORIDE 50 MG/1
50 TABLET ORAL EVERY 8 HOURS PRN
Qty: 15 TABLET | Refills: 0 | Status: SHIPPED | OUTPATIENT
Start: 2024-10-10

## 2024-10-10 NOTE — TELEPHONE ENCOUNTER
Contacted Trisha, pt's daughter, who states pt has tried Tylenol, Tylenol Arthritis, and ibuprofen with no relief. Trisha states pt also had gabapentin 300 mg to be taken at night, however this was also ineffective. Tizanidine 4 mg on pt's medication list. Trisha states tizanidine was ineffective, therefore pt stopped taking. Asked if pt ever rcv'd topical cream that Dr. Shaw prescribed. Trisha states pt did not rcv any cream. Trisha states pt reports worsening pain in her arm and confirms this is d/t spinal stenosis. Trisha states pt has not been able to sleep d/t pain for the past several nights and would like some relief prior to her injection on 10/15/24. Advised that this request will be sent to Dr. Shaw and call back will be provided. Trisha verbalized understanding.

## 2024-10-10 NOTE — TELEPHONE ENCOUNTER
Script for Tramadol has sent to pt's pharmacy.Contacted pt's daughter and notified.Daughter asked if pt can continue taking the Gabapentin, advised pt's daughter that her mom can continue it as prescribed.No further questions at this time

## 2024-10-10 NOTE — TELEPHONE ENCOUNTER
Patient's daughter states mother is in severe pain and is unable to sleep.    Patient has tried over the counter medication and nothing is helping.   Patient asking for pain medicine.

## 2024-10-15 ENCOUNTER — HOSPITAL ENCOUNTER (OUTPATIENT)
Facility: HOSPITAL | Age: 64
Setting detail: HOSPITAL OUTPATIENT SURGERY
Discharge: HOME | End: 2024-10-15
Attending: ANESTHESIOLOGY | Admitting: ANESTHESIOLOGY
Payer: MEDICAID

## 2024-10-15 ENCOUNTER — APPOINTMENT (OUTPATIENT)
Dept: GENERAL RADIOLOGY | Facility: HOSPITAL | Age: 64
End: 2024-10-15
Attending: ANESTHESIOLOGY
Payer: MEDICAID

## 2024-10-15 VITALS
HEART RATE: 85 BPM | BODY MASS INDEX: 26.7 KG/M2 | DIASTOLIC BLOOD PRESSURE: 91 MMHG | OXYGEN SATURATION: 100 % | HEIGHT: 60 IN | WEIGHT: 136 LBS | SYSTOLIC BLOOD PRESSURE: 165 MMHG | TEMPERATURE: 97 F | RESPIRATION RATE: 18 BRPM

## 2024-10-15 LAB — GLUCOSE BLD-MCNC: 130 MG/DL (ref 70–99)

## 2024-10-15 PROCEDURE — 3E0R3KZ INTRODUCTION OF OTHER DIAGNOSTIC SUBSTANCE INTO SPINAL CANAL, PERCUTANEOUS APPROACH: ICD-10-PCS | Performed by: ANESTHESIOLOGY

## 2024-10-15 PROCEDURE — 62321 NJX INTERLAMINAR CRV/THRC: CPT | Performed by: ANESTHESIOLOGY

## 2024-10-15 PROCEDURE — 3E0R33Z INTRODUCTION OF ANTI-INFLAMMATORY INTO SPINAL CANAL, PERCUTANEOUS APPROACH: ICD-10-PCS | Performed by: ANESTHESIOLOGY

## 2024-10-15 RX ORDER — LIDOCAINE HYDROCHLORIDE 10 MG/ML
INJECTION, SOLUTION EPIDURAL; INFILTRATION; INTRACAUDAL; PERINEURAL
Status: DISCONTINUED | OUTPATIENT
Start: 2024-10-15 | End: 2024-10-15

## 2024-10-15 RX ORDER — SODIUM CHLORIDE 9 MG/ML
INJECTION, SOLUTION INTRAMUSCULAR; INTRAVENOUS; SUBCUTANEOUS
Status: DISCONTINUED | OUTPATIENT
Start: 2024-10-15 | End: 2024-10-15

## 2024-10-15 RX ORDER — NALOXONE HYDROCHLORIDE 0.4 MG/ML
0.08 INJECTION, SOLUTION INTRAMUSCULAR; INTRAVENOUS; SUBCUTANEOUS AS NEEDED
Status: DISCONTINUED | OUTPATIENT
Start: 2024-10-15 | End: 2024-10-15

## 2024-10-15 RX ORDER — DEXAMETHASONE SODIUM PHOSPHATE 10 MG/ML
INJECTION, SOLUTION INTRAMUSCULAR; INTRAVENOUS
Status: DISCONTINUED | OUTPATIENT
Start: 2024-10-15 | End: 2024-10-15

## 2024-10-15 RX ORDER — SODIUM CHLORIDE, SODIUM LACTATE, POTASSIUM CHLORIDE, CALCIUM CHLORIDE 600; 310; 30; 20 MG/100ML; MG/100ML; MG/100ML; MG/100ML
100 INJECTION, SOLUTION INTRAVENOUS CONTINUOUS
Status: DISCONTINUED | OUTPATIENT
Start: 2024-10-15 | End: 2024-10-15

## 2024-10-15 RX ORDER — ONDANSETRON 2 MG/ML
4 INJECTION INTRAMUSCULAR; INTRAVENOUS ONCE AS NEEDED
Status: DISCONTINUED | OUTPATIENT
Start: 2024-10-15 | End: 2024-10-15

## 2024-10-15 NOTE — OPERATIVE REPORT
[facility]  Operative Report  10/15/2024     Darrin Gerber Patient Status:  Hospital Outpatient Surgery    1960 MRN [id number]   Location [facility] PAIN [facility] Attending Carlo Shaw MD   Hosp Day # 0 PCP John Botello MD     Indication: Drarin is a 64 year old female with cervical radiculitis    Preoperative Diagnosis:  Cervical radiculitis [M54.12]    Postoperative Diagnosis: Same as above.    Procedure performed: CERVICAL EPIDURAL STEROID INJECTION with local    Anesthesia: Local      EBL: Less than 1 ml.    Procedure Description:  After reviewing the patient's history and performing a focused physical examination, the diagnosis was confirmed and contraindications such as infection and coagulopathy were ruled out.  Following review of allergies, potential side effects, and complications, including but not necessarily limited to infection, allergic reaction, local tissue breakdown, nerve injury, post-dural puncture headache and paresis, the patient indicated they understood and agreed to proceed.  After obtaining the informed consent, the patient was brought to the procedure room and monitored.       The patient was brought to the procedure room and positioned prone.  After comprehensive monitors were applied, the patient's neck was prepped and draped sterilely.  After local anesthetic was instilled in the skin and subcutaneous tissue, a 20-gauge Tuohy needle was introduced and advanced under fluoroscopy at C7-T1.  The epidural space was reached by using a loss of resistance to air technique. There was no C.S.F. or blood through the needle. After obtaining a good epidurogram by injecting Omnipaque 180 1 mL, a combination of normal saline and dexamethasone 10 mg in total volume of 4 mL was injected.  The needle was then flushed with normal saline 1 mL.  The stylet re-applied.  The needle was withdrawn with the tip intact.  The patient tolerated the procedure very well and recovered  and was discharged to a responsible adult after discharge criteria were met.        Complications: None.    Follow up:  The patient was followed in the pain clinic as needed basis.          Carlo Shaw MD

## 2024-10-15 NOTE — DISCHARGE INSTRUCTIONS
Home Care Instructions Following Your Pain Procedure     Darrin,  It has been a pleasure to have you as our patient. To help you at home, you must follow these general discharge instructions. We will review these with you before you are discharged. It is our hope that you have a complete and uneventful recovery from our procedure.     General Instructions:  What to Expect:  Bandages from your procedure today can be removed when you get home.  Please avoid soaking and/or swimming for 24 hours.  Showering is okay  It is normal to have increased pain symptoms and/or pain at injection site for up to 3-5 days after procedure, you can use heat or ice (20 minutes on 20 minutes off) for comfort.  You may experience some temporary side effects which may include restlessness or insomnia, flushing of the face, or heart palpitations.  Please contact the provider if these symptoms do not resolve within 3-4 days.  Lightheadedness or nausea may occur and should resolve within 24 to 48 hours.  If you develop a headache after treatment, rest, drink fluids (with caffeine, if possible) and take mild over-the-counter pain medication.  If the headache does not improve with the above treatment, contact the physician.  Home Medications:  Resume all previously prescribed medication.  Please avoid taking NSAIDs (Non-Steriodal Anti-Inflammatory Drugs) such as:  Ibuprofen ( Advil, Motrin) Aleve (Naproxen), Diclofenac, Meloxicam for 6 hours after procedure.   If you are on Coumadin (Warfarin) or any other anti-coagulant (or \"blood thinning\") medication such as Plavix (Clopidogrel), Xarelto (Rivaroxaban), Eliquis (Apixaban), Effient (Prasugrel) etc., restart on the following day from the procedure unless otherwise directed by your provider.  If you are a diabetic, please increase the frequency of your glucose monitoring after the procedure as steroids may cause a temporary (2-3 day) increase in your blood sugar.  Contact your primary care  physician if your blood sugar remains elevated as you may require some medication adjustment.  Diet:  Resume your regular diet as tolerated.  Activity:  We recommend that you relax and rest during the rest of your procedure day.  If you feel weakness in your arms or legs do not drive.  Follow-up Appointment  Please schedule a follow-up visit within 3 to 4 weeks after your last procedure date.  Question or Concerns:  Feel free to call our office with any questions or concerns at [phone] (option #2)    Darrin  Thank you for coming to [facility] for your procedure.  The nurses try very hard to make sure you receive the best care possible.  Your trust in them as well as us is greatly appreciated.    Thanks so much,   Dr. Carlo Shaw

## 2024-10-15 NOTE — H&P
History & Physical Examination    Patient Name: Darrin Gerber  MRN: [id number]  CSN: [id number]  YOB: 1960    Pre-Operative Diagnosis:  Cervical radiculitis [M54.12]    Present Illness: Cervical radiculitis    ASA: 2  MP class: 1  Sedation: Local      Prescriptions Prior to Admission[1]  Current Facility-Administered Medications   Medication Dose Route Frequency    lactated ringers infusion  100 mL/hr Intravenous Continuous    ondansetron (Zofran) 4 MG/2ML injection 4 mg  4 mg Intravenous Once PRN       Allergies: Allergies[2]    Past Medical History:    Arthritis    Brain tumor (benign) (HCC)    Esophageal reflux    Essential hypertension    Hyperlipidemia     Past Surgical History:   Procedure Laterality Date    Excis infratent brain tumor       Family History   Problem Relation Age of Onset    No Known Problems Father     No Known Problems Mother      Social History     Tobacco Use    Smoking status: Never    Smokeless tobacco: Never    Tobacco comments:     Updated 8/19/24   Substance Use Topics    Alcohol use: Never       SYSTEM Check if Review is Normal Check if Physical Exam is Normal If not normal, please explain:   HEENT [x ] [x ]    NECK & BACK [x ] [x ]    HEART [x ] [x ]    LUNGS [x ] [x ]    ABDOMEN [x ] [x ]    UROGENITAL [x ] [x ]    EXTREMITIES [x ] [x ]    OTHER        [ x ] I have discussed the risks and benefits and alternatives with the patient/family.  They understand and agree to proceed with plan of care.  [ x ] I have reviewed the History and Physical done within the last 30 days.  Any changes noted above.    Carlo Shaw MD              [1]   Medications Prior to Admission   Medication Sig Dispense Refill Last Dose/Taking    traMADol 50 MG Oral Tab Take 1 tablet (50 mg total) by mouth every 8 (eight) hours as needed for Pain. 15 tablet 0 10/14/2024    folic acid 1 MG Oral Tab Take 2 tablets (2 mg total) by mouth daily. 180 tablet 3 Past Week    atorvastatin 10 MG Oral Tab TAKE 1  TABLET BY MOUTH IN THE MORNING 90 tablet 0 10/13/2024    Ferrous Sulfate (FEROSUL) 325 (65 Fe) MG Oral Tab Take 1 tablet (325 mg total) by mouth daily. 90 tablet 3 Past Week    leucovorin 5 MG Oral Tab Take 1 tablet (5 mg total) by mouth once a week. ON SUNDAYS 12 tablet 3 10/14/2024    hydroCHLOROthiazide 25 MG Oral Tab Take 1 tablet (25 mg total) by mouth daily. 90 tablet 1 10/15/2024    metFORMIN 500 MG Oral Tab Take 1 tablet (500 mg total) by mouth nightly. 90 tablet 1 10/15/2024    Omeprazole 40 MG Oral Capsule Delayed Release Take 1 capsule (40 mg total) by mouth daily. 90 capsule 1 10/15/2024 Morning    Cyanocobalamin (VITAMIN B-12) 1000 MCG Sublingual SL Tab Take 1 tablet by mouth daily. 90 tablet 3 Past Week    famotidine 20 MG Oral Tab    More than a month    tiZANidine 4 MG Oral Tab Take 1 tablet (4 mg total) by mouth every 8 (eight) hours as needed. 30 tablet 0 More than a month    nystatin 855377 UNIT/ML Mouth/Throat Suspension Take 5 mL (500,000 Units total) by mouth 4 (four) times daily. 473 mL 2 More than a month    methotrexate 2.5 MG Oral Tab Take 6 tablets (15 mg total) by mouth once a week. 78 tablet 0 10/13/2024    predniSONE 5 MG Oral Tab Take 1 tablet (5 mg total) by mouth daily. Prednisone 15 mg daily for three days, 10 mg for seven days, 5 mg for seven days 30 tablet 0 More than a month    [] fluconazole 100 MG Oral Tab Take 2 tablets (200 mg total) by mouth daily for 1 day, THEN 1 tablet (100 mg total) daily for 13 days. (Patient not taking: Reported on 2024) 15 tablet 0     Glucose Blood (ONETOUCH ULTRA) In Vitro Strip USE TO TEST TWICE A  each 3     Lancets (ONETOUCH DELICA PLUS [id number]) Does not apply Misc 1 Lancet by Finger stick route 2 (two) times daily. 300 each 2     aspirin 81 MG Oral Tab EC Take 1 tablet (81 mg total) by mouth daily.   10/13/2024    nystatin 218455 UNIT/ML Mouth/Throat Suspension TAKE 5ML BY MOUTH IN THE MORNING AT NOON IN THE EVENING AND AT  BEDTIME FOR 10 DAYS 240 mL 0 More than a month    Atovaquone-Proguanil HCl 250-100 MG Oral Tab Take 1 tablet by mouth daily. 90 tablet 1 Unknown    ibuprofen 600 MG Oral Tab Take 1 tablet (600 mg total) by mouth 3 (three) times daily as needed.   More than a month    hydroxychloroquine 200 MG Oral Tab Take 1 tablet (200 mg total) by mouth daily. 90 tablet 1 Unknown    Lidocaine Viscous HCl 2 % Mouth/Throat Solution Take 5 mL by mouth every 3 (three) hours as needed for Pain. For mouth sores. 100 mL 1 More than a month    diclofenac 1 % External Gel Apply 2 g topically 4 (four) times daily. 350 g 0 More than a month    NAPROXEN 500 MG Oral Tab TAKE ONE TABLET BY MOUTH TWICE A DAY AS NEEDED 180 tablet 1 More than a month    Blood Glucose Monitoring Suppl (BLOOD GLUCOSE MONITOR SYSTEM) w/Device Does not apply Kit Please check blood glucose twice a day 1 kit 0     Elastic Bandages & Supports (NEOPRENE KNEE BRACE) Does not apply Misc 1 Application by Does not apply route daily. Left neoprene knee brace for patellofemoral pain syndrome 1 each 0    [2]   Allergies  Allergen Reactions    Meloxicam SWELLING     Able to tolerate iburprofen, naproxen,

## 2024-10-16 ENCOUNTER — TELEPHONE (OUTPATIENT)
Dept: PAIN CLINIC | Facility: CLINIC | Age: 64
End: 2024-10-16

## 2024-10-16 NOTE — TELEPHONE ENCOUNTER
Courtesy called placed to patient for post procedure follow up. Patient stated better, but still has some pain. Informed patient that soreness is to be expected after the procedure. Educated patient that it takes 3-5 days for the steroid to be effective and to allow adequate time for medication to work. Encouraged patient to alternate ice and heat and to take medications as prescribed. Pt verbalized understanding to call with any questions or concerns.      Procedure: CERVICAL EPIDURAL STEROID INJECTION with local   Date: 10/15/24  Follow up Visit Scheduled:

## 2024-10-22 ENCOUNTER — OFFICE VISIT (OUTPATIENT)
Facility: CLINIC | Age: 64
End: 2024-10-22
Payer: MEDICAID

## 2024-10-22 DIAGNOSIS — M17.0 PRIMARY OSTEOARTHRITIS OF BOTH KNEES: Primary | ICD-10-CM

## 2024-10-22 PROCEDURE — 20610 DRAIN/INJ JOINT/BURSA W/O US: CPT | Performed by: PHYSICIAN ASSISTANT

## 2024-10-22 RX ORDER — TRIAMCINOLONE ACETONIDE 40 MG/ML
80 INJECTION, SUSPENSION INTRA-ARTICULAR; INTRAMUSCULAR ONCE
Status: COMPLETED | OUTPATIENT
Start: 2024-10-22 | End: 2024-10-22

## 2024-10-22 RX ADMIN — TRIAMCINOLONE ACETONIDE 80 MG: 40 INJECTION, SUSPENSION INTRA-ARTICULAR; INTRAMUSCULAR at 11:56:00

## 2024-10-22 NOTE — PROCEDURES
Risks and benefits of knee injection discussed with the patient, with risks including but not limited to pain and swelling at the injection site and/or within the knee joint, infection, elevation in blood pressure and/or glucose levels, facial flushing. After informed consent, the patient's right and left knees were marked, locally anesthetized with skin refrigerant, prepped with topical antiseptic, and injected with a mixture of 1mL 40mg/mL Kenalog, 2mL 1% lidocaine and 2mL 0.5% marcaine through the inferolateral portal.  A band-aid was applied.  The patient tolerated the procedure well.    Anthony Parks PA-C  [facility] Orthopedic Surgery

## 2024-10-30 ENCOUNTER — TELEPHONE (OUTPATIENT)
Dept: PAIN CLINIC | Facility: CLINIC | Age: 64
End: 2024-10-30

## 2024-10-30 NOTE — TELEPHONE ENCOUNTER
Pt completed a EMILY on 10/15/24. At the time of scheduling the procedure, pt's daughter indicated she would call back to schedule pt for a f/u. No f/u scheduled. Pt rcv'd a script for tramadol 50 mg qty 15 tabs on 10/10/24 through this clinic after pt's daughter called stating pt was in severe pain that was disrupting her sleep.  Pt is also prescribed tizanidine 4 mg tabs through her orthopedist's office. Pt's daughter reported that pt has tried Tylenol, Tylenol arthritis, ibuprofen, and gabapentin 300 mg with no relief. (See TE dated 10/10/24)      LOV 9/25/24:    ASSESSMENT AND PLAN:      1. Cervical radiculitis    2. Primary osteoarthritis of both shoulders       The patient is a 64-year-old female with a longstanding history of shoulder pain.  She also complains of numbness and tingling in the right forearm.  Suspect a combination of symptoms including cervical radiculitis and osteoarthritis of the right shoulder.  Recommended cervical epidural steroid injection.  Also prescribed cream.     Orders:      Orders Placed This Encounter   Procedures    [facility] PAIN NAVIGATOR            Radiology orders and consultations:None  The patient indicates understanding of these issues and agrees to the plan.  No follow-ups on file.     Carlo Shaw MD, 9/25/2024, 5:15 PM

## 2024-10-30 NOTE — TELEPHONE ENCOUNTER
Contacted pt's daughter to relay info below. Trisha states she would like to schedule a f/u w/ Dr. Shaw to discuss other options prior to considering surgery. Pt scheduled for 11/4/24.

## 2024-10-30 NOTE — TELEPHONE ENCOUNTER
Carlo Shaw MD  You36 minutes ago (2:01 PM)       If injection is not helpful, patient should follow-up with Dr. Gonzales's team to discuss if surgical options are available.  Regardless, she will need to be seen before any further recommendation can be made.

## 2024-10-30 NOTE — TELEPHONE ENCOUNTER
Patient's daughter requesting call back to discuss patient's condition. Per daughter, patient is still having a lot of pain, especially during the night. Offered to make an appt. Declined and requested a call back.

## 2024-11-10 RX ORDER — ATORVASTATIN CALCIUM 10 MG/1
TABLET, FILM COATED ORAL
Qty: 90 TABLET | Refills: 1 | Status: SHIPPED | OUTPATIENT
Start: 2024-11-10

## 2024-11-13 ENCOUNTER — OFFICE VISIT (OUTPATIENT)
Dept: PAIN CLINIC | Facility: CLINIC | Age: 64
End: 2024-11-13
Payer: MEDICAID

## 2024-11-13 VITALS — OXYGEN SATURATION: 98 % | SYSTOLIC BLOOD PRESSURE: 100 MMHG | DIASTOLIC BLOOD PRESSURE: 70 MMHG | HEART RATE: 88 BPM

## 2024-11-13 DIAGNOSIS — M54.12 CERVICAL RADICULITIS: Primary | ICD-10-CM

## 2024-11-13 DIAGNOSIS — M54.16 LUMBAR RADICULITIS: ICD-10-CM

## 2024-11-13 PROCEDURE — 99215 OFFICE O/P EST HI 40 MIN: CPT | Performed by: ANESTHESIOLOGY

## 2024-11-13 RX ORDER — GABAPENTIN 100 MG/1
100 CAPSULE ORAL 3 TIMES DAILY
Qty: 90 CAPSULE | Refills: 0 | Status: SHIPPED | OUTPATIENT
Start: 2024-11-13

## 2024-11-13 NOTE — PROGRESS NOTES
Name: Darrin Gerber   : 1960   DOS: 2024     Pain Clinic Follow Up Visit:     Chief Complaint   Patient presents with    Procedure Follow Up     CERVICAL EPIDURAL STEROID INJECTION with local          Darrin Gerber is a 64 year old female with a history of cervical radiculitis and polyarthralgia accompanied by her daughter today for follow-up after cervical epidural steroid patient.  The patient is status post a cervical epidural steroid injection and reports 0% improvement in symptoms.  Complains of right greater than left radicular symptoms which is particular bothersome at night.  Rates his discomfort as 9 out of 10.  Patient is scheduled for knee replacement surgery upcoming.    Pt denies any chills, fever, or weakness. There is no bladder or bowel incontinence associated with the pain.    REVIEW OF SYSTEMS:  A ten point review of systems was performed with pertinent positives and negatives in the HPI.    Allergies[1]    Current Outpatient Medications   Medication Sig Dispense Refill    gabapentin 100 MG Oral Cap Take 1 capsule (100 mg total) by mouth 3 (three) times daily. 90 capsule 0    ATORVASTATIN 10 MG Oral Tab TAKE 1 TABLET BY MOUTH IN THE MORNING 90 tablet 1    traMADol 50 MG Oral Tab Take 1 tablet (50 mg total) by mouth every 8 (eight) hours as needed for Pain. 15 tablet 0    famotidine 20 MG Oral Tab       folic acid 1 MG Oral Tab Take 2 tablets (2 mg total) by mouth daily. 180 tablet 3    tiZANidine 4 MG Oral Tab Take 1 tablet (4 mg total) by mouth every 8 (eight) hours as needed. 30 tablet 0    nystatin [id number] UNIT/ML Mouth/Throat Suspension Take 5 mL (500,000 Units total) by mouth 4 (four) times daily. 473 mL 2    methotrexate 2.5 MG Oral Tab Take 6 tablets (15 mg total) by mouth once a week. 78 tablet 0    predniSONE 5 MG Oral Tab Take 1 tablet (5 mg total) by mouth daily. Prednisone 15 mg daily for three days, 10 mg for seven days, 5 mg for seven days 30 tablet 0    Ferrous Sulfate  (FEROSUL) 325 (65 Fe) MG Oral Tab Take 1 tablet (325 mg total) by mouth daily. 90 tablet 3    Glucose Blood (ONETOUCH ULTRA) In Vitro Strip USE TO TEST TWICE A  each 3    Lancets (ONETOUCH DELICA PLUS [id number]) Does not apply Misc 1 Lancet by Finger stick route 2 (two) times daily. 300 each 2    aspirin 81 MG Oral Tab EC Take 1 tablet (81 mg total) by mouth daily.      leucovorin 5 MG Oral Tab Take 1 tablet (5 mg total) by mouth once a week. ON SUNDAYS 12 tablet 3    nystatin [id number] UNIT/ML Mouth/Throat Suspension TAKE 5ML BY MOUTH IN THE MORNING AT NOON IN THE EVENING AND AT BEDTIME FOR 10 DAYS 240 mL 0    Atovaquone-Proguanil HCl 250-100 MG Oral Tab Take 1 tablet by mouth daily. 90 tablet 1    hydroCHLOROthiazide 25 MG Oral Tab Take 1 tablet (25 mg total) by mouth daily. 90 tablet 1    metFORMIN 500 MG Oral Tab Take 1 tablet (500 mg total) by mouth nightly. 90 tablet 1    Omeprazole 40 MG Oral Capsule Delayed Release Take 1 capsule (40 mg total) by mouth daily. 90 capsule 1    ibuprofen 600 MG Oral Tab Take 1 tablet (600 mg total) by mouth 3 (three) times daily as needed.      hydroxychloroquine 200 MG Oral Tab Take 1 tablet (200 mg total) by mouth daily. 90 tablet 1    Lidocaine Viscous HCl 2 % Mouth/Throat Solution Take 5 mL by mouth every 3 (three) hours as needed for Pain. For mouth sores. 100 mL 1    Cyanocobalamin (VITAMIN B-12) 1000 MCG Sublingual SL Tab Take 1 tablet by mouth daily. 90 tablet 3    diclofenac 1 % External Gel Apply 2 g topically 4 (four) times daily. 350 g 0    NAPROXEN 500 MG Oral Tab TAKE ONE TABLET BY MOUTH TWICE A DAY AS NEEDED 180 tablet 1    Blood Glucose Monitoring Suppl (BLOOD GLUCOSE MONITOR SYSTEM) w/Device Does not apply Kit Please check blood glucose twice a day 1 kit 0    Elastic Bandages & Supports (NEOPRENE KNEE BRACE) Does not apply Misc 1 Application by Does not apply route daily. Left neoprene knee brace for patellofemoral pain syndrome 1 each 0         EXAM:   BP  100/70 (BP Location: Left arm, Patient Position: Sitting)   Pulse 88   SpO2 98%   General:  Patient is a(n) 64 year old year old female in no acute distress.  Neurologic:: WNL-Orientation to time, place and person, normal mood & affect, concentration & attention span intact.   Inspection:  Ambulates with well-coordinated, fluid, non-antalgic gait.  Gait is normal.  Neck: Injection site is clear.  Upper EXTR and range of motion is intact  Cranial nerve: Grossly intact  Respiratory: Nonlabored  Back: Gait intact    IMAGES:     Cervical MRI from 2020 reviewed with evidence of degenerative changes of right sided foraminal narrowing at the C5    ASSESSMENT AND PLAN:     1. Cervical radiculitis    2. Lumbar radiculitis      The patient is a 64-year-old female with a history of chronic neck pain and polyarthralgia.  The patient is following up after cervical epidural steroid injection.  She denies any relief following the procedure.  Had extensive discussion with patient regarding her imaging findings and treatment options.  Patient is apprehensive about surgical decompression yet and wants pain relief.  Discussed the structural causes for her pain.  Will update MRI.  Also referred to neurosurgery.  Will also start gabapentin.        Pain is  limiting functional status. Failed conservative treatment consisting of medications, activity modification, and  PT.  1.Risks of long term narcotic use d/w pt including dependence, abuse, and death from overdose and mixing narcotic with alcohol. Pt verbalized understanding.     Medications filled today:  Requested Prescriptions     Signed Prescriptions Disp Refills    gabapentin 100 MG Oral Cap 90 capsule 0     Sig: Take 1 capsule (100 mg total) by mouth 3 (three) times daily.     Orders:No orders of the defined types were placed in this encounter.        Radiology orders and consultations:NEUROSURGERY - INTERNAL  MRI SPINE CERVICAL (CPT=72141)  MRI SPINE LUMBAR (CPT=72148)  The  patient indicates understanding of these issues and agrees to the plan.  No follow-ups on file.    Carlo Shaw MD, 11/13/2024, 11:57 AM              [1]   Allergies  Allergen Reactions    Meloxicam SWELLING     Able to tolerate iburprofen, naproxen,

## 2024-11-13 NOTE — PROGRESS NOTES
Last procedure: EMILY  Date: 10/15/2024  Percentage of relief obtained: 0%    Current Pain Score: 9/10

## 2024-11-13 NOTE — PATIENT INSTRUCTIONS
Refill policies:    Allow 2-3 business days for refills; controlled substances may take longer.  Contact your pharmacy at least 5 days prior to running out of medication and have them send an electronic request or submit request through the “request refill” option in your [employer] account.  Refills are not addressed on weekends; covering physicians do not authorize routine medications on weekends.  No narcotics or controlled substances are refilled after noon on Fridays or by on call physicians.  By law, narcotics must be electronically prescribed.  A 30 day supply with no refills is the maximum allowed.  If your prescription is due for a refill, you may be due for a follow up appointment.  To best provide you care, patients receiving routine medications need to be seen at least once a year.  Patients receiving narcotic/controlled substance medications need to be seen at least once every 3 months.  In the event that your preferred pharmacy does not have the requested medication in stock (e.g. Backordered), it is your responsibility to find another pharmacy that has the requested medication available.  We will gladly send a new prescription to that pharmacy at your request.    Scheduling Tests:    If your physician has ordered radiology tests such as MRI or CT scans, please contact Central Scheduling at [phone] right away to schedule the test.  Once scheduled, the [facility] Centralized Referral Team will work with your insurance carrier to obtain pre-certification or prior authorization.  Depending on your insurance carrier, approval may take 3-10 days.  It is highly recommended patients assure they have received an authorization before having a test performed.  If test is done without insurance authorization, patient may be responsible for the entire amount billed.      Precertification and Prior Authorizations:  If your physician has recommended that you have a procedure or additional testing performed the [facility]  Centralized Referral Team will contact your insurance carrier to obtain pre-certification or prior authorization.    You are strongly encouraged to contact your insurance carrier to verify that your procedure/test has been approved and is a COVERED benefit.  Although the [facility] Centralized Referral Team does its due diligence, the insurance carrier gives the disclaimer that \"Although the procedure is authorized, this does not guarantee payment.\"    Ultimately the patient is responsible for payment.   Thank you for your understanding in this matter.  Paperwork Completion:  If you require FMLA or disability paperwork for your recovery, please make sure to either drop it off or have it faxed to our office at [phone]. Be sure the form has your name and date of birth on it.  The form will be faxed to our Forms Department and they will complete it for you.  There is a 25$ fee for all forms that need to be filled out.  Please be aware there is a 10-14 day turnaround time.  You will need to sign a release of information (CLAUDIO) form if your paperwork does not come with one.  You may call the Forms Department with any questions at [phone].  Their fax number is [phone].

## 2024-12-02 ENCOUNTER — TELEPHONE (OUTPATIENT)
Dept: FAMILY MEDICINE CLINIC | Facility: CLINIC | Age: 64
End: 2024-12-02

## 2024-12-02 DIAGNOSIS — E11.65 TYPE 2 DIABETES MELLITUS WITH HYPERGLYCEMIA, WITHOUT LONG-TERM CURRENT USE OF INSULIN (HCC): Primary | ICD-10-CM

## 2024-12-02 DIAGNOSIS — I10 ESSENTIAL HYPERTENSION: ICD-10-CM

## 2024-12-02 DIAGNOSIS — E78.2 MIXED HYPERLIPIDEMIA: ICD-10-CM

## 2024-12-02 RX ORDER — HYDROCHLOROTHIAZIDE 25 MG/1
25 TABLET ORAL DAILY
Qty: 90 TABLET | Refills: 1 | Status: SHIPPED | OUTPATIENT
Start: 2024-12-02

## 2024-12-02 NOTE — TELEPHONE ENCOUNTER
Pts daughter called to request that lab orders be placed prior to her upcoming appointment.     Future Appointments   Date Time Provider Department Center   12/26/2024  1:40 PM Estela Fuller MD [id number] [address]   1/13/2025 11:30 AM John Botello MD [facility] [address] [facility] [address]   2/18/2025 11:00 AM Anthony Parks PA-C [facility]TH[facility] [address]   3/24/2025 11:00 AM Jacky Bautista MD [facility]TH[facility] [address]   4/22/2025 11:20 AM Anthony Parks PA-C [facility] [address]   5/19/2025 11:20 AM Jacky Bautista MD [facility] ORTHOP[facility] [address]

## 2024-12-18 DIAGNOSIS — M05.742 RHEUMATOID ARTHRITIS INVOLVING BOTH HANDS WITH POSITIVE RHEUMATOID FACTOR (HCC): Primary | ICD-10-CM

## 2024-12-18 DIAGNOSIS — M05.741 RHEUMATOID ARTHRITIS INVOLVING BOTH HANDS WITH POSITIVE RHEUMATOID FACTOR (HCC): Primary | ICD-10-CM

## 2024-12-18 RX ORDER — METHOTREXATE 2.5 MG/1
15 TABLET ORAL WEEKLY
Qty: 24 TABLET | Refills: 0 | Status: SHIPPED | OUTPATIENT
Start: 2024-12-18 | End: 2025-03-19

## 2024-12-18 NOTE — TELEPHONE ENCOUNTER
Last office visit: 8/1/24    Next Rheum Apt:12/26/2024 Estela Fuller MD    Last fill: 8/25/24    Labs:   Lab Results   Component Value Date    CREATSERUM 1.08 (H) 08/23/2024    GFR 60 06/22/2017    ALKPHO 84 08/23/2024    AST 25 08/23/2024    ALT 27 08/23/2024    BILT 0.4 08/23/2024    TP 6.9 08/23/2024    ALB 4.0 08/23/2024       Lab Results   Component Value Date    WBC 8.8 08/23/2024    HGB 11.2 (L) 08/23/2024    .0 08/23/2024    NEPRELIM 5.18 08/23/2024    NEPERCENT 59.1 08/23/2024    LYPERCENT 32.2 08/23/2024    NE 5.18 08/23/2024    LYMABS 2.82 08/23/2024

## 2024-12-30 ENCOUNTER — ORDER TRANSCRIPTION (OUTPATIENT)
Dept: PHYSICAL THERAPY | Facility: HOSPITAL | Age: 64
End: 2024-12-30

## 2024-12-30 DIAGNOSIS — Z96.651 S/P TOTAL KNEE ARTHROPLASTY, RIGHT: Primary | ICD-10-CM

## 2025-01-10 ENCOUNTER — TELEPHONE (OUTPATIENT)
Dept: ADMINISTRATIVE | Facility: HOSPITAL | Age: 65
End: 2025-01-10

## 2025-01-10 ENCOUNTER — TELEPHONE (OUTPATIENT)
Dept: FAMILY MEDICINE CLINIC | Facility: CLINIC | Age: 65
End: 2025-01-10

## 2025-01-10 ENCOUNTER — PATIENT MESSAGE (OUTPATIENT)
Dept: ADMINISTRATIVE | Facility: HOSPITAL | Age: 65
End: 2025-01-10

## 2025-01-10 DIAGNOSIS — Z01.818 PREOP TESTING: Primary | ICD-10-CM

## 2025-01-10 NOTE — TELEPHONE ENCOUNTER
Patient will be having upcoming surgery and will need orders for P T/PTT/Type and Screen.   Future Appointments   Date Time Provider Department Center   1/13/2025 12:45 PM PF MRI 1 (1.5T) [facility] [place]   1/13/2025  1:30 PM PF MRI [facility] (1.5T) [facility] [place]   1/22/2025  2:30 PM John Botello MD [facility] [address] [facility] [address]   2/18/2025 11:00 AM Anthony Parks PA-C [facility] ORTHOPL [facility] [address]   3/24/2025 11:00 AM Jacky Bautista MD [facility] ORTHOPL [facility] [address]   4/22/2025 11:20 AM Anthony Parks PA-C [facility]TH[facility] [address]   5/19/2025 11:20 AM Jacky Bautista MD [facility] ORTHOPL [facility] [address]

## 2025-01-10 NOTE — TELEPHONE ENCOUNTER
Good Afternoon, Please be advised that the MRI SPINE LUMBAR (CPT=72148) and MRI SPINE CERVICAL (CPT=72141) has been Denied by the Patient [employer] Plan.  According to Cristina, Dr. Shaw can call and initiate a P2P to see if the Denial can be overturned.  Cases#[id number], [id number] Tel#[phone]    Kecia Sung    Denial Reason:  Procedure Description Units   Requested  Units   Denied  Modifier (if   applicable)  54414 Magnetic Resonance Imaging   (MRI), a special kind of picture   of your lower back without   contrast (dye)  1 1  Your doctor told us that you are having lower back pain that travels to your hip and/or   leg. An imaging study was asked for. We cannot approve this request because:  Imaging can be done when results of an in-person evaluation support the imaging. This   must be performed for the current episode of your complaint. The notes sent to us do   not describe these results.  Imaging can be done when results of a detailed exam that included your nervous   system show a reason for the imaging. This exam must have been performed after your   symptoms started or changed. The notes sent to us do not describe these exam results.  Imaging requires six weeks of provider directed treatment to be completed. Supported   treatments include (but are not limited to) drugs for swelling or pain, an in office workout   (physical therapy), and/or oral or injected steroids. This must have been completed in   the past three months without improved symptoms. Contact (via office visit, phone,   email, or messaging) must occur after the treatment is completed. This has not been   met because:  The notes sent to us do not show you have completed a full six weeks of this type of   treatment.  Your treatment did not occur within the last three months  Procedure Description Units   Requested  Units   Denied  Modifier (if   applicable)  89430 Magnetic Resonance Imaging   (MRI), a special kind of picture   of your neck  without contrast   (dye)  1 1  Your doctor told us that there is a concern related to your neck. An imaging study was   asked for. We cannot approve this request because:  Imaging can be done when results of a detailed exam that included your nervous   system show a reason for the imaging. This exam must have been performed after your   symptoms started or changed. The notes sent to us do not describe these exam results.  A study similar to the one requested has recently been performed. The results of that   study showed your doctor what they needed to see in order to treat your problem. No   additional imaging is needed at this time.  Imaging requires six weeks of provider directed treatment to be completed. This must   have been completed in the past three months without improved symptoms. Contact   (via office visit, phone, email, or messaging) must occur after the treatment is   completed. This has not been met because:  The notes sent to us do not show you have completed a full six weeks of this type of   treatment.  Your treatment did not occur within the last three months.  Symptoms must be the same or worse after treatment to support imaging.

## 2025-01-14 ENCOUNTER — TELEPHONE (OUTPATIENT)
Dept: PHYSICAL THERAPY | Facility: HOSPITAL | Age: 65
End: 2025-01-14

## 2025-01-14 NOTE — TELEPHONE ENCOUNTER
Carlo Shaw MD  You3 minutes ago (11:24 AM)       Peer to peer     CERVICAL MRI (CASE # [id number])  Scheduled  Wed 1/15/25 - 9:30 am CST    Dr. Aly Duque      LUMBAR MRI (CASE # [id number])  Scheduled  Wed 1/15/25 - 11:30 am CST    Dr. Kristina Lorenzo

## 2025-01-15 ENCOUNTER — TELEPHONE (OUTPATIENT)
Dept: ORTHOPEDICS CLINIC | Facility: CLINIC | Age: 65
End: 2025-01-15

## 2025-01-15 DIAGNOSIS — M17.11 PRIMARY OSTEOARTHRITIS OF RIGHT KNEE: Primary | ICD-10-CM

## 2025-01-15 DIAGNOSIS — M17.12 PRIMARY OSTEOARTHRITIS OF LEFT KNEE: ICD-10-CM

## 2025-01-15 DIAGNOSIS — M05.742 RHEUMATOID ARTHRITIS INVOLVING BOTH HANDS WITH POSITIVE RHEUMATOID FACTOR (HCC): ICD-10-CM

## 2025-01-15 DIAGNOSIS — M05.741 RHEUMATOID ARTHRITIS INVOLVING BOTH HANDS WITH POSITIVE RHEUMATOID FACTOR (HCC): ICD-10-CM

## 2025-01-15 RX ORDER — ATORVASTATIN CALCIUM 10 MG/1
TABLET, FILM COATED ORAL
Qty: 30 TABLET | Refills: 4 | Status: SHIPPED | OUTPATIENT
Start: 2025-01-15

## 2025-01-15 NOTE — TELEPHONE ENCOUNTER
Received a call from [employer]. P2P completed by  for Lumbar MRI with an approval ,[id number] exp. 3/1/2525

## 2025-01-15 NOTE — TELEPHONE ENCOUNTER
Contacted pt, pt's daughter picked up the call. Advised Trisha that the MRI for lumbar and cervical has been approved by pt's insurance. Advised to call central scheduling to schedule. No further concerns at this time.

## 2025-01-16 NOTE — TELEPHONE ENCOUNTER
Last office visit: 8/1/2024    Next Rheum Apt:2/4/2025 Estela Fuller MD    Last fill: 12/18/2024 24 tab, 0 refills    Pt was told at last fill to get lab work done. Lab work is not done.    Labs:   Lab Results   Component Value Date    CREATSERUM 1.08 (H) 08/23/2024    GFR 60 06/22/2017    ALKPHO 84 08/23/2024    AST 25 08/23/2024    ALT 27 08/23/2024    BILT 0.4 08/23/2024    TP 6.9 08/23/2024    ALB 4.0 08/23/2024       Lab Results   Component Value Date    WBC 8.8 08/23/2024    HGB 11.2 (L) 08/23/2024    .0 08/23/2024    NEPRELIM 5.18 08/23/2024    NEPERCENT 59.1 08/23/2024    LYPERCENT 32.2 08/23/2024    NE 5.18 08/23/2024    LYMABS 2.82 08/23/2024

## 2025-01-20 ENCOUNTER — LAB ENCOUNTER (OUTPATIENT)
Dept: LAB | Age: 65
End: 2025-01-20
Attending: ORTHOPAEDIC SURGERY
Payer: MEDICAID

## 2025-01-20 DIAGNOSIS — Z01.818 PREOP TESTING: ICD-10-CM

## 2025-01-20 DIAGNOSIS — Z01.818 PRE-OP TESTING: ICD-10-CM

## 2025-01-20 DIAGNOSIS — E11.65 TYPE 2 DIABETES MELLITUS WITH HYPERGLYCEMIA, WITHOUT LONG-TERM CURRENT USE OF INSULIN (HCC): ICD-10-CM

## 2025-01-20 DIAGNOSIS — E78.2 MIXED HYPERLIPIDEMIA: ICD-10-CM

## 2025-01-20 DIAGNOSIS — I10 ESSENTIAL HYPERTENSION: ICD-10-CM

## 2025-01-20 LAB
ALBUMIN SERPL-MCNC: 4 G/DL (ref 3.2–4.8)
ALBUMIN/GLOB SERPL: 1.3 {RATIO} (ref 1–2)
ALP LIVER SERPL-CCNC: 69 U/L
ALT SERPL-CCNC: 15 U/L
ANION GAP SERPL CALC-SCNC: 9 MMOL/L (ref 0–18)
ANTIBODY SCREEN: NEGATIVE
APTT PPP: 30.9 SECONDS (ref 23–36)
AST SERPL-CCNC: 22 U/L (ref ?–34)
BASOPHILS # BLD AUTO: 0.03 X10(3) UL (ref 0–0.2)
BASOPHILS NFR BLD AUTO: 0.5 %
BILIRUB SERPL-MCNC: 0.6 MG/DL (ref 0.2–1.1)
BUN BLD-MCNC: 16 MG/DL (ref 9–23)
CALCIUM BLD-MCNC: 9.3 MG/DL (ref 8.7–10.6)
CHLORIDE SERPL-SCNC: 98 MMOL/L (ref 98–112)
CHOLEST SERPL-MCNC: 157 MG/DL (ref ?–200)
CO2 SERPL-SCNC: 29 MMOL/L (ref 21–32)
CREAT BLD-MCNC: 0.97 MG/DL
EGFRCR SERPLBLD CKD-EPI 2021: 64 ML/MIN/1.73M2 (ref 60–?)
EOSINOPHIL # BLD AUTO: 0.06 X10(3) UL (ref 0–0.7)
EOSINOPHIL NFR BLD AUTO: 1 %
ERYTHROCYTE [DISTWIDTH] IN BLOOD BY AUTOMATED COUNT: 14.3 %
EST. AVERAGE GLUCOSE BLD GHB EST-MCNC: 120 MG/DL (ref 68–126)
FASTING PATIENT LIPID ANSWER: YES
FASTING STATUS PATIENT QL REPORTED: YES
GLOBULIN PLAS-MCNC: 3.1 G/DL (ref 2–3.5)
GLUCOSE BLD-MCNC: 109 MG/DL (ref 70–99)
HBA1C MFR BLD: 5.8 % (ref ?–5.7)
HCT VFR BLD AUTO: 33.2 %
HDLC SERPL-MCNC: 74 MG/DL (ref 40–59)
HGB BLD-MCNC: 11.4 G/DL
IMM GRANULOCYTES # BLD AUTO: 0.01 X10(3) UL (ref 0–1)
IMM GRANULOCYTES NFR BLD: 0.2 %
INR BLD: 1.08 (ref 0.8–1.2)
LDLC SERPL CALC-MCNC: 67 MG/DL (ref ?–100)
LYMPHOCYTES # BLD AUTO: 1.83 X10(3) UL (ref 1–4)
LYMPHOCYTES NFR BLD AUTO: 30.3 %
MCH RBC QN AUTO: 31.1 PG (ref 26–34)
MCHC RBC AUTO-ENTMCNC: 34.3 G/DL (ref 31–37)
MCV RBC AUTO: 90.5 FL
MONOCYTES # BLD AUTO: 0.46 X10(3) UL (ref 0.1–1)
MONOCYTES NFR BLD AUTO: 7.6 %
NEUTROPHILS # BLD AUTO: 3.64 X10 (3) UL (ref 1.5–7.7)
NEUTROPHILS # BLD AUTO: 3.64 X10(3) UL (ref 1.5–7.7)
NEUTROPHILS NFR BLD AUTO: 60.4 %
NONHDLC SERPL-MCNC: 83 MG/DL (ref ?–130)
OSMOLALITY SERPL CALC.SUM OF ELEC: 284 MOSM/KG (ref 275–295)
PLATELET # BLD AUTO: 375 10(3)UL (ref 150–450)
POTASSIUM SERPL-SCNC: 3.4 MMOL/L (ref 3.5–5.1)
PROT SERPL-MCNC: 7.1 G/DL (ref 5.7–8.2)
PROTHROMBIN TIME: 14.1 SECONDS (ref 11.6–14.8)
RBC # BLD AUTO: 3.67 X10(6)UL
RH BLOOD TYPE: POSITIVE
SODIUM SERPL-SCNC: 136 MMOL/L (ref 136–145)
T4 FREE SERPL-MCNC: 2.1 NG/DL (ref 0.8–1.7)
TRIGL SERPL-MCNC: 83 MG/DL (ref 30–149)
TSI SER-ACNC: 3.82 UIU/ML (ref 0.55–4.78)
VLDLC SERPL CALC-MCNC: 12 MG/DL (ref 0–30)
WBC # BLD AUTO: 6 X10(3) UL (ref 4–11)

## 2025-01-20 PROCEDURE — 84439 ASSAY OF FREE THYROXINE: CPT

## 2025-01-20 PROCEDURE — 87081 CULTURE SCREEN ONLY: CPT

## 2025-01-20 PROCEDURE — 85025 COMPLETE CBC W/AUTO DIFF WBC: CPT

## 2025-01-20 PROCEDURE — 86901 BLOOD TYPING SEROLOGIC RH(D): CPT

## 2025-01-20 PROCEDURE — 85610 PROTHROMBIN TIME: CPT

## 2025-01-20 PROCEDURE — 84443 ASSAY THYROID STIM HORMONE: CPT

## 2025-01-20 PROCEDURE — 80061 LIPID PANEL: CPT

## 2025-01-20 PROCEDURE — 80053 COMPREHEN METABOLIC PANEL: CPT

## 2025-01-20 PROCEDURE — 86850 RBC ANTIBODY SCREEN: CPT

## 2025-01-20 PROCEDURE — 36415 COLL VENOUS BLD VENIPUNCTURE: CPT

## 2025-01-20 PROCEDURE — 85730 THROMBOPLASTIN TIME PARTIAL: CPT

## 2025-01-20 PROCEDURE — 83036 HEMOGLOBIN GLYCOSYLATED A1C: CPT

## 2025-01-20 PROCEDURE — 86900 BLOOD TYPING SEROLOGIC ABO: CPT

## 2025-01-21 RX ORDER — METHOTREXATE 2.5 MG/1
15 TABLET ORAL WEEKLY
Qty: 24 TABLET | Refills: 0 | Status: SHIPPED | OUTPATIENT
Start: 2025-01-21 | End: 2025-02-18

## 2025-01-21 NOTE — TELEPHONE ENCOUNTER
Pharmacy called office, requesting refill for Methotrexate.     Pt had CBC and CMP completed yesterday through her PCP.

## 2025-01-22 ENCOUNTER — EKG ENCOUNTER (OUTPATIENT)
Dept: LAB | Age: 65
End: 2025-01-22
Attending: ORTHOPAEDIC SURGERY
Payer: MEDICAID

## 2025-01-22 ENCOUNTER — OFFICE VISIT (OUTPATIENT)
Dept: FAMILY MEDICINE CLINIC | Facility: CLINIC | Age: 65
End: 2025-01-22
Payer: MEDICAID

## 2025-01-22 VITALS
WEIGHT: 161 LBS | HEIGHT: 60 IN | HEART RATE: 80 BPM | OXYGEN SATURATION: 98 % | DIASTOLIC BLOOD PRESSURE: 72 MMHG | SYSTOLIC BLOOD PRESSURE: 110 MMHG | TEMPERATURE: 98 F | BODY MASS INDEX: 31.61 KG/M2 | RESPIRATION RATE: 16 BRPM

## 2025-01-22 DIAGNOSIS — Z01.818 PRE-OP TESTING: ICD-10-CM

## 2025-01-22 DIAGNOSIS — Z01.818 PREOPERATIVE CLEARANCE: Primary | ICD-10-CM

## 2025-01-22 DIAGNOSIS — M17.11 PRIMARY OSTEOARTHRITIS OF RIGHT KNEE: ICD-10-CM

## 2025-01-22 PROCEDURE — 93005 ELECTROCARDIOGRAM TRACING: CPT

## 2025-01-22 PROCEDURE — 93010 ELECTROCARDIOGRAM REPORT: CPT | Performed by: INTERNAL MEDICINE

## 2025-01-22 PROCEDURE — 99214 OFFICE O/P EST MOD 30 MIN: CPT | Performed by: FAMILY MEDICINE

## 2025-01-22 NOTE — H&P (VIEW-ONLY)
Preoperative History and Physical Family Medicine    CC:   Chief Complaint   Patient presents with    Pre-Op Exam     R knee surgery scheduled on 2/6/2025 w/        Darrin Gerber is 65 year old presenting for a preoperative exam.    This patient is having surgery on date: 2/6/25 for procedure: RTKA  I'm seeing the patient at the request from : Dr. Bautista because of preop clearance  Reporting mechanism back to the doctor via fax and this note.    HPI:   Ms. Gerber is a 65-year-old female with history of diabetes mellitus, rheumatoid arthritis, arthritis of both knees hypertension, hyperlipidemia presenting today   For preoperative clearance.  She is scheduled to undergo a right knee arthroplasty in February 6/25 and left knee arthroplasty in March.  She does not have fever, cough, chest pain, shortness of breath, nausea, vomiting, abdominal pain.  No side effects are complications from anesthesia and surgical procedures that he has had in the past.  No bleeding tendencies.    Labs done which showed A1c of 5.8%      I had reviewed past medical and family histories together with allergy and medication lists documented.    Past Medical History:    Arthritis    Brain tumor (benign) (HCC)    Cataract    Diabetes (HCC)    Esophageal reflux    Essential hypertension    High blood pressure    Hyperlipidemia    Visual impairment       Past Surgical History:   Procedure Laterality Date    Brain surgery      Cataract      Colonoscopy      Excis infratent brain tumor      Upper gi endoscopy,exam         Social History:  Social History     Socioeconomic History    Marital status:    Tobacco Use    Smoking status: Never    Smokeless tobacco: Never    Tobacco comments:     Updated 8/19/24   Vaping Use    Vaping status: Never Used   Substance and Sexual Activity    Alcohol use: Never    Drug use: Never   Other Topics Concern    Caffeine Concern Yes    Exercise No     Social Drivers of Health      Received from [facility], [facility]    Housing Stability       Family History   Problem Relation Age of Onset    No Known Problems Father     No Known Problems Mother        Allergies:  Allergies[1]  Current Meds:    Current Outpatient Medications:     methotrexate 2.5 MG Oral Tab, Take 6 tablets (15 mg total) by mouth once a week for 28 days., Disp: 24 tablet, Rfl: 0    ATORVASTATIN 10 MG Oral Tab, TAKE 1 TABLET BY MOUTH IN THE MORNING, Disp: 30 tablet, Rfl: 4    METFORMIN 500 MG Oral Tab, TAKE 1 TABLET (500 MG TOTAL) BY MOUTH NIGHTLY. (Patient taking differently: Take 1 tablet (500 mg total) by mouth daily with breakfast.), Disp: 90 tablet, Rfl: 1    HYDROCHLOROTHIAZIDE 25 MG Oral Tab, TAKE 1 TABLET (25 MG TOTAL) BY MOUTH DAILY., Disp: 90 tablet, Rfl: 1    gabapentin 100 MG Oral Cap, Take 1 capsule (100 mg total) by mouth 3 (three) times daily., Disp: 90 capsule, Rfl: 0    traMADol 50 MG Oral Tab, Take 1 tablet (50 mg total) by mouth every 8 (eight) hours as needed for Pain., Disp: 15 tablet, Rfl: 0    famotidine 20 MG Oral Tab, 2 (two) times daily as needed., Disp: , Rfl:     folic acid 1 MG Oral Tab, Take 2 tablets (2 mg total) by mouth daily., Disp: 180 tablet, Rfl: 3    tiZANidine 4 MG Oral Tab, Take 1 tablet (4 mg total) by mouth every 8 (eight) hours as needed., Disp: 30 tablet, Rfl: 0    Ferrous Sulfate (FEROSUL) 325 (65 Fe) MG Oral Tab, Take 1 tablet (325 mg total) by mouth daily., Disp: 90 tablet, Rfl: 3    Glucose Blood (ONETOUCH ULTRA) In Vitro Strip, USE TO TEST TWICE A DAY, Disp: 100 each, Rfl: 3    Lancets (ONETOUCH DELICA PLUS [id number]) Does not apply Misc, 1 Lancet by Finger stick route 2 (two) times daily., Disp: 300 each, Rfl: 2    aspirin 81 MG Oral Tab EC, Take 1 tablet (81 mg total) by mouth daily., Disp: , Rfl:     leucovorin 5 MG Oral Tab, Take 1 tablet (5 mg total) by mouth once a week. ON SUNDAYS (Patient taking differently: Take 1 tablet (5 mg total) by mouth once a  week. ON MONDAYS), Disp: 12 tablet, Rfl: 3    nystatin 348556 UNIT/ML Mouth/Throat Suspension, TAKE 5ML BY MOUTH IN THE MORNING AT NOON IN THE EVENING AND AT BEDTIME FOR 10 DAYS, Disp: 240 mL, Rfl: 0    Atovaquone-Proguanil HCl 250-100 MG Oral Tab, Take 1 tablet by mouth daily., Disp: 90 tablet, Rfl: 1    Omeprazole 40 MG Oral Capsule Delayed Release, Take 1 capsule (40 mg total) by mouth daily., Disp: 90 capsule, Rfl: 1    ibuprofen 600 MG Oral Tab, Take 1 tablet (600 mg total) by mouth 3 (three) times daily as needed., Disp: , Rfl:     hydroxychloroquine 200 MG Oral Tab, Take 1 tablet (200 mg total) by mouth daily., Disp: 90 tablet, Rfl: 1    Lidocaine Viscous HCl 2 % Mouth/Throat Solution, Take 5 mL by mouth every 3 (three) hours as needed for Pain. For mouth sores., Disp: 100 mL, Rfl: 1    Cyanocobalamin (VITAMIN B-12) 1000 MCG Sublingual SL Tab, Take 1 tablet by mouth daily., Disp: 90 tablet, Rfl: 3    diclofenac 1 % External Gel, Apply 2 g topically 4 (four) times daily. (Patient taking differently: Apply 2 g topically daily as needed.), Disp: 350 g, Rfl: 0    NAPROXEN 500 MG Oral Tab, TAKE ONE TABLET BY MOUTH TWICE A DAY AS NEEDED, Disp: 180 tablet, Rfl: 1    Blood Glucose Monitoring Suppl (BLOOD GLUCOSE MONITOR SYSTEM) w/Device Does not apply Kit, Please check blood glucose twice a day, Disp: 1 kit, Rfl: 0    Elastic Bandages & Supports (NEOPRENE KNEE BRACE) Does not apply Misc, 1 Application by Does not apply route daily. Left neoprene knee brace for patellofemoral pain syndrome, Disp: 1 each, Rfl: 0    predniSONE 5 MG Oral Tab, Take 1 tablet (5 mg total) by mouth daily. Prednisone 15 mg daily for three days, 10 mg for seven days, 5 mg for seven days (Patient taking differently: Take 1 tablet (5 mg total) by mouth daily as needed (FOR PAIN).), Disp: 30 tablet, Rfl: 0    Review of Systems   Review of Systems   Constitutional:  Negative for fatigue and fever.   HENT:  Negative for sore throat and trouble  swallowing.    Respiratory:  Negative for cough and shortness of breath.    Cardiovascular:  Negative for chest pain.   Gastrointestinal:  Negative for abdominal pain, constipation, diarrhea, nausea and vomiting.   Genitourinary:  Negative for difficulty urinating.   Musculoskeletal:  Positive for arthralgias.   Neurological:  Negative for dizziness and weakness.       Physical Exam   /72   Pulse 80   Temp 98 °F (36.7 °C) (Temporal)   Resp 16   Ht 5' (1.524 m)   Wt 161 lb (73 kg)   SpO2 98%   BMI 31.44 kg/m²   Physical Exam  Vitals reviewed.   Constitutional:       General: She is not in acute distress.  HENT:      Right Ear: Tympanic membrane, ear canal and external ear normal.      Left Ear: Tympanic membrane, ear canal and external ear normal.      Nose: Nose normal.      Mouth/Throat:      Mouth: Mucous membranes are moist.      Pharynx: Oropharynx is clear.   Eyes:      General: No scleral icterus.     Conjunctiva/sclera: Conjunctivae normal.   Cardiovascular:      Rate and Rhythm: Normal rate and regular rhythm.      Heart sounds: Normal heart sounds. No murmur heard.  Pulmonary:      Effort: Pulmonary effort is normal. No respiratory distress.      Breath sounds: Normal breath sounds. No wheezing or rales.   Abdominal:      General: Bowel sounds are normal. There is no distension.      Palpations: Abdomen is soft.      Tenderness: There is no abdominal tenderness.   Musculoskeletal:      Cervical back: Neck supple.      Right lower leg: No edema.      Left lower leg: No edema.   Lymphadenopathy:      Cervical: No cervical adenopathy.   Skin:     General: Skin is warm.   Neurological:      General: No focal deficit present.      Mental Status: She is alert.   Psychiatric:         Mood and Affect: Mood normal.         Behavior: Behavior normal.         Thought Content: Thought content normal.         [facility] Lab Encounter on 01/20/2025   Component Date Value    MSSA/MRSA Culture 01/20/2025 No MRSA or  Staph aureus(MSSA) Isolated     HgbA1C 01/20/2025 5.8 (H)     Estimated Average Glucose 01/20/2025 120     WBC 01/20/2025 6.0     RBC 01/20/2025 3.67 (L)     HGB 01/20/2025 11.4 (L)     HCT 01/20/2025 33.2 (L)     PLT 01/20/2025 375.0     MCV 01/20/2025 90.5     MCH 01/20/2025 31.1     MCHC 01/20/2025 34.3     RDW 01/20/2025 14.3     Neutrophil Absolute Prel* 01/20/2025 3.64     Neutrophil Absolute 01/20/2025 3.64     Lymphocyte Absolute 01/20/2025 1.83     Monocyte Absolute 01/20/2025 0.46     Eosinophil Absolute 01/20/2025 0.06     Basophil Absolute 01/20/2025 0.03     Immature Granulocyte Abs* 01/20/2025 0.01     Neutrophil % 01/20/2025 60.4     Lymphocyte % 01/20/2025 30.3     Monocyte % 01/20/2025 7.6     Eosinophil % 01/20/2025 1.0     Basophil % 01/20/2025 0.5     Immature Granulocyte % 01/20/2025 0.2     Glucose 01/20/2025 109 (H)     Sodium 01/20/2025 136     Potassium 01/20/2025 3.4 (L)     Chloride 01/20/2025 98     CO2 01/20/2025 29.0     Anion Gap 01/20/2025 9     BUN 01/20/2025 16     Creatinine 01/20/2025 0.97     Calcium, Total 01/20/2025 9.3     Calculated Osmolality 01/20/2025 284     eGFR-Cr 01/20/2025 64     AST 01/20/2025 22     ALT 01/20/2025 15     Alkaline Phosphatase 01/20/2025 69     Bilirubin, Total 01/20/2025 0.6     Total Protein 01/20/2025 7.1     Albumin 01/20/2025 4.0     Globulin  01/20/2025 3.1     A/G Ratio 01/20/2025 1.3     Patient Fasting for CMP? 01/20/2025 Yes     PT 01/20/2025 14.1     INR 01/20/2025 1.08     PTT 01/20/2025 30.9     Cholesterol, Total 01/20/2025 157     HDL Cholesterol 01/20/2025 74 (H)     Triglycerides 01/20/2025 83     LDL Cholesterol 01/20/2025 67     VLDL 01/20/2025 12     Non HDL Chol 01/20/2025 83     Patient Fasting for Lipi* 01/20/2025 Yes     Free T4 01/20/2025 2.1 (H)     TSH 01/20/2025 3.823     ABO BLOOD TYPE 01/20/2025 A     RH BLOOD TYPE 01/20/2025 Positive     Antibody Screen 01/20/2025 Negative        Assessment and Plan:  Darrin Gerber is a 65  year old female here for   Chief Complaint   Patient presents with    Pre-Op Exam     R knee surgery scheduled on 2/6/2025 w/      1. Preoperative clearance    2. Primary osteoarthritis of right knee    Patient is medically cleared to undergo planned procedure.  He is at low risk for developing perioperative and postoperative cardiac complications.  Please contact my office if questions or concerns. Patient was advised to hold any blood thinners, anti-inflammatories, or supplements that patient may be taking at least 7-10 days prior to procedure.  I will forward my notes to  for review.        Patient/Caregiver Education: Patient/Caregiver Education: There are no barriers to learning. Medical education done. Outcome: Patient verbalizes understanding.    No orders of the defined types were placed in this encounter.      This note was prepared using Dragon Medical voice recognition dictation software. As a result errors may occur. When identified these errors have been corrected. While every attempt is made to correct errors during dictation discrepancies may still exist          No orders of the defined types were placed in this encounter.    Requested Prescriptions      No prescriptions requested or ordered in this encounter     OFFICE/OUTPT VISIT,ESTLEVL IV          [1]   Allergies  Allergen Reactions    Meloxicam SWELLING     Able to tolerate iburprofen, naproxen,

## 2025-01-22 NOTE — H&P
Preoperative History and Physical Family Medicine    CC:   Chief Complaint   Patient presents with    Pre-Op Exam     R knee surgery scheduled on 2/6/2025 w/        Darrin Gerber is 65 year old presenting for a preoperative exam.    This patient is having surgery on date: 2/6/25 for procedure: RTKA  I'm seeing the patient at the request from : Dr. Bautista because of preop clearance  Reporting mechanism back to the doctor via fax and this note.    HPI:   Ms. Gerber is a 65-year-old female with history of diabetes mellitus, rheumatoid arthritis, arthritis of both knees hypertension, hyperlipidemia presenting today   For preoperative clearance.  She is scheduled to undergo a right knee arthroplasty in February 6/25 and left knee arthroplasty in March.  She does not have fever, cough, chest pain, shortness of breath, nausea, vomiting, abdominal pain.  No side effects are complications from anesthesia and surgical procedures that he has had in the past.  No bleeding tendencies.    Labs done which showed A1c of 5.8%      I had reviewed past medical and family histories together with allergy and medication lists documented.    Past Medical History:    Arthritis    Brain tumor (benign) (HCC)    Cataract    Diabetes (HCC)    Esophageal reflux    Essential hypertension    High blood pressure    Hyperlipidemia    Visual impairment       Past Surgical History:   Procedure Laterality Date    Brain surgery      Cataract      Colonoscopy      Excis infratent brain tumor      Upper gi endoscopy,exam         Social History:  Social History     Socioeconomic History    Marital status:    Tobacco Use    Smoking status: Never    Smokeless tobacco: Never    Tobacco comments:     Updated 8/19/24   Vaping Use    Vaping status: Never Used   Substance and Sexual Activity    Alcohol use: Never    Drug use: Never   Other Topics Concern    Caffeine Concern Yes    Exercise No     Social Drivers of Health      Received from [facility], [facility]    Housing Stability       Family History   Problem Relation Age of Onset    No Known Problems Father     No Known Problems Mother        Allergies:  Allergies[1]  Current Meds:    Current Outpatient Medications:     methotrexate 2.5 MG Oral Tab, Take 6 tablets (15 mg total) by mouth once a week for 28 days., Disp: 24 tablet, Rfl: 0    ATORVASTATIN 10 MG Oral Tab, TAKE 1 TABLET BY MOUTH IN THE MORNING, Disp: 30 tablet, Rfl: 4    METFORMIN 500 MG Oral Tab, TAKE 1 TABLET (500 MG TOTAL) BY MOUTH NIGHTLY. (Patient taking differently: Take 1 tablet (500 mg total) by mouth daily with breakfast.), Disp: 90 tablet, Rfl: 1    HYDROCHLOROTHIAZIDE 25 MG Oral Tab, TAKE 1 TABLET (25 MG TOTAL) BY MOUTH DAILY., Disp: 90 tablet, Rfl: 1    gabapentin 100 MG Oral Cap, Take 1 capsule (100 mg total) by mouth 3 (three) times daily., Disp: 90 capsule, Rfl: 0    traMADol 50 MG Oral Tab, Take 1 tablet (50 mg total) by mouth every 8 (eight) hours as needed for Pain., Disp: 15 tablet, Rfl: 0    famotidine 20 MG Oral Tab, 2 (two) times daily as needed., Disp: , Rfl:     folic acid 1 MG Oral Tab, Take 2 tablets (2 mg total) by mouth daily., Disp: 180 tablet, Rfl: 3    tiZANidine 4 MG Oral Tab, Take 1 tablet (4 mg total) by mouth every 8 (eight) hours as needed., Disp: 30 tablet, Rfl: 0    Ferrous Sulfate (FEROSUL) 325 (65 Fe) MG Oral Tab, Take 1 tablet (325 mg total) by mouth daily., Disp: 90 tablet, Rfl: 3    Glucose Blood (ONETOUCH ULTRA) In Vitro Strip, USE TO TEST TWICE A DAY, Disp: 100 each, Rfl: 3    Lancets (ONETOUCH DELICA PLUS [id number]) Does not apply Misc, 1 Lancet by Finger stick route 2 (two) times daily., Disp: 300 each, Rfl: 2    aspirin 81 MG Oral Tab EC, Take 1 tablet (81 mg total) by mouth daily., Disp: , Rfl:     leucovorin 5 MG Oral Tab, Take 1 tablet (5 mg total) by mouth once a week. ON SUNDAYS (Patient taking differently: Take 1 tablet (5 mg total) by mouth once a  week. ON MONDAYS), Disp: 12 tablet, Rfl: 3    nystatin 577239 UNIT/ML Mouth/Throat Suspension, TAKE 5ML BY MOUTH IN THE MORNING AT NOON IN THE EVENING AND AT BEDTIME FOR 10 DAYS, Disp: 240 mL, Rfl: 0    Atovaquone-Proguanil HCl 250-100 MG Oral Tab, Take 1 tablet by mouth daily., Disp: 90 tablet, Rfl: 1    Omeprazole 40 MG Oral Capsule Delayed Release, Take 1 capsule (40 mg total) by mouth daily., Disp: 90 capsule, Rfl: 1    ibuprofen 600 MG Oral Tab, Take 1 tablet (600 mg total) by mouth 3 (three) times daily as needed., Disp: , Rfl:     hydroxychloroquine 200 MG Oral Tab, Take 1 tablet (200 mg total) by mouth daily., Disp: 90 tablet, Rfl: 1    Lidocaine Viscous HCl 2 % Mouth/Throat Solution, Take 5 mL by mouth every 3 (three) hours as needed for Pain. For mouth sores., Disp: 100 mL, Rfl: 1    Cyanocobalamin (VITAMIN B-12) 1000 MCG Sublingual SL Tab, Take 1 tablet by mouth daily., Disp: 90 tablet, Rfl: 3    diclofenac 1 % External Gel, Apply 2 g topically 4 (four) times daily. (Patient taking differently: Apply 2 g topically daily as needed.), Disp: 350 g, Rfl: 0    NAPROXEN 500 MG Oral Tab, TAKE ONE TABLET BY MOUTH TWICE A DAY AS NEEDED, Disp: 180 tablet, Rfl: 1    Blood Glucose Monitoring Suppl (BLOOD GLUCOSE MONITOR SYSTEM) w/Device Does not apply Kit, Please check blood glucose twice a day, Disp: 1 kit, Rfl: 0    Elastic Bandages & Supports (NEOPRENE KNEE BRACE) Does not apply Misc, 1 Application by Does not apply route daily. Left neoprene knee brace for patellofemoral pain syndrome, Disp: 1 each, Rfl: 0    predniSONE 5 MG Oral Tab, Take 1 tablet (5 mg total) by mouth daily. Prednisone 15 mg daily for three days, 10 mg for seven days, 5 mg for seven days (Patient taking differently: Take 1 tablet (5 mg total) by mouth daily as needed (FOR PAIN).), Disp: 30 tablet, Rfl: 0    Review of Systems   Review of Systems   Constitutional:  Negative for fatigue and fever.   HENT:  Negative for sore throat and trouble  swallowing.    Respiratory:  Negative for cough and shortness of breath.    Cardiovascular:  Negative for chest pain.   Gastrointestinal:  Negative for abdominal pain, constipation, diarrhea, nausea and vomiting.   Genitourinary:  Negative for difficulty urinating.   Musculoskeletal:  Positive for arthralgias.   Neurological:  Negative for dizziness and weakness.       Physical Exam   /72   Pulse 80   Temp 98 °F (36.7 °C) (Temporal)   Resp 16   Ht 5' (1.524 m)   Wt 161 lb (73 kg)   SpO2 98%   BMI 31.44 kg/m²   Physical Exam  Vitals reviewed.   Constitutional:       General: She is not in acute distress.  HENT:      Right Ear: Tympanic membrane, ear canal and external ear normal.      Left Ear: Tympanic membrane, ear canal and external ear normal.      Nose: Nose normal.      Mouth/Throat:      Mouth: Mucous membranes are moist.      Pharynx: Oropharynx is clear.   Eyes:      General: No scleral icterus.     Conjunctiva/sclera: Conjunctivae normal.   Cardiovascular:      Rate and Rhythm: Normal rate and regular rhythm.      Heart sounds: Normal heart sounds. No murmur heard.  Pulmonary:      Effort: Pulmonary effort is normal. No respiratory distress.      Breath sounds: Normal breath sounds. No wheezing or rales.   Abdominal:      General: Bowel sounds are normal. There is no distension.      Palpations: Abdomen is soft.      Tenderness: There is no abdominal tenderness.   Musculoskeletal:      Cervical back: Neck supple.      Right lower leg: No edema.      Left lower leg: No edema.   Lymphadenopathy:      Cervical: No cervical adenopathy.   Skin:     General: Skin is warm.   Neurological:      General: No focal deficit present.      Mental Status: She is alert.   Psychiatric:         Mood and Affect: Mood normal.         Behavior: Behavior normal.         Thought Content: Thought content normal.         [facility] Lab Encounter on 01/20/2025   Component Date Value    MSSA/MRSA Culture 01/20/2025 No MRSA or  Staph aureus(MSSA) Isolated     HgbA1C 01/20/2025 5.8 (H)     Estimated Average Glucose 01/20/2025 120     WBC 01/20/2025 6.0     RBC 01/20/2025 3.67 (L)     HGB 01/20/2025 11.4 (L)     HCT 01/20/2025 33.2 (L)     PLT 01/20/2025 375.0     MCV 01/20/2025 90.5     MCH 01/20/2025 31.1     MCHC 01/20/2025 34.3     RDW 01/20/2025 14.3     Neutrophil Absolute Prel* 01/20/2025 3.64     Neutrophil Absolute 01/20/2025 3.64     Lymphocyte Absolute 01/20/2025 1.83     Monocyte Absolute 01/20/2025 0.46     Eosinophil Absolute 01/20/2025 0.06     Basophil Absolute 01/20/2025 0.03     Immature Granulocyte Abs* 01/20/2025 0.01     Neutrophil % 01/20/2025 60.4     Lymphocyte % 01/20/2025 30.3     Monocyte % 01/20/2025 7.6     Eosinophil % 01/20/2025 1.0     Basophil % 01/20/2025 0.5     Immature Granulocyte % 01/20/2025 0.2     Glucose 01/20/2025 109 (H)     Sodium 01/20/2025 136     Potassium 01/20/2025 3.4 (L)     Chloride 01/20/2025 98     CO2 01/20/2025 29.0     Anion Gap 01/20/2025 9     BUN 01/20/2025 16     Creatinine 01/20/2025 0.97     Calcium, Total 01/20/2025 9.3     Calculated Osmolality 01/20/2025 284     eGFR-Cr 01/20/2025 64     AST 01/20/2025 22     ALT 01/20/2025 15     Alkaline Phosphatase 01/20/2025 69     Bilirubin, Total 01/20/2025 0.6     Total Protein 01/20/2025 7.1     Albumin 01/20/2025 4.0     Globulin  01/20/2025 3.1     A/G Ratio 01/20/2025 1.3     Patient Fasting for CMP? 01/20/2025 Yes     PT 01/20/2025 14.1     INR 01/20/2025 1.08     PTT 01/20/2025 30.9     Cholesterol, Total 01/20/2025 157     HDL Cholesterol 01/20/2025 74 (H)     Triglycerides 01/20/2025 83     LDL Cholesterol 01/20/2025 67     VLDL 01/20/2025 12     Non HDL Chol 01/20/2025 83     Patient Fasting for Lipi* 01/20/2025 Yes     Free T4 01/20/2025 2.1 (H)     TSH 01/20/2025 3.823     ABO BLOOD TYPE 01/20/2025 A     RH BLOOD TYPE 01/20/2025 Positive     Antibody Screen 01/20/2025 Negative        Assessment and Plan:  Darrin Gerber is a 65  year old female here for   Chief Complaint   Patient presents with    Pre-Op Exam     R knee surgery scheduled on 2/6/2025 w/      1. Preoperative clearance    2. Primary osteoarthritis of right knee    Patient is medically cleared to undergo planned procedure.  He is at low risk for developing perioperative and postoperative cardiac complications.  Please contact my office if questions or concerns. Patient was advised to hold any blood thinners, anti-inflammatories, or supplements that patient may be taking at least 7-10 days prior to procedure.  I will forward my notes to  for review.        Patient/Caregiver Education: Patient/Caregiver Education: There are no barriers to learning. Medical education done. Outcome: Patient verbalizes understanding.    No orders of the defined types were placed in this encounter.      This note was prepared using Dragon Medical voice recognition dictation software. As a result errors may occur. When identified these errors have been corrected. While every attempt is made to correct errors during dictation discrepancies may still exist          No orders of the defined types were placed in this encounter.    Requested Prescriptions      No prescriptions requested or ordered in this encounter     OFFICE/OUTPT VISIT,ESTLEVL IV          [1]   Allergies  Allergen Reactions    Meloxicam SWELLING     Able to tolerate iburprofen, naproxen,

## 2025-01-23 ENCOUNTER — HOSPITAL ENCOUNTER (OUTPATIENT)
Dept: GENERAL RADIOLOGY | Age: 65
Discharge: HOME | End: 2025-01-23
Attending: ORTHOPAEDIC SURGERY
Payer: MEDICAID

## 2025-01-23 ENCOUNTER — TELEPHONE (OUTPATIENT)
Dept: ORTHOPEDICS CLINIC | Facility: CLINIC | Age: 65
End: 2025-01-23

## 2025-01-23 DIAGNOSIS — M17.0 PRIMARY OSTEOARTHRITIS OF BOTH KNEES: ICD-10-CM

## 2025-01-23 DIAGNOSIS — M17.11 PRIMARY OSTEOARTHRITIS OF RIGHT KNEE: Primary | ICD-10-CM

## 2025-01-23 LAB
ATRIAL RATE: 84 BPM
P AXIS: 60 DEGREES
P-R INTERVAL: 134 MS
Q-T INTERVAL: 376 MS
QRS DURATION: 84 MS
QTC CALCULATION (BEZET): 444 MS
R AXIS: -2 DEGREES
T AXIS: 68 DEGREES
VENTRICULAR RATE: 84 BPM

## 2025-01-23 PROCEDURE — 73562 X-RAY EXAM OF KNEE 3: CPT | Performed by: ORTHOPAEDIC SURGERY

## 2025-01-23 PROCEDURE — 77073 BONE LENGTH STUDIES: CPT | Performed by: ORTHOPAEDIC SURGERY

## 2025-02-04 ENCOUNTER — OFFICE VISIT (OUTPATIENT)
Dept: RHEUMATOLOGY | Facility: CLINIC | Age: 65
End: 2025-02-04
Payer: MEDICAID

## 2025-02-04 ENCOUNTER — HOSPITAL ENCOUNTER (OUTPATIENT)
Dept: GENERAL RADIOLOGY | Age: 65
Discharge: HOME | End: 2025-02-04
Attending: INTERNAL MEDICINE
Payer: MEDICAID

## 2025-02-04 VITALS
BODY MASS INDEX: 31 KG/M2 | WEIGHT: 161 LBS | HEART RATE: 88 BPM | OXYGEN SATURATION: 98 % | RESPIRATION RATE: 16 BRPM | SYSTOLIC BLOOD PRESSURE: 132 MMHG | TEMPERATURE: 98 F | DIASTOLIC BLOOD PRESSURE: 62 MMHG

## 2025-02-04 DIAGNOSIS — Z78.0 POST-MENOPAUSAL: ICD-10-CM

## 2025-02-04 DIAGNOSIS — M25.521 BILATERAL ELBOW JOINT PAIN: ICD-10-CM

## 2025-02-04 DIAGNOSIS — M54.2 CERVICALGIA: ICD-10-CM

## 2025-02-04 DIAGNOSIS — M05.742 RHEUMATOID ARTHRITIS INVOLVING BOTH HANDS WITH POSITIVE RHEUMATOID FACTOR (HCC): Primary | ICD-10-CM

## 2025-02-04 DIAGNOSIS — M25.522 BILATERAL ELBOW JOINT PAIN: ICD-10-CM

## 2025-02-04 DIAGNOSIS — D84.821 IMMUNODEFICIENCY DUE TO DRUG THERAPY (HCC): ICD-10-CM

## 2025-02-04 DIAGNOSIS — M05.741 RHEUMATOID ARTHRITIS INVOLVING BOTH HANDS WITH POSITIVE RHEUMATOID FACTOR (HCC): Primary | ICD-10-CM

## 2025-02-04 DIAGNOSIS — Z79.899 IMMUNODEFICIENCY DUE TO DRUG THERAPY (HCC): ICD-10-CM

## 2025-02-04 DIAGNOSIS — G62.9 NEUROPATHY: ICD-10-CM

## 2025-02-04 DIAGNOSIS — Z51.81 THERAPEUTIC DRUG MONITORING: ICD-10-CM

## 2025-02-04 PROCEDURE — 99214 OFFICE O/P EST MOD 30 MIN: CPT | Performed by: INTERNAL MEDICINE

## 2025-02-04 PROCEDURE — 72052 X-RAY EXAM NECK SPINE 6/>VWS: CPT | Performed by: INTERNAL MEDICINE

## 2025-02-04 RX ORDER — ACETAMINOPHEN 500 MG
500 TABLET ORAL EVERY 6 HOURS PRN
COMMUNITY
End: 2025-02-07

## 2025-02-04 RX ORDER — METHOTREXATE 2.5 MG/1
20 TABLET ORAL WEEKLY
Qty: 104 TABLET | Refills: 0 | Status: SHIPPED | OUTPATIENT
Start: 2025-02-04 | End: 2025-05-06

## 2025-02-04 NOTE — PATIENT INSTRUCTIONS
-continue methotrexate 15 mg weekly for February 2025    -increase the methotrexate to 8 pills weekly (in early March 2025)  --4 pills Sunday morning   --4 pills Sunday evening     -leucovorin on Mondays  -folic acid daily     Previously ordered bone density scan prior visits.  Not completed.  Reordered this    -Upcoming surgeries, TKA.  Obtain cervical spine films with flexion-extension to rule out atlantoaxial instability given rheumatoid arthritis    X-rays of the bilateral shoulders and elbows given persistent pain in these regions.  These would benefit from physical therapy.  However she needs to focus on rehab after TKAs first.

## 2025-02-04 NOTE — PROGRESS NOTES
Rheumatology Follow-up Note  =====================================================================================================    Chief Complaint:   RA    PCP  John Botello MD    =====================================================================================================  HPI    Darrin Gerber is a 65 year old female   Diagnosed with RA 7 years ago at [facility]. Was on methotrexate in the past. Has been off medications 1-2 years.  More inflammed in the hands now. This is happening every day. Steroid tapers in the past, helped, but a few days later it comes back.   Seen by her PCP Dr. Botello on April 13 was given a Medrol Dosepack and started on gabapentin 300 mg nightly. Prn naproxen helps a bit. .   Recent bilateral knee corticosteroid injections by pain service in February 2021   Gets injections: shoulders, LESI, EMILY. These help somewhat.   ==============================================================================================================  Visit: 08/01/24  Recent thrush, doing better.  Took fluconazole, stopped 3 to 4 days ago.  Thrush now resolved.  Continues on methotrexate 15 mg weekly, leucovorin once weekly, hydroxychloroquine 1 pill daily  -Was not able to do home physical therapy since the last visit.  Did get bilateral knee injections and shoulder injections recently with partial relief  -1 day of the week she has some pain and stiffness in the morning.  The rest of the week she is good.  ==============================================================================================================  Today's Visit: 02/04/25     Hands are okay.  -Self discontinued hydroxychloroquine in the past  -Continues on methotrexate 15 mg weekly, leucovorin the day after methotrexate.  Oral ulcers have resolved.  -Shoulders/elbows are fairly good.  Did 1 session of physical therapy  -Getting sequential right TKA then left TKA in February and April 2025 respectively    5 point ROS  negative except noted above  I had reviewed past medical and family histories together with allergy and medication lists documented.    Medications:  Outpatient Medications Marked as Taking for the 2/4/25 encounter (Office Visit) with Estela Fuller MD   Medication Sig Dispense Refill    acetaminophen 500 MG Oral Tab Take 1 tablet (500 mg total) by mouth every 6 (six) hours as needed for Pain.      methotrexate 2.5 MG Oral Tab Take 8 tablets (20 mg total) by mouth once a week. 104 tablet 0    ATORVASTATIN 10 MG Oral Tab TAKE 1 TABLET BY MOUTH IN THE MORNING 30 tablet 4    METFORMIN 500 MG Oral Tab TAKE 1 TABLET (500 MG TOTAL) BY MOUTH NIGHTLY. 90 tablet 1    HYDROCHLOROTHIAZIDE 25 MG Oral Tab TAKE 1 TABLET (25 MG TOTAL) BY MOUTH DAILY. 90 tablet 1    famotidine 20 MG Oral Tab 2 (two) times daily as needed.      tiZANidine 4 MG Oral Tab Take 1 tablet (4 mg total) by mouth every 8 (eight) hours as needed. 30 tablet 0    Ferrous Sulfate (FEROSUL) 325 (65 Fe) MG Oral Tab Take 1 tablet (325 mg total) by mouth daily. 90 tablet 3    Glucose Blood (ONETOUCH ULTRA) In Vitro Strip USE TO TEST TWICE A  each 3    Lancets (ONETOUCH DELICA PLUS [id number]) Does not apply Misc 1 Lancet by Finger stick route 2 (two) times daily. 300 each 2    aspirin 81 MG Oral Tab EC Take 1 tablet (81 mg total) by mouth daily.      leucovorin 5 MG Oral Tab Take 1 tablet (5 mg total) by mouth once a week. ON SUNDAYS 12 tablet 3    nystatin 864047 UNIT/ML Mouth/Throat Suspension TAKE 5ML BY MOUTH IN THE MORNING AT NOON IN THE EVENING AND AT BEDTIME FOR 10 DAYS 240 mL 0    Atovaquone-Proguanil HCl 250-100 MG Oral Tab Take 1 tablet by mouth daily. 90 tablet 1    Omeprazole 40 MG Oral Capsule Delayed Release Take 1 capsule (40 mg total) by mouth daily. 90 capsule 1    Lidocaine Viscous HCl 2 % Mouth/Throat Solution Take 5 mL by mouth every 3 (three) hours as needed for Pain. For mouth sores. 100 mL 1    diclofenac 1 % External Gel Apply 2 g  topically 4 (four) times daily. 350 g 0    Blood Glucose Monitoring Suppl (BLOOD GLUCOSE MONITOR SYSTEM) w/Device Does not apply Kit Please check blood glucose twice a day 1 kit 0    Elastic Bandages & Supports (NEOPRENE KNEE BRACE) Does not apply Misc 1 Application by Does not apply route daily. Left neoprene knee brace for patellofemoral pain syndrome 1 each 0     Modified Medications    Modified Medication Previous Medication    METHOTREXATE 2.5 MG ORAL TAB methotrexate 2.5 MG Oral Tab       Take 8 tablets (20 mg total) by mouth once a week.    Take 6 tablets (15 mg total) by mouth once a week for 28 days.     Medications Discontinued During This Encounter   Medication Reason    traMADol 50 MG Oral Tab     ibuprofen 600 MG Oral Tab     NAPROXEN 500 MG Oral Tab     methotrexate 2.5 MG Oral Tab        Allergies:  Allergies   Allergen Reactions    Meloxicam SWELLING     Able to tolerate iburprofen, naproxen,        Objective    Vitals:    02/04/25 1311   BP: 132/62   Pulse: 88   Resp: 16   Temp: 98 °F (36.7 °C)   SpO2: 98%   Weight: 161 lb (73 kg)     GEN: NAD, well-nourished.   HEENT: Head: NCAT. Face: No lesions. Eyes: Conjunctiva clear. Sclera are anicteric. PERRLA. EOMs are full.   PULM:  easy effort  Extremities: No cyanosis, edema or deformities.   Neurologic: Strength, CN2-12 grossly intact   Psych: normal affect.   Skin: No lesions or rashes.  MSK: 28 joint count performed. No evidence of synovitis in mcp, pip, dip, wrist, elbows, shoulders, hips, knees, ankles, mtp unless otherwise noted. Full ROM of elbows, wrists, knees.     PtGA: 8.5  SJ: 0  TJ: 2 (bilateral shoulders)  MDGA: 1      CDAI 11.5  -Painful arc of the bilateral shoulders  -Tender to palpation in the bilateral lateral epicondyles    Labs:  Additional Labs:    Lab Results   Component Value Date    WBC 6.0 01/20/2025    RBC 3.67 (L) 01/20/2025    HGB 11.4 (L) 01/20/2025    HCT 33.2 (L) 01/20/2025    .0 01/20/2025    MCV 90.5 01/20/2025     MCH 31.1 01/20/2025    MCHC 34.3 01/20/2025    RDW 14.3 01/20/2025    NEPRELIM 3.64 01/20/2025    NEPERCENT 60.4 01/20/2025    LYPERCENT 30.3 01/20/2025    MOPERCENT 7.6 01/20/2025    EOPERCENT 1.0 01/20/2025    BAPERCENT 0.5 01/20/2025    NE 3.64 01/20/2025    LYMABS 1.83 01/20/2025    MOABSO 0.46 01/20/2025    EOABSO 0.06 01/20/2025    BAABSO 0.03 01/20/2025     Lab Results   Component Value Date     (H) 01/20/2025    BUN 16 01/20/2025    BUNCREA 21.2 (H) 07/21/2021    CREATSERUM 0.97 01/20/2025    ANIONGAP 9 01/20/2025    GFR 60 06/22/2017    GFRNAA 58 (L) 04/13/2022    GFRAA 67 04/13/2022    CA 9.3 01/20/2025    OSMOCALC 284 01/20/2025    ALKPHO 69 01/20/2025    AST 22 01/20/2025    ALT 15 01/20/2025    BILT 0.6 01/20/2025    TP 7.1 01/20/2025    ALB 4.0 01/20/2025    GLOBULIN 3.1 01/20/2025     01/20/2025    K 3.4 (L) 01/20/2025    CL 98 01/20/2025    CO2 29.0 01/20/2025 12/2022: Creatinine 1.33, rest of CMP is normal.   WBC 5.0, hemoglobin 10.5, platelets 307  Radiology review:  XR BONE LENGTH STUDIES (SCANOGRAM) (CPT=77073)    Result Date: 1/23/2025  CONCLUSION:  No acute injury.  Moderate osteoarthritis of bilateral knees.  Mild/moderate osteoarthritis of bilateral hips.   LOCATION:  [facility]    Dictated by (CST): Carolyn Colon MD on 1/23/2025 at 2:32 PM     Finalized by (CST): Carolyn Colon MD on 1/23/2025 at 2:34 PM       XR KNEE (3 VIEWS), LEFT (CPT=73562)    Result Date: 1/23/2025  CONCLUSION:  Osteopenia degrades sensitivity for fracture detection.  Within this limitation no appreciable fracture or traumatic malalignment.  Severe joint space loss of the medial and lateral compartments, additionally with mild/moderate patellofemoral joint space loss.  Tricompartmental osteophyte formation.   No patellar subluxation.  Partial mineralization of the proximal patellar tendon.  Trace suprapatellar joint effusion.  Stable areas of mineralization projecting within the posterior aspect of the joint,  possible accessory ossicle and/or intra-articular body.   LOCATION:  [facility]   Dictated by (CST): Carolyn Colon MD on 1/23/2025 at 2:31 PM     Finalized by (CST): Carolyn Colon MD on 1/23/2025 at 2:32 PM       XR KNEE (3 VIEWS), RIGHT (CPT=73562)    Result Date: 1/23/2025  CONCLUSION:  No acute fractures.  Prominent osteoarthritic changes.   LOCATION:  [facility]   Dictated by (CST): Carlee Marks MD on 1/23/2025 at 2:05 PM     Finalized by (CST): Carlee Marks MD on 1/23/2025 at 2:05 PM         6/2021 CT chest     Impression   CONCLUSION:         1. No active cardiopulmonary process identified.  No specific findings to suggest significant interstitial lung disease.       2. Scattered noncalcified pulmonary nodules in the lungs measuring up to 6 mm as above. Followup as per Fleischner criteria is suggested.       3. Mild bronchiectasis in the lower lobes with minimal scattered mucus plugging.          =====================================================================================================  Assessment and Plan    Assessment:  1. Rheumatoid arthritis involving both hands with positive rheumatoid factor (HCC)    2. Bilateral elbow joint pain    3. Neuropathy    4. Cervicalgia    5. Post-menopausal    6. Therapeutic drug monitoring    7. 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   -Today, CDAI is 10.5 indicating low/moderate disease activity.  Bulk of disease activity measure is due to patient global.  No active synovitis noted today  -Suspect osteoarthritis is contributing to the vast majority of the patient's pain    #Chronic musculoskeletal pain: Cervical/lumbar DJD, bilateral GH osteoarthritis with rotator cuff impingement, bilateral knee osteoarthritis  #Bilateral lateral epicondylitis  Knee and back arthralgias more related to chronic damage than active inflammation. Actual age may be 10 years higher than reported age per patient  daughter.  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    High risk medication labs including CMP and CBC w/ diff reviewed from 1/2025. Results are stable.   Lab Results   Component Value Date    HBCAB Nonreactive 06/10/2021    HBSAG Nonreactive 06/10/2021    HBSABQL Nonreactive 06/10/2021    HCVAB Nonreactive 06/10/2021    QFGOLDMITNIL >10.00 06/21/2021    QFGOLDNIL 0.03 06/21/2021    OOVGCJEO1DTD 0.03 06/21/2021    MXCZCNHV1BNO 0.02 06/21/2021       Plan:  -Patient self discontinued hydroxychloroquine in the past and does not want to restart    -continue methotrexate 15 mg weekly for February 2025  -increase the methotrexate to 8 pills weekly (in early March 2025)  --4 pills Sunday morning   --4 pills Sunday evening     -leucovorin on Mondays  -folic acid daily     Repeat methotrexate labs in May 2025.     Previously ordered bone density scan prior visits.  Not completed.  Reordered this    -Upcoming surgeries, 2/6/2025 R TKA and 4/8/2025 L TKA  Obtain cervical spine films with flexion-extension to rule out atlantoaxial instability given rheumatoid arthritis. Addendum: 02/04/25: C-spine films are ok. Ok to proceed from rheum standpoint with surgery.     X-rays of the bilateral shoulders and elbows given persistent pain in these regions.  These would benefit from physical therapy.  However she needs to focus on rehab after TKAs first.    EMG/NCS of the bilateral upper extremities given paresthesias in the morning.  Not able to reproduce this today on exam    Rtc July 2025    Plan:  Diagnoses and all orders for this visit:    Rheumatoid arthritis involving both hands with positive rheumatoid factor (HCC)  -     methotrexate 2.5 MG Oral Tab; Take 8 tablets (20 mg total) by mouth once a week.  -     Comp Metabolic Panel (14); Future  -     CBC With Differential With Platelet; Future  -     XR DEXA  BONE DENSITOMETRY (CPT=77080); Future    Bilateral elbow joint pain  -     XR ELBOW, COMPLETE (MIN 3 VIEWS), RIGHT (CPT=73080); Future  -     XR ELBOW, COMPLETE (MIN 3 VIEWS), LEFT (CPT=73080); Future  -     Cancel: XR CERVICAL SPINE COMPLETE W/FLEX + EXT (CPT=72052); Future  -     Comp Metabolic Panel (14); Future  -     CBC With Differential With Platelet; Future  -     XR CERVICAL SPINE COMPLETE W/FLEX + EXT (CPT=72052); Future    Neuropathy  -     EMG/NCV; Future  -     Comp Metabolic Panel (14); Future  -     CBC With Differential With Platelet; Future    Cervicalgia  -     Cancel: XR CERVICAL SPINE COMPLETE W/FLEX + EXT (CPT=72052); Future  -     Comp Metabolic Panel (14); Future  -     CBC With Differential With Platelet; Future  -     XR CERVICAL SPINE COMPLETE W/FLEX + EXT (CPT=72052); Future    Post-menopausal  -     XR DEXA BONE DENSITOMETRY (CPT=77080); Future    Therapeutic drug monitoring    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      The above plan of care, diagnosis, orders, and follow-up were discussed with the patient. Questions related to this recommended plan of care were answered.    Communication to the referring provider, PCP, and/or the patient's other physicians relevant to this encounter was sent.    Thank you for referring this delightful patient to me. Please feel free to contact me with any questions.     This report was performed utilizing speech recognition software technology. Despite proofreading, speech recognition errors could escape detection. If a word or phrase is confusing or out of context, please do not hesitate to call for   clarification.     Kind regards    Estela Fuller MD  [facility]

## 2025-02-06 ENCOUNTER — ANESTHESIA (OUTPATIENT)
Dept: SURGERY | Facility: HOSPITAL | Age: 65
End: 2025-02-06
Payer: MEDICAID

## 2025-02-06 ENCOUNTER — APPOINTMENT (OUTPATIENT)
Dept: GENERAL RADIOLOGY | Facility: HOSPITAL | Age: 65
End: 2025-02-06
Attending: ORTHOPAEDIC SURGERY
Payer: MEDICAID

## 2025-02-06 ENCOUNTER — HOSPITAL ENCOUNTER (OUTPATIENT)
Facility: HOSPITAL | Age: 65
Discharge: HOME HEALTH | End: 2025-02-07
Attending: ORTHOPAEDIC SURGERY | Admitting: ORTHOPAEDIC SURGERY
Payer: MEDICAID

## 2025-02-06 ENCOUNTER — ANESTHESIA EVENT (OUTPATIENT)
Dept: SURGERY | Facility: HOSPITAL | Age: 65
End: 2025-02-06
Payer: MEDICAID

## 2025-02-06 DIAGNOSIS — M17.0 PRIMARY OSTEOARTHRITIS OF BOTH KNEES: ICD-10-CM

## 2025-02-06 DIAGNOSIS — M17.11 PRIMARY OSTEOARTHRITIS OF RIGHT KNEE: ICD-10-CM

## 2025-02-06 DIAGNOSIS — Z01.818 PRE-OP TESTING: Primary | ICD-10-CM

## 2025-02-06 LAB
GLUCOSE BLD-MCNC: 113 MG/DL (ref 70–99)
GLUCOSE BLD-MCNC: 115 MG/DL (ref 70–99)
RH BLOOD TYPE: POSITIVE

## 2025-02-06 PROCEDURE — 76942 ECHO GUIDE FOR BIOPSY: CPT | Performed by: ANESTHESIOLOGY

## 2025-02-06 PROCEDURE — 0SRC069 REPLACEMENT OF RIGHT KNEE JOINT WITH OXIDIZED ZIRCONIUM ON POLYETHYLENE SYNTHETIC SUBSTITUTE, CEMENTED, OPEN APPROACH: ICD-10-PCS | Performed by: ORTHOPAEDIC SURGERY

## 2025-02-06 PROCEDURE — 27447 TOTAL KNEE ARTHROPLASTY: CPT | Performed by: PHYSICIAN ASSISTANT

## 2025-02-06 PROCEDURE — 73560 X-RAY EXAM OF KNEE 1 OR 2: CPT | Performed by: ORTHOPAEDIC SURGERY

## 2025-02-06 PROCEDURE — 27447 TOTAL KNEE ARTHROPLASTY: CPT | Performed by: ORTHOPAEDIC SURGERY

## 2025-02-06 PROCEDURE — 99205 OFFICE O/P NEW HI 60 MIN: CPT | Performed by: HOSPITALIST

## 2025-02-06 DEVICE — FIXED TIBIAL TRAY CEMENTED RIGHT, SIZE 2
Type: IMPLANTABLE DEVICE | Site: KNEE | Status: FUNCTIONAL
Brand: GMK PRIMARY TOTAL KNEE SYSTEM

## 2025-02-06 DEVICE — FEMUR SPHERE CEMENTED RIGHT, SIZE 2
Type: IMPLANTABLE DEVICE | Site: KNEE | Status: FUNCTIONAL
Brand: GMK SPHERE TOTAL KNEE SYSTEM

## 2025-02-06 DEVICE — GMK SPHERE KNEE: Type: IMPLANTABLE DEVICE

## 2025-02-06 DEVICE — IMPLANTABLE DEVICE
Type: IMPLANTABLE DEVICE | Site: KNEE | Status: FUNCTIONAL
Brand: REFOBACIN® BONE CEMENT R

## 2025-02-06 DEVICE — TIBIAL INSERT FIXED SPHERE FLEX   #2/14 MM R E-CROSS
Type: IMPLANTABLE DEVICE | Site: KNEE | Status: FUNCTIONAL
Brand: GMK SPHERE TOTAL KNEE SYSTEM

## 2025-02-06 RX ORDER — SODIUM PHOSPHATE, DIBASIC AND SODIUM PHOSPHATE, MONOBASIC 7; 19 G/230ML; G/230ML
1 ENEMA RECTAL ONCE AS NEEDED
Status: DISCONTINUED | OUTPATIENT
Start: 2025-02-06 | End: 2025-02-08

## 2025-02-06 RX ORDER — SODIUM CHLORIDE, SODIUM LACTATE, POTASSIUM CHLORIDE, CALCIUM CHLORIDE 600; 310; 30; 20 MG/100ML; MG/100ML; MG/100ML; MG/100ML
INJECTION, SOLUTION INTRAVENOUS CONTINUOUS
Status: DISCONTINUED | OUTPATIENT
Start: 2025-02-06 | End: 2025-02-06 | Stop reason: HOSPADM

## 2025-02-06 RX ORDER — BISACODYL 10 MG
10 SUPPOSITORY, RECTAL RECTAL
Status: DISCONTINUED | OUTPATIENT
Start: 2025-02-06 | End: 2025-02-08

## 2025-02-06 RX ORDER — CEPHALEXIN 500 MG/1
500 CAPSULE ORAL EVERY 8 HOURS SCHEDULED
Status: DISCONTINUED | OUTPATIENT
Start: 2025-02-07 | End: 2025-02-08

## 2025-02-06 RX ORDER — ACETAMINOPHEN 325 MG/1
650 TABLET ORAL ONCE
Status: DISCONTINUED | OUTPATIENT
Start: 2025-02-06 | End: 2025-02-06 | Stop reason: HOSPADM

## 2025-02-06 RX ORDER — MIDAZOLAM HYDROCHLORIDE 1 MG/ML
INJECTION INTRAMUSCULAR; INTRAVENOUS AS NEEDED
Status: DISCONTINUED | OUTPATIENT
Start: 2025-02-06 | End: 2025-02-06 | Stop reason: SURG

## 2025-02-06 RX ORDER — DEXTROSE MONOHYDRATE 25 G/50ML
50 INJECTION, SOLUTION INTRAVENOUS
Status: DISCONTINUED | OUTPATIENT
Start: 2025-02-06 | End: 2025-02-06 | Stop reason: HOSPADM

## 2025-02-06 RX ORDER — DIPHENHYDRAMINE HYDROCHLORIDE 50 MG/ML
12.5 INJECTION INTRAMUSCULAR; INTRAVENOUS EVERY 4 HOURS PRN
Status: DISCONTINUED | OUTPATIENT
Start: 2025-02-06 | End: 2025-02-08

## 2025-02-06 RX ORDER — DEXAMETHASONE SODIUM PHOSPHATE 10 MG/ML
INJECTION, SOLUTION INTRAMUSCULAR; INTRAVENOUS AS NEEDED
Status: DISCONTINUED | OUTPATIENT
Start: 2025-02-06 | End: 2025-02-06 | Stop reason: SURG

## 2025-02-06 RX ORDER — FERROUS SULFATE 325(65) MG
325 TABLET, DELAYED RELEASE (ENTERIC COATED) ORAL
Status: DISCONTINUED | OUTPATIENT
Start: 2025-02-07 | End: 2025-02-08

## 2025-02-06 RX ORDER — NICOTINE POLACRILEX 4 MG
15 LOZENGE BUCCAL
Status: DISCONTINUED | OUTPATIENT
Start: 2025-02-06 | End: 2025-02-06 | Stop reason: HOSPADM

## 2025-02-06 RX ORDER — HYDROMORPHONE HYDROCHLORIDE 1 MG/ML
0.2 INJECTION, SOLUTION INTRAMUSCULAR; INTRAVENOUS; SUBCUTANEOUS EVERY 2 HOUR PRN
Status: DISCONTINUED | OUTPATIENT
Start: 2025-02-06 | End: 2025-02-08

## 2025-02-06 RX ORDER — BUPIVACAINE HYDROCHLORIDE 5 MG/ML
INJECTION, SOLUTION EPIDURAL; INTRACAUDAL AS NEEDED
Status: DISCONTINUED | OUTPATIENT
Start: 2025-02-06 | End: 2025-02-06 | Stop reason: SURG

## 2025-02-06 RX ORDER — ONDANSETRON 2 MG/ML
4 INJECTION INTRAMUSCULAR; INTRAVENOUS EVERY 6 HOURS PRN
Status: DISCONTINUED | OUTPATIENT
Start: 2025-02-06 | End: 2025-02-06 | Stop reason: HOSPADM

## 2025-02-06 RX ORDER — MEPERIDINE HYDROCHLORIDE 25 MG/ML
INJECTION INTRAMUSCULAR; INTRAVENOUS; SUBCUTANEOUS
Status: COMPLETED
Start: 2025-02-06 | End: 2025-02-06

## 2025-02-06 RX ORDER — ACETAMINOPHEN 500 MG
1000 TABLET ORAL ONCE
Status: DISCONTINUED | OUTPATIENT
Start: 2025-02-06 | End: 2025-02-06 | Stop reason: HOSPADM

## 2025-02-06 RX ORDER — SENNOSIDES 8.6 MG
17.2 TABLET ORAL NIGHTLY
Status: DISCONTINUED | OUTPATIENT
Start: 2025-02-06 | End: 2025-02-08

## 2025-02-06 RX ORDER — ONDANSETRON 2 MG/ML
INJECTION INTRAMUSCULAR; INTRAVENOUS AS NEEDED
Status: DISCONTINUED | OUTPATIENT
Start: 2025-02-06 | End: 2025-02-06 | Stop reason: SURG

## 2025-02-06 RX ORDER — ATORVASTATIN CALCIUM 10 MG/1
10 TABLET, FILM COATED ORAL DAILY
Status: DISCONTINUED | OUTPATIENT
Start: 2025-02-07 | End: 2025-02-08

## 2025-02-06 RX ORDER — POLYETHYLENE GLYCOL 3350 17 G/17G
17 POWDER, FOR SOLUTION ORAL DAILY PRN
Status: DISCONTINUED | OUTPATIENT
Start: 2025-02-06 | End: 2025-02-08

## 2025-02-06 RX ORDER — DIPHENHYDRAMINE HYDROCHLORIDE 50 MG/ML
25 INJECTION INTRAMUSCULAR; INTRAVENOUS ONCE AS NEEDED
Status: ACTIVE | OUTPATIENT
Start: 2025-02-06 | End: 2025-02-06

## 2025-02-06 RX ORDER — PANTOPRAZOLE SODIUM 40 MG/1
40 TABLET, DELAYED RELEASE ORAL
Status: DISCONTINUED | OUTPATIENT
Start: 2025-02-07 | End: 2025-02-08

## 2025-02-06 RX ORDER — TRANEXAMIC ACID 10 MG/ML
1000 INJECTION, SOLUTION INTRAVENOUS ONCE
Status: DISCONTINUED | OUTPATIENT
Start: 2025-02-06 | End: 2025-02-06 | Stop reason: HOSPADM

## 2025-02-06 RX ORDER — METOCLOPRAMIDE HYDROCHLORIDE 5 MG/ML
10 INJECTION INTRAMUSCULAR; INTRAVENOUS EVERY 8 HOURS PRN
Status: DISCONTINUED | OUTPATIENT
Start: 2025-02-06 | End: 2025-02-08

## 2025-02-06 RX ORDER — ONDANSETRON 2 MG/ML
4 INJECTION INTRAMUSCULAR; INTRAVENOUS EVERY 6 HOURS PRN
Status: DISCONTINUED | OUTPATIENT
Start: 2025-02-06 | End: 2025-02-08

## 2025-02-06 RX ORDER — MEPERIDINE HYDROCHLORIDE 25 MG/ML
12.5 INJECTION INTRAMUSCULAR; INTRAVENOUS; SUBCUTANEOUS AS NEEDED
Status: DISCONTINUED | OUTPATIENT
Start: 2025-02-06 | End: 2025-02-06 | Stop reason: HOSPADM

## 2025-02-06 RX ORDER — HYDROMORPHONE HYDROCHLORIDE 1 MG/ML
0.2 INJECTION, SOLUTION INTRAMUSCULAR; INTRAVENOUS; SUBCUTANEOUS EVERY 5 MIN PRN
Status: DISCONTINUED | OUTPATIENT
Start: 2025-02-06 | End: 2025-02-06 | Stop reason: HOSPADM

## 2025-02-06 RX ORDER — METHOTREXATE 2.5 MG/1
20 TABLET ORAL WEEKLY
Status: DISCONTINUED | OUTPATIENT
Start: 2025-02-09 | End: 2025-02-08

## 2025-02-06 RX ORDER — HYDROMORPHONE HYDROCHLORIDE 1 MG/ML
0.6 INJECTION, SOLUTION INTRAMUSCULAR; INTRAVENOUS; SUBCUTANEOUS EVERY 5 MIN PRN
Status: DISCONTINUED | OUTPATIENT
Start: 2025-02-06 | End: 2025-02-06 | Stop reason: HOSPADM

## 2025-02-06 RX ORDER — LIDOCAINE HYDROCHLORIDE 10 MG/ML
INJECTION, SOLUTION EPIDURAL; INFILTRATION; INTRACAUDAL; PERINEURAL AS NEEDED
Status: DISCONTINUED | OUTPATIENT
Start: 2025-02-06 | End: 2025-02-06 | Stop reason: SURG

## 2025-02-06 RX ORDER — PHENYLEPHRINE HCL 10 MG/ML
VIAL (ML) INJECTION AS NEEDED
Status: DISCONTINUED | OUTPATIENT
Start: 2025-02-06 | End: 2025-02-06 | Stop reason: SURG

## 2025-02-06 RX ORDER — HYDROMORPHONE HYDROCHLORIDE 1 MG/ML
0.4 INJECTION, SOLUTION INTRAMUSCULAR; INTRAVENOUS; SUBCUTANEOUS EVERY 2 HOUR PRN
Status: DISCONTINUED | OUTPATIENT
Start: 2025-02-06 | End: 2025-02-08

## 2025-02-06 RX ORDER — ASPIRIN 81 MG/1
81 TABLET ORAL 2 TIMES DAILY
Status: DISCONTINUED | OUTPATIENT
Start: 2025-02-06 | End: 2025-02-08

## 2025-02-06 RX ORDER — ACETAMINOPHEN 500 MG
1000 TABLET ORAL EVERY 8 HOURS SCHEDULED
Status: DISCONTINUED | OUTPATIENT
Start: 2025-02-06 | End: 2025-02-08

## 2025-02-06 RX ORDER — DIPHENHYDRAMINE HCL 25 MG
25 CAPSULE ORAL EVERY 4 HOURS PRN
Status: DISCONTINUED | OUTPATIENT
Start: 2025-02-06 | End: 2025-02-08

## 2025-02-06 RX ORDER — NALOXONE HYDROCHLORIDE 0.4 MG/ML
80 INJECTION, SOLUTION INTRAMUSCULAR; INTRAVENOUS; SUBCUTANEOUS AS NEEDED
Status: DISCONTINUED | OUTPATIENT
Start: 2025-02-06 | End: 2025-02-06 | Stop reason: HOSPADM

## 2025-02-06 RX ORDER — OXYCODONE HYDROCHLORIDE 5 MG/1
5 TABLET ORAL EVERY 4 HOURS PRN
Status: DISCONTINUED | OUTPATIENT
Start: 2025-02-06 | End: 2025-02-08

## 2025-02-06 RX ORDER — SODIUM CHLORIDE, SODIUM LACTATE, POTASSIUM CHLORIDE, CALCIUM CHLORIDE 600; 310; 30; 20 MG/100ML; MG/100ML; MG/100ML; MG/100ML
INJECTION, SOLUTION INTRAVENOUS CONTINUOUS
Status: DISCONTINUED | OUTPATIENT
Start: 2025-02-06 | End: 2025-02-08

## 2025-02-06 RX ORDER — HYDROCHLOROTHIAZIDE 25 MG/1
25 TABLET ORAL DAILY
Status: DISCONTINUED | OUTPATIENT
Start: 2025-02-07 | End: 2025-02-08

## 2025-02-06 RX ORDER — GABAPENTIN 100 MG/1
100 CAPSULE ORAL 3 TIMES DAILY
Status: DISCONTINUED | OUTPATIENT
Start: 2025-02-06 | End: 2025-02-06

## 2025-02-06 RX ORDER — HYDROMORPHONE HYDROCHLORIDE 1 MG/ML
0.4 INJECTION, SOLUTION INTRAMUSCULAR; INTRAVENOUS; SUBCUTANEOUS EVERY 5 MIN PRN
Status: DISCONTINUED | OUTPATIENT
Start: 2025-02-06 | End: 2025-02-06 | Stop reason: HOSPADM

## 2025-02-06 RX ORDER — METOCLOPRAMIDE HYDROCHLORIDE 5 MG/ML
10 INJECTION INTRAMUSCULAR; INTRAVENOUS EVERY 8 HOURS PRN
Status: DISCONTINUED | OUTPATIENT
Start: 2025-02-06 | End: 2025-02-06 | Stop reason: HOSPADM

## 2025-02-06 RX ORDER — NICOTINE POLACRILEX 4 MG
30 LOZENGE BUCCAL
Status: DISCONTINUED | OUTPATIENT
Start: 2025-02-06 | End: 2025-02-06 | Stop reason: HOSPADM

## 2025-02-06 RX ORDER — DOCUSATE SODIUM 100 MG/1
100 CAPSULE, LIQUID FILLED ORAL 2 TIMES DAILY
Status: DISCONTINUED | OUTPATIENT
Start: 2025-02-06 | End: 2025-02-08

## 2025-02-06 RX ORDER — DEXAMETHASONE SODIUM PHOSPHATE 10 MG/ML
8 INJECTION, SOLUTION INTRAMUSCULAR; INTRAVENOUS ONCE
Status: COMPLETED | OUTPATIENT
Start: 2025-02-07 | End: 2025-02-07

## 2025-02-06 RX ADMIN — LIDOCAINE HYDROCHLORIDE 25 MG: 10 INJECTION, SOLUTION EPIDURAL; INFILTRATION; INTRACAUDAL; PERINEURAL at 16:45:00

## 2025-02-06 RX ADMIN — DEXAMETHASONE SODIUM PHOSPHATE 4 MG: 10 INJECTION, SOLUTION INTRAMUSCULAR; INTRAVENOUS at 16:39:00

## 2025-02-06 RX ADMIN — ONDANSETRON 4 MG: 2 INJECTION INTRAMUSCULAR; INTRAVENOUS at 18:03:00

## 2025-02-06 RX ADMIN — PHENYLEPHRINE HCL 50 MCG: 10 MG/ML VIAL (ML) INJECTION at 18:16:00

## 2025-02-06 RX ADMIN — MIDAZOLAM HYDROCHLORIDE 1 MG: 1 INJECTION INTRAMUSCULAR; INTRAVENOUS at 16:28:00

## 2025-02-06 RX ADMIN — SODIUM CHLORIDE, SODIUM LACTATE, POTASSIUM CHLORIDE, CALCIUM CHLORIDE: 600; 310; 30; 20 INJECTION, SOLUTION INTRAVENOUS at 18:35:00

## 2025-02-06 RX ADMIN — BUPIVACAINE HYDROCHLORIDE 2.3 ML: 5 INJECTION, SOLUTION EPIDURAL; INTRACAUDAL at 16:34:00

## 2025-02-06 NOTE — ANESTHESIA PREPROCEDURE EVALUATION
PRE-OP EVALUATION    Patient Name: Darrin Gerber    Admit Diagnosis: Primary osteoarthritis of both knees [M17.0]    Pre-op Diagnosis: Primary osteoarthritis of both knees [M17.0]    Right total knee arthroplasty    Anesthesia Procedure: Right total knee arthroplasty (Right)    Surgeons and Role:     * Jacky Bautista MD - Primary    Pre-op vitals reviewed.  Temp: 98.1 °F (36.7 °C)  Pulse: 78  Resp: 17  BP: 152/73  SpO2: 100 %  Body mass index is 31.56 kg/m².    Current medications reviewed.  Hospital Medications:  • [Transfer Hold] acetaminophen (Tylenol Extra Strength) tab 1,000 mg  1,000 mg Oral Once   • [Transfer Hold] glucose (Dex4) 15 GM/59ML oral liquid 15 g  15 g Oral Q15 Min PRN    Or   • [Transfer Hold] glucose (Glutose) 40% oral gel 15 g  15 g Oral Q15 Min PRN    Or   • [Transfer Hold] glucose-vitamin C (Dex-4) chewable tab 4 tablet  4 tablet Oral Q15 Min PRN    Or   • [Transfer Hold] dextrose 50% injection 50 mL  50 mL Intravenous Q15 Min PRN    Or   • [Transfer Hold] glucose (Dex4) 15 GM/59ML oral liquid 30 g  30 g Oral Q15 Min PRN    Or   • [Transfer Hold] glucose (Glutose) 40% oral gel 30 g  30 g Oral Q15 Min PRN    Or   • [Transfer Hold] glucose-vitamin C (Dex-4) chewable tab 8 tablet  8 tablet Oral Q15 Min PRN   • lactated ringers infusion   Intravenous Continuous   • [Transfer Hold] tranexamic acid in sodium chloride 0.7% (Cyklokapron) 1000 mg/100mL infusion premix 1,000 mg  1,000 mg Intravenous Once   • ceFAZolin (Ancef) 2g in 10mL IV syringe premix  2 g Intravenous Once       Outpatient Medications:   Prescriptions Prior to Admission[1]    Allergies: Meloxicam      Anesthesia Evaluation    Patient summary reviewed.    Anesthetic Complications  (-) history of anesthetic complications         GI/Hepatic/Renal      (+) GERD                           Cardiovascular                  (+) hypertension                                     Endo/Other      (+) diabetes  type 2, not using  insulin                  (+) arthritis  (+) rheumatoid arthritis     Pulmonary    Negative pulmonary ROS.                       Neuro/Psych                                  Past Surgical History:   Procedure Laterality Date   • Brain surgery     • Cataract     • Colonoscopy     • Excis infratent brain tumor     • Upper gi endoscopy,exam       Social History     Socioeconomic History   • Marital status:    Tobacco Use   • Smoking status: Never   • Smokeless tobacco: Never   • Tobacco comments:     Updated 8/19/24   Vaping Use   • Vaping status: Never Used   Substance and Sexual Activity   • Alcohol use: Never   • Drug use: Never   Other Topics Concern   • Caffeine Concern Yes   • Exercise No     History   Drug Use Unknown     Available pre-op labs reviewed.  Lab Results   Component Value Date    WBC 6.0 01/20/2025    RBC 3.67 (L) 01/20/2025    HGB 11.4 (L) 01/20/2025    HCT 33.2 (L) 01/20/2025    MCV 90.5 01/20/2025    MCH 31.1 01/20/2025    MCHC 34.3 01/20/2025    RDW 14.3 01/20/2025    .0 01/20/2025     Lab Results   Component Value Date     01/20/2025    K 3.4 (L) 01/20/2025    CL 98 01/20/2025    CO2 29.0 01/20/2025    BUN 16 01/20/2025    CREATSERUM 0.97 01/20/2025     (H) 01/20/2025    CA 9.3 01/20/2025     Lab Results   Component Value Date    INR 1.08 01/20/2025         Airway      Mallampati: I  Mouth opening: >3 FB  TM distance: > 6 cm  Neck ROM: full Cardiovascular    Cardiovascular exam normal.  Rhythm: regular  Rate: normal     Dental    Dentition appears grossly intact         Pulmonary    Pulmonary exam normal.                 Other findings        ASA: 2   Plan: spinal  NPO status verified and patient meets guidelines.    Post-procedure pain management plan discussed with surgeon and patient.  Surgeon requests: regional block  Comment: Discussed the risks and benefits of regional and spinal anesthesia with the patient as outlined in the anesthesia consent and the peripheral  nerve block procedure note. The paient understands, wishes to proceed and has no further questions.    Plan/risks discussed with: patient            Present on Admission:  **None**             [1]   Medications Prior to Admission   Medication Sig Dispense Refill Last Dose/Taking   • acetaminophen 500 MG Oral Tab Take 1 tablet (500 mg total) by mouth every 6 (six) hours as needed for Pain.   2/6/2025 at 12:00 PM   • ATORVASTATIN 10 MG Oral Tab TAKE 1 TABLET BY MOUTH IN THE MORNING 30 tablet 4 Past Week   • METFORMIN 500 MG Oral Tab TAKE 1 TABLET (500 MG TOTAL) BY MOUTH NIGHTLY. 90 tablet 1 2/5/2025   • HYDROCHLOROTHIAZIDE 25 MG Oral Tab TAKE 1 TABLET (25 MG TOTAL) BY MOUTH DAILY. 90 tablet 1 2/6/2025 at 11:00 AM   • folic acid 1 MG Oral Tab Take 2 tablets (2 mg total) by mouth daily. 180 tablet 3 2/6/2025 at 11:00 AM   • tiZANidine 4 MG Oral Tab Take 1 tablet (4 mg total) by mouth every 8 (eight) hours as needed. 30 tablet 0 Taking As Needed   • Ferrous Sulfate (FEROSUL) 325 (65 Fe) MG Oral Tab Take 1 tablet (325 mg total) by mouth daily. 90 tablet 3 2/6/2025 at 11:00 AM   • aspirin 81 MG Oral Tab EC Take 1 tablet (81 mg total) by mouth daily.   Past Week   • leucovorin 5 MG Oral Tab Take 1 tablet (5 mg total) by mouth once a week. ON SUNDAYS 12 tablet 3 2/3/2025   • Atovaquone-Proguanil HCl 250-100 MG Oral Tab Take 1 tablet by mouth daily. 90 tablet 1 Taking   • Omeprazole 40 MG Oral Capsule Delayed Release Take 1 capsule (40 mg total) by mouth daily. 90 capsule 1 2/6/2025 at  4:00 AM   • Cyanocobalamin (VITAMIN B-12) 1000 MCG Sublingual SL Tab Take 1 tablet by mouth daily. 90 tablet 3 2/6/2025 at 11:00 AM   • methotrexate 2.5 MG Oral Tab Take 8 tablets (20 mg total) by mouth once a week. 104 tablet 0 2/2/2025   • gabapentin 100 MG Oral Cap Take 1 capsule (100 mg total) by mouth 3 (three) times daily. (Patient not taking: Reported on 2/4/2025) 90 capsule 0 More than a month   • famotidine 20 MG Oral Tab 2 (two)  times daily as needed.   More than a month   • predniSONE 5 MG Oral Tab Take 1 tablet (5 mg total) by mouth daily. Prednisone 15 mg daily for three days, 10 mg for seven days, 5 mg for seven days (Patient not taking: Reported on 2/4/2025) 30 tablet 0 More than a month   • Glucose Blood (ONETOUCH ULTRA) In Vitro Strip USE TO TEST TWICE A  each 3    • Lancets (ONETOUCH DELICA PLUS [id number]) Does not apply Misc 1 Lancet by Finger stick route 2 (two) times daily. 300 each 2    • nystatin 123932 UNIT/ML Mouth/Throat Suspension TAKE 5ML BY MOUTH IN THE MORNING AT NOON IN THE EVENING AND AT BEDTIME FOR 10 DAYS 240 mL 0 More than a month   • hydroxychloroquine 200 MG Oral Tab Take 1 tablet (200 mg total) by mouth daily. (Patient not taking: Reported on 2/4/2025) 90 tablet 1 Not Taking   • Lidocaine Viscous HCl 2 % Mouth/Throat Solution Take 5 mL by mouth every 3 (three) hours as needed for Pain. For mouth sores. 100 mL 1 More than a month   • diclofenac 1 % External Gel Apply 2 g topically 4 (four) times daily. 350 g 0 More than a month   • Blood Glucose Monitoring Suppl (BLOOD GLUCOSE MONITOR SYSTEM) w/Device Does not apply Kit Please check blood glucose twice a day 1 kit 0    • Elastic Bandages & Supports (NEOPRENE KNEE BRACE) Does not apply Misc 1 Application by Does not apply route daily. Left neoprene knee brace for patellofemoral pain syndrome 1 each 0

## 2025-02-06 NOTE — INTERVAL H&P NOTE
Pre-op Diagnosis: Primary osteoarthritis of both knees [M17.0]    The above referenced H&P was reviewed by Jacky Bautista MD on 2/6/2025, the patient was examined and no significant changes have occurred in the patient's condition since the H&P was performed.  I discussed with the patient and/or legal representative the potential benefits, risks and side effects of this procedure; the likelihood of the patient achieving goals; and potential problems that might occur during recuperation.  I discussed reasonable alternatives to the procedure, including risks, benefits and side effects related to the alternatives and risks related to not receiving this procedure.  We will proceed with procedure as planned.

## 2025-02-06 NOTE — CM/SW NOTE
02/06/25 1100   CM/SW Referral Data   Referral Source Physician   Reason for Referral Discharge planning   Informant EMR     Pt scheduled for R TKR today.    Pre-op plan [facility].    Aranza Tamayo LCSW  /Discharge Planner

## 2025-02-06 NOTE — ANESTHESIA PROCEDURE NOTES
Spinal Block    Date/Time: 2/6/2025 4:33 PM    Performed by: Destini Myers MD  Authorized by: Destini Myers MD      General Information and Staff    Start Time:  2/6/2025 4:33 PM  End Time:  2/6/2025 4:36 PM  Anesthesiologist:  Destini Myers MD  Performed by:  Anesthesiologist  Patient Location:  OR  Site identification: surface landmarks    Preanesthetic Checklist: patient identified, IV checked, risks and benefits discussed, monitors and equipment checked, pre-op evaluation, timeout performed, anesthesia consent and sterile technique used      Procedure Details    Patient Position:  Sitting  Prep: ChloraPrep    Monitoring:  Cardiac monitor, heart rate and continuous pulse ox  Approach:  Midline  Location:  L3-4  Injection Technique:  Single-shot    Needle    Needle Type:  Sprotte  Needle Gauge:  24 G  Needle Length:  3.5 in    Assessment    Sensory Level:   Events: clear CSF, CSF aspirated, well tolerated and blood negative      Additional Comments

## 2025-02-06 NOTE — ANESTHESIA PROCEDURE NOTES
[facility]ional [facility]    Date/Time: 2/6/2025 4:38 PM    Performed by: Destini Myers MD  Authorized by: Destini Myers MD      General Information and Staff    Start Time:  2/6/2025 4:38 PM  End Time:  2/6/2025 4:41 PM  Anesthesiologist:  Destini Myers MD  Performed by:  Anesthesiologist  Patient Location:  OR    Block Placement: Post Induction  Site Identification: real time ultrasound guided and image stored and retrievable    Block site/laterality marked before start: site marked  Reason for Block: at surgeon's request and post-op pain management    Preanesthetic Checklist: 2 patient identifers, IV checked, risks and benefits discussed, monitors and equipment checked, pre-op evaluation, timeout performed, anesthesia consent, sterile technique used, no prohibitive neurological deficits and no local skin infection at insertion site      Procedure Details    Patient Position:  Supine  Prep: ChloraPrep    Monitoring:  Cardiac monitor, continuous pulse ox, blood pressure cuff and heart rate  Block Type:  Adductor canal  Laterality:  Right  Injection Technique:  Single-shot    Needle    Needle Type:  Short-bevel and echogenic  Needle Gauge:  21 G  Needle Length:  100 mm  Needle Localization:  Ultrasound guidance  Reason for Ultrasound Use: appropriate spread of the medication was noted in real time and no ultrasound evidence of intravascular and/or intraneural injection            Assessment    Injection Assessment:  Good spread noted, negative resistance, negative aspiration for heme, incremental injection and low pressure  Heart Rate Change: No    - Patient tolerated block procedure well without evidence of immediate block related complications.     Medications  2/6/2025 4:38 PM      Additional Comments    Medication:  Ropivacaine 0.375% 20mL plus PF decadron 4mg

## 2025-02-06 NOTE — DISCHARGE INSTRUCTIONS
Sometimes managing your health at home requires assistance.  The [facility] team has recognized your preference to use [facility].  They can be reached by phone at [phone].  The fax number for your reference is [phone].  A representative from the home health agency will contact you or your family to schedule your first visit.       Joint Replacement Surgery: Patient Discharge Instructions   The best way to contact your surgeon or their team is by phone [phone] (preferred for urgent/acute matters) or through [employer].    Dear Patient,     [facility] cares about your progress with recovery following your joint replacement surgery.     300 days from your scheduled surgery, [facility] will send you a follow-up survey to help us understand how your surgery impacted your mobility, pain, and overall quality of life. Please make every effort to complete this survey. The information collected from this survey will be used by your physician to track your recovery.     Sincerely,     [facility] [place]    What to Expect:  A certain amount of pain, swelling, and bruising is expected for several weeks after surgery.    Pain Medications:   Below is a list of commonly prescribed pain medications. You may have received prescriptions for some, all, or even additional pain medications not listed.  If you are taking the following medications for pain, these are some general guidelines for using and discontinuing them.  You may also want to preemptively take your pain medication before physical therapy (at least 30 minutes prior to the session).  If you are concerned you are suffering side effects from any of these medications please contact your surgeon’s office:    Ultram (Tramadol): Take this as prescribed 3 times per day with meals until your first postoperative appointment. If your pain levels are low, you can stop taking this on a scheduled basis  and start taking it as needed.  Tylenol (Acetaminophen): Take this as prescribed 3 times per day until your first postoperative appointment. Do NOT exceed 4g (4000mg) of acetaminophen daily.  Celebrex (Celecoxib): this medication will treat swelling and pain and should be taken as directed until your first postoperative appointment.  Oxycodone IR (immediate release): This is your “rescue” drug. Take this only as needed for pain that is not controlled (rated more than 4 out of 10) by other medications.  You may wean yourself off prescription pain medication to a non-prescription pain reliever by substituting two 500mg extra-strength Tylenol (Acetaminophen) tablets in place of your prescription pain medications up to four times per day. Do NOT exceed 4g (4000mg) of acetaminophen daily.  *We will discuss pain management and weaning off pain medicine at your 1st postoperative appointment. To avoid delays in getting your narcotic pain medicine refilled, please contact the office prior to running out of medication either by phone [phone] or through [employer]. Please allow 24-72 hours for prescriptions to be filled. Your doctor may need to physically sign a hand-written prescription -- some medicines cannot be re-ordered electronically/or via phone. If you need to  a hand-written prescription, you can do so at the office located at [address] [place] [address], [address].  Pain Medications can be constipating. Below is a guideline for preventing or managing postoperative constipation:  Drink plenty of fluids. Aim to drink at least of 8oz of water 8 times per day (total of 64oz).  Eat a high-fiber diet (whatever helps you stay regular).  Make sure you are taking Senekot S (Docusate Sodium + Sennosides) or Colace (Docusate Sodium), 1-2 tablets twice daily, while on narcotics. You may decrease to once daily if you develop diarrhea. You will be sent home with a prescription.  If you have not had normal bowel  movements the following over the counter medications can be helpful:  Add daily Miralax™ or Metamucil™ as directed on bottle.  If still no results, add a dose of Milk of Magnesia™ as directed on bottle.  If still no results, take one Dulcolax™ (Bisacodyl) suppository as directed on package.  If you still have no results and it has been more than 3 days since you had a bowel movement, be sure to contact your surgeon's office.    Swelling:  You may apply a cloth covered ice pack for up to 20 minutes each hour, or as needed, to your incision to help control pain and swelling. Do not apply directly to your skin.  For the first 7 days after you leave the hospital, it is critical that you elevate your leg above the level of your heart 4 times per day for 1 hour each time. After a week, elevate your leg as needed if swelling is noted.   Call your Surgeons office if you have:  A temperature greater than 100.4 F.  Increasing pain or numbness at the incision or on your operated leg.  Increasing redness, drainage, or odor from the wound.  You WILL be swollen the first week or two after surgery; however, swelling that continues to get worse to your surgical leg or the opposite leg and is accompanied by discomfort or redness should be reported.  **Go to the Emergency Room if you have chest pain or shortness of breath**  Activities:  Your activity order is:   Weight bearing as tolerated  If you have home health care, a physical therapist (PT) will come to your home 2-3 times per week. The number of PT visits will depend on your needs and your insurance coverage.   At your first postoperative appointment with your surgeon, you will be given a prescription for outpatient physical therapy if you have not already started outpatient therapy.  Rules of the road: No driving until it is approved by your care team, and you are no longer taking narcotic pain medicine.    Wound Care/ Bathing:   Aquacel (waterproof brown rubberized dressing)  should remain in place for 7 days and may then be removed. While this waterproof dressing is in place, you may shower and let water run over the dressing (ensure first that the dressing seal is intact and none of the edges have rolled up exposing the wound - if the dressing has become open to the environment, do not allow water to enter into this area)  Once the original postoperative dressing is removed, you do not need to keep your incision covered. If you would like to keep it covered for comfort you may - use a clean new dressing each day, or more frequently if needed (if the dressing is soiled or it is not staying fixed to your skin). Use the dressings suggested by the nurse at discharge.  After the original postoperative dressing is removed, you should continue to keep the incision covered when showering to prevent it from getting wet prior to your first postoperative appointment. Before showering, be sure to cover the incision with saran wrap or a plastic bag to keep dry. After showering, pat the incision with a clean towel to ensure it is dry. Do this until you are cleared to get the incision wet (usually after the first postoperative appointment).  Once cleared to get the incision wet, you may gently allow soap and water to rinse over the area. Be sure to rinse the area well and gently pat dry.  Do not apply soap, lotion, cream, ointments, or powders to your incision. Keep the incision clean and dry.  Do not soak your incision in water. No tub baths, pools or hot tubs for at least 6 weeks after surgery and not until the wound is completely healed.  If you have stitches or staples, they will be removed at your first postoperative appointment or the week after. Do not remove steri-strips, if you have any. These will come off on their own and do not need to be replaced.  If you have a home care nurse they should:  Examine the incision during visits and call your surgeon if there are any signs of infection or  other cause for concern.  Change your dressing and may remove staples (when indicated).  Take your vitals and draw your labs.    Remember, good hand washing with soap at all times helps prevent infections. Wash your hands with soap and water prior to performing any dressing changes or wound evaluation.    Medications/Special Instructions:  Do not restart your blood pressure medication until cleared by your physician, or until your systolic blood pressure (top number) is above 130 on 2 consecutive checks and then continue your medication as prescribed.      Antibiotics:  An oral/by-mouth antibiotic tablet has been ordered for you to take for one week postoperatively. Be sure to take this medication three times daily for one week as prescribed.    Blood Thinners (you will be on blood thinners for a total of six weeks):  Aspirin: some patients will be prescribed Aspirin to prevent blood clots after surgery. If you are prescribed aspirin, take one 81mg tablet by mouth twice per day for six weeks.  DO NOT restart fmxgg4p/fish oils, glucosamine, nonsteroidal antiinflammatories [NSAIDs, such as such as Ibuprofen (Advil, Motrin), Naproxen (Naprosyn, Aleve), Diclofenac (Voltaren), Meloxicam (Mobic)], tumeric, cinnamon, progesterone, estrogen, or Aspirin (unless told differently) until you have completed your blood thinner. These will increase your risk of bleeding.   Protonix (Omeprazole/Nexium, etc.) may be ordered if Aspirin is to be continued while on your blood thinner and should be taken daily during that time. This will help to protect your stomach.    X-Ray  X-rays needed: none    If you do not have a follow-up appointment with your surgeon, or you need to change your follow-up appointment date/time, please call today to schedule or change this appointment: [phone]. Our phones are open to schedule appointments from 8:30am to 4:30pm, Monday through Friday.  If you have any questions or concerns during the  postoperative period (for example: wound issues, pain, swelling, redness or drainage) call our office FIRST at [phone] so that we may appropriately direct you or set up a clinic appointment.  Spanda- knee replacement (single layer compression sleeve):  All patients should wear the compression sleeves (SPANDA-) until first follow up visit to surgeon’s office ( about 2-3 weeks) and then check with surgeon if need to continue use.  Take off to shower. Best to keep on as much as possible, even at night.  Wash with mild soap and water; DRIP dry overnight.    Directions to put on and off:  IT TAKES 2 PIECES OF SPANDA- (compression sleeves), (USUALLY DIFFERENT SIZES because a calf is usually smaller than a knee.)   Use the longer tube (spanda-/compression sleeve) pulled up over the calf.   This 1st piece (spanda-/compression sleeve) should be on the calf part of the leg, from just below the knee to the ball of the foot to expose the toes.   The 2nd piece (shorter compression sleeve) is pulled over and past the first sleeve onto the knee. The lower edge of the knee portion should overlap the top of the calf spanda- by a few inches. This is the only place where the 2 different pieces overlap.  The top edge of the second spanda- should be about a few inches above the knee but it should NOT be rolling down. The spanda- should be flat so that it does not roll and become too tight.  Makes sure it is NOT bunched up anywhere.   IF the spanda- feels TOO tight, then REMOVE it and call your surgeon to let them know.

## 2025-02-07 VITALS
HEIGHT: 60 IN | WEIGHT: 161.63 LBS | RESPIRATION RATE: 16 BRPM | DIASTOLIC BLOOD PRESSURE: 52 MMHG | SYSTOLIC BLOOD PRESSURE: 118 MMHG | OXYGEN SATURATION: 98 % | HEART RATE: 69 BPM | BODY MASS INDEX: 31.73 KG/M2 | TEMPERATURE: 98 F

## 2025-02-07 PROBLEM — Z01.818 PRE-OP TESTING: Status: ACTIVE | Noted: 2021-06-11

## 2025-02-07 LAB
GLUCOSE BLD-MCNC: 137 MG/DL (ref 70–99)
GLUCOSE BLD-MCNC: 179 MG/DL (ref 70–99)

## 2025-02-07 PROCEDURE — 99024 POSTOP FOLLOW-UP VISIT: CPT | Performed by: PHYSICIAN ASSISTANT

## 2025-02-07 PROCEDURE — 99214 OFFICE O/P EST MOD 30 MIN: CPT | Performed by: HOSPITALIST

## 2025-02-07 RX ORDER — CEPHALEXIN 500 MG/1
500 CAPSULE ORAL EVERY 8 HOURS SCHEDULED
Qty: 18 CAPSULE | Refills: 0 | Status: SHIPPED | OUTPATIENT
Start: 2025-02-07 | End: 2025-02-13

## 2025-02-07 RX ORDER — DEXTROSE MONOHYDRATE 25 G/50ML
50 INJECTION, SOLUTION INTRAVENOUS
Status: DISCONTINUED | OUTPATIENT
Start: 2025-02-07 | End: 2025-02-08

## 2025-02-07 RX ORDER — OXYCODONE HYDROCHLORIDE 5 MG/1
TABLET ORAL EVERY 4 HOURS PRN
Qty: 40 TABLET | Refills: 0 | Status: SHIPPED | OUTPATIENT
Start: 2025-02-07

## 2025-02-07 RX ORDER — ACETAMINOPHEN 500 MG
1000 TABLET ORAL EVERY 8 HOURS SCHEDULED
Qty: 90 TABLET | Refills: 0 | Status: SHIPPED | OUTPATIENT
Start: 2025-02-07 | End: 2025-02-22

## 2025-02-07 RX ORDER — ASPIRIN 81 MG/1
81 TABLET ORAL 2 TIMES DAILY
Qty: 80 TABLET | Refills: 0 | Status: SHIPPED | OUTPATIENT
Start: 2025-02-07 | End: 2025-03-19

## 2025-02-07 RX ORDER — SENNA AND DOCUSATE SODIUM 50; 8.6 MG/1; MG/1
1 TABLET, FILM COATED ORAL DAILY PRN
Qty: 30 TABLET | Refills: 0 | Status: SHIPPED | OUTPATIENT
Start: 2025-02-07

## 2025-02-07 RX ORDER — NAPROXEN 500 MG/1
500 TABLET ORAL 2 TIMES DAILY WITH MEALS
Qty: 60 TABLET | Refills: 0 | Status: SHIPPED | OUTPATIENT
Start: 2025-02-07 | End: 2025-02-07

## 2025-02-07 RX ORDER — NICOTINE POLACRILEX 4 MG
30 LOZENGE BUCCAL
Status: DISCONTINUED | OUTPATIENT
Start: 2025-02-07 | End: 2025-02-08

## 2025-02-07 RX ORDER — NICOTINE POLACRILEX 4 MG
15 LOZENGE BUCCAL
Status: DISCONTINUED | OUTPATIENT
Start: 2025-02-07 | End: 2025-02-08

## 2025-02-07 RX ORDER — TRAMADOL HYDROCHLORIDE 50 MG/1
50 TABLET ORAL EVERY 8 HOURS
Qty: 45 TABLET | Refills: 0 | Status: SHIPPED | OUTPATIENT
Start: 2025-02-07 | End: 2025-02-22

## 2025-02-07 RX ORDER — CELECOXIB 200 MG/1
200 CAPSULE ORAL DAILY
Qty: 30 CAPSULE | Refills: 0 | Status: SHIPPED | OUTPATIENT
Start: 2025-02-07 | End: 2025-02-07

## 2025-02-07 NOTE — PLAN OF CARE
Patient A & O x4, Malay speaking, declining use for translating services and daughter to aide with translating. VSS, on RA. Denies pain. C/o numbness/tingling to RLE. Purewick in place, DTV by 0300. Safety measures in place. Instructed to use call light.

## 2025-02-07 NOTE — PROGRESS NOTES
AVS reviewed, IV dc'd, will dc home w/ [facility], daughter at bedside, verbalized understanding of discharge instructions, meds delivered to bedside, ride to arrive at 1730.

## 2025-02-07 NOTE — ANESTHESIA POSTPROCEDURE EVALUATION
[facility] Patient Status:  Outpatient in a Bed   Age/Gender 65 year old female MRN [id number]   Location [facility] SURGERY Attending Jacky Bautista MD   Hosp Day # 0 PCP John Botello MD       Anesthesia Post-op Note    Right total knee arthroplasty    Procedure Summary       Date: 02/06/25 Room / Location:  MAIN OR  /  MAIN OR    Anesthesia Start: 1622 Anesthesia Stop: 1835    Procedure: Right total knee arthroplasty (Right: Knee) Diagnosis:       Primary osteoarthritis of both knees      (Primary osteoarthritis of both knees [M17.0])    Surgeons: Jacky Bautista MD Anesthesiologist: Destini Myers MD    Anesthesia Type: spinal ASA Status: 2            Anesthesia Type: spinal    Vitals Value Taken Time   /60 02/06/25 1901   Temp 98.2 02/06/25 1904   Pulse 74 02/06/25 1903   Resp 15 02/06/25 1903   SpO2 99 % 02/06/25 1903   Vitals shown include unfiled device data.        Patient Location: PACU    Anesthesia Type: spinal    Airway Patency: patent    Postop Pain Control: adequate    Mental Status: preanesthetic baseline    Nausea/Vomiting: none    Cardiopulmonary/Hydration status: stable euvolemic    Complications: no apparent anesthesia related complications    Postop vital signs: stable    Dental Exam: Unchanged from Preop    Patient to be discharged from PACU when criteria met.

## 2025-02-07 NOTE — PROGRESS NOTES
[facility]   part of [facility]     Hospitalist Progress Note     Darrin Gerber Patient Status:  Outpatient in a Bed    1960 MRN [id number]   Location [facility] 3SW-A Attending Jacky Bautista MD   Hosp Day # 0 PCP John Botello MD     Subjective:   Doing well  Used      Objective:    Review of Systems:   A comprehensive review of systems was completed; pertinent positive and negatives stated in subjective.  Vital signs:  Temp:  [97 °F (36.1 °C)-98.9 °F (37.2 °C)] 97.8 °F (36.6 °C)  Pulse:  [65-85] 65  Resp:  [11-22] 17  BP: (100-152)/(49-91) 123/55  SpO2:  [95 %-100 %] 98 %  Physical Exam:    General: No acute distress    Respiratory: no wheezes, no rhonchi  Cardiovascular: S1, S2, RRR  Abdomen: Soft, NT/ND, +BS  Extremities: no edema    Diagnostic Data:    Labs:  No results for input(s): \"WBC\", \"HGB\", \"MCV\", \"PLT\", \"BAND\", \"INR\" in the last 168 hours.    Invalid input(s): \"LYM#\", \"MONO#\", \"BASOS#\", \"EOSIN#\"  No results for input(s): \"GLU\", \"BUN\", \"CREATSERUM\", \"GFRAA\", \"GFRNAA\", \"CA\", \"ALB\", \"NA\", \"K\", \"CL\", \"CO2\", \"ALKPHO\", \"AST\", \"ALT\", \"BILT\", \"TP\" in the last 168 hours.  CrCl cannot be calculated (Patient's most recent lab result is older than the maximum 7 days allowed.).  No results for input(s): \"PTP\", \"INR\" in the last 168 hours.     Microbiology  No results found for this visit on 25.  Imaging: Reviewed in Epic.  Medications:    insulin aspart  2-10 Units Subcutaneous TID AC and HS    dexAMETHasone PF  8 mg Intravenous Once    acetaminophen  1,000 mg Oral Q8H PANTERA    atorvastatin  10 mg Oral Daily    [START ON 2025] methotrexate  20 mg Oral Weekly    pantoprazole  40 mg Oral QAM AC    sennosides  17.2 mg Oral Nightly    docusate sodium  100 mg Oral BID    ferrous sulfate  325 mg Oral Daily with breakfast    aspirin  81 mg Oral BID    ceFAZolin  2 g Intravenous Q8H    cephalexin  500 mg Oral Q8H PANTERA    hydroCHLOROthiazide  25 mg Oral Daily       Assessment & Plan:     # Right knee osteoarthritis  - S/P R TKA  -Management per orthopedic surgery     # Hypertension- hydrochlorothiazide    # Type 2 diabetes- hyperglycemic protocol    # Hyperlipidemia- statin    # Peripheral neuropathy   # Rheumatoid arthritis- Patient is on methotrexate at home      DC planning        Supplementary Documentation:   Quality:  DVT Mechanical Prophylaxis:   SCDs, Early ambuation  DVT Pharmacologic Prophylaxis   Medication   None         DVT Pharmacologic prophylaxis: Aspirin 81 mg      Code Status: Not on file  Lockhart: No urinary catheter in place  Lockhart Duration (in days):   Central line:    GAY:   At this point Ms. Gerber is expected to be discharge to: home     The 21st Century Cures Act makes medical notes like these available to patients in the interest of transparency. Please be advised this is a medical document. Medical documents are intended to carry relevant information, facts as evident, and the clinical opinion of the practitioner. The medical note is intended as peer to peer communication and may appear blunt or direct. It is written in medical language and may contain abbreviations or verbiage that are unfamiliar.

## 2025-02-07 NOTE — OCCUPATIONAL THERAPY NOTE
OCCUPATIONAL THERAPY EVALUATION - INPATIENT    Room Number: 364/364-A  Evaluation Date: 2/7/2025     Type of Evaluation: Initial  Presenting Problem: s/p R TKA    Physician Order: IP Consult to Occupational Therapy  Reason for Therapy:  ADL/IADL Dysfunction and Discharge Planning    OCCUPATIONAL THERAPY ASSESSMENT   Patient is a 65 year old female admitted on 2/6/2025 from home for elective R TKA.     Co-Morbidities : DMII, HTN, HL, GERD      Patient participated well in eval, able to complete ADLs and functional mobility/transfers with RW and SBA. All necessary education completed and Pt verbalized/demo'd understanding as appropriate. No further questions/concerns voiced. Has all necessary AE/DME. Family will be present to assist as needed. No OT needs anticipated at DC.     Recommendations for nursing staff:   Transfers: SBA with RW  Toileting location: commode over toilet    EVALUATION SESSION:  Patient at start of session: semi-supine     FUNCTIONAL TRANSFER ASSESSMENT  Sit to Stand: Edge of Bed; Chair  Edge of Bed: Supervision  Chair: Supervision  Toilet Transfer: Supervision    BED MOBILITY  Supine to Sit : Stand-by Assist  Scooting: SBA    BALANCE ASSESSMENT  Static Sitting: Independent  Static Standing: Supervision    FUNCTIONAL ADL ASSESSMENT  Grooming Seated: Supervision  UB Dressing Seated: Supervision  LB Dressing Seated: Supervision  LB Dressing Standing: Supervision  Toileting Seated: Independent  Toileting Standing: Supervision    ACTIVITY TOLERANCE: Pt on room air and denies SOB, dizziness or lightheadedness throughout session. No significant change in vitals noted.     COGNITION  Overall Cognitive Status:  WFL - within functional limits      EDUCATION PROVIDED  Patient Education : Plan of Care; Role of Occupational Therapy; Discharge Recommendations; DME Recommendations; Functional Transfer Techniques; Fall Prevention; Weight Bear Status; Proper Body Mechanics  Patient's Response to Education:  Verbalized Understanding; Returned Demonstration  Family/Caregiver Education : Role of Occupational Therapy; Plan of Care; Discharge Recommendations; DME Recommendations; Functional Transfer Techniques; Fall Prevention; Weight Bear Status  Family/Caregiver's Response to Education: Verbalized Understanding    Equipment used: RW, commode   Demonstrates functional use    Therapist comments: Supportive spouse present and dtr on the phone, included in all education. Recommend bringing shower chair to daughter's home.     Patient End of Session: Up in chair;Needs met;Call light within reach;RN aware of session/findings;All patient questions and concerns addressed;Hospital anti-slip socks;Ice applied;Alarm set;Family present    OCCUPATIONAL PROFILE    HOME SITUATION  Type of Home: Apartment  Home Layout: One level  Lives With: Spouse       Shower/Tub and Equipment: Shower chair  Other Equipment:  (cane)             Patient Regularly Uses: Cane    Prior Level of Function: Pt and her spouse live in one level apartment. Per daughter, if patient is able to do stairs (she was able to do so with PT), they would ideally like her to DC to daughter's home for more support. PTA, Patient is mostly MOD I with cane or uses spouse for support. Mostly MOD I for ADLs, spouse assists as needed.     SUBJECTIVE  Pt primarily Kinyarwanda speaking. Per RN, had difficulty using ipad  and pt/spouse not able to understand. Called daughter and she assisted with translation for session.     PAIN ASSESSMENT  Rating: Unable to rate  Location: RLE  Management Techniques: Activity promotion;Body mechanics;Breathing techniques;Repositioning;Ice    OBJECTIVE  Precautions: Bed/chair alarm  Fall Risk: Standard fall risk    WEIGHT BEARING RESTRICTION  R Lower Extremity: Weight Bearing as Tolerated      AM-PAC ‘6-Clicks’ Inpatient Daily Activity Short Form  -   Putting on and taking off regular lower body clothing?: A Little  -   Bathing (including  washing, rinsing, drying)?: A Little  -   Toileting, which includes using toilet, bedpan or urinal? : None  -   Putting on and taking off regular upper body clothing?: None  -   Taking care of personal grooming such as brushing teeth?: None  -   Eating meals?: None    AM-PAC Score:  Score: 22  Approx Degree of Impairment: 25.8%  Standardized Score (AM-PAC Scale): 47.1    ADDITIONAL TESTS     NEUROLOGICAL FINDINGS      PLAN   Patient has been evaluated and presents with no skilled Occupational Therapy needs at this time.  Patient discharged from Occupational Therapy services.  Please re-order if a new functional limitation presents during this admission.    OT Device Recommendations: Shower chair (RW)    Patient Evaluation Complexity Level:   Occupational Profile/Medical History LOW - Brief history including review of medical or therapy records    Specific performance deficits impacting engagement in ADL/IADL LOW  1 - 3 performance deficits    Client Assessment/Performance Deficits MODERATE - Comorbidities and min to mod modifications of tasks    Clinical Decision Making LOW - Analysis of occupational profile, problem-focused assessments, limited treatment options    Overall Complexity LOW     OT Session Time: 25 minutes  Self-Care Home Management: 10 minutes

## 2025-02-07 NOTE — OPERATIVE REPORT
Operative Note    Patient Name/: Darrin Gerber 1960  Date: 2025  Location: [facility]  Preoperative Diagnosis: Right knee osteoarthritis  Postoperative Diagnosis: Same  Operation: Right total knee arthroplasty mechanical alignment  Primary Surgeon: Jacky Bautista  Assistant:  DOE Archer first assist.  Jacinto Barcenas also assisted in this case. Please note a skilled surgical assistant was necessary for this case in order to assist with patient positioning, prepping, draping, incision, exposure, implant placement, wound closure.  Indications: 65-year-old female with end-stage right knee varus osteoarthritis  Surgical Findings: Varus OA  Operative Report:  After informed consent, the right lower extremity was marked.  Patient was brought to the operating room where spinal anesthesia was induced.  Preoperative exam demonstrated range of motion from a few degrees short of full extension, gravity flexion 110 120. The right lower extremity was prepped and draped in sterile fashion.  Timeout was performed to verify correct surgical site and procedure.  2 g of Ancef was given within 1 hour of incision.  Additionally, 1 g tranexamic acid was given intravenously.  Next the limb was gravity exsanguinated and tourniquet inflated.  Incision was made anteriorly from 2 fingerbreadths proximal to the patella down along the medial aspect of the tibial tubercle.  This was carried down to the extensor mechanism.  Medial and lateral flaps were developed.  The VMO muscle belly was identified.  Full-thickness medial parapatellar arthrotomy was made from 2 fingerbreadths proximal to the patella along the VMO muscle belly, around the medial patella and patellar tendon.  The fat pad was dissected free from the patellar tendon and reflected medially.  Subperiosteal dissection was carried out posteriorly about the proximal medial aspect of the tibia.  Osteophytes were removed from the proximal medial tibia.  The lateral  patellofemoral plica was incised, and the patella was everted and denervated.  The synovium over the anterior distal femur was teed open.  Next the knee was flexed up, and remnant of the ACL and lateral meniscus were excised.  I used an intramedullary reamer to open up the femur for our intramedullary alignment guide.  The distal femoral cutting block was placed on the distal femur to take off 9.5 mm of bone.  It sat down on the distal medial femur, measuring to take off more medial bone than lateral.  This was pinned in place.  A marcelino's wing was used to check the cut, and the cut was performed.  The distal medial femur fragment measured 8.5 mm.  Next the knee was flexed and the tibial crest was marked.  An extra medullary tibial cutting guide was placed above the ankle, measuring to take off a few millimeters medially, pinned in line with the tibial crest in the coronal plane and with a few degrees of posterior slope in the sagittal plane.  The proximal tibia was cut, using a Z retractor to protect MCL, patellar tendon and lateral soft tissues.  The cut measured 12 lateral.  After this portion of bone was removed, the knee was brought into extension and a lamina  was used to open up the extension space.  What remained of the medial and lateral menisci were removed.  Spacer block was placed with a T-handle drop-down beatriz to verify appropriate cut angle.  Next the knee was flexed up, and the femoral sizing block with stylus was used.  This sized for a size 2 femur.  Pins were placed, and the sizing block removed.  Pins were noted to be parallel to the epicondylar axis.  The 4-in-1 cutting block was placed and screwed down.  Anterior distal femur was cut, demonstrating a positive grand piano sign.  Posterior condyles were cut, measuring 6.5 on the posterior medial cut.  Chamfers were then cut.  The cutting block was removed, and the knee was flexed with a lamina  to open up the posterior knee joint.   Notch contents and PCL were removed.  Posterior condylar recesses were opened, and posterior osteophytes chiseled out.  The knee was brought into extension, and sized for a tibial baseplate.  Trials were then placed.  With the 2 femur, the 2 tibia, and a 14 polyethylene liner, the knee came to full extension with great varus valgus stability, 1-2 varus; in mid flexion, 4-5 varus, less than half a millimeter valgus, Lachman's rotating around the medial side; at 90 degrees, anterior drawer less than 5 mm rotating around the medial femoral condyle; Gravity flexion 1 20-1 30 with the patella tracking centrally.  The tibial component rotation was marked, and the tibial trial removed.  The femoral lugs were drilled, and the anterior chamfer finishing guide was placed to cut the finishing anterior chamfer cut.  The knee was then flexed, and the tibia brought forward to place the tibial baseplate trial.  This was pinned in place, and the drill and keel punch were used to prepare the proximal tibia.  The knee was then brought into extension, the patella everted, and it measured only 19 mm therefore was not resurfaced but rather circumferentially denervated and osteophytes were excised.  Next final components were opened, bony surfaces were washed and dried, and periarticular pain cocktail was injected with 30 mL in the posterior capsule.  Cement was then mixed.  The tibial component was cemented along with the proximal tibial bone surface, the component was impacted into place followed by removal of excess cement.  The femur was reduced onto the tibia, and cement was placed on the cut surface of the femur.  The cemented femoral component was then impacted down, with removal of excess cement.  The knee was brought into full extension and an axial load was held across the joint.  The patella was cemented along with the button, and this was compressed and held in place.  Once cement had hardened, the knee was trialed again.   Optimal stability was achieved with a 14 mm polyethylene liner, with exam demonstrating same as with trials.  The trial poly was removed, the knee was washed out, any loose bodies removed, and final poly was placed.  Betadine lavage was performed.  Remaining pain cocktail was injected with 10 mL into remaining fat pad, 10 mL into medial femoral and tibial periosteum, and 10 mL into extensor mechanism.  Closure was performed with 5 Ethibond for the arthrotomy, 2-0 Vicryl for skin, followed by staples.  A sterile dressing was applied, and the patient was brought to PACU in good condition.  Anesthesia: Spinal plus adductor canal block  Complications: None  Specimens: Bone  Blood loss: 25 mL  Fluids: See anesthesia report  Implants: Medacta GM K sphere size 2 femur, 2 tibia, 14 poly  Drains: None  Condition: Good  Disposition: Floor    -Weightbearing as tolerated, PT OT  -DVT prophylaxis mechanical plus aspirin twice daily  -Pain control with oral meds  -Perioperative Ancef, 1 week Keflex postop due to diabetes    Jacky Bautista MD, FAAOS  [facility] Orthopaedic Surgery  Phone [phone]  Fax [phone]

## 2025-02-07 NOTE — PROGRESS NOTES
[facility] Progress Note    Subjective:  Patient doing well. Pain well controlled on oral medication. Has worked with and cleared PT/OT. Tolerating diet, voiding independently.    Objective:  Laying comfortably in hospital chair. Alert and oriented. Firing BL TA/EHL. Dressing C/D/I.    Assessment/Plan: 65 year old female postop day #1 status post right total knee arthroplasty.    -Weightbearing as tolerated, PT OT  -DVT prophylaxis mechanical plus aspirin twice daily  -Pain control with oral meds  -Perioperative Ancef, 1 week Keflex postop due to diabetes  Disposition: Home today    Anthony Parks PA-C  [facility] Orthopedic Surgery  Phone [phone]  Fax [phone]

## 2025-02-07 NOTE — PHYSICAL THERAPY NOTE
PHYSICAL THERAPY EVALUATION - INPATIENT     Room Number: 364/364-A  Evaluation Date: 2/7/2025  Type of Evaluation: Initial  Physician Order: PT Eval and Treat    Presenting Problem: s/p R TKA  Co-Morbidities : type 2 diabetes, hypertension, hyperlipidemia, GERD  Reason for Therapy: Mobility Dysfunction and Discharge Planning    Procedure: Right total knee arthroplasty mechanical alignment Dr. Bautista 2/6/25    PHYSICAL THERAPY ASSESSMENT   Patient is a 65 year old female admitted 2/6/2025 for R TKA.   Patient is currently functioning near baseline with bed mobility, transfers, gait, and stair negotiation. Prior to admission, patient's baseline is [place].     Patient will benefit from continued skilled PT Services at discharge to promote prior level of function.  Anticipate patient will return home with home health PT.    PLAN  Patient has been evaluated and presents with no skilled Physical Therapy needs at this time.  Patient discharged from Physical Therapy services.  Please re-order if a new functional limitation presents during this admission.    PT Device Recommendation: Rolling walker    GOALS  Patient was able to achieve the following goals ...    Patient was able to transfer Safely with RW   Patient able to ambulate on level surfaces Safely with RW     HOME SITUATION  Type of Home: Apartment  Home Layout: One level                     Lives With: Spouse        Patient Regularly Uses: Cane     Prior Level of [place]: Patient lives in an apartment with her spouse. There are no stairs. Depending on her ability to do stairs, she may stay with her daughter who has stairs to enter and get to the second floor. She is typically [place] with mobility and uses a cane and HHA of her . Patient is Danish speaking, so her daughter provided interpretation.    SUBJECTIVE  \"My L knee hurts more than the R\"     OBJECTIVE  Precautions: Bed/chair alarm  Fall Risk: Standard fall risk    WEIGHT BEARING RESTRICTION  R Lower  Extremity: Weight Bearing as Tolerated    PAIN ASSESSMENT  Rating: Unable to rate  Location: incisional  Management Techniques: Activity promotion;Body mechanics;Repositioning;Other (Comment) (ice)    COGNITION  Overall Cognitive Status:  WFL - within functional limits    RANGE OF MOTION AND STRENGTH ASSESSMENT  Upper extremity ROM and strength are within functional limits     R Lower extremity ROM is limited as expected s/p TKA    R Lower extremity strength is limited as expected s/p TKA    BALANCE  Static Sitting: Normal  Dynamic Sitting: Normal  Static Standing: Fair -  Dynamic Standing: Fair -    ADDITIONAL TESTS                                    ACTIVITY TOLERANCE                         O2 WALK       NEUROLOGICAL FINDINGS                        AM-PAC '6-Clicks' INPATIENT SHORT FORM - BASIC MOBILITY  How much difficulty does the patient currently have...  Patient Difficulty: Turning over in bed (including adjusting bedclothes, sheets and blankets)?: None   Patient Difficulty: Sitting down on and standing up from a chair with arms (e.g., wheelchair, bedside commode, etc.): None   Patient Difficulty: Moving from lying on back to sitting on the side of the bed?: None   How much help from another person does the patient currently need...   Help from Another: Moving to and from a bed to a chair (including a wheelchair)?: A Little   Help from Another: Need to walk in hospital room?: A Little   Help from Another: Climbing 3-5 steps with a railing?: A Little       AM-PAC Score:  Raw Score: 21   Approx Degree of Impairment: 28.97%   Standardized Score (AM-PAC Scale): 50.25   CMS Modifier (G-Code): CJ    FUNCTIONAL ABILITY STATUS  Gait Assessment   Functional Mobility/Gait Assessment  Gait Assistance: Supervision  Distance (ft): 150, 150  Assistive Device: Rolling walker  Pattern: R Decreased stance time  Stairs: Stairs  How Many Stairs: 4  Device: 2 Rails  Assist: Supervision  Pattern: Ascend and Descend  Ascend and  Descend : Step to    Skilled Therapy Provided   Educated on importance of out of bed mobility and ambulation  Educated on WBAT status and use of RW  Educated on not placing a pillow behind the knee to maintain extension  Educated on use of ice for swelling and pain management  Educated on HEP    Bed mobility> sit to stand to RW> ambulated 150 feet with RW> ascended/descended 4 stairs> ambulated back to room> completed dressing with OT (see OT note for details)> upright in chair at end of session    *per RN, use of iPad  unsuccessful, called daughter who provided interpretation throughout session    Bed Mobility:  Rolling: independent  Supine to sit: independent   Sit to supine: NT     Transfer Mobility:  Sit to stand: supervision to RW   Stand to sit: supervision  Gait = supervision with RW, 150 feet, 150 feet, decreased R stance time, step through pattern, increased reliance on BUE support    Therapist's comments:RN gave clearance to see patient. Discussed role and goal of physical therapy in hospital setting. Spouse present during session. Pt in agreement to session.      Exercise/Education Provided:  Bed mobility  Body mechanics  Energy conservation  Functional activity tolerated  Gait training  Transfer training    Patient End of Session: Up in chair;Needs met;Call light within reach;RN aware of session/findings;All patient questions and concerns addressed;Hospital anti-slip socks;SCDs in place;Alarm set;Family present    Patient Evaluation Complexity Level:  History Low - no personal factors and/or co-morbidities   Examination of body systems Low -  addressing 1-2 elements   Clinical Presentation Low- Stable   Clinical Decision Making Low Complexity       PT Session Time: 20 minutes  Gait Training: 10 minutes

## 2025-02-07 NOTE — CONSULTS
[place] HOSPITALIST  CONSULT     Darrin Gerber Patient Status:  Outpatient in a Bed    1960 MRN [id number]   Location [facility] 3SW-A Attending Jacky Bautista MD   Hosp Day # 0 PCP John Botello MD     Reason for consult: Medical management    Requested by: Dr. Bautista    Subjective:   History of Present Illness:     Darrin Gerber is a 65 year old female with history of type 2 diabetes, hypertension, hyperlipidemia, GERD presents to the hospital for an elective right total knee replacement.    History/Other:    Past Medical History:  Past Medical History:    Arthritis    Brain tumor (benign) (HCC)    Cataract    Diabetes (HCC)    Esophageal reflux    Essential hypertension    High blood pressure    Hyperlipidemia    Visual impairment     Past Surgical History:   Past Surgical History:   Procedure Laterality Date    Brain surgery      Cataract      Colonoscopy      Excis infratent brain tumor      Upper gi endoscopy,exam        Family History:   Family History   Problem Relation Age of Onset    No Known Problems Father     No Known Problems Mother      Social History:    reports that she has never smoked. She has never used smokeless tobacco. She reports that she does not drink alcohol and does not use drugs.     Allergies: Allergies[1]    Medications:  Medications Ordered Prior to Encounter[2]    Review of Systems:   A comprehensive review of systems was completed.    Pertinent positives and negatives noted in the HPI.    Objective:   Physical Exam:    /76 (BP Location: Left arm)   Pulse 72   Temp 98.3 °F (36.8 °C) (Oral)   Resp 18   Ht 5' (1.524 m)   Wt 161 lb 9.6 oz (73.3 kg)   SpO2 95%   BMI 31.56 kg/m²   General: No acute distress, Alert  Respiratory: No rhonchi, no wheezes  Cardiovascular: S1, S2. Regular rate and rhythm  Abdomen: Soft, NT/ND, +BS  Neuro: No new focal deficits  Extremities: No edema      Results:    Labs:      Labs Last 24 Hours:  No results for input(s): \"WBC\", \"HGB\",  \"MCV\", \"PLT\", \"BAND\", \"INR\" in the last 168 hours.    Invalid input(s): \"LYM#\", \"MONO#\", \"BASOS#\", \"EOSIN#\"    No results for input(s): \"GLU\", \"BUN\", \"CREATSERUM\", \"GFRAA\", \"GFRNAA\", \"CA\", \"ALB\", \"NA\", \"K\", \"CL\", \"CO2\", \"ALKPHO\", \"AST\", \"ALT\", \"BILT\", \"TP\" in the last 168 hours.    No results for input(s): \"PTP\", \"INR\" in the last 168 hours.    No results for input(s): \"TROP\", \"CK\" in the last 168 hours.      Imaging: Imaging data reviewed in Epic.    Assessment & Plan:      # Right knee osteoarthritis  - S/P R TKA  -Management per orthopedic surgery    # Hypertension  -Will continue hydrochlorothiazide    # Type 2 diabetes  -Will place on hypoglycemia protocol with correction factor insulin.    # Hyperlipidemia  -Will continue on statin therapy    # Peripheral neuropathy  -Will continue on gabapentin    # Rheumatoid arthritis.  -Patient is on methotrexate at home  -Patient to resume upon discharge    Plan of care discussed with patient at bedside.    Grabiel Jackson, DO  2/6/2025    The 21st Century Cures Act makes medical notes like these available to patients in the interest of transparency. Please be advised this is a medical document. Medical documents are intended to carry relevant information, facts as evident, and the clinical opinion of the practitioner. The medical note is intended as peer to peer communication and may appear blunt or direct. It is written in medical language and may contain abbreviations or verbiage that are unfamiliar.          [1]   Allergies  Allergen Reactions    Meloxicam SWELLING     Able to tolerate iburprofen, naproxen,    [2]   No current facility-administered medications on file prior to encounter.     Current Outpatient Medications on File Prior to Encounter   Medication Sig Dispense Refill    Ferrous Sulfate (FEROSUL) 325 (65 Fe) MG Oral Tab Take 1 tablet (325 mg total) by mouth daily. 90 tablet 3    aspirin 81 MG Oral Tab EC Take 1 tablet (81 mg total) by mouth  daily.      leucovorin 5 MG Oral Tab Take 1 tablet (5 mg total) by mouth once a week. ON SUNDAYS 12 tablet 3    Atovaquone-Proguanil HCl 250-100 MG Oral Tab Take 1 tablet by mouth daily. 90 tablet 1    Omeprazole 40 MG Oral Capsule Delayed Release Take 1 capsule (40 mg total) by mouth daily. 90 capsule 1    Cyanocobalamin (VITAMIN B-12) 1000 MCG Sublingual SL Tab Take 1 tablet by mouth daily. 90 tablet 3    Glucose Blood (ONETOUCH ULTRA) In Vitro Strip USE TO TEST TWICE A  each 3    Lancets (ONETOUCH DELICA PLUS [id number]) Does not apply Misc 1 Lancet by Finger stick route 2 (two) times daily. 300 each 2    nystatin 409596 UNIT/ML Mouth/Throat Suspension TAKE 5ML BY MOUTH IN THE MORNING AT NOON IN THE EVENING AND AT BEDTIME FOR 10 DAYS 240 mL 0    hydroxychloroquine 200 MG Oral Tab Take 1 tablet (200 mg total) by mouth daily. (Patient not taking: Reported on 2/4/2025) 90 tablet 1    Lidocaine Viscous HCl 2 % Mouth/Throat Solution Take 5 mL by mouth every 3 (three) hours as needed for Pain. For mouth sores. 100 mL 1    diclofenac 1 % External Gel Apply 2 g topically 4 (four) times daily. 350 g 0    Blood Glucose Monitoring Suppl (BLOOD GLUCOSE MONITOR SYSTEM) w/Device Does not apply Kit Please check blood glucose twice a day 1 kit 0    Elastic Bandages & Supports (NEOPRENE KNEE BRACE) Does not apply Misc 1 Application by Does not apply route daily. Left neoprene knee brace for patellofemoral pain syndrome 1 each 0

## 2025-02-08 NOTE — PLAN OF CARE
Pt a&O. On room air. Encouraged use of incentive spirometer and ankle pump exercises every hour while awake. Scds to BLE. Spanda- to RLE. Pt has extra compression sleeve. Tolerating diet with good appetite. Blood sugars monitored. Last BM 2/5/25. Miralax given this AM. Voiding w/o difficulty. Pain managed with current medications. Pt reminded to \"call; don't fall\". Participating in therapy as ordered. Up with standby assist using walker and gait belt. Right knee aquacel drsg C/d/I. Gel ice packs on for pain/swelling. Pt ready for dc home with [facility]. Pt's spouse at bedside and updated with plan of care.    Pt had her Rx filled by our outpt pharmacy.

## 2025-02-10 ENCOUNTER — TELEPHONE (OUTPATIENT)
Dept: ORTHOPEDICS CLINIC | Facility: CLINIC | Age: 65
End: 2025-02-10

## 2025-02-11 DIAGNOSIS — M05.741 RHEUMATOID ARTHRITIS INVOLVING BOTH HANDS WITH POSITIVE RHEUMATOID FACTOR (HCC): ICD-10-CM

## 2025-02-11 DIAGNOSIS — M05.742 RHEUMATOID ARTHRITIS INVOLVING BOTH HANDS WITH POSITIVE RHEUMATOID FACTOR (HCC): ICD-10-CM

## 2025-02-11 DIAGNOSIS — Z96.651 STATUS POST RIGHT KNEE REPLACEMENT: Primary | ICD-10-CM

## 2025-02-11 RX ORDER — ONDANSETRON 4 MG/1
4 TABLET, FILM COATED ORAL EVERY 8 HOURS PRN
Qty: 20 TABLET | Refills: 2 | Status: CANCELLED | OUTPATIENT
Start: 2025-02-11

## 2025-02-11 RX ORDER — ONDANSETRON 4 MG/1
4 TABLET, ORALLY DISINTEGRATING ORAL EVERY 8 HOURS PRN
Qty: 20 TABLET | Refills: 0 | Status: SHIPPED | OUTPATIENT
Start: 2025-02-11 | End: 2025-03-19 | Stop reason: ALTCHOICE

## 2025-02-11 NOTE — TELEPHONE ENCOUNTER
Zofran order pending for nausea/vomiting.  Route back so we call patient to educate.  Thank you!      Spoke with Shahriar RN with [facility] who stated he saw patient last Saturday and today.  He noticed today the dressing was saturated with serous fluid.  No redness or pain to touch.  Patient has been very nauseous w/ vomiting in last few days, so she has been unable to take medications regularly.  Shahriar doesn't know if the medications are causing the nausea/vomiting because she was taking them.  Shahriar states she can only take small sips and bites.  He found her today sleeping on her right side and encouraged her to sleep on her left side, she has difficulty sleeping on her back due to her weight.  Vital signs were fine today and she is having regular BMs.      2/6/25 Right total knee arthroplasty     Future Appointments   Date Time Provider Department Center   2/18/2025 11:00 AM Anthony Parks PA-C [facility] [address]

## 2025-02-12 RX ORDER — METHOTREXATE 2.5 MG/1
15 TABLET ORAL WEEKLY
Qty: 78 TABLET | Refills: 2 | OUTPATIENT
Start: 2025-02-12

## 2025-02-17 ENCOUNTER — TELEPHONE (OUTPATIENT)
Dept: FAMILY MEDICINE CLINIC | Facility: CLINIC | Age: 65
End: 2025-02-17

## 2025-02-17 DIAGNOSIS — E11.65 TYPE 2 DIABETES MELLITUS WITH HYPERGLYCEMIA, WITHOUT LONG-TERM CURRENT USE OF INSULIN (HCC): ICD-10-CM

## 2025-02-17 RX ORDER — BLOOD SUGAR DIAGNOSTIC
STRIP MISCELLANEOUS 2 TIMES DAILY
Qty: 300 STRIP | Refills: 1 | Status: SHIPPED | OUTPATIENT
Start: 2025-02-17

## 2025-02-17 RX ORDER — LANCETS 33 GAUGE
EACH MISCELLANEOUS
Qty: 300 EACH | Refills: 1 | Status: SHIPPED | OUTPATIENT
Start: 2025-02-17

## 2025-02-18 ENCOUNTER — OFFICE VISIT (OUTPATIENT)
Facility: CLINIC | Age: 65
End: 2025-02-18
Payer: MEDICAID

## 2025-02-18 DIAGNOSIS — M17.11 PRIMARY OSTEOARTHRITIS OF RIGHT KNEE: ICD-10-CM

## 2025-02-18 DIAGNOSIS — Z96.651 STATUS POST RIGHT KNEE REPLACEMENT: Primary | ICD-10-CM

## 2025-02-18 PROCEDURE — 99024 POSTOP FOLLOW-UP VISIT: CPT | Performed by: PHYSICIAN ASSISTANT

## 2025-02-18 RX ORDER — TRAMADOL HYDROCHLORIDE 50 MG/1
50 TABLET ORAL EVERY 8 HOURS PRN
Qty: 45 TABLET | Refills: 0 | Status: SHIPPED | OUTPATIENT
Start: 2025-02-18 | End: 2025-03-05

## 2025-02-18 NOTE — PROGRESS NOTES
[facility] Progress Note    Subjective: Patient returns to clinic 2 weeks status post right total knee arthroplasty performed on 2/6/25. They have been taking Tylenol, Tramadol for pain control. She stopped oxycodone 5 days ago. They continue to take aspirin for DVT prophylaxis. They are overall satisfied with their progress.  Denies any fevers, chills, night sweats.  She has continued to experience some drainage from the incision and presents the bandage in clinic today.    Objective: Patient is seated comfortably in the exam chair. Alert and oriented. Nonlabored breathing. Grossly neurologically intact.  Incision is clean, dry, and intact but the bandage has about a quarter sized amount of serosanguinous drainage along the area corresponding with the midportion of the incision.  There is no expressible drainage with tactile pressure along the incision.  No fluctuance around the incision.  Demonstrates active knee extension to 0 and knee flexion to 90. Calves are soft and nontender.     Assessment/Plan: I discussed keeping the staples in place for at least another week to allow the incision to heal further before staple removal.  I swabbed the incision with Betadine and covered it with an island dressing and instructed the patient to do so daily for the next week.  Supplies were also given to the patient and her  in clinic today.  Continue with aspirin for DVT prophylaxis.  Outpatient physical therapy referral written.  Follow-up in 1 week to see me for staple removal.   The patient's questions were sought and answered satisfactorily.  They are happy with the plan and will follow as advised.    X-rays needed at next visit: Postoperative radiographs right knee and full leg length films        Anthony Parks PA-C  [facility] Orthopedic Surgery    This note was dictated using Dragon software.  While it was briefly proofread prior to completion, some grammatical, spelling, and word  choice errors due to dictation may still occur.

## 2025-02-24 ENCOUNTER — OFFICE VISIT (OUTPATIENT)
Facility: CLINIC | Age: 65
End: 2025-02-24
Payer: MEDICAID

## 2025-02-24 DIAGNOSIS — M17.11 PRIMARY OSTEOARTHRITIS OF RIGHT KNEE: ICD-10-CM

## 2025-02-24 DIAGNOSIS — Z96.651 STATUS POST RIGHT KNEE REPLACEMENT: Primary | ICD-10-CM

## 2025-02-24 PROCEDURE — 99024 POSTOP FOLLOW-UP VISIT: CPT | Performed by: PHYSICIAN ASSISTANT

## 2025-02-24 RX ORDER — PSEUDOEPHED/ACETAMINOPH/DIPHEN 30MG-500MG
1000 TABLET ORAL 3 TIMES DAILY
Qty: 90 TABLET | Refills: 0 | Status: SHIPPED | OUTPATIENT
Start: 2025-02-24 | End: 2025-03-11

## 2025-02-24 NOTE — PROGRESS NOTES
[facility] Progress Note    Subjective: Patient returns to clinic 3 weeks status post right total knee arthroplasty performed on 2/6/25. They have been taking Tylenol, Tramadol for pain control. It has been helpful and her left knee is now the limiting factor.  They continue to take aspirin for DVT prophylaxis. They are overall satisfied with their progress.  Denies any fevers, chills, night sweats.  Reports improvement in the amount of drainage from the right knee incision.    Objective: Patient is seated comfortably in the exam chair. Alert and oriented. Nonlabored breathing. Grossly neurologically intact. Incision is clean, dry, and intact with staples in place without any redness or drainage concerning for infection. Demonstrates active knee extension to 0 and knee flexion to 95 degrees. Calves are soft and nontender.     Assessment/Plan: Staples were removed in clinic today.  The incision was swabbed with Betadine, Mastisol was applied to either side of the incision, and Steri-Strips were applied over the incision.  I instructed the patient to avoid getting the incision wet in the shower for the next 2 days to allow the staple sites to reepithelialize.  I also recommended avoiding soaking the incision in a pool or tub until the patient is 6 weeks postop.  Continue with aspirin for DVT prophylaxis.  Outpatient physical therapy referral written. Follow-up in 3 weeks with Dr. Bautista for routine 6-week postoperative visit or sooner if any concerns.  The patient's questions were sought and answered satisfactorily.  They are happy with the plan and will follow as advised.    X-rays needed at next visit: Postoperative radiographs right knee and full leg length films        Anthony Parks PA-C  [facility]    This note was dictated using Dragon software.  While it was briefly proofread prior to completion, some grammatical, spelling, and word choice errors due to dictation  may still occur.

## 2025-02-25 ENCOUNTER — OFFICE VISIT (OUTPATIENT)
Dept: PHYSICAL THERAPY | Age: 65
End: 2025-02-25
Attending: ORTHOPAEDIC SURGERY
Payer: MEDICAID

## 2025-02-25 DIAGNOSIS — Z96.651 S/P TOTAL KNEE ARTHROPLASTY, RIGHT: Primary | ICD-10-CM

## 2025-02-25 PROCEDURE — 97161 PT EVAL LOW COMPLEX 20 MIN: CPT

## 2025-02-25 PROCEDURE — 97110 THERAPEUTIC EXERCISES: CPT

## 2025-02-25 PROCEDURE — 97140 MANUAL THERAPY 1/> REGIONS: CPT

## 2025-02-26 DIAGNOSIS — R79.89 LOW VITAMIN B12 LEVEL: ICD-10-CM

## 2025-02-26 RX ORDER — MAGNESIUM 200 MG
1 TABLET ORAL DAILY
Qty: 270 TABLET | Refills: 0 | Status: SHIPPED | OUTPATIENT
Start: 2025-02-26

## 2025-02-26 NOTE — PROGRESS NOTES
LOWER EXTREMITY EVALUATION:     Diagnosis:   Status post right knee replacement (Z96.651) Patient:  Darrin Gerber (65 year old, female)        Referring Provider: Jacky Bautista  Today's Date   2/25/2025    Precautions:  None   Date of Evaluation: 02/25/25  Next MD visit: 3/24/25  Date of Surgery: 2/6/25     PATIENT SUMMARY   History of current condition: The patient attended therapt with her  who helped translate, but still had a mild language barrier. The patient reports that she has been feeling better since she had knee surgery. The patient reports that she only had 1-2 sessions of home health PT. The patient's HEP inclduing SLR, marches, ankle pumps. She is currently using a walker for community ambulation, but walks around the house without her walker. She would like to resume walking without an AD. The patient reports that she doen't need to use the stairs at home. The patient is scheduled for surgery 4/18/25. The patient reports her doctor measured her at 95deg knee bend.   Pain level: current 0 /10, at best 0 /10, at worst 6 /10  Description of symptoms: anterior knee pain, stiffness, and functional limitations   Occupation: retired   Leisure activities/Hobbies: No hobbies   Prior level of function: The patient's  reports that she doesn't do any cleaning or cooking or household chores at home.  Current limitations: weakness, walking longer distances, standing, stairs  Pt goals: to walk without the walker  Past medical history was reviewed with Darrin. Significant findings include: left knee pain, anticipating left knee replacement surgery in April  Imaging/Tests: x-ray 2/6/25   Darrin  has a past medical history of Arthritis, Brain tumor (benign) (HCC), Cataract, Diabetes (HCC), Esophageal reflux, Essential hypertension, High blood pressure, Hyperlipidemia, and Visual impairment.  She  has a past surgical history that includes excis infratent brain tumor; cataract; Brain Surgery;  colonoscopy; upper gi endoscopy,exam; and knee replacement surgery (Right, 02/06/2025).    ASSESSMENT  Darrin presents to physical therapy evaluation with primary c/o right knee pain after total knee replacement surgery. The patient speaks little English. She had her  here to translate, but there still seemed to be a mild language barrier. Subjective and history taking was mild limited and the patient would benefit from using a  for future appts. The results of the objective tests and measures show anticipated post-surgical deficits in knee mobility, strength, endurance, and function after TKR surgeruy 2/6/25. The patient is currently using a RW for community ambulation, but will walk around her home without an AD, and would like to resume walking without an AD. The patient is scheduled for an upcoming left knee replacement 4/8/25. Functional deficits include but are not limited to weakness, walking longer distances, standing, stairs. Signs and symptoms are consistent with diagnosis of Status post right knee replacement (Z96.651). Pt and PT discussed evaluation findings, pathology, POC and HEP.  Pt voiced understanding and performs HEP correctly without reported pain. Skilled Physical Therapy is medically necessary to address the above impairments and reach functional goals.    OBJECTIVE:   Musculoskeletal  Observation: The patient's incision is still covered by steri strips, but appear clean and intact.    ROM and Strength/MMT:  (* denotes performed with pain)  Hip   MMT (-/5)    R L     Flex (L2) 3+/5 3+/5     ,   Knee   ROM MMT (-/5)    R L R L     Flex 123 130 3+/5 3+/5     Ext (L3) -6 0 3+/5 3+/5     Balance and Functional Mobility:  Gait: pt ambulates on level ground with assistive device; decreased step length; decreased stance phase; decreased foot clearance; stooped posture/forward lean    Today's Treatment and Response:   Pt education was provided on exam findings, treatment diagnosis,  treatment plan, expectations, and prognosis.  Today's Treatment       2/25/2025   PT Treatment   Therapeutic Exercise SLR: x 10 (R)   DL bridging: 1 x 10   Hooklying clamshell: 2 x 10 (B), red theraband  DLHR: x 10   Standing hip ABD, ext, and HSC:  1 x 10 (B)   Provided patient with a written copy of exercise instruction which was also documented in patient's electronic medical chart. Educated the patient on the need for consistency with HEP to achieve the mutually established goals.   Manual Therapy Soft tissue mobilization: (R) quad, ITB/VL, posterior knee   Therapeutic Activity Education on anatomy and pathophysiology of current condition, rationale for physical therapy, anticipated treatment interventions, prognosis, timeline for recovery, and expected functional outcomes based on evaluative findings.    Therapeutic Exercise Min 12   Manual Therapy Min 8   Therapeutic Activity Min 3   Evaluation Min 22   Total of Timed Procedures 23   Total of Service Based 22   Total Treatment Time 45         Patient was instructed in and issued a HEP for: Access Code: [email or web address]  URL: [email or web address]  Date: 02/25/2025  Prepared by: Keke Eugene    Exercises  - Supine Straight Leg Raises  - 1 x daily - 7 x weekly - 2 sets - 10 reps  - Supine Bridge  - 1 x daily - 7 x weekly - 2 sets - 10 reps  - Hooklying Clamshell with Resistance  - 1 x daily - 7 x weekly - 2 sets - 10 reps - red theraband  - Standing Heel Raise  - 1 x daily - 7 x weekly - 1 sets - 10 reps  - Standing Hip Abduction with Counter Support  - 1 x daily - 7 x weekly - 1 sets - 10 reps  - Standing Hip Extension with Counter Support  - 1 x daily - 7 x weekly - 1 sets - 10 reps  - Standing Knee Flexion AROM with Chair Support  - 1 x daily - 7 x weekly - 1 sets - 10 reps    Charges:  PT EVAL:  , MT x 1, Ther Ex x 1  In agreement with evaluation findings and clinical rationale, this evaluation involved LOW COMPLEXITY decision making due to no  personal factors/comorbidities, 1-2 body structures involved/activity limitations, and stable symptoms as documented in the evaluation.                                                                                                                PLAN OF CARE:    Goals: (to be met in 12 visits)   Goals       Therapy Goals     The patient will improve LE strength and endurance to facilitate standing for extended periods of time for 30 minutes at a time for household ambulation and chores. Timeframe: 4-6 visits.  Patient will improve knee strength and stability to facilitate discontinued use of AD for daily ambulation. Timeframe: 6-8 visits.   Long-Term Goals:  The patient will improve LE strength and endurance to facilitate walking distances of 1 mile(s) daily for community ambulation. Timeframe: 8-12 visits.  Patient will improve lower motor control to perform double-leg squat with symmetrical loading, without asymmetries, and with proper posterior chain loading to facilitate squatting and lifting ADL's. Timeframe: 8-12 visits.  Patient will improve LE mobility, strength, and single-leg stabilization to meet strength requirements for reciprocal stair navigation pattern with no asymmetries for ascending and descending 2-3 flights of stairs daily for community integration. Timeframe: 12 visits.  Patient will improve LEFS score by at least 9 points to indicate a true change in improved function for ADL's and restoring PLF. Timeframe: 12 visits.             Frequency / Duration: Patient will be seen 2x/week or a total of 12  visits over a 90 day period. Treatment will include: Gait training; Manual Therapy; Neuromuscular Re-education; Self-Care Home Management; Therapeutic Activities; Therapeutic Exercise; Home Exercise Program instruction; Electrical stimulation (unattended)    Education or treatment limitation: Communication   Rehab Potential: good     LEFS Score  LEFS Score: (Patient-Rptd) 23.75 % (2/25/2025 11:22  AM)    Patient/Family/Caregiver was advised of these findings, precautions, and treatment options and has agreed to actively participate in planning and for this course of care.    Thank you for your referral. Please co-sign or sign and return this letter via fax as soon as possible to [phone]. If you have any questions, please contact me at Dept: [phone]    Sincerely,  Electronically signed by therapist: Keke Eugene PT  Physician's certification required: Yes  I certify the need for these services furnished under this plan of treatment and while under my care.    X___________________________________________________ Date____________________    Certification From: 2/25/2025  To:5/26/2025

## 2025-02-26 NOTE — PATIENT INSTRUCTIONS
Thank you for coming to your physical therapy appt! During your appt today you were issued a home exercise program with a printed handout from [employer]. Your home exercise program can also be accessed using the information below. The selected exercises are meant to supplement the treatment you received and reinforce your progress made in physical therapy. Please complete these exercises as instructed by your physical therapist. It is important to stay consistent with your exercises to maximize your recovery!       Access Code: [email or web address]  URL: [email or web address]  Date: 02/25/2025  Prepared by: Keke Eugene    Exercises  - Supine Straight Leg Raises  - 1 x daily - 7 x weekly - 2 sets - 10 reps  - Supine Bridge  - 1 x daily - 7 x weekly - 2 sets - 10 reps  - Hooklying Clamshell with Resistance  - 1 x daily - 7 x weekly - 2 sets - 10 reps - red theraband  - Standing Heel Raise  - 1 x daily - 7 x weekly - 1 sets - 10 reps  - Standing Hip Abduction with Counter Support  - 1 x daily - 7 x weekly - 1 sets - 10 reps  - Standing Hip Extension with Counter Support  - 1 x daily - 7 x weekly - 1 sets - 10 reps  - Standing Knee Flexion AROM with Chair Support  - 1 x daily - 7 x weekly - 1 sets - 10 reps

## 2025-02-26 NOTE — TELEPHONE ENCOUNTER
Vitamin b 12    Future Appointments   Date Time Provider Department Center   2/27/2025 12:00 PM Keke Eugene, PT [facility] [place]   3/4/2025 12:00 PM [place], Norma, PTA [facility] [place]   3/6/2025 12:00 PM [place], Norma, PTA [facility] [place]   3/11/2025 12:00 PM [place], Norma, PTA [facility] [place]   3/13/2025 12:00 PM Keke Eugene, PT [facility] [place]   3/18/2025 12:00 PM Brian, Norma, PTA [facility] [place]   3/20/2025 12:00 PM Brian, Norma, PTA [facility] [place]   3/24/2025 11:00 AM Jacky Bautista MD [facility] [address]   3/24/2025 12:45 PM PF MRI 1 (1.5T) PF MRI [place]   3/25/2025 12:45 PM Norma Torres, PTA [facility] [place]   3/27/2025 12:45 PM Keke Eugene, PT [facility] [place]   4/1/2025 12:00 PM Keke Eugene, PT [facility] [place]   4/3/2025 12:00 PM Keke Eugene, PT [facility] [place]   4/22/2025 11:20 AM Anthony Parks PA-C [facility] [address]   5/19/2025 11:20 AM Jacky Bautista MD [facility] [address]   7/7/2025  2:15 PM Estela Fuller MD [facility] [place]     Last office visit: 2/4/2025    Last fill: unsure

## 2025-02-27 ENCOUNTER — OFFICE VISIT (OUTPATIENT)
Dept: PHYSICAL THERAPY | Age: 65
End: 2025-02-27
Attending: ORTHOPAEDIC SURGERY
Payer: MEDICAID

## 2025-02-27 PROCEDURE — 97110 THERAPEUTIC EXERCISES: CPT

## 2025-02-27 PROCEDURE — 97140 MANUAL THERAPY 1/> REGIONS: CPT

## 2025-02-27 NOTE — PROGRESS NOTES
Patient: Darrin Gerber (65 year old, female) Referring Provider:  Insurance:   Diagnosis: Status post right knee replacement (Z96.651) [employer]   Date of Surgery: 2/6/25 Next MD visit:  N/A   Precautions:  None 3/24/25 Referral Information:    Date of Evaluation: Req: 15, Auth: 15, Exp: 4/26/2025 02/25/25 POC Auth Visits:  15       Today's Date   2/27/2025    Subjective  A virtual  was used fo today's appt. The patient reports that her knee is feeling better. We jimmy       Pain: 5/10     Objective    Assessment  Darrin was more limited by left knee pain during her exercises this date than her right knee. The patient was with increased knee pain during her SLR exercise that was not present at her last appt and she denied pain when doing them at home. She was unable to identify any aggravating incident. She also had complaints of increased right shin pain that she reports has been chronic, but has been a bit worse since her surgery. She usually manages with Tylenol, but didn't take any pain meds this morning. The patient seems to get mildly confused with sets/reps terminology and is better off being told 20 reps versus 2 sets of 10. The patient was unable to navigate the stairs with her left leg due to pain. We will assess tolerance to exercise progressions next session and will update HEP next session.    Goals (to be met in 12 visits)   Goals       Therapy Goals     The patient will improve LE strength and endurance to facilitate standing for extended periods of time for 30 minutes at a time for household ambulation and chores. Timeframe: 4-6 visits.  Patient will improve knee strength and stability to facilitate discontinued use of AD for daily ambulation. Timeframe: 6-8 visits.   Long-Term Goals:  The patient will improve LE strength and endurance to facilitate walking distances of 1 mile(s) daily for community ambulation. Timeframe: 8-12 visits.  Patient will improve lower  motor control to perform double-leg squat with symmetrical loading, without asymmetries, and with proper posterior chain loading to facilitate squatting and lifting ADL's. Timeframe: 8-12 visits.  Patient will improve LE mobility, strength, and single-leg stabilization to meet strength requirements for reciprocal stair navigation pattern with no asymmetries for ascending and descending 2-3 flights of stairs daily for community integration. Timeframe: 12 visits.  Patient will improve LEFS score by at least 9 points to indicate a true change in improved function for ADL's and restoring PLF. Timeframe: 12 visits.               Plan  Update HEP next session if no adverse reaction to exercise progressions.    Treatment Last 4 Visits       2/25/2025 2/27/2025   PT Treatment   Treatment Day  2   Therapeutic Exercise SLR: x 10 (R)   DL bridging: 1 x 10   Hooklying clamshell: 2 x 10 (B), red theraband  DLHR: x 10   Standing hip ABD, ext, and HSC:  1 x 10 (B)   Provided patient with a written copy of exercise instruction which was also documented in patient's electronic medical chart. Educated the patient on the need for consistency with HEP to achieve the mutually established goals. PROM - knee flexion x 5 - shin/ankle pain with PT grasp at the ankle, needed to support bottom of foot for PROM   SLR: x 5 (R) - patient was unable to complete any more due to \"too much pain\"   DL bridging: 1 x 10   Hooklying clamshell: 2 x 10 (B), red theraband   Hookyling ADD ball squeeze: x 20   DKC with SB: x 20   LAQ: x 20 (R)   DLHR: x 20  Standing hip ABD: x 20 (R) only    Standing hip ext: attempted but d/c due to left knee pain with SL WB     Neuro Re-Ed  Romberg balance: x 30\"  Tandem stance balance: x 20\" (B)    Manual Therapy Soft tissue mobilization: (R) quad, ITB/VL, posterior knee Soft tissue mobilization: (R) quad, ITB/VL, adductors - TTP over medial knee   Therapeutic Activity Education on anatomy and pathophysiology of current  condition, rationale for physical therapy, anticipated treatment interventions, prognosis, timeline for recovery, and expected functional outcomes based on evaluative findings.  Minisquat: 1 x 10   Step-up: 1 x 10 (R) only, 4\" step   Step-down: 1 x 10 (R) only, 4\" step    Therapeutic Exercise Min 12 17   Neuro Re-Ed Min  3   Manual Therapy Min 8 10   Therapeutic Activity Min 3 8   Evaluation Min 22    Total of Timed Procedures 23 38   Total of Service Based 22 0   Total Treatment Time 45 38         HEP  Access Code: [email or web address]  URL: [email or web address]  Date: 02/25/2025  Prepared by: Keke Eugene    Exercises  - Supine Straight Leg Raises  - 1 x daily - 7 x weekly - 2 sets - 10 reps  - Supine Bridge  - 1 x daily - 7 x weekly - 2 sets - 10 reps  - Hooklying Clamshell with Resistance  - 1 x daily - 7 x weekly - 2 sets - 10 reps - red theraband  - Standing Heel Raise  - 1 x daily - 7 x weekly - 1 sets - 10 reps  - Standing Hip Abduction with Counter Support  - 1 x daily - 7 x weekly - 1 sets - 10 reps  - Standing Hip Extension with Counter Support  - 1 x daily - 7 x weekly - 1 sets - 10 reps  - Standing Knee Flexion AROM with Chair Support  - 1 x daily - 7 x weekly - 1 sets - 10 reps    Charges     MT x 1, Therex x 2

## 2025-03-04 ENCOUNTER — OFFICE VISIT (OUTPATIENT)
Dept: PHYSICAL THERAPY | Age: 65
End: 2025-03-04
Attending: ORTHOPAEDIC SURGERY
Payer: MEDICAID

## 2025-03-04 PROCEDURE — 97530 THERAPEUTIC ACTIVITIES: CPT

## 2025-03-04 PROCEDURE — 97112 NEUROMUSCULAR REEDUCATION: CPT

## 2025-03-04 PROCEDURE — 97110 THERAPEUTIC EXERCISES: CPT

## 2025-03-04 NOTE — PROGRESS NOTES
Patient: Darrin Gerber (65 year old, female) Referring Provider:  Insurance:   Diagnosis: Status post right knee replacement (Z96.651) [facility]   Date of Surgery: 2/6/25 Next MD visit:  N/A   Precautions:  None 3/24/25 Referral Information:    Date of Evaluation: Req: 15, Auth: 15, Exp: 4/26/2025 02/25/25 POC Auth Visits:  15       Today's Date   3/4/2025    Subjective  A virtual  was used fo today's appt. Patient reports pain on the outside of the knee at times while walking, but overall feeling better despite this. Patient feels at times she feels weak as she has been doing 5-6 sets of exercises every day. Patient reports pain levels are still relatively high but is no longer constant.       Pain: 6/10     Objective     Assessment  Educated patient on parameters for HEP to reduce over-exertion and fatigue, patient reports she had been doing 5-6 sets of her exercises every day and was corrected in this. Patient also required extensive cueing for proper mechanics to complete majority of bed level exercises.    Goals (to be met in 12 visits)   Therapy Goals        The patient will improve LE strength and endurance to facilitate standing for extended periods of time for 30 minutes at a time for household ambulation and chores. Timeframe: 4-6 visits.  Patient will improve knee strength and stability to facilitate discontinued use of AD for daily ambulation. Timeframe: 6-8 visits.   Long-Term Goals:  The patient will improve LE strength and endurance to facilitate walking distances of 1 mile(s) daily for community ambulation. Timeframe: 8-12 visits.  Patient will improve lower motor control to perform double-leg squat with symmetrical loading, without asymmetries, and with proper posterior chain loading to facilitate squatting and lifting ADL's. Timeframe: 8-12 visits.  Patient will improve LE mobility, strength, and single-leg stabilization to meet strength requirements for  reciprocal stair navigation pattern with no asymmetries for ascending and descending 2-3 flights of stairs daily for community integration. Timeframe: 12 visits.  Patient will improve LEFS score by at least 9 points to indicate a true change in improved function for ADL's and restoring PLF. Timeframe: 12 visits.         Plan  Patient still with limited tolerance to balance and functional strengthening exercises, plan to trial progression of HEP next visit but not performed this day    Treatment Last 4 Visits       2/25/2025 2/27/2025 3/4/2025   PT Treatment   Treatment Day  2 3   Therapeutic Exercise SLR: x 10 (R)   DL bridging: 1 x 10   Hooklying clamshell: 2 x 10 (B), red theraband  DLHR: x 10   Standing hip ABD, ext, and HSC:  1 x 10 (B)   Provided patient with a written copy of exercise instruction which was also documented in patient's electronic medical chart. Educated the patient on the need for consistency with HEP to achieve the mutually established goals. PROM - knee flexion x 5 - shin/ankle pain with PT grasp at the ankle, needed to support bottom of foot for PROM   SLR: x 5 (R) - patient was unable to complete any more due to \"too much pain\"   DL bridging: 1 x 10   Hooklying clamshell: 2 x 10 (B), red theraband   Hookyling ADD ball squeeze: x 20   DKC with SB: x 20   LAQ: x 20 (R)   DLHR: x 20  Standing hip ABD: x 20 (R) only    Standing hip ext: attempted but d/c due to left knee pain with SL WB      Neuro Re-Ed  Romberg balance: x 30\"  Tandem stance balance: x 20\" (B)     Manual Therapy Soft tissue mobilization: (R) quad, ITB/VL, posterior knee Soft tissue mobilization: (R) quad, ITB/VL, adductors - TTP over medial knee    Therapeutic Activity Education on anatomy and pathophysiology of current condition, rationale for physical therapy, anticipated treatment interventions, prognosis, timeline for recovery, and expected functional outcomes based on evaluative findings.  Minisquat: 1 x 10   Step-up: 1 x  10 (R) only, 4\" step   Step-down: 1 x 10 (R) only, 4\" step     Therapeutic Exercise Min 12 17    Neuro Re-Ed Min  3    Manual Therapy Min 8 10    Therapeutic Activity Min 3 8    Evaluation Min 22     Total of Timed Procedures 23 38    Total of Service Based 22 0    Total Treatment Time 45 38           3/4/2025   LE Treatment   Therapeutic Exercise SLR with QS 2 x 5 (R)  DL bridging: 2 x 10   Hooklying clamshell: 2 x 10 (B), red theraband   Hookyling ADD ball squeeze: x 20 5\" H        Neuro Re-Education Tandem Stance 2 x 30\" ea R/L     Therapeutic Activity Extensive time spent on discussion/clarifying parameters for HEP, recent symptoms/changes   Therapeutic Exercise Minutes 13   Neuro Re-Educ Minutes 8   Therapeutic Activity Minutes 18   Total Time Of Timed Procedures 39   Total Time Of Service-Based Procedures 0   Total Treatment Time 39   HEP Access Code: [email or web address]  URL: [email or web address]  Date: 02/25/2025  Prepared by: Keke Eugene     Exercises  - Supine Straight Leg Raises  - 1 x daily - 7 x weekly - 2 sets - 10 reps  - Supine Bridge  - 1 x daily - 7 x weekly - 2 sets - 10 reps  - Hooklying Clamshell with Resistance  - 1 x daily - 7 x weekly - 2 sets - 10 reps - red theraband  - Standing Heel Raise  - 1 x daily - 7 x weekly - 1 sets - 10 reps  - Standing Hip Abduction with Counter Support  - 1 x daily - 7 x weekly - 1 sets - 10 reps  - Standing Hip Extension with Counter Support  - 1 x daily - 7 x weekly - 1 sets - 10 reps  - Standing Knee Flexion AROM with Chair Support  - 1 x daily - 7 x weekly - 1 sets - 10 reps        HEP  Access Code: [email or web address]  URL: [email or web address]  Date: 02/25/2025  Prepared by: Keke Eugene     Exercises  - Supine Straight Leg Raises  - 1 x daily - 7 x weekly - 2 sets - 10 reps  - Supine Bridge  - 1 x daily - 7 x weekly - 2 sets - 10 reps  - Hooklying Clamshell with Resistance  - 1 x daily - 7 x weekly - 2 sets - 10 reps - red  theraband  - Standing Heel Raise  - 1 x daily - 7 x weekly - 1 sets - 10 reps  - Standing Hip Abduction with Counter Support  - 1 x daily - 7 x weekly - 1 sets - 10 reps  - Standing Hip Extension with Counter Support  - 1 x daily - 7 x weekly - 1 sets - 10 reps  - Standing Knee Flexion AROM with Chair Support  - 1 x daily - 7 x weekly - 1 sets - 10 reps    Charges     Ther Ex x 1, Neuro x 1, Ther Act x 1

## 2025-03-06 ENCOUNTER — OFFICE VISIT (OUTPATIENT)
Dept: PHYSICAL THERAPY | Age: 65
End: 2025-03-06
Attending: ORTHOPAEDIC SURGERY
Payer: MEDICAID

## 2025-03-06 PROCEDURE — 97112 NEUROMUSCULAR REEDUCATION: CPT

## 2025-03-06 PROCEDURE — 97110 THERAPEUTIC EXERCISES: CPT

## 2025-03-06 PROCEDURE — 97140 MANUAL THERAPY 1/> REGIONS: CPT

## 2025-03-06 NOTE — PROGRESS NOTES
Patient: Darrin Gerber (65 year old, female) Referring Provider:  Insurance:   Diagnosis: Status post right knee replacement (Z96.651) [facility] [employer] MEDICAID   Date of Surgery: 2/6/25 Next MD visit:  N/A   Precautions:  None 3/24/25 Referral Information:    Date of Evaluation: Req: 15, Auth: 15, Exp: 4/26/2025 02/25/25 POC Auth Visits:  15       Today's Date   3/6/2025    Subjective  A virtual  was used fo today's appt. Patient reports pain has been less since last visit, still worse at night but not as bad.       Pain: 4/10     Objective       Assessment  Patient returns to PT reporting improved symptoms in her knee since last visit, less pain and less often. Progresed patient to 3 sets of SLR this day, but with some irritability of adductors and quads noted following. This improved with some soft tissue to the area, and patient was then able to continue. Patient demonstrated improved stability with tandem stance this day compared to previous visit, would benefit from balance progressings next visit    Goals (to be met in 12 visits)   Therapy Goals        The patient will improve LE strength and endurance to facilitate standing for extended periods of time for 30 minutes at a time for household ambulation and chores. Timeframe: 4-6 visits.  Patient will improve knee strength and stability to facilitate discontinued use of AD for daily ambulation. Timeframe: 6-8 visits.   Long-Term Goals:  The patient will improve LE strength and endurance to facilitate walking distances of 1 mile(s) daily for community ambulation. Timeframe: 8-12 visits.  Patient will improve lower motor control to perform double-leg squat with symmetrical loading, without asymmetries, and with proper posterior chain loading to facilitate squatting and lifting ADL's. Timeframe: 8-12 visits.  Patient will improve LE mobility, strength, and single-leg stabilization to meet strength requirements for reciprocal stair navigation  pattern with no asymmetries for ascending and descending 2-3 flights of stairs daily for community integration. Timeframe: 12 visits.  Patient will improve LEFS score by at least 9 points to indicate a true change in improved function for ADL's and restoring PLF. Timeframe: 12 visits.             Plan  Progress balance and endurnace as tolerated    Treatment Last 4 Visits       2/25/2025 2/27/2025 3/4/2025 3/6/2025   PT Treatment   Treatment Day  2 3 4   Therapeutic Exercise SLR: x 10 (R)   DL bridging: 1 x 10   Hooklying clamshell: 2 x 10 (B), red theraband  DLHR: x 10   Standing hip ABD, ext, and HSC:  1 x 10 (B)   Provided patient with a written copy of exercise instruction which was also documented in patient's electronic medical chart. Educated the patient on the need for consistency with HEP to achieve the mutually established goals. PROM - knee flexion x 5 - shin/ankle pain with PT grasp at the ankle, needed to support bottom of foot for PROM   SLR: x 5 (R) - patient was unable to complete any more due to \"too much pain\"   DL bridging: 1 x 10   Hooklying clamshell: 2 x 10 (B), red theraband   Hookyling ADD ball squeeze: x 20   DKC with SB: x 20   LAQ: x 20 (R)   DLHR: x 20  Standing hip ABD: x 20 (R) only    Standing hip ext: attempted but d/c due to left knee pain with SL WB       Neuro Re-Ed  Romberg balance: x 30\"  Tandem stance balance: x 20\" (B)      Manual Therapy Soft tissue mobilization: (R) quad, ITB/VL, posterior knee Soft tissue mobilization: (R) quad, ITB/VL, adductors - TTP over medial knee     Therapeutic Activity Education on anatomy and pathophysiology of current condition, rationale for physical therapy, anticipated treatment interventions, prognosis, timeline for recovery, and expected functional outcomes based on evaluative findings.  Minisquat: 1 x 10   Step-up: 1 x 10 (R) only, 4\" step   Step-down: 1 x 10 (R) only, 4\" step      Therapeutic Exercise Min 12 17     Neuro Re-Ed Min  3      Manual Therapy Min 8 10     Therapeutic Activity Min 3 8     Evaluation Min 22      Total of Timed Procedures 23 38     Total of Service Based 22 0     Total Treatment Time 45 38            2/27/2025 3/4/2025 3/6/2025   LE Treatment   Treatment Day 2 3 4   Therapeutic Exercise  SLR with QS 2 x 5 (R)  DL bridging: 2 x 10   Hooklying clamshell: 2 x 10 (B), red theraband   Hookyling ADD ball squeeze: x 20 5\" H      SLR with QS 3 x 5 (R)  DL bridging: 2 x 10   Hooklying clamshell: 2 x 10 (B), red theraband   Hookyling ADD ball squeeze: x 20 5\" H  LAQ x 10 (R)           Neuro Re-Education  Tandem Stance 2 x 30\" ea R/L   Tandem Stance 2 x 30\" ea R/L    Manual Therapy   STM (Supine): Posterior/Medial Knee, Hip Flexors, Adductors (R)   Therapeutic Activity  Extensive time spent on discussion/clarifying parameters for HEP, recent symptoms/changes    Therapeutic Exercise Minutes  13 15   Neuro Re-Educ Minutes  8 8   Manual Therapy Minutes   18   Therapeutic Activity Minutes  18    Total Time Of Timed Procedures  39 41   Total Time Of Service-Based Procedures  0 0   Total Treatment Time  39 41   HEP  Access Code: [email or web address]  URL: [email or web address]  Date: 02/25/2025  Prepared by: Keke Eugene     Exercises  - Supine Straight Leg Raises  - 1 x daily - 7 x weekly - 2 sets - 10 reps  - Supine Bridge  - 1 x daily - 7 x weekly - 2 sets - 10 reps  - Hooklying Clamshell with Resistance  - 1 x daily - 7 x weekly - 2 sets - 10 reps - red theraband  - Standing Heel Raise  - 1 x daily - 7 x weekly - 1 sets - 10 reps  - Standing Hip Abduction with Counter Support  - 1 x daily - 7 x weekly - 1 sets - 10 reps  - Standing Hip Extension with Counter Support  - 1 x daily - 7 x weekly - 1 sets - 10 reps  - Standing Knee Flexion AROM with Chair Support  - 1 x daily - 7 x weekly - 1 sets - 10 reps Access Code: [email or web address]  URL: [email or web address]  Date: 02/25/2025  Prepared by: Keke Eugene      Exercises  - Supine Straight Leg Raises  - 1 x daily - 7 x weekly - 2 sets - 10 reps  - Supine Bridge  - 1 x daily - 7 x weekly - 2 sets - 10 reps  - Hooklying Clamshell with Resistance  - 1 x daily - 7 x weekly - 2 sets - 10 reps - red theraband  - Standing Heel Raise  - 1 x daily - 7 x weekly - 1 sets - 10 reps  - Standing Hip Abduction with Counter Support  - 1 x daily - 7 x weekly - 1 sets - 10 reps  - Standing Hip Extension with Counter Support  - 1 x daily - 7 x weekly - 1 sets - 10 reps  - Standing Knee Flexion AROM with Chair Support  - 1 x daily - 7 x weekly - 1 sets - 10 reps        HEP  Access Code: [email or web address]  URL: [email or web address]  Date: 02/25/2025  Prepared by: Keke Eugene     Exercises  - Supine Straight Leg Raises  - 1 x daily - 7 x weekly - 2 sets - 10 reps  - Supine Bridge  - 1 x daily - 7 x weekly - 2 sets - 10 reps  - Hooklying Clamshell with Resistance  - 1 x daily - 7 x weekly - 2 sets - 10 reps - red theraband  - Standing Heel Raise  - 1 x daily - 7 x weekly - 1 sets - 10 reps  - Standing Hip Abduction with Counter Support  - 1 x daily - 7 x weekly - 1 sets - 10 reps  - Standing Hip Extension with Counter Support  - 1 x daily - 7 x weekly - 1 sets - 10 reps  - Standing Knee Flexion AROM with Chair Support  - 1 x daily - 7 x weekly - 1 sets - 10 reps    Charges     Ther Ex x 1, Neuro x 1, Manual x 1

## 2025-03-11 ENCOUNTER — OFFICE VISIT (OUTPATIENT)
Dept: PHYSICAL THERAPY | Age: 65
End: 2025-03-11
Attending: ORTHOPAEDIC SURGERY
Payer: MEDICAID

## 2025-03-11 DIAGNOSIS — M17.11 PRIMARY OSTEOARTHRITIS OF RIGHT KNEE: ICD-10-CM

## 2025-03-11 PROCEDURE — 97140 MANUAL THERAPY 1/> REGIONS: CPT

## 2025-03-11 PROCEDURE — 97110 THERAPEUTIC EXERCISES: CPT

## 2025-03-11 NOTE — TELEPHONE ENCOUNTER
Acetaminophen 500 mg  DOS: upcoming 4/8/25  Last OV: 2/24/25  Last refill date: 2/24/25     #/refills: 90/0  Upcoming appt:   Future Appointments   Date Time Provider Department Center   3/13/2025 12:00 PM Keke Eugene, PT [facility]   3/18/2025 12:00 PM Norma Torres PTA [facility]   3/20/2025 12:00 PM Norma Torres PTA [facility]   3/24/2025 11:00 AM Jacky Bautista MD [facility] [address]

## 2025-03-11 NOTE — PROGRESS NOTES
Patient: Darrin Gerber (65 year old, female) Referring Provider:  Insurance:   Diagnosis: Status post right knee replacement (Z96.651) [facility]   Date of Surgery: 2/6/25 Next MD visit:  N/A   Precautions:  None 3/24/25 Referral Information:    Date of Evaluation: Req: 15, Auth: 15, Exp: 4/26/2025 02/25/25 POC Auth Visits:  15       Today's Date   3/11/2025    Subjective  A virtual  was used fo today's appt. Patient reports she still has pain sometimes but not as bad as it used to be. Patient reports she had some fluid and blood come out of her knee yesterday, happened once. Patient is unsure of how long she has had the swelling below her knees on both, or cause.       Pain: 4/10     Objective  Patient ambulating with improved speed and step length this day, still with walker and reliance on surrounding objects for support       Assessment  Patient care limited today and majority of other sessions due to communication difficulties despite virtual  to assist. Patient reports pain has been improving, despite this. Progressions into more standing activity were made this day, though patient is still resistant to this, stating \"I can't do this, too difficult\". Patient would benefit from continued progressions in weight bearing and balance training as tolerated.     Patient still demonstrating difficulty following instructions from exercises included in HEP, suggesting poor compliance or less than accurate carry over of mechanics    Goals (to be met in 12 visits)   Therapy Goals        The patient will improve LE strength and endurance to facilitate standing for extended periods of time for 30 minutes at a time for household ambulation and chores. Timeframe: 4-6 visits.  Patient will improve knee strength and stability to facilitate discontinued use of AD for daily ambulation. Timeframe: 6-8 visits.   Long-Term Goals:  The patient will improve LE strength and endurance to  facilitate walking distances of 1 mile(s) daily for community ambulation. Timeframe: 8-12 visits.  Patient will improve lower motor control to perform double-leg squat with symmetrical loading, without asymmetries, and with proper posterior chain loading to facilitate squatting and lifting ADL's. Timeframe: 8-12 visits.  Patient will improve LE mobility, strength, and single-leg stabilization to meet strength requirements for reciprocal stair navigation pattern with no asymmetries for ascending and descending 2-3 flights of stairs daily for community integration. Timeframe: 12 visits.  Patient will improve LEFS score by at least 9 points to indicate a true change in improved function for ADL's and restoring PLF. Timeframe: 12 visits.                 Plan  Follow up on fluid cpming from surgical knee next visit    Treatment Last 4 Visits       2/27/2025 3/4/2025 3/6/2025 3/11/2025   PT Treatment   Treatment Day 2 3 4 5   Therapeutic Exercise PROM - knee flexion x 5 - shin/ankle pain with PT grasp at the ankle, needed to support bottom of foot for PROM   SLR: x 5 (R) - patient was unable to complete any more due to \"too much pain\"   DL bridging: 1 x 10   Hooklying clamshell: 2 x 10 (B), red theraband   Hookyling ADD ball squeeze: x 20   DKC with SB: x 20   LAQ: x 20 (R)   DLHR: x 20  Standing hip ABD: x 20 (R) only    Standing hip ext: attempted but d/c due to left knee pain with SL WB        Neuro Re-Ed Romberg balance: x 30\"  Tandem stance balance: x 20\" (B)       Manual Therapy Soft tissue mobilization: (R) quad, ITB/VL, adductors - TTP over medial knee      Therapeutic Activity Minisquat: 1 x 10   Step-up: 1 x 10 (R) only, 4\" step   Step-down: 1 x 10 (R) only, 4\" step       Therapeutic Exercise Min 17      Neuro Re-Ed Min 3      Manual Therapy Min 10      Therapeutic Activity Min 8      Total of Timed Procedures 38      Total of Service Based 0      Total Treatment Time 38             2/27/2025 3/4/2025 3/6/2025  3/11/2025   LE Treatment   Treatment Day 2 3 4 5   Therapeutic Exercise  SLR with QS 2 x 5 (R)  DL bridging: 2 x 10   Hooklying clamshell: 2 x 10 (B), red theraband   Hookyling ADD ball squeeze: x 20 5\" H      SLR with QS 3 x 5 (R)  DL bridging: 2 x 10   Hooklying clamshell: 2 x 10 (B), red theraband   Hookyling ADD ball squeeze: x 20 5\" H  LAQ x 10 (R)         H/L AROM Knee Flexion/Heel Slides 2 x 10 (R)  SLR with QS x 10   Bridges x 10 3\" H  S/L Hip Abd x 10 (R)  Seated LAQ 5\" H (R)  Standing Hip Abd x 10 ea R/L  Standing hip Ext x 10 ea R/L    Neuro Re-Education  Tandem Stance 2 x 30\" ea R/L   Tandem Stance 2 x 30\" ea R/L  Tandem Stance 2 x 30\" ea R/L     Manual Therapy   STM (Supine): Posterior/Medial Knee, Hip Flexors, Adductors (R)    Therapeutic Activity  Extensive time spent on discussion/clarifying parameters for HEP, recent symptoms/changes     Therapeutic Exercise Minutes  13 15    Neuro Re-Educ Minutes  8 8    Manual Therapy Minutes   18    Therapeutic Activity Minutes  18     Total Time Of Timed Procedures  39 41 0   Total Time Of Service-Based Procedures  0 0 0   Total Treatment Time  39 41 0   HEP  Access Code: [email or web address]  URL: [email or web address]  Date: 02/25/2025  Prepared by: Keke Eugene     Exercises  - Supine Straight Leg Raises  - 1 x daily - 7 x weekly - 2 sets - 10 reps  - Supine Bridge  - 1 x daily - 7 x weekly - 2 sets - 10 reps  - Hooklying Clamshell with Resistance  - 1 x daily - 7 x weekly - 2 sets - 10 reps - red theraband  - Standing Heel Raise  - 1 x daily - 7 x weekly - 1 sets - 10 reps  - Standing Hip Abduction with Counter Support  - 1 x daily - 7 x weekly - 1 sets - 10 reps  - Standing Hip Extension with Counter Support  - 1 x daily - 7 x weekly - 1 sets - 10 reps  - Standing Knee Flexion AROM with Chair Support  - 1 x daily - 7 x weekly - 1 sets - 10 reps Access Code: [email or web address]  URL: [email or web address]  Date: 02/25/2025  Prepared by:  Keke Eugene     Exercises  - Supine Straight Leg Raises  - 1 x daily - 7 x weekly - 2 sets - 10 reps  - Supine Bridge  - 1 x daily - 7 x weekly - 2 sets - 10 reps  - Hooklying Clamshell with Resistance  - 1 x daily - 7 x weekly - 2 sets - 10 reps - red theraband  - Standing Heel Raise  - 1 x daily - 7 x weekly - 1 sets - 10 reps  - Standing Hip Abduction with Counter Support  - 1 x daily - 7 x weekly - 1 sets - 10 reps  - Standing Hip Extension with Counter Support  - 1 x daily - 7 x weekly - 1 sets - 10 reps  - Standing Knee Flexion AROM with Chair Support  - 1 x daily - 7 x weekly - 1 sets - 10 reps Access Code: [email or web address]  URL: [email or web address]  Date: 02/25/2025  Prepared by: Keke Eugene     Exercises  - Supine Straight Leg Raises  - 1 x daily - 7 x weekly - 2 sets - 10 reps  - Supine Bridge  - 1 x daily - 7 x weekly - 2 sets - 10 reps  - Hooklying Clamshell with Resistance  - 1 x daily - 7 x weekly - 2 sets - 10 reps - red theraband  - Standing Heel Raise  - 1 x daily - 7 x weekly - 1 sets - 10 reps  - Standing Hip Abduction with Counter Support  - 1 x daily - 7 x weekly - 1 sets - 10 reps  - Standing Hip Extension with Counter Support  - 1 x daily - 7 x weekly - 1 sets - 10 reps  - Standing Knee Flexion AROM with Chair Support  - 1 x daily - 7 x weekly - 1 sets - 10 reps        HEP  Access Code: [email or web address]  URL: [email or web address]  Date: 02/25/2025  Prepared by: Keke Eugene     Exercises  - Supine Straight Leg Raises  - 1 x daily - 7 x weekly - 2 sets - 10 reps  - Supine Bridge  - 1 x daily - 7 x weekly - 2 sets - 10 reps  - Hooklying Clamshell with Resistance  - 1 x daily - 7 x weekly - 2 sets - 10 reps - red theraband  - Standing Heel Raise  - 1 x daily - 7 x weekly - 1 sets - 10 reps  - Standing Hip Abduction with Counter Support  - 1 x daily - 7 x weekly - 1 sets - 10 reps  - Standing Hip Extension with Counter Support  - 1 x daily - 7 x weekly - 1 sets  - 10 reps  - Standing Knee Flexion AROM with Chair Support  - 1 x daily - 7 x weekly - 1 sets - 10 reps    Charges  Ther Ex x 2, Manual x 1

## 2025-03-12 RX ORDER — PSEUDOEPHED/ACETAMINOPH/DIPHEN 30MG-500MG
TABLET ORAL
Qty: 90 TABLET | Refills: 0 | Status: SHIPPED | OUTPATIENT
Start: 2025-03-12

## 2025-03-13 ENCOUNTER — APPOINTMENT (OUTPATIENT)
Dept: PHYSICAL THERAPY | Age: 65
End: 2025-03-13
Attending: ORTHOPAEDIC SURGERY
Payer: MEDICAID

## 2025-03-18 ENCOUNTER — OFFICE VISIT (OUTPATIENT)
Dept: PHYSICAL THERAPY | Age: 65
End: 2025-03-18
Attending: ORTHOPAEDIC SURGERY
Payer: MEDICAID

## 2025-03-18 PROCEDURE — 97110 THERAPEUTIC EXERCISES: CPT

## 2025-03-18 PROCEDURE — 97112 NEUROMUSCULAR REEDUCATION: CPT

## 2025-03-18 NOTE — PROGRESS NOTES
Patient: Darrin Gerber (65 year old, female) Referring Provider:  Insurance:   Diagnosis: Status post right knee replacement (Z96.651) [facility]   Date of Surgery: 2/6/25 Next MD visit:  N/A   Precautions:  None 3/24/25 Referral Information:    Date of Evaluation: Req: 15, Auth: 15, Exp: 4/26/2025 02/25/25 POC Auth Visits:  15       Today's Date   3/18/2025    Subjective  A virtual  was used fo today's appt. Patient reports pain has been minimal to none during the day, but has more pain at night that may be related to posture. Patient reports despite this improvement in her RLE, she is now having more LLE pain that limits her tolerance to activity at times.       Pain: 0/10     Objective  Worst Pain: 2-3/10 while sleeping but only for a few seconds         Assessment  Patient care limited today and majority of other sessions due to communication difficulties despite virtual  to assist. Despite barriers, patient has progressed from walking with RW to single point cane for short distances, still requires RW for longer distance per subjective reports. Patient demonstrates improved ability to stabilize with tandem stance this day compared to previous visits, though did refuse to continue at times due to left pain. Patient would benefit from continued efforts to progress with step ups, and walking in parallel bars.    Goals (to be met in 12 visits)   Therapy Goals        The patient will improve LE strength and endurance to facilitate standing for extended periods of time for 30 minutes at a time for household ambulation and chores. Timeframe: 4-6 visits.  Patient will improve knee strength and stability to facilitate discontinued use of AD for daily ambulation. Timeframe: 6-8 visits.   Long-Term Goals:  The patient will improve LE strength and endurance to facilitate walking distances of 1 mile(s) daily for community ambulation. Timeframe: 8-12 visits.  Patient will improve  lower motor control to perform double-leg squat with symmetrical loading, without asymmetries, and with proper posterior chain loading to facilitate squatting and lifting ADL's. Timeframe: 8-12 visits.  Patient will improve LE mobility, strength, and single-leg stabilization to meet strength requirements for reciprocal stair navigation pattern with no asymmetries for ascending and descending 2-3 flights of stairs daily for community integration. Timeframe: 12 visits.  Patient will improve LEFS score by at least 9 points to indicate a true change in improved function for ADL's and restoring PLF. Timeframe: 12 visits.                   Plan  Continue balance training per tolerance of LLE, trial walking in parallel bars    Treatment Last 4 Visits       3/4/2025 3/6/2025 3/11/2025 3/18/2025   PT Treatment   Treatment Day 3 4 5 6          3/4/2025 3/6/2025 3/11/2025 3/18/2025   LE Treatment   Treatment Day 3 4 5 6   Therapeutic Exercise SLR with QS 2 x 5 (R)  DL bridging: 2 x 10   Hooklying clamshell: 2 x 10 (B), red theraband   Hookyling ADD ball squeeze: x 20 5\" H      SLR with QS 3 x 5 (R)  DL bridging: 2 x 10   Hooklying clamshell: 2 x 10 (B), red theraband   Hookyling ADD ball squeeze: x 20 5\" H  LAQ x 10 (R)         H/L AROM Knee Flexion/Heel Slides 2 x 10 (R)  SLR with QS x 10   Bridges x 10 3\" H  S/L Hip Abd x 10 (R)  Seated LAQ 5\" H (R)  Standing Hip Abd x 10 ea R/L  Standing hip Ext x 10 ea R/L  Seated LAQ 5\" H 2 x 10 (R)  SLR with QS x 10 (R)  S/L Hip Abd x 10 (R) - difficulty maintaining position  Standing Hip Abd x 10 ea R/L  Standing hip Ext x 10 ea R/L   FSU 4\" x 10 (R) Only   LSU 4\" x 10 (R) Only   Staggered STS (R) x 5    Neuro Re-Education Tandem Stance 2 x 30\" ea R/L   Tandem Stance 2 x 30\" ea R/L  Tandem Stance 2 x 30\" ea R/L   Tandem Stance 2 x 30\" ea R/L   Alternating Standing Marches 1 min; 2-3\" H ea R/L   Manual Therapy  STM (Supine): Posterior/Medial Knee, Hip Flexors, Adductors (R)      Therapeutic Activity Extensive time spent on discussion/clarifying parameters for HEP, recent symptoms/changes      Therapeutic Exercise Minutes 13 15  28   Neuro Re-Educ Minutes 8 8  12   Manual Therapy Minutes  18     Therapeutic Activity Minutes 18      Total Time Of Timed Procedures 39 41 0 40   Total Time Of Service-Based Procedures 0 0 0 0   Total Treatment Time 39 41 0 40   HEP Access Code: [email or web address]  URL: [email or web address]  Date: 02/25/2025  Prepared by: Keke Eugene     Exercises  - Supine Straight Leg Raises  - 1 x daily - 7 x weekly - 2 sets - 10 reps  - Supine Bridge  - 1 x daily - 7 x weekly - 2 sets - 10 reps  - Hooklying Clamshell with Resistance  - 1 x daily - 7 x weekly - 2 sets - 10 reps - red theraband  - Standing Heel Raise  - 1 x daily - 7 x weekly - 1 sets - 10 reps  - Standing Hip Abduction with Counter Support  - 1 x daily - 7 x weekly - 1 sets - 10 reps  - Standing Hip Extension with Counter Support  - 1 x daily - 7 x weekly - 1 sets - 10 reps  - Standing Knee Flexion AROM with Chair Support  - 1 x daily - 7 x weekly - 1 sets - 10 reps Access Code: [email or web address]  URL: [email or web address]  Date: 02/25/2025  Prepared by: Keke Eugene     Exercises  - Supine Straight Leg Raises  - 1 x daily - 7 x weekly - 2 sets - 10 reps  - Supine Bridge  - 1 x daily - 7 x weekly - 2 sets - 10 reps  - Hooklying Clamshell with Resistance  - 1 x daily - 7 x weekly - 2 sets - 10 reps - red theraband  - Standing Heel Raise  - 1 x daily - 7 x weekly - 1 sets - 10 reps  - Standing Hip Abduction with Counter Support  - 1 x daily - 7 x weekly - 1 sets - 10 reps  - Standing Hip Extension with Counter Support  - 1 x daily - 7 x weekly - 1 sets - 10 reps  - Standing Knee Flexion AROM with Chair Support  - 1 x daily - 7 x weekly - 1 sets - 10 reps Access Code: [email or web address]  URL: [email or web address]  Date: 02/25/2025  Prepared by: Keke Eugene      Exercises  - Supine Straight Leg Raises  - 1 x daily - 7 x weekly - 2 sets - 10 reps  - Supine Bridge  - 1 x daily - 7 x weekly - 2 sets - 10 reps  - Hooklying Clamshell with Resistance  - 1 x daily - 7 x weekly - 2 sets - 10 reps - red theraband  - Standing Heel Raise  - 1 x daily - 7 x weekly - 1 sets - 10 reps  - Standing Hip Abduction with Counter Support  - 1 x daily - 7 x weekly - 1 sets - 10 reps  - Standing Hip Extension with Counter Support  - 1 x daily - 7 x weekly - 1 sets - 10 reps  - Standing Knee Flexion AROM with Chair Support  - 1 x daily - 7 x weekly - 1 sets - 10 reps Access Code: [email or web address]  URL: [email or web address]  Date: 02/25/2025  Prepared by: Keke Eugene     Exercises  - Supine Straight Leg Raises  - 1 x daily - 7 x weekly - 2 sets - 10 reps  - Supine Bridge  - 1 x daily - 7 x weekly - 2 sets - 10 reps  - Hooklying Clamshell with Resistance  - 1 x daily - 7 x weekly - 2 sets - 10 reps - red theraband  - Standing Heel Raise  - 1 x daily - 7 x weekly - 1 sets - 10 reps  - Standing Hip Abduction with Counter Support  - 1 x daily - 7 x weekly - 1 sets - 10 reps  - Standing Hip Extension with Counter Support  - 1 x daily - 7 x weekly - 1 sets - 10 reps  - Standing Knee Flexion AROM with Chair Support  - 1 x daily - 7 x weekly - 1 sets - 10 reps        HEP  Access Code: [email or web address]  URL: [email or web address]  Date: 02/25/2025  Prepared by: Keke Eugene     Exercises  - Supine Straight Leg Raises  - 1 x daily - 7 x weekly - 2 sets - 10 reps  - Supine Bridge  - 1 x daily - 7 x weekly - 2 sets - 10 reps  - Hooklying Clamshell with Resistance  - 1 x daily - 7 x weekly - 2 sets - 10 reps - red theraband  - Standing Heel Raise  - 1 x daily - 7 x weekly - 1 sets - 10 reps  - Standing Hip Abduction with Counter Support  - 1 x daily - 7 x weekly - 1 sets - 10 reps  - Standing Hip Extension with Counter Support  - 1 x daily - 7 x weekly - 1 sets - 10 reps  -  Standing Knee Flexion AROM with Chair Support  - 1 x daily - 7 x weekly - 1 sets - 10 reps    Charges     Ther Ex x 2, Nuero x 1

## 2025-03-19 RX ORDER — MULTIVIT-MIN/IRON FUM/FOLIC AC 7.5 MG-4
1 TABLET ORAL DAILY
COMMUNITY

## 2025-03-20 ENCOUNTER — OFFICE VISIT (OUTPATIENT)
Dept: PHYSICAL THERAPY | Age: 65
End: 2025-03-20
Attending: ORTHOPAEDIC SURGERY
Payer: MEDICAID

## 2025-03-20 PROCEDURE — 97110 THERAPEUTIC EXERCISES: CPT

## 2025-03-20 PROCEDURE — 97112 NEUROMUSCULAR REEDUCATION: CPT

## 2025-03-20 NOTE — PROGRESS NOTES
Patient: Darrin Gerber (65 year old, female) Referring Provider:  Insurance:   Diagnosis: Status post right knee replacement (Z96.651) [facility] MEDICAID   Date of Surgery: 2/6/25 Next MD visit:  N/A   Precautions:  None 3/24/25 Referral Information:    Date of Evaluation: Req: 15, Auth: 15, Exp: 4/26/2025 02/25/25 POC Auth Visits:  15       Today's Date   3/20/2025    Subjective  A virtual  was used fo today's appt. Patient reports lately she has noted more pain/stiffness in the morning when waking, but improves as the day goes on, since last visit.       Pain: 4/10     Objective       Assessment  Continue to require increased time for communication despite use of virtual . Patient care this day focused on challenging patient's tolerance to activity, with more standing exercises, but was limited in tolerance stating \"too difficult\" and \"I'm tired.\" Patient continues to struggle with mechanics for exercises in session that are prescribed for home, suggesting limitations in compliance, or mechanics, or both, to complete at home between sessions. Patient required seated rest break after standing exercises progression attempt, was also limited in attention to complete remaining standing exercises, despite use of consistent cues.    Goals (to be met in 12 visits)   Therapy Goals        The patient will improve LE strength and endurance to facilitate standing for extended periods of time for 30 minutes at a time for household ambulation and chores. Timeframe: 4-6 visits.  Patient will improve knee strength and stability to facilitate discontinued use of AD for daily ambulation. Timeframe: 6-8 visits.   Long-Term Goals:  The patient will improve LE strength and endurance to facilitate walking distances of 1 mile(s) daily for community ambulation. Timeframe: 8-12 visits.  Patient will improve lower motor control to perform double-leg squat with symmetrical loading, without asymmetries,  and with proper posterior chain loading to facilitate squatting and lifting ADL's. Timeframe: 8-12 visits.  Patient will improve LE mobility, strength, and single-leg stabilization to meet strength requirements for reciprocal stair navigation pattern with no asymmetries for ascending and descending 2-3 flights of stairs daily for community integration. Timeframe: 12 visits.  Patient will improve LEFS score by at least 9 points to indicate a true change in improved function for ADL's and restoring PLF. Timeframe: 12 visits.                       Plan  Trial walking in parallel bars next visit, assess progress thus far    Treatment Last 4 Visits       3/6/2025 3/11/2025 3/18/2025 3/20/2025   PT Treatment   Treatment Day 4 5 6 7          3/6/2025 3/11/2025 3/18/2025 3/20/2025   LE Treatment   Treatment Day 4 5 6 7   Therapeutic Exercise SLR with QS 3 x 5 (R)  DL bridging: 2 x 10   Hooklying clamshell: 2 x 10 (B), red theraband   Hookyling ADD ball squeeze: x 20 5\" H  LAQ x 10 (R)         H/L AROM Knee Flexion/Heel Slides 2 x 10 (R)  SLR with QS x 10   Bridges x 10 3\" H  S/L Hip Abd x 10 (R)  Seated LAQ 5\" H (R)  Standing Hip Abd x 10 ea R/L  Standing hip Ext x 10 ea R/L  Seated LAQ 5\" H 2 x 10 (R)  SLR with QS x 10 (R)  S/L Hip Abd x 10 (R) - difficulty maintaining position  Standing Hip Abd x 10 ea R/L  Standing hip Ext x 10 ea R/L   FSU 4\" x 10 (R) Only   LSU 4\" x 10 (R) Only   Staggered STS (R) x 5  Standing Hip Abd 2 x 10 ea R/L  Standing hip Ext 2 x 10 ea R/L   LAQ x 10   S/L Hip Abd 2 x 10   SLR with QS x 10 (R)  STS x 5   Staggered STS x 5 (R Bias)        Neuro Re-Education Tandem Stance 2 x 30\" ea R/L  Tandem Stance 2 x 30\" ea R/L   Tandem Stance 2 x 30\" ea R/L   Alternating Standing Marches 1 min; 2-3\" H ea R/L Tandem Stance 2 x 30\" ea R/L   Alternating Standing Marches 1 min; 2-3\" H ea R/L - req increased time due to decr ability to follow instructions even with various tactile, verbal, and visual cues    Manual Therapy STM (Supine): Posterior/Medial Knee, Hip Flexors, Adductors (R)      Therapeutic Exercise Minutes 15  28 28   Neuro Re-Educ Minutes 8  12 10   Manual Therapy Minutes 18      Total Time Of Timed Procedures 41 0 40 38   Total Time Of Service-Based Procedures 0 0 0 0   Total Treatment Time 41 0 40 38   HEP Access Code: [email or web address]  URL: [email or web address]  Date: 02/25/2025  Prepared by: Keke Eugene     Exercises  - Supine Straight Leg Raises  - 1 x daily - 7 x weekly - 2 sets - 10 reps  - Supine Bridge  - 1 x daily - 7 x weekly - 2 sets - 10 reps  - Hooklying Clamshell with Resistance  - 1 x daily - 7 x weekly - 2 sets - 10 reps - red theraband  - Standing Heel Raise  - 1 x daily - 7 x weekly - 1 sets - 10 reps  - Standing Hip Abduction with Counter Support  - 1 x daily - 7 x weekly - 1 sets - 10 reps  - Standing Hip Extension with Counter Support  - 1 x daily - 7 x weekly - 1 sets - 10 reps  - Standing Knee Flexion AROM with Chair Support  - 1 x daily - 7 x weekly - 1 sets - 10 reps Access Code: [email or web address]  URL: [email or web address]  Date: 02/25/2025  Prepared by: Keke Eugene     Exercises  - Supine Straight Leg Raises  - 1 x daily - 7 x weekly - 2 sets - 10 reps  - Supine Bridge  - 1 x daily - 7 x weekly - 2 sets - 10 reps  - Hooklying Clamshell with Resistance  - 1 x daily - 7 x weekly - 2 sets - 10 reps - red theraband  - Standing Heel Raise  - 1 x daily - 7 x weekly - 1 sets - 10 reps  - Standing Hip Abduction with Counter Support  - 1 x daily - 7 x weekly - 1 sets - 10 reps  - Standing Hip Extension with Counter Support  - 1 x daily - 7 x weekly - 1 sets - 10 reps  - Standing Knee Flexion AROM with Chair Support  - 1 x daily - 7 x weekly - 1 sets - 10 reps Access Code: [email or web address]  URL: [email or web address]  Date: 02/25/2025  Prepared by: Keke Eugene     Exercises  - Supine Straight Leg Raises  - 1 x daily - 7 x weekly - 2 sets -  10 reps  - Supine Bridge  - 1 x daily - 7 x weekly - 2 sets - 10 reps  - Hooklying Clamshell with Resistance  - 1 x daily - 7 x weekly - 2 sets - 10 reps - red theraband  - Standing Heel Raise  - 1 x daily - 7 x weekly - 1 sets - 10 reps  - Standing Hip Abduction with Counter Support  - 1 x daily - 7 x weekly - 1 sets - 10 reps  - Standing Hip Extension with Counter Support  - 1 x daily - 7 x weekly - 1 sets - 10 reps  - Standing Knee Flexion AROM with Chair Support  - 1 x daily - 7 x weekly - 1 sets - 10 reps Access Code: [email or web address]  URL: [email or web address]  Date: 02/25/2025  Prepared by: Keke Eugene     Exercises  - Supine Straight Leg Raises  - 1 x daily - 7 x weekly - 2 sets - 10 reps  - Supine Bridge  - 1 x daily - 7 x weekly - 2 sets - 10 reps  - Hooklying Clamshell with Resistance  - 1 x daily - 7 x weekly - 2 sets - 10 reps - red theraband  - Standing Heel Raise  - 1 x daily - 7 x weekly - 1 sets - 10 reps  - Standing Hip Abduction with Counter Support  - 1 x daily - 7 x weekly - 1 sets - 10 reps  - Standing Hip Extension with Counter Support  - 1 x daily - 7 x weekly - 1 sets - 10 reps  - Standing Knee Flexion AROM with Chair Support  - 1 x daily - 7 x weekly - 1 sets - 10 reps        HEP  Access Code: [email or web address]  URL: [email or web address]  Date: 02/25/2025  Prepared by: Keke Eugene     Exercises  - Supine Straight Leg Raises  - 1 x daily - 7 x weekly - 2 sets - 10 reps  - Supine Bridge  - 1 x daily - 7 x weekly - 2 sets - 10 reps  - Hooklying Clamshell with Resistance  - 1 x daily - 7 x weekly - 2 sets - 10 reps - red theraband  - Standing Heel Raise  - 1 x daily - 7 x weekly - 1 sets - 10 reps  - Standing Hip Abduction with Counter Support  - 1 x daily - 7 x weekly - 1 sets - 10 reps  - Standing Hip Extension with Counter Support  - 1 x daily - 7 x weekly - 1 sets - 10 reps  - Standing Knee Flexion AROM with Chair Support  - 1 x daily - 7 x weekly - 1 sets -  10 reps    Charges     Ther Ex x 2, Neuro x 1

## 2025-03-21 ENCOUNTER — TELEPHONE (OUTPATIENT)
Dept: ORTHOPEDICS CLINIC | Facility: CLINIC | Age: 65
End: 2025-03-21

## 2025-03-21 DIAGNOSIS — Z96.651 STATUS POST RIGHT KNEE REPLACEMENT: Primary | ICD-10-CM

## 2025-03-23 DIAGNOSIS — M05.742 RHEUMATOID ARTHRITIS INVOLVING BOTH HANDS WITH POSITIVE RHEUMATOID FACTOR (HCC): Primary | ICD-10-CM

## 2025-03-23 DIAGNOSIS — M05.741 RHEUMATOID ARTHRITIS INVOLVING BOTH HANDS WITH POSITIVE RHEUMATOID FACTOR (HCC): Primary | ICD-10-CM

## 2025-03-23 DIAGNOSIS — K12.30 MUCOSITIS: ICD-10-CM

## 2025-03-24 ENCOUNTER — LABORATORY ENCOUNTER (OUTPATIENT)
Dept: LAB | Age: 65
End: 2025-03-24
Attending: ORTHOPAEDIC SURGERY
Payer: MEDICAID

## 2025-03-24 ENCOUNTER — HOSPITAL ENCOUNTER (OUTPATIENT)
Dept: GENERAL RADIOLOGY | Age: 65
Discharge: HOME | End: 2025-03-24
Attending: ORTHOPAEDIC SURGERY
Payer: MEDICAID

## 2025-03-24 ENCOUNTER — TELEPHONE (OUTPATIENT)
Dept: PAIN CLINIC | Facility: CLINIC | Age: 65
End: 2025-03-24

## 2025-03-24 ENCOUNTER — OFFICE VISIT (OUTPATIENT)
Facility: CLINIC | Age: 65
End: 2025-03-24
Payer: MEDICAID

## 2025-03-24 ENCOUNTER — HOSPITAL ENCOUNTER (OUTPATIENT)
Dept: MRI IMAGING | Age: 65
Discharge: HOME | End: 2025-03-24
Attending: ANESTHESIOLOGY
Payer: MEDICAID

## 2025-03-24 DIAGNOSIS — K11.8 PAROTID MASS: Primary | ICD-10-CM

## 2025-03-24 DIAGNOSIS — M54.12 CERVICAL RADICULITIS: ICD-10-CM

## 2025-03-24 DIAGNOSIS — Z96.651 STATUS POST RIGHT KNEE REPLACEMENT: ICD-10-CM

## 2025-03-24 DIAGNOSIS — Z96.651 STATUS POST RIGHT KNEE REPLACEMENT: Primary | ICD-10-CM

## 2025-03-24 DIAGNOSIS — M17.12 PRIMARY OSTEOARTHRITIS OF LEFT KNEE: ICD-10-CM

## 2025-03-24 LAB
ANTIBODY SCREEN: NEGATIVE
APTT PPP: 29 SECONDS (ref 23–36)
INR BLD: 1.04 (ref 0.8–1.2)
PROTHROMBIN TIME: 13.4 SECONDS (ref 11.6–14.8)
RH BLOOD TYPE: POSITIVE

## 2025-03-24 PROCEDURE — 87081 CULTURE SCREEN ONLY: CPT

## 2025-03-24 PROCEDURE — 77073 BONE LENGTH STUDIES: CPT | Performed by: ORTHOPAEDIC SURGERY

## 2025-03-24 PROCEDURE — 99024 POSTOP FOLLOW-UP VISIT: CPT | Performed by: ORTHOPAEDIC SURGERY

## 2025-03-24 PROCEDURE — 85730 THROMBOPLASTIN TIME PARTIAL: CPT

## 2025-03-24 PROCEDURE — 86900 BLOOD TYPING SEROLOGIC ABO: CPT

## 2025-03-24 PROCEDURE — 36415 COLL VENOUS BLD VENIPUNCTURE: CPT

## 2025-03-24 PROCEDURE — 86901 BLOOD TYPING SEROLOGIC RH(D): CPT

## 2025-03-24 PROCEDURE — 86850 RBC ANTIBODY SCREEN: CPT

## 2025-03-24 PROCEDURE — 85610 PROTHROMBIN TIME: CPT

## 2025-03-24 PROCEDURE — 72141 MRI NECK SPINE W/O DYE: CPT | Performed by: ANESTHESIOLOGY

## 2025-03-24 PROCEDURE — 73562 X-RAY EXAM OF KNEE 3: CPT | Performed by: ORTHOPAEDIC SURGERY

## 2025-03-24 RX ORDER — OMEPRAZOLE 20 MG/1
CAPSULE, DELAYED RELEASE ORAL
COMMUNITY
Start: 2025-03-09

## 2025-03-24 RX ORDER — NAPROXEN 500 MG/1
500 TABLET ORAL 2 TIMES DAILY WITH MEALS
COMMUNITY
Start: 2025-02-07

## 2025-03-24 RX ORDER — LEUCOVORIN CALCIUM 5 MG/1
TABLET ORAL
Qty: 13 TABLET | Refills: 3 | Status: SHIPPED | OUTPATIENT
Start: 2025-03-24

## 2025-03-24 NOTE — TELEPHONE ENCOUNTER
Patient with worsening neck pain that has not responded to several epidural steroid injection.  Reviewed patient's MRI and called and left message advising patient to follow-up with neurosurgery as discussed in the office visit.  Does have worsening degenerative changes mostly C6-7.  Additionally, there was comment on imaging about a small nodule in the parotid gland.  Did order neck MRI with and without contrast as advised by radiology.  Patient should follow-up with Dr. Botello regarding ENT referral if necessary.

## 2025-03-24 NOTE — TELEPHONE ENCOUNTER
Leucovorin 5mg    Last office visit: 2/4/25    Next Rheum Apt:7/7/2025 Estela Fuller MD    Last fill: 4/8/24    Labs:   Lab Results   Component Value Date    CREATSERUM 0.97 01/20/2025    GFR 60 06/22/2017    ALKPHO 69 01/20/2025    AST 22 01/20/2025    ALT 15 01/20/2025    BILT 0.6 01/20/2025    TP 7.1 01/20/2025    ALB 4.0 01/20/2025       Lab Results   Component Value Date    WBC 6.0 01/20/2025    HGB 11.4 (L) 01/20/2025    .0 01/20/2025    NEPRELIM 3.64 01/20/2025    NEPERCENT 60.4 01/20/2025    LYPERCENT 30.3 01/20/2025    NE 3.64 01/20/2025    LYMABS 1.83 01/20/2025

## 2025-03-24 NOTE — PROGRESS NOTES
[facility] Progress Note    Subjective: Returns to clinic 6 weeks postop from right knee replacement.  She states she has no pain in the knee, states that her left knee is painful (scheduled for left knee replacement in 2 weeks).  She is only taking pain medication for her left knee.  Her incision has been healing well.  She has been ambulating with a cane outside the house but not using assist device inside the house.  She feels there is some stiffness in the morning that loosens up, recent therapy notes document stiffness in the morning that improves as the day goes on, struggling with therapy compliance and mechanics.    Objective: Patient appears comfortable, ambulatory with cane.  She demonstrates full extension, flexion around 120.  Few millimeters varus valgus in full extension, appropriate mid flexion and 90 degrees.  Incision well-healed.      Imaging: Trays of the right knee personally viewed, independently interpreted and radiology report read.  Full-length films demonstrate significant improvement in hip-knee-ankle axis, postoperatively along the medial aspect of the trochlear groove.  Dedicated knee films with tibial component within a degree neutral alignment, touch valgus.  Lateral view with tibial component and 7 degrees of posterior slope.  Patella tracking centrally.      Assessment/Plan: 65-year-old female 6 weeks postop from right knee replacement.  Doing very well.  Continue activities as tolerated.  She has excellent motion, rebuilding strength, no restrictions on the incision.  She has done with aspirin DVT prophylaxis.  She will follow-up for left knee replacement in 2 weeks.    Jacky Bautista MD, FAAOS  [facility]  Phone [phone]  Fax [phone]

## 2025-03-25 ENCOUNTER — OFFICE VISIT (OUTPATIENT)
Dept: PHYSICAL THERAPY | Age: 65
End: 2025-03-25
Attending: ORTHOPAEDIC SURGERY
Payer: MEDICAID

## 2025-03-25 ENCOUNTER — TELEPHONE (OUTPATIENT)
Dept: ORTHOPEDICS CLINIC | Facility: CLINIC | Age: 65
End: 2025-03-25

## 2025-03-25 DIAGNOSIS — M17.12 PRIMARY OSTEOARTHRITIS OF LEFT KNEE: Primary | ICD-10-CM

## 2025-03-25 PROCEDURE — 97110 THERAPEUTIC EXERCISES: CPT

## 2025-03-25 PROCEDURE — 97112 NEUROMUSCULAR REEDUCATION: CPT

## 2025-03-25 NOTE — PROGRESS NOTES
Patient: Darrin Gerber (65 year old, female) Referring Provider:  Insurance:   Diagnosis: Status post right knee replacement (Z96.651) [facility] MEDICAID   Date of Surgery: 2/6/25 Next MD visit:  N/A   Precautions:  None 3/24/25 Referral Information:    Date of Evaluation: Req: 15, Auth: 15, Exp: 4/26/2025 02/25/25 POC Auth Visits:  15       Today's Date   3/25/2025    Subjective  A virtual  was used fo today's appt. Patient reports pain in lateral shin and medial knee today, but rates this 2-3/10.       Pain: 3/10     Objective    Hip       2/25/2025 3/25/2025   Hip ROM/MMT   Rt Hip Flexion MMT (L2) 3+/5 3+/5   Lt Hip Flexion MMT (L2) 3+/5 3+/5   Rt Hip Extension MMT N/A    Lt Hip Extension MMT N/A    Rt Hip Abduction MMT N/A    Lt Hip Abduction MMT N/A    Rt Hip ER MMT N/A    Lt Hip ER MMT N/A    Rt Hip IR MMT N/A    Lt Hip IR MMT N/A    , Knee       2/25/2025 3/25/2025   Knee ROM/MMT   Rt Knee Flexion 123 126   Lt Knee Flexion 130 133   Rt Knee Flexion MMT 3+/5 4+/5   Lt Knee Flexion MMT 3+/5 4/5   Rt Knee Extension (L3) -6 -2   Lt Knee Extension (L3) 0 -5   Rt Knee Extension MMT (L3) 3+/5 4+/5   Lt Knee Extension MMT (L3) 3+/5 4/5      Assessment  Continue to require increased time for communication despite use of virtual . Attempts to progress functional strengthening are poor due to limited participation, for example with beginning FSU/LSU to 4\" step. Patient was enouraged to trial with limited HHA, but insisted use of hands despite cues to encourage independence and hands on assist. Despite these issues, the patient overall continues to demonstrate improvements in ROM and strength, reaching 126 degrees of flexion on surgical knee, and -2 extension compared to 123/-6 at eval. Patient also progressing in knee flexion and extension MMT, but still limited in hip flexion and would benefit from increased focus on this and all other remaining deficits.    Goals (to be met in 12  visits)   Therapy Goals        The patient will improve LE strength and endurance to facilitate standing for extended periods of time for 30 minutes at a time for household ambulation and chores. Timeframe: 4-6 visits. (Met 03/25).    Patient will improve knee strength and stability to facilitate discontinued use of AD for daily ambulation. Timeframe: 6-8 visits. (Not Met, using SPC for short distances, RW for long distance 03/25).    Long-Term Goals:  The patient will improve LE strength and endurance to facilitate walking distances of 1 mile(s) daily for community ambulation. Timeframe: 8-12 visits. (Progressing, but still requires Walker/Cane 03/25).    Patient will improve lower motor control to perform double-leg squat with symmetrical loading, without asymmetries, and with proper posterior chain loading to facilitate squatting and lifting ADL's. Timeframe: 8-12 visits. (Not Met/Progressing; 03/25)    Patient will improve LE mobility, strength, and single-leg stabilization to meet strength requirements for reciprocal stair navigation pattern with no asymmetries for ascending and descending 2-3 flights of stairs daily for community integration. Timeframe: 12 visits.     Patient will improve LEFS score by at least 9 points to indicate a true change in improved function for ADL's and restoring PLF. Timeframe: 12 visits.         Plan  Trial walking in parallel bars next visit, continue 2x/week for 3 more visits then patient is scheduled for additional knee replacement for left leg 04/08.    -squats next visit, walking in parallel bars    Treatment Last 4 Visits       3/11/2025 3/18/2025 3/20/2025 3/25/2025   PT Treatment   Treatment Day 5 6 7 8    8    8       Multiple values from one day are sorted in reverse-chronological order          3/11/2025 3/18/2025 3/20/2025 3/25/2025   LE Treatment   Treatment Day 5 6 7 8    8    8   Therapeutic Exercise H/L AROM Knee Flexion/Heel Slides 2 x 10 (R)  SLR with QS x 10    Bridges x 10 3\" H  S/L Hip Abd x 10 (R)  Seated LAQ 5\" H (R)  Standing Hip Abd x 10 ea R/L  Standing hip Ext x 10 ea R/L  Seated LAQ 5\" H 2 x 10 (R)  SLR with QS x 10 (R)  S/L Hip Abd x 10 (R) - difficulty maintaining position  Standing Hip Abd x 10 ea R/L  Standing hip Ext x 10 ea R/L   FSU 4\" x 10 (R) Only   LSU 4\" x 10 (R) Only   Staggered STS (R) x 5  Standing Hip Abd 2 x 10 ea R/L  Standing hip Ext 2 x 10 ea R/L   LAQ x 10   S/L Hip Abd 2 x 10   SLR with QS x 10 (R)  STS x 5   Staggered STS x 5 (R Bias)      Reassess   Seated Alternating Marches x 10 3\" H each R/L   DLHR x 10   FSU 4\" x 10 ea R/L - consistent cues for reducing HHA   LSU 4\" x 10 ea R/L - bilateral HHA required    Neuro Re-Education Tandem Stance 2 x 30\" ea R/L   Tandem Stance 2 x 30\" ea R/L   Alternating Standing Marches 1 min; 2-3\" H ea R/L Tandem Stance 2 x 30\" ea R/L   Alternating Standing Marches 1 min; 2-3\" H ea R/L - req increased time due to decr ability to follow instructions even with various tactile, verbal, and visual cues Tandem Stance 2 x 30\" ea R/L   Standing Alternating Marches 2-3\" H ea R/L x 10       Therapeutic Exercise Minutes  28 28 28   Neuro Re-Educ Minutes  12 10 10   Total Time Of Timed Procedures 0 40 38 38   Total Time Of Service-Based Procedures 0 0 0 0   Total Treatment Time 0 40 38 38   HEP Access Code: [email or web address]  URL: [email or web address]  Date: 02/25/2025  Prepared by: Keke Eugene     Exercises  - Supine Straight Leg Raises  - 1 x daily - 7 x weekly - 2 sets - 10 reps  - Supine Bridge  - 1 x daily - 7 x weekly - 2 sets - 10 reps  - Hooklying Clamshell with Resistance  - 1 x daily - 7 x weekly - 2 sets - 10 reps - red theraband  - Standing Heel Raise  - 1 x daily - 7 x weekly - 1 sets - 10 reps  - Standing Hip Abduction with Counter Support  - 1 x daily - 7 x weekly - 1 sets - 10 reps  - Standing Hip Extension with Counter Support  - 1 x daily - 7 x weekly - 1 sets - 10 reps  - Standing Knee  Flexion AROM with Chair Support  - 1 x daily - 7 x weekly - 1 sets - 10 reps Access Code: [email or web address]  URL: [email or web address]  Date: 02/25/2025  Prepared by: Keke Eugene     Exercises  - Supine Straight Leg Raises  - 1 x daily - 7 x weekly - 2 sets - 10 reps  - Supine Bridge  - 1 x daily - 7 x weekly - 2 sets - 10 reps  - Hooklying Clamshell with Resistance  - 1 x daily - 7 x weekly - 2 sets - 10 reps - red theraband  - Standing Heel Raise  - 1 x daily - 7 x weekly - 1 sets - 10 reps  - Standing Hip Abduction with Counter Support  - 1 x daily - 7 x weekly - 1 sets - 10 reps  - Standing Hip Extension with Counter Support  - 1 x daily - 7 x weekly - 1 sets - 10 reps  - Standing Knee Flexion AROM with Chair Support  - 1 x daily - 7 x weekly - 1 sets - 10 reps Access Code: [email or web address]  URL: [email or web address]  Date: 02/25/2025  Prepared by: Keke Eugene     Exercises  - Supine Straight Leg Raises  - 1 x daily - 7 x weekly - 2 sets - 10 reps  - Supine Bridge  - 1 x daily - 7 x weekly - 2 sets - 10 reps  - Hooklying Clamshell with Resistance  - 1 x daily - 7 x weekly - 2 sets - 10 reps - red theraband  - Standing Heel Raise  - 1 x daily - 7 x weekly - 1 sets - 10 reps  - Standing Hip Abduction with Counter Support  - 1 x daily - 7 x weekly - 1 sets - 10 reps  - Standing Hip Extension with Counter Support  - 1 x daily - 7 x weekly - 1 sets - 10 reps  - Standing Knee Flexion AROM with Chair Support  - 1 x daily - 7 x weekly - 1 sets - 10 reps Update HEP with tandem stance and marches next visit        Multiple values from one day are sorted in reverse-chronological order        HEP  Update HEP with tandem stance and marches next visit     Charges     Ther Ex x 2, Neuro x 1

## 2025-03-27 ENCOUNTER — OFFICE VISIT (OUTPATIENT)
Dept: PHYSICAL THERAPY | Age: 65
End: 2025-03-27
Attending: ORTHOPAEDIC SURGERY
Payer: MEDICAID

## 2025-03-27 DIAGNOSIS — M17.11 PRIMARY OSTEOARTHRITIS OF RIGHT KNEE: ICD-10-CM

## 2025-03-27 PROCEDURE — 97112 NEUROMUSCULAR REEDUCATION: CPT

## 2025-03-27 PROCEDURE — 97110 THERAPEUTIC EXERCISES: CPT

## 2025-03-27 PROCEDURE — 97530 THERAPEUTIC ACTIVITIES: CPT

## 2025-03-27 RX ORDER — PSEUDOEPHED/ACETAMINOPH/DIPHEN 30MG-500MG
TABLET ORAL
Qty: 90 TABLET | Refills: 0 | OUTPATIENT
Start: 2025-03-27

## 2025-03-27 NOTE — TELEPHONE ENCOUNTER
DOS: 2/6/25  Last OV: 2/24/25  Last refill date: 3/12/25     #/refills: 90/0  Upcoming appt: 4/22/25, 5/19/25  In PT    NEXT surgery: 4/8/25    3/19/25 - acetaminophen marked as discontinued by different dept.      Future Appointments   Date Time Provider Department Center   3/27/2025 12:45 PM Keke Eugene, PT [facility] [place]   4/1/2025 12:00 PM Keke Eugene, PT [facility] [place]   4/2/2025 12:00 PM John Botello MD [facility] [address] [facility] [address]   4/3/2025 12:00 PM Norma Torres PTA [facility] [place]   4/22/2025 11:20 AM Anthony Parks PA-C [facility] [address]   5/19/2025 11:20 AM Jacky Bautista MD [facility] [address]   7/7/2025  2:15 PM [facility] Fuller [facility] [place]

## 2025-03-27 NOTE — PROGRESS NOTES
Patient: Darrin Gerber (65 year old, female) Referring Provider:  Insurance:   Diagnosis: Status post right knee replacement [facility]Z96.651) [facility]   Date of Surgery: 2/6/25 Next MD visit:  N/A   Precautions:  None 3/24/25 Referral Information:    Date of Evaluation: Req: 15, Auth: 15, Exp: 4/26/2025 02/25/25 POC Auth Visits:  15       Today's Date   3/27/2025    Subjective  A virtual  was used for today's appt. The patient reports that her knee is feeling \"much better.\" She reports that sometimes she has some muscle pulling in the legs and can cause a tight feeling. She reports that if she sleeps in a certain position, she will get the pulling sensation. She reports that she will stretch out her knees in the morning, which makes it feel better. When asked, the patient denied any issues or limitaitons in her daily activities. She admits that she avoid stairs and uses the elevator. The patient reports that she will use a walker to walk around her building, but it's difficult for her to walk without a stick or a walker.       Pain: 3/10     Objective          Assessment  The patient was informed that she will need to contact her doctor and request a referral for outpatient therapy in order to schedule PT appts for after her upcoming left knee replacement surgery scheduled 4/8/25. The patient verbalized understanding and stated she will have her daughter contact her doctor about having a referral written for outpatient PT. Even when using the virtual , there still seems to be some miscommunication as the patient's responses don't consistently answer the question at hand. The patient reports that she is still using an AD for walking, but this may be, in part, due to the remaining left-sided knee pain. The patient required a sitting rest break during her appt today. She is limited in tolerance to SL WB on her left side, which limits some available exercises. We continue to  progress functional strengthening as tolerated. We will see the patient for two more sessions, progressing to discharge over those sessions in anticipation of her upcoming left knee replacement surgery.    Goals (to be met in 12 visits)   The patient will improve LE strength and endurance to facilitate standing for extended periods of time for 30 minutes at a time for household ambulation and chores. Timeframe: 4-6 visits.  Patient will improve knee strength and stability to facilitate discontinued use of AD for daily ambulation. Timeframe: 6-8 visits.   Long-Term Goals:  The patient will improve LE strength and endurance to facilitate walking distances of 1 mile(s) daily for community ambulation. Timeframe: 8-12 visits.  Patient will improve lower motor control to perform double-leg squat with symmetrical loading, without asymmetries, and with proper posterior chain loading to facilitate squatting and lifting ADL's. Timeframe: 8-12 visits.  Patient will improve LE mobility, strength, and single-leg stabilization to meet strength requirements for reciprocal stair navigation pattern with no asymmetries for ascending and descending 2-3 flights of stairs daily for community integration. Timeframe: 12 visits.  Patient will improve LEFS score by at least 9 points to indicate a true change in improved function for ADL's and restoring PLF. Timeframe: 12 visits.      Plan  Discharge patient over the next two sessions.    Treatment Last 4 Visits  Treatment Day: 9       3/18/2025 3/20/2025 3/25/2025 3/27/2025   LE Treatment   Therapeutic Exercise Seated LAQ 5\" H 2 x 10 (R)  SLR with QS x 10 (R)  S/L Hip Abd x 10 (R) - difficulty maintaining position  Standing Hip Abd x 10 ea R/L  Standing hip Ext x 10 ea R/L   FSU 4\" x 10 (R) Only   LSU 4\" x 10 (R) Only   Staggered STS (R) x 5  Standing Hip Abd 2 x 10 ea R/L  Standing hip Ext 2 x 10 ea R/L   LAQ x 10   S/L Hip Abd 2 x 10   SLR with QS x 10 (R)  STS x 5   Staggered STS x 5 (R  Bias)      Reassess   Seated Alternating Marches x 10 3\" H each R/L   DLHR x 10   FSU 4\" x 10 ea R/L - consistent cues for reducing HHA   LSU 4\" x 10 ea R/L - bilateral HHA required  Discussed the patient's remaining symptoms, functional limitations, and concerns to establish updated POC.   PROM - knee flexion x 10 - mild end-range pain   SLR: x 10   Bridging: 1 x 10   LAQ: 1 x 10   DLHR: 1 x 10   Standing marching: 1 x 10 (B)   Standing hip ABD and ext: 1 x 20 (B), requested a sitting rest break when starting repetitions on the right side   Standing HSC: 1 x 10 (B)    Neuro Re-Education Tandem Stance 2 x 30\" ea R/L   Alternating Standing Marches 1 min; 2-3\" H ea R/L Tandem Stance 2 x 30\" ea R/L   Alternating Standing Marches 1 min; 2-3\" H ea R/L - req increased time due to decr ability to follow instructions even with various tactile, verbal, and visual cues Tandem Stance 2 x 30\" ea R/L   Standing Alternating Marches 2-3\" H ea R/L x 10     Forward karuna walk (step-to): x 2 laps x 5 - 6\" hurdles  Lateral karuna walk (step-to): x 2 laps x 5 - 6\" hurdles   Therapeutic Activity    FSU: 1 x 10 (R), 4\" step  LSU: 1 x 10 (R), 4\" step   BW squat: 1 x 10   Sit to stand: 1 x 10    Therapeutic Exercise Minutes 28 28 28 20   Neuro Re-Educ Minutes 12 10 10 8   Therapeutic Activity Minutes    12   Total Time Of Timed Procedures 40 38 38 40   Total Time Of Service-Based Procedures 0 0 0 0   Total Treatment Time 40 38 38 40   HEP Access Code: [email or web address]  URL: [email or web address]  Date: 02/25/2025  Prepared by: Keke Eugene     Exercises  - Supine Straight Leg Raises  - 1 x daily - 7 x weekly - 2 sets - 10 reps  - Supine Bridge  - 1 x daily - 7 x weekly - 2 sets - 10 reps  - Hooklying Clamshell with Resistance  - 1 x daily - 7 x weekly - 2 sets - 10 reps - red theraband  - Standing Heel Raise  - 1 x daily - 7 x weekly - 1 sets - 10 reps  - Standing Hip Abduction with Counter Support  - 1 x daily - 7 x weekly  - 1 sets - 10 reps  - Standing Hip Extension with Counter Support  - 1 x daily - 7 x weekly - 1 sets - 10 reps  - Standing Knee Flexion AROM with Chair Support  - 1 x daily - 7 x weekly - 1 sets - 10 reps Access Code: [email or web address]  URL: [email or web address]  Date: 02/25/2025  Prepared by: Keke Eugene     Exercises  - Supine Straight Leg Raises  - 1 x daily - 7 x weekly - 2 sets - 10 reps  - Supine Bridge  - 1 x daily - 7 x weekly - 2 sets - 10 reps  - Hooklying Clamshell with Resistance  - 1 x daily - 7 x weekly - 2 sets - 10 reps - red theraband  - Standing Heel Raise  - 1 x daily - 7 x weekly - 1 sets - 10 reps  - Standing Hip Abduction with Counter Support  - 1 x daily - 7 x weekly - 1 sets - 10 reps  - Standing Hip Extension with Counter Support  - 1 x daily - 7 x weekly - 1 sets - 10 reps  - Standing Knee Flexion AROM with Chair Support  - 1 x daily - 7 x weekly - 1 sets - 10 reps Update HEP with tandem stance and marches next visit          HEP  Update HEP with tandem stance and marches next visit     Charges     TE x 1, NMR x 1, TA x 1

## 2025-04-01 ENCOUNTER — APPOINTMENT (OUTPATIENT)
Dept: PHYSICAL THERAPY | Age: 65
End: 2025-04-01
Attending: ORTHOPAEDIC SURGERY
Payer: MEDICAID

## 2025-04-02 ENCOUNTER — LAB ENCOUNTER (OUTPATIENT)
Dept: LAB | Age: 65
End: 2025-04-02
Attending: FAMILY MEDICINE
Payer: MEDICAID

## 2025-04-02 ENCOUNTER — OFFICE VISIT (OUTPATIENT)
Dept: FAMILY MEDICINE CLINIC | Facility: CLINIC | Age: 65
End: 2025-04-02
Payer: MEDICAID

## 2025-04-02 VITALS
BODY MASS INDEX: 31.02 KG/M2 | DIASTOLIC BLOOD PRESSURE: 80 MMHG | OXYGEN SATURATION: 98 % | WEIGHT: 158 LBS | SYSTOLIC BLOOD PRESSURE: 132 MMHG | RESPIRATION RATE: 16 BRPM | HEART RATE: 76 BPM | TEMPERATURE: 98 F | HEIGHT: 60 IN

## 2025-04-02 DIAGNOSIS — Z01.818 PREOPERATIVE CLEARANCE: Primary | ICD-10-CM

## 2025-04-02 DIAGNOSIS — E11.65 TYPE 2 DIABETES MELLITUS WITH HYPERGLYCEMIA, WITHOUT LONG-TERM CURRENT USE OF INSULIN (HCC): ICD-10-CM

## 2025-04-02 DIAGNOSIS — M17.12 ARTHRITIS OF LEFT KNEE: ICD-10-CM

## 2025-04-02 LAB
ALBUMIN SERPL-MCNC: 4.1 G/DL (ref 3.2–4.8)
ALBUMIN/GLOB SERPL: 1.4 {RATIO} (ref 1–2)
ALP LIVER SERPL-CCNC: 70 U/L
ALT SERPL-CCNC: 15 U/L
ANION GAP SERPL CALC-SCNC: 12 MMOL/L (ref 0–18)
AST SERPL-CCNC: 24 U/L (ref ?–34)
BASOPHILS # BLD AUTO: 0.01 X10(3) UL (ref 0–0.2)
BASOPHILS NFR BLD AUTO: 0.2 %
BILIRUB SERPL-MCNC: 0.5 MG/DL (ref 0.2–1.1)
BUN BLD-MCNC: 19 MG/DL (ref 9–23)
CALCIUM BLD-MCNC: 9.4 MG/DL (ref 8.7–10.6)
CHLORIDE SERPL-SCNC: 97 MMOL/L (ref 98–112)
CO2 SERPL-SCNC: 28 MMOL/L (ref 21–32)
CREAT BLD-MCNC: 1.04 MG/DL
CREAT UR-SCNC: 98.8 MG/DL
EGFRCR SERPLBLD CKD-EPI 2021: 59 ML/MIN/1.73M2 (ref 60–?)
EOSINOPHIL # BLD AUTO: 0.04 X10(3) UL (ref 0–0.7)
EOSINOPHIL NFR BLD AUTO: 0.7 %
ERYTHROCYTE [DISTWIDTH] IN BLOOD BY AUTOMATED COUNT: 14.7 %
FASTING STATUS PATIENT QL REPORTED: NO
GLOBULIN PLAS-MCNC: 3 G/DL (ref 2–3.5)
GLUCOSE BLD-MCNC: 109 MG/DL (ref 70–99)
HCT VFR BLD AUTO: 32.3 %
HGB BLD-MCNC: 10.7 G/DL
IMM GRANULOCYTES # BLD AUTO: 0.02 X10(3) UL (ref 0–1)
IMM GRANULOCYTES NFR BLD: 0.3 %
LYMPHOCYTES # BLD AUTO: 1.76 X10(3) UL (ref 1–4)
LYMPHOCYTES NFR BLD AUTO: 29.2 %
MCH RBC QN AUTO: 30.5 PG (ref 26–34)
MCHC RBC AUTO-ENTMCNC: 33.1 G/DL (ref 31–37)
MCV RBC AUTO: 92 FL
MICROALBUMIN UR-MCNC: 0.5 MG/DL
MICROALBUMIN/CREAT 24H UR-RTO: 5.1 UG/MG (ref ?–30)
MONOCYTES # BLD AUTO: 0.25 X10(3) UL (ref 0.1–1)
MONOCYTES NFR BLD AUTO: 4.1 %
NEUTROPHILS # BLD AUTO: 3.95 X10 (3) UL (ref 1.5–7.7)
NEUTROPHILS # BLD AUTO: 3.95 X10(3) UL (ref 1.5–7.7)
NEUTROPHILS NFR BLD AUTO: 65.5 %
OSMOLALITY SERPL CALC.SUM OF ELEC: 287 MOSM/KG (ref 275–295)
PLATELET # BLD AUTO: 380 10(3)UL (ref 150–450)
POTASSIUM SERPL-SCNC: 3.3 MMOL/L (ref 3.5–5.1)
PROT SERPL-MCNC: 7.1 G/DL (ref 5.7–8.2)
RBC # BLD AUTO: 3.51 X10(6)UL
SODIUM SERPL-SCNC: 137 MMOL/L (ref 136–145)
WBC # BLD AUTO: 6 X10(3) UL (ref 4–11)

## 2025-04-02 PROCEDURE — 85025 COMPLETE CBC W/AUTO DIFF WBC: CPT

## 2025-04-02 PROCEDURE — 82043 UR ALBUMIN QUANTITATIVE: CPT

## 2025-04-02 PROCEDURE — 99214 OFFICE O/P EST MOD 30 MIN: CPT | Performed by: FAMILY MEDICINE

## 2025-04-02 PROCEDURE — 82570 ASSAY OF URINE CREATININE: CPT

## 2025-04-02 PROCEDURE — 36415 COLL VENOUS BLD VENIPUNCTURE: CPT

## 2025-04-02 PROCEDURE — 80053 COMPREHEN METABOLIC PANEL: CPT

## 2025-04-02 NOTE — H&P (VIEW-ONLY)
Preoperative History and Physical Family Medicine    CC:   Chief Complaint   Patient presents with    Pre-Op Exam     Scheduled on 4/8 Left total knee arthroplasty with Dr.Sullivan Clifford Zabrina is 65 year old presenting for a preoperative exam.    This patient is having surgery on date: 4/8/25 for procedure: L TKA  I'm seeing the patient at the request from : Dr. Bautista because of preop clearance  Reporting mechanism back to the doctor via fax and this note.    HPI:   Ms. Gerber is a 65-year-old female with history of diabetes mellitus, rheumatoid arthritis, arthritis of both knees status post recent right knee arthroplasty with no note of complications hypertension, hyperlipidemia here today for preoperative clearance.  He is scheduled to undergo left total knee arthroplasty in April 8, 2025.  No side effects or complications from anesthesia and surgical procedures that she has had.   She does not have fever, cough, chest pain, shortness of breath, nausea, vomiting, abdominal pain.  No side effects are complications from anesthesia and surgical procedures that he has had in the past.  No bleeding tendencies.     Past Medical History:    Arthritis    Back problem    Spinal stenosis    Brain tumor (benign) (HCC)    Cataract    Diabetes (HCC)    Esophageal reflux    Essential hypertension    Hearing impaired person, bilateral    Bilateral hearing aids    High blood pressure    Hyperlipidemia    Osteoarthritis    Visual impairment       Past Surgical History:   Procedure Laterality Date    Brain surgery      Cataract      Colonoscopy      Excis infratent brain tumor      Knee replacement surgery Right 02/06/2025        Upper gi endoscopy,exam         Social History:  Social History     Socioeconomic History    Marital status:    Tobacco Use    Smoking status: Never    Smokeless tobacco: Never    Tobacco comments:     Updated 2/18/25   Vaping Use    Vaping status: Never Used   Substance and Sexual  Activity    Alcohol use: Never    Drug use: Never   Other Topics Concern    Caffeine Concern Yes    Exercise No     Social Drivers of Health     Food Insecurity: No Food Insecurity (2/6/2025)    NCSS - Food Insecurity     Worried About Running Out of Food in the Last Year: No     Ran Out of Food in the Last Year: No   Transportation Needs: No Transportation Needs (2/6/2025)    NCSS - Transportation     Lack of Transportation: No   Housing Stability: Not At Risk (2/6/2025)    NCSS - Housing/Utilities     Has Housing: Yes     Worried About Losing Housing: No     Unable to Get Utilities: No       Family History   Problem Relation Age of Onset    No Known Problems Father     No Known Problems Mother        Allergies:  Allergies[1]  Current Meds:    Current Outpatient Medications:     leucovorin 5 MG Oral Tab, TAKE ONE TABLET BY MOUTH ONCE A WEEK ON SUNDAYS, Disp: 13 tablet, Rfl: 3    naproxen 500 MG Oral Tab, Take 1 tablet (500 mg total) by mouth 2 (two) times daily with meals., Disp: , Rfl:     omeprazole 20 MG Oral Capsule Delayed Release, , Disp: , Rfl:     Multiple Vitamins-Minerals (MULTI-VITAMIN/MINERALS) Oral Tab, Take 1 tablet by mouth daily., Disp: , Rfl:     VITAMIN B-12 1000 MCG Sublingual SL Tab, PLACE 1 TABLET UNDER THE TONGUE DAILY, Disp: 270 tablet, Rfl: 0    Lancets (ONETOUCH DELICA PLUS HOCITT29B) Does not apply Misc, USE TO CHECK BLOOD GLUCOSE LEVELS TWICE A DAY, Disp: 300 each, Rfl: 1    Glucose Blood (ONETOUCH ULTRA TEST) In Vitro Strip, USE TO TEST TWICE A DAY, Disp: 300 strip, Rfl: 1    methotrexate 2.5 MG Oral Tab, Take 8 tablets (20 mg total) by mouth once a week., Disp: 104 tablet, Rfl: 0    ATORVASTATIN 10 MG Oral Tab, TAKE 1 TABLET BY MOUTH IN THE MORNING, Disp: 30 tablet, Rfl: 4    METFORMIN 500 MG Oral Tab, TAKE 1 TABLET (500 MG TOTAL) BY MOUTH NIGHTLY., Disp: 90 tablet, Rfl: 1    HYDROCHLOROTHIAZIDE 25 MG Oral Tab, TAKE 1 TABLET (25 MG TOTAL) BY MOUTH DAILY., Disp: 90 tablet, Rfl: 1     gabapentin 100 MG Oral Cap, Take 1 capsule (100 mg total) by mouth 3 (three) times daily., Disp: 90 capsule, Rfl: 0    folic acid 1 MG Oral Tab, Take 2 tablets (2 mg total) by mouth daily., Disp: 180 tablet, Rfl: 3    Ferrous Sulfate (FEROSUL) 325 (65 Fe) MG Oral Tab, Take 1 tablet (325 mg total) by mouth daily., Disp: 90 tablet, Rfl: 3    Lidocaine Viscous HCl 2 % Mouth/Throat Solution, Take 5 mL by mouth every 3 (three) hours as needed for Pain. For mouth sores., Disp: 100 mL, Rfl: 1    diclofenac 1 % External Gel, Apply 2 g topically 4 (four) times daily., Disp: 350 g, Rfl: 0    Blood Glucose Monitoring Suppl (BLOOD GLUCOSE MONITOR SYSTEM) w/Device Does not apply Kit, Please check blood glucose twice a day, Disp: 1 kit, Rfl: 0    Elastic Bandages & Supports (NEOPRENE KNEE BRACE) Does not apply Misc, 1 Application by Does not apply route daily. Left neoprene knee brace for patellofemoral pain syndrome, Disp: 1 each, Rfl: 0    Review of Systems   Review of Systems  Constitutional:  Negative for fatigue and fever.   HENT:  Negative for sore throat and trouble swallowing.    Respiratory:  Negative for cough and shortness of breath.    Cardiovascular:  Negative for chest pain.   Gastrointestinal:  Negative for abdominal pain, constipation, diarrhea, nausea and vomiting.   Genitourinary:  Negative for difficulty urinating.   Musculoskeletal:  Positive for arthralgias.   Neurological:  Negative for dizziness and weakness.   Physical Exam   /80   Pulse 76   Temp 98.2 °F (36.8 °C) (Temporal)   Resp 16   Ht 5' (1.524 m)   Wt 158 lb (71.7 kg)   SpO2 98%   BMI 30.86 kg/m²   Physical Exam  Vitals reviewed.   Constitutional:       General: She is not in acute distress.  HENT:      Right Ear: Tympanic membrane, ear canal and external ear normal.      Left Ear: Tympanic membrane, ear canal and external ear normal.      Nose: Nose normal.      Mouth/Throat:      Mouth: Mucous membranes are moist.      Pharynx:  Oropharynx is clear.   Eyes:      General: No scleral icterus.     Conjunctiva/sclera: Conjunctivae normal.   Cardiovascular:      Rate and Rhythm: Normal rate and regular rhythm.      Heart sounds: Normal heart sounds. No murmur heard.  Pulmonary:      Effort: Pulmonary effort is normal. No respiratory distress.      Breath sounds: Normal breath sounds. No wheezing or rales.   Abdominal:      General: Bowel sounds are normal. There is no distension.      Palpations: Abdomen is soft.      Tenderness: There is no abdominal tenderness.   Musculoskeletal:      Cervical back: Neck supple.      Right lower leg: No edema.      Left lower leg: No edema.   Lymphadenopathy:      Cervical: No cervical adenopathy.   Skin:     General: Skin is warm.   Neurological:      General: No focal deficit present.      Mental Status: She is alert.   Psychiatric:         Mood and Affect: Mood normal.         Behavior: Behavior normal.         Thought Content: Thought content normal.   Laboratory Encounter on 03/24/2025   Component Date Value    PT 03/24/2025 13.4     INR 03/24/2025 1.04     PTT 03/24/2025 29.0     MSSA/MRSA Culture 03/24/2025 No MRSA or Staph aureus(MSSA) Isolated     ABO BLOOD TYPE 03/24/2025 A     RH BLOOD TYPE 03/24/2025 Positive     Antibody Screen 03/24/2025 Negative        Assessment and Plan:  Darrin Gerber is a 65 year old female here for   Chief Complaint   Patient presents with    Pre-Op Exam     Scheduled on 4/8 Left total knee arthroplasty with      1. Preoperative clearance    2. Arthritis of left knee    3. Type 2 diabetes mellitus with hyperglycemia, without long-term current use of insulin (HCC)    Patient is medically cleared to undergo planned procedure.  He is at low risk for developing perioperative and postoperative cardiac complications.  Please contact my office if questions or concerns. Patient was advised to hold any blood thinners, anti-inflammatories, or supplements that patient may be  taking at least 7-10 days prior to procedure.  I will forward my notes to  for review.       This note was prepared using Dragon Medical voice recognition dictation software. As a result errors may occur. When identified these errors have been corrected. While every attempt is made to correct errors during dictation discrepancies may still exist              Patient/Caregiver Education: Patient/Caregiver Education: There are no barriers to learning. Medical education done. Outcome: Patient verbalizes understanding.    Orders Placed This Encounter   Procedures    CBC With Differential With Platelet     Standing Status:   Future     Standing Expiration Date:   4/2/2026    Comp Metabolic Panel (14) [E]     Standing Status:   Future     Standing Expiration Date:   4/2/2026     Order Specific Question:   Release to patient     Answer:   Immediate    Microalb/Creat Ratio, Random Urine [E]     Standing Status:   Future     Standing Expiration Date:   4/2/2026     Order Specific Question:   Release to patient     Answer:   Immediate         Procedures    CBC With Differential With Platelet    Comp Metabolic Panel (14) [E]    Microalb/Creat Ratio, Random Urine [E]     Requested Prescriptions      No prescriptions requested or ordered in this encounter     OFFICE/OUTPT VISIT,EST,LEVL IV        [1]   Allergies  Allergen Reactions    Meloxicam SWELLING     Able to tolerate iburprofen, naproxen,

## 2025-04-02 NOTE — H&P
Preoperative History and Physical Family Medicine    CC:   Chief Complaint   Patient presents with    Pre-Op Exam     Scheduled on 4/8 Left total knee arthroplasty with Dr.Sullivan Clifford Zabrina is 65 year old presenting for a preoperative exam.    This patient is having surgery on date: 4/8/25 for procedure: L TKA  I'm seeing the patient at the request from : Dr. Bautista because of preop clearance  Reporting mechanism back to the doctor via fax and this note.    HPI:   Ms. Gerber is a 65-year-old female with history of diabetes mellitus, rheumatoid arthritis, arthritis of both knees status post recent right knee arthroplasty with no note of complications hypertension, hyperlipidemia here today for preoperative clearance.  He is scheduled to undergo left total knee arthroplasty in April 8, 2025.  No side effects or complications from anesthesia and surgical procedures that she has had.   She does not have fever, cough, chest pain, shortness of breath, nausea, vomiting, abdominal pain.  No side effects are complications from anesthesia and surgical procedures that he has had in the past.  No bleeding tendencies.     Past Medical History:    Arthritis    Back problem    Spinal stenosis    Brain tumor (benign) (HCC)    Cataract    Diabetes (HCC)    Esophageal reflux    Essential hypertension    Hearing impaired person, bilateral    Bilateral hearing aids    High blood pressure    Hyperlipidemia    Osteoarthritis    Visual impairment       Past Surgical History:   Procedure Laterality Date    Brain surgery      Cataract      Colonoscopy      Excis infratent brain tumor      Knee replacement surgery Right 02/06/2025        Upper gi endoscopy,exam         Social History:  Social History     Socioeconomic History    Marital status:    Tobacco Use    Smoking status: Never    Smokeless tobacco: Never    Tobacco comments:     Updated 2/18/25   Vaping Use    Vaping status: Never Used   Substance and Sexual  Activity    Alcohol use: Never    Drug use: Never   Other Topics Concern    Caffeine Concern Yes    Exercise No     Social Drivers of Health     Food Insecurity: No Food Insecurity (2/6/2025)    NCSS - Food Insecurity     Worried About Running Out of Food in the Last Year: No     Ran Out of Food in the Last Year: No   Transportation Needs: No Transportation Needs (2/6/2025)    NCSS - Transportation     Lack of Transportation: No   Housing Stability: Not At Risk (2/6/2025)    NCSS - Housing/Utilities     Has Housing: Yes     Worried About Losing Housing: No     Unable to Get Utilities: No       Family History   Problem Relation Age of Onset    No Known Problems Father     No Known Problems Mother        Allergies:  Allergies[1]  Current Meds:    Current Outpatient Medications:     leucovorin 5 MG Oral Tab, TAKE ONE TABLET BY MOUTH ONCE A WEEK ON SUNDAYS, Disp: 13 tablet, Rfl: 3    naproxen 500 MG Oral Tab, Take 1 tablet (500 mg total) by mouth 2 (two) times daily with meals., Disp: , Rfl:     omeprazole 20 MG Oral Capsule Delayed Release, , Disp: , Rfl:     Multiple Vitamins-Minerals (MULTI-VITAMIN/MINERALS) Oral Tab, Take 1 tablet by mouth daily., Disp: , Rfl:     VITAMIN B-12 1000 MCG Sublingual SL Tab, PLACE 1 TABLET UNDER THE TONGUE DAILY, Disp: 270 tablet, Rfl: 0    Lancets (ONETOUCH DELICA PLUS [id number]) Does not apply Misc, USE TO CHECK BLOOD GLUCOSE LEVELS TWICE A DAY, Disp: 300 each, Rfl: 1    Glucose Blood (ONETOUCH ULTRA TEST) In Vitro Strip, USE TO TEST TWICE A DAY, Disp: 300 strip, Rfl: 1    methotrexate 2.5 MG Oral Tab, Take 8 tablets (20 mg total) by mouth once a week., Disp: 104 tablet, Rfl: 0    ATORVASTATIN 10 MG Oral Tab, TAKE 1 TABLET BY MOUTH IN THE MORNING, Disp: 30 tablet, Rfl: 4    METFORMIN 500 MG Oral Tab, TAKE 1 TABLET (500 MG TOTAL) BY MOUTH NIGHTLY., Disp: 90 tablet, Rfl: 1    HYDROCHLOROTHIAZIDE 25 MG Oral Tab, TAKE 1 TABLET (25 MG TOTAL) BY MOUTH DAILY., Disp: 90 tablet, Rfl: 1     gabapentin 100 MG Oral Cap, Take 1 capsule (100 mg total) by mouth 3 (three) times daily., Disp: 90 capsule, Rfl: 0    folic acid 1 MG Oral Tab, Take 2 tablets (2 mg total) by mouth daily., Disp: 180 tablet, Rfl: 3    Ferrous Sulfate (FEROSUL) 325 (65 Fe) MG Oral Tab, Take 1 tablet (325 mg total) by mouth daily., Disp: 90 tablet, Rfl: 3    Lidocaine Viscous HCl 2 % Mouth/Throat Solution, Take 5 mL by mouth every 3 (three) hours as needed for Pain. For mouth sores., Disp: 100 mL, Rfl: 1    diclofenac 1 % External Gel, Apply 2 g topically 4 (four) times daily., Disp: 350 g, Rfl: 0    Blood Glucose Monitoring Suppl (BLOOD GLUCOSE MONITOR SYSTEM) w/Device Does not apply Kit, Please check blood glucose twice a day, Disp: 1 kit, Rfl: 0    Elastic Bandages & Supports (NEOPRENE KNEE BRACE) Does not apply Misc, 1 Application by Does not apply route daily. Left neoprene knee brace for patellofemoral pain syndrome, Disp: 1 each, Rfl: 0    Review of Systems   Review of Systems  Constitutional:  Negative for fatigue and fever.   HENT:  Negative for sore throat and trouble swallowing.    Respiratory:  Negative for cough and shortness of breath.    Cardiovascular:  Negative for chest pain.   Gastrointestinal:  Negative for abdominal pain, constipation, diarrhea, nausea and vomiting.   Genitourinary:  Negative for difficulty urinating.   Musculoskeletal:  Positive for arthralgias.   Neurological:  Negative for dizziness and weakness.   Physical Exam   /80   Pulse 76   Temp 98.2 °F (36.8 °C) (Temporal)   Resp 16   Ht 5' (1.524 m)   Wt 158 lb (71.7 kg)   SpO2 98%   BMI 30.86 kg/m²   Physical Exam  Vitals reviewed.   Constitutional:       General: She is not in acute distress.  HENT:      Right Ear: Tympanic membrane, ear canal and external ear normal.      Left Ear: Tympanic membrane, ear canal and external ear normal.      Nose: Nose normal.      Mouth/Throat:      Mouth: Mucous membranes are moist.      Pharynx:  Oropharynx is clear.   Eyes:      General: No scleral icterus.     Conjunctiva/sclera: Conjunctivae normal.   Cardiovascular:      Rate and Rhythm: Normal rate and regular rhythm.      Heart sounds: Normal heart sounds. No murmur heard.  Pulmonary:      Effort: Pulmonary effort is normal. No respiratory distress.      Breath sounds: Normal breath sounds. No wheezing or rales.   Abdominal:      General: Bowel sounds are normal. There is no distension.      Palpations: Abdomen is soft.      Tenderness: There is no abdominal tenderness.   Musculoskeletal:      Cervical back: Neck supple.      Right lower leg: No edema.      Left lower leg: No edema.   Lymphadenopathy:      Cervical: No cervical adenopathy.   Skin:     General: Skin is warm.   Neurological:      General: No focal deficit present.      Mental Status: She is alert.   Psychiatric:         Mood and Affect: Mood normal.         Behavior: Behavior normal.         Thought Content: Thought content normal.   Laboratory Encounter on 03/24/2025   Component Date Value    PT 03/24/2025 13.4     INR 03/24/2025 1.04     PTT 03/24/2025 29.0     MSSA/MRSA Culture 03/24/2025 No MRSA or Staph aureus(MSSA) Isolated     ABO BLOOD TYPE 03/24/2025 A     RH BLOOD TYPE 03/24/2025 Positive     Antibody Screen 03/24/2025 Negative        Assessment and Plan:  Darrin Gerber is a 65 year old female here for   Chief Complaint   Patient presents with    Pre-Op Exam     Scheduled on 4/8 Left total knee arthroplasty with      1. Preoperative clearance    2. Arthritis of left knee    3. Type 2 diabetes mellitus with hyperglycemia, without long-term current use of insulin (HCC)    Patient is medically cleared to undergo planned procedure.  He is at low risk for developing perioperative and postoperative cardiac complications.  Please contact my office if questions or concerns. Patient was advised to hold any blood thinners, anti-inflammatories, or supplements that patient may be  taking at least 7-10 days prior to procedure.  I will forward my notes to  for review.       This note was prepared using Dragon Medical voice recognition dictation software. As a result errors may occur. When identified these errors have been corrected. While every attempt is made to correct errors during dictation discrepancies may still exist              Patient/Caregiver Education: Patient/Caregiver Education: There are no barriers to learning. Medical education done. Outcome: Patient verbalizes understanding.    Orders Placed This Encounter   Procedures    CBC With Differential With Platelet     Standing Status:   Future     Standing Expiration Date:   4/2/2026    Comp Metabolic Panel (14) [E]     Standing Status:   Future     Standing Expiration Date:   4/2/2026     Order Specific Question:   Release to patient     Answer:   Immediate    Microalb/Creat Ratio, Random Urine [E]     Standing Status:   Future     Standing Expiration Date:   4/2/2026     Order Specific Question:   Release to patient     Answer:   Immediate         Procedures    CBC With Differential With Platelet    Comp Metabolic Panel (14) [E]    Microalb/Creat Ratio, Random Urine [E]     Requested Prescriptions      No prescriptions requested or ordered in this encounter     OFFICE/OUTPT VISIT,EST,LEVL IV        [1]   Allergies  Allergen Reactions    Meloxicam SWELLING     Able to tolerate iburprofen, naproxen,

## 2025-04-03 ENCOUNTER — APPOINTMENT (OUTPATIENT)
Dept: PHYSICAL THERAPY | Age: 65
End: 2025-04-03
Attending: ORTHOPAEDIC SURGERY
Payer: MEDICAID

## 2025-04-03 ENCOUNTER — OFFICE VISIT (OUTPATIENT)
Dept: PHYSICAL THERAPY | Age: 65
End: 2025-04-03
Attending: ORTHOPAEDIC SURGERY
Payer: MEDICAID

## 2025-04-03 PROCEDURE — 97110 THERAPEUTIC EXERCISES: CPT

## 2025-04-03 PROCEDURE — 97112 NEUROMUSCULAR REEDUCATION: CPT

## 2025-04-03 NOTE — PROGRESS NOTES
Discharge Summary  Pt has attended 10 visits in Physical Therapy.           Patient: Darrin Gerber (65 year old, female) Referring Provider:  Insurance:   Diagnosis: Status post right knee replacement (Z96.651) [facility]   Date of Surgery: 2/6/25 Next MD visit:  N/A   Precautions:  None 3/24/25 Referral Information:    Date of Evaluation: Req: 15, Auth: 15, Exp: 4/26/2025 02/25/25 POC Auth Visits:  15       Today's Date   4/3/2025    Subjective  A virtual  was used for today's appt. Patient reports she still uses RW for long distances, is not able to walk more than 3-4 times around her house at one time but this has improved since beginning PT.       Pain: 0/10     Objective  Worst Pain: 3-4/10 at night      Hip       2/25/2025 3/25/2025 4/3/2025   Hip ROM/MMT   Rt Hip Flexion MMT (L2) 3+/5 3+/5 4/5   Lt Hip Flexion MMT (L2) 3+/5 3+/5 4-/5           2/25/2025 3/25/2025 4/3/2025   Knee ROM/MMT   Rt Knee Flexion 123 126 131   Lt Knee Flexion 130 133 132   Rt Knee Flexion MMT 3+/5 4+/5 5/5   Lt Knee Flexion MMT 3+/5 4/5 4+/5   Rt Knee Extension (L3) -6 -2 0   Lt Knee Extension (L3) 0 -5 -3   Rt Knee Extension MMT (L3) 3+/5 4+/5 4+/5   Lt Knee Extension MMT (L3) 3+/5 4/5 4/5     Post LEFS Score  Post LEFS Score: 56.25 % (4/3/2025 12:03 PM)    32.5 % improvement     Assessment  Patient has attended 10 visits following right TKR, demonstrating delayed but consistent gains in strength and ROM. Patient required  throughout care, which was time limiting in care at times, as well as patient's general difficulty to communicate with therapist. Patient is scheduled for contralateral knee replacement beginning of next week, and was provided with final HEP to continue on with for RLE until that time. Patient is now appropriate for DC    Goals (to be met in 12 visits)   Therapy Goals        The patient will improve LE strength and endurance to facilitate standing for extended periods of  time for 30 minutes at a time for household ambulation and chores. Timeframe: 4-6 visits. (Met 03/25).    Patient will improve knee strength and stability to facilitate discontinued use of AD for daily ambulation. Timeframe: 6-8 visits. (Not Met, using SPC for short distances, RW for long distance 03/25).    Long-Term Goals:  The patient will improve LE strength and endurance to facilitate walking distances of 1 mile(s) daily for community ambulation. Timeframe: 8-12 visits. (Progressing, but still requires Walker/Cane 03/25).    Patient will improve lower motor control to perform double-leg squat with symmetrical loading, without asymmetries, and with proper posterior chain loading to facilitate squatting and lifting ADL's. Timeframe: 8-12 visits. (Not Met/Progressing; 03/25)    Patient will improve LE mobility, strength, and single-leg stabilization to meet strength requirements for reciprocal stair navigation pattern with no asymmetries for ascending and descending 2-3 flights of stairs daily for community integration. Timeframe: 12 visits.     Patient will improve LEFS score by at least 9 points to indicate a true change in improved function for ADL's and restoring PLF. Timeframe: 12 visits.           Plan  DC to final HEP, patient scheduled for TKR of left leg next week    Treatment Last 4 Visits  Treatment Day: 10       3/20/2025 3/25/2025 3/27/2025 4/3/2025   LE Treatment   Therapeutic Exercise Standing Hip Abd 2 x 10 ea R/L  Standing hip Ext 2 x 10 ea R/L   LAQ x 10   S/L Hip Abd 2 x 10   SLR with QS x 10 (R)  STS x 5   Staggered STS x 5 (R Bias)      Reassess   Seated Alternating Marches x 10 3\" H each R/L   DLHR x 10   FSU 4\" x 10 ea R/L - consistent cues for reducing HHA   LSU 4\" x 10 ea R/L - bilateral HHA required  Discussed the patient's remaining symptoms, functional limitations, and concerns to establish updated POC.   PROM - knee flexion x 10 - mild end-range pain   SLR: x 10   Bridging: 1 x 10    LAQ: 1 x 10   DLHR: 1 x 10   Standing marching: 1 x 10 (B)   Standing hip ABD and ext: 1 x 20 (B), requested a sitting rest break when starting repetitions on the right side   Standing HSC: 1 x 10 (B)  Reassess  LEFS  Staggered STS x 5    Neuro Re-Education Tandem Stance 2 x 30\" ea R/L   Alternating Standing Marches 1 min; 2-3\" H ea R/L - req increased time due to decr ability to follow instructions even with various tactile, verbal, and visual cues Tandem Stance 2 x 30\" ea R/L   Standing Alternating Marches 2-3\" H ea R/L x 10     Forward karuna walk (step-to): x 2 laps x 5 - 6\" hurdles  Lateral karuna walk (step-to): x 2 laps x 5 - 6\" hurdles Standing Alt Marches x 1 min   Tandem Stance 2 x 30\" ea R/L    Therapeutic Activity   FSU: 1 x 10 (R), 4\" step  LSU: 1 x 10 (R), 4\" step   BW squat: 1 x 10   Sit to stand: 1 x 10     Therapeutic Exercise Minutes 28 28 20 35   Neuro Re-Educ Minutes 10 10 8 10   Therapeutic Activity Minutes   12    Total Time Of Timed Procedures 38 38 40 45   Total Time Of Service-Based Procedures 0 0 0 0   Total Treatment Time 38 38 40 45   HEP Access Code: [email or web address]  URL: [email or web address]  Date: 02/25/2025  Prepared by: Keke Eugene     Exercises  - Supine Straight Leg Raises  - 1 x daily - 7 x weekly - 2 sets - 10 reps  - Supine Bridge  - 1 x daily - 7 x weekly - 2 sets - 10 reps  - Hooklying Clamshell with Resistance  - 1 x daily - 7 x weekly - 2 sets - 10 reps - red theraband  - Standing Heel Raise  - 1 x daily - 7 x weekly - 1 sets - 10 reps  - Standing Hip Abduction with Counter Support  - 1 x daily - 7 x weekly - 1 sets - 10 reps  - Standing Hip Extension with Counter Support  - 1 x daily - 7 x weekly - 1 sets - 10 reps  - Standing Knee Flexion AROM with Chair Support  - 1 x daily - 7 x weekly - 1 sets - 10 reps Update HEP with tandem stance and marches next visit   Access Code: [email or web address]  URL: [email or web address]  Date:  04/03/2025  Prepared by: Norma Torres    Exercises  - Standing Tandem Balance with Counter Support  - 1 x daily - 7 x weekly - 3 sets - 30s hold  - Standing March with Counter Support  - 1 x daily - 7 x weekly - 1 sets - 10 reps  - Standing Hip Abduction with Counter Support  - 1 x daily - 7 x weekly - 2 sets - 10 reps  - Standing Hip Extension with Counter Support  - 1 x daily - 7 x weekly - 2 sets - 10 reps  - Staggered Sit-to-Stand  - 1 x daily - 7 x weekly - 3 sets - 5 reps        HEP  Access Code: [email or web address]  URL: [email or web address]  Date: 04/03/2025  Prepared by: Norma Torres    Exercises  - Standing Tandem Balance with Counter Support  - 1 x daily - 7 x weekly - 3 sets - 30s hold  - Standing March with Counter Support  - 1 x daily - 7 x weekly - 1 sets - 10 reps  - Standing Hip Abduction with Counter Support  - 1 x daily - 7 x weekly - 2 sets - 10 reps  - Standing Hip Extension with Counter Support  - 1 x daily - 7 x weekly - 2 sets - 10 reps  - Staggered Sit-to-Stand  - 1 x daily - 7 x weekly - 3 sets - 5 reps    Charges     Ther Ex x 2, Neuro x 1

## 2025-04-07 ENCOUNTER — ORDER TRANSCRIPTION (OUTPATIENT)
Dept: PHYSICAL THERAPY | Facility: HOSPITAL | Age: 65
End: 2025-04-07

## 2025-04-07 DIAGNOSIS — Z96.652 S/P TOTAL KNEE ARTHROPLASTY, LEFT: Primary | ICD-10-CM

## 2025-04-07 NOTE — DISCHARGE INSTRUCTIONS
Sometimes managing your health at home requires assistance.  The [facility] team has recognized your preference to use [facility].  They can be reached by phone at [phone].  The fax number for your reference is [phone].  A representative from the home health agency will contact you or your family to schedule your first visit.       Joint Replacement Surgery: Patient Discharge Instructions   The best way to contact your surgeon or their team is by phone [phone] (preferred for urgent/acute matters) or through [employer].    Dear Patient,     [facility] cares about your progress with recovery following your joint replacement surgery.     300 days from your scheduled surgery, [facility] will send you a follow-up survey to help us understand how your surgery impacted your mobility, pain, and overall quality of life. Please make every effort to complete this survey. The information collected from this survey will be used by your physician to track your recovery.     Sincerely,     [facility] [place]    What to Expect:  A certain amount of pain, swelling, and bruising is expected for several weeks after surgery.    Pain Medications:   Below is a list of commonly prescribed pain medications. You may have received prescriptions for some, all, or even additional pain medications not listed.  If you are taking the following medications for pain, these are some general guidelines for using and discontinuing them.  You may also want to preemptively take your pain medication before physical therapy (at least 30 minutes prior to the session).  If you are concerned you are suffering side effects from any of these medications please contact your surgeon’s office:    Ultram (Tramadol): Take this as prescribed 3 times per day with meals until your first postoperative appointment. If your pain levels are low, you can stop taking this on a scheduled basis  and start taking it as needed.  Tylenol (Acetaminophen): Take this as prescribed 3 times per day until your first postoperative appointment. Do NOT exceed 4g (4000mg) of acetaminophen daily.  Oxycodone IR (immediate release): This is your “rescue” drug. Take this only as needed for pain that is not controlled (rated more than 4 out of 10) by other medications.  You may wean yourself off prescription pain medication to a non-prescription pain reliever by substituting two 500mg extra-strength Tylenol (Acetaminophen) tablets in place of your prescription pain medications up to four times per day. Do NOT exceed 4g (4000mg) of acetaminophen daily.  *We will discuss pain management and weaning off pain medicine at your 1st postoperative appointment. To avoid delays in getting your narcotic pain medicine refilled, please contact the office prior to running out of medication either by phone [phone] or through [employer]. Please allow 24-72 hours for prescriptions to be filled. Your doctor may need to physically sign a hand-written prescription -- some medicines cannot be re-ordered electronically/or via phone. If you need to  a hand-written prescription, you can do so at the office located at [address] [place] [address], [address].  Pain Medications can be constipating. Below is a guideline for preventing or managing postoperative constipation:  Drink plenty of fluids. Aim to drink at least of 8oz of water 8 times per day (total of 64oz).  Eat a high-fiber diet (whatever helps you stay regular).  Make sure you are taking Senekot S (Docusate Sodium + Sennosides) or Colace (Docusate Sodium), 1-2 tablets twice daily, while on narcotics. You may decrease to once daily if you develop diarrhea. You will be sent home with a prescription.  If you have not had normal bowel movements the following over the counter medications can be helpful:  Add daily Miralax™ or Metamucil™ as directed on bottle.  If still no  results, add a dose of Milk of Magnesia™ as directed on bottle.  If still no results, take one Dulcolax™ (Bisacodyl) suppository as directed on package.  If you still have no results and it has been more than 3 days since you had a bowel movement, be sure to contact your surgeon's office.    Swelling:  You may apply a cloth covered ice pack for up to 20 minutes each hour, or as needed, to your incision to help control pain and swelling. Do not apply directly to your skin.  For the first 7 days after you leave the hospital, it is critical that you elevate your leg above the level of your heart 4 times per day for 1 hour each time. After a week, elevate your leg as needed if swelling is noted.   Call your Surgeons office if you have:  A temperature greater than 100.4 F.  Increasing pain or numbness at the incision or on your operated leg.  Increasing redness, drainage, or odor from the wound.  You WILL be swollen the first week or two after surgery; however, swelling that continues to get worse to your surgical leg or the opposite leg and is accompanied by discomfort or redness should be reported.  **Go to the Emergency Room if you have chest pain or shortness of breath**  Activities:  Your activity order is:   Weight bearing as tolerated  If you have home health care, a physical therapist (PT) will come to your home 2-3 times per week. The number of PT visits will depend on your needs and your insurance coverage.   At your first postoperative appointment with your surgeon, you will be given a prescription for outpatient physical therapy if you have not already started outpatient therapy.  Rules of the road: No driving until it is approved by your care team, and you are no longer taking narcotic pain medicine.    Wound Care/ Bathing:   Aquacel (waterproof brown rubberized dressing) should remain in place for 7 days and may then be removed. While this waterproof dressing is in place, you may shower and let water run  over the dressing (ensure first that the dressing seal is intact and none of the edges have rolled up exposing the wound - if the dressing has become open to the environment, do not allow water to enter into this area)  Once the original postoperative dressing is removed, you do not need to keep your incision covered. If you would like to keep it covered for comfort you may - use a clean new dressing each day, or more frequently if needed (if the dressing is soiled or it is not staying fixed to your skin). Use the dressings suggested by the nurse at discharge.  After the original postoperative dressing is removed, you should continue to keep the incision covered when showering to prevent it from getting wet prior to your first postoperative appointment. Before showering, be sure to cover the incision with saran wrap or a plastic bag to keep dry. After showering, pat the incision with a clean towel to ensure it is dry. Do this until you are cleared to get the incision wet (usually after the first postoperative appointment).  Once cleared to get the incision wet, you may gently allow soap and water to rinse over the area. Be sure to rinse the area well and gently pat dry.  Do not apply soap, lotion, cream, ointments, or powders to your incision. Keep the incision clean and dry.  Do not soak your incision in water. No tub baths, pools or hot tubs for at least 6 weeks after surgery and not until the wound is completely healed.  If you have stitches or staples, they will be removed at your first postoperative appointment or the week after. Do not remove steri-strips, if you have any. These will come off on their own and do not need to be replaced.  If you have a home care nurse they should:  Examine the incision during visits and call your surgeon if there are any signs of infection or other cause for concern.  Change your dressing and may remove staples (when indicated).  Take your vitals and draw your  labs.    Remember, good hand washing with soap at all times helps prevent infections. Wash your hands with soap and water prior to performing any dressing changes or wound evaluation.    Medications/Special Instructions:  Do not restart your blood pressure medication until cleared by your physician, or until your systolic blood pressure (top number) is above 130 on 2 consecutive checks and then continue your medication as prescribed.    Antibiotics:  An oral/by-mouth antibiotic tablet has been ordered for you to take for one week postoperatively. Be sure to take this medication three times daily for one week as prescribed.    Blood Thinners (you will be on blood thinners for a total of six weeks):  Aspirin: some patients will be prescribed Aspirin to prevent blood clots after surgery. If you are prescribed aspirin, take one 81mg tablet by mouth twice per day for six weeks.  DO NOT restart hfkps9a/fish oils, glucosamine, nonsteroidal antiinflammatories [NSAIDs, such as such as Ibuprofen (Advil, Motrin), Naproxen (Naprosyn, Aleve), Diclofenac (Voltaren), Meloxicam (Mobic)], tumeric, cinnamon, progesterone, estrogen, or Aspirin (unless told differently) until you have completed your blood thinner. These will increase your risk of bleeding.   Protonix (Omeprazole/Nexium, etc.) may be ordered if Aspirin is to be continued while on your blood thinner and should be taken daily during that time. This will help to protect your stomach.    X-Ray  X-rays needed: none    If you do not have a follow-up appointment with your surgeon, or you need to change your follow-up appointment date/time, please call today to schedule or change this appointment: [phone]. Our phones are open to schedule appointments from 8:30am to 4:30pm, Monday through Friday.  If you have any questions or concerns during the postoperative period (for example: wound issues, pain, swelling, redness or drainage) call our office FIRST at [phone] so  that we may appropriately direct you or set up a clinic appointment.  Spanda- knee replacement (single layer compression sleeve):  All patients should wear the compression sleeves (SPANDA-) until first follow up visit to surgeon’s office ( about 2-3 weeks) and then check with surgeon if need to continue use.  Take off to shower. Best to keep on as much as possible, even at night.  Wash with mild soap and water; DRIP dry overnight.    Directions to put on and off:  IT TAKES 2 PIECES OF SPANDA- (compression sleeves), (USUALLY DIFFERENT SIZES because a calf is usually smaller than a knee.)   Use the longer tube (spanda-/compression sleeve) pulled up over the calf.   This 1st piece (spanda-/compression sleeve) should be on the calf part of the leg, from just below the knee to the ball of the foot to expose the toes.   The 2nd piece (shorter compression sleeve) is pulled over and past the first sleeve onto the knee. The lower edge of the knee portion should overlap the top of the calf spanda- by a few inches. This is the only place where the 2 different pieces overlap.  The top edge of the second spanda- should be about a few inches above the knee but it should NOT be rolling down. The spanda- should be flat so that it does not roll and become too tight.  Makes sure it is NOT bunched up anywhere.   IF the spanda- feels TOO tight, then REMOVE it and call your surgeon to let them know.

## 2025-04-08 ENCOUNTER — ANESTHESIA EVENT (OUTPATIENT)
Dept: SURGERY | Facility: HOSPITAL | Age: 65
End: 2025-04-08
Payer: MEDICAID

## 2025-04-08 ENCOUNTER — APPOINTMENT (OUTPATIENT)
Dept: GENERAL RADIOLOGY | Facility: HOSPITAL | Age: 65
End: 2025-04-08
Attending: ORTHOPAEDIC SURGERY
Payer: MEDICAID

## 2025-04-08 ENCOUNTER — HOSPITAL ENCOUNTER (OUTPATIENT)
Facility: HOSPITAL | Age: 65
Discharge: HOME HEALTH | End: 2025-04-09
Attending: ORTHOPAEDIC SURGERY | Admitting: ORTHOPAEDIC SURGERY
Payer: MEDICAID

## 2025-04-08 ENCOUNTER — ANESTHESIA (OUTPATIENT)
Dept: SURGERY | Facility: HOSPITAL | Age: 65
End: 2025-04-08
Payer: MEDICAID

## 2025-04-08 DIAGNOSIS — M17.12 PRIMARY OSTEOARTHRITIS OF LEFT KNEE: Primary | ICD-10-CM

## 2025-04-08 LAB
GLUCOSE BLD-MCNC: 115 MG/DL (ref 70–99)
GLUCOSE BLD-MCNC: 116 MG/DL (ref 70–99)
GLUCOSE BLD-MCNC: 135 MG/DL (ref 70–99)
GLUCOSE BLD-MCNC: 274 MG/DL (ref 70–99)

## 2025-04-08 PROCEDURE — 99215 OFFICE O/P EST HI 40 MIN: CPT | Performed by: HOSPITALIST

## 2025-04-08 PROCEDURE — 27447 TOTAL KNEE ARTHROPLASTY: CPT | Performed by: ORTHOPAEDIC SURGERY

## 2025-04-08 PROCEDURE — 73560 X-RAY EXAM OF KNEE 1 OR 2: CPT | Performed by: ORTHOPAEDIC SURGERY

## 2025-04-08 PROCEDURE — 0SRD0J9 REPLACEMENT OF LEFT KNEE JOINT WITH SYNTHETIC SUBSTITUTE, CEMENTED, OPEN APPROACH: ICD-10-PCS | Performed by: ORTHOPAEDIC SURGERY

## 2025-04-08 PROCEDURE — 76942 ECHO GUIDE FOR BIOPSY: CPT | Performed by: STUDENT IN AN ORGANIZED HEALTH CARE EDUCATION/TRAINING PROGRAM

## 2025-04-08 DEVICE — GMK SPHERE KNEE: Type: IMPLANTABLE DEVICE

## 2025-04-08 DEVICE — FIXED TIBIAL TRAY CEMENTED LEFT, SIZE 1
Type: IMPLANTABLE DEVICE | Site: KNEE | Status: FUNCTIONAL
Brand: GMK PRIMARY TOTAL KNEE SYSTEM

## 2025-04-08 DEVICE — IMPLANTABLE DEVICE
Type: IMPLANTABLE DEVICE | Site: KNEE | Status: FUNCTIONAL
Brand: REFOBACIN® BONE CEMENT R

## 2025-04-08 DEVICE — FEMUR SPHERE CEMENTED LEFT, SIZE 2
Type: IMPLANTABLE DEVICE | Site: KNEE | Status: FUNCTIONAL
Brand: GMK SPHERE TOTAL KNEE SYSTEM

## 2025-04-08 DEVICE — TIBIAL INSERT FIXED SPHERE FLEX   #1/11 MM L E-CROSS
Type: IMPLANTABLE DEVICE | Site: KNEE | Status: FUNCTIONAL
Brand: GMK SPHERE TOTAL KNEE SYSTEM

## 2025-04-08 RX ORDER — OXYCODONE HYDROCHLORIDE 5 MG/1
5 TABLET ORAL EVERY 4 HOURS PRN
Status: DISCONTINUED | OUTPATIENT
Start: 2025-04-08 | End: 2025-04-08

## 2025-04-08 RX ORDER — KETOROLAC TROMETHAMINE 30 MG/ML
15 INJECTION, SOLUTION INTRAMUSCULAR; INTRAVENOUS EVERY 6 HOURS
Status: DISCONTINUED | OUTPATIENT
Start: 2025-04-08 | End: 2025-04-09

## 2025-04-08 RX ORDER — ACETAMINOPHEN 500 MG
1000 TABLET ORAL ONCE
Status: DISCONTINUED | OUTPATIENT
Start: 2025-04-08 | End: 2025-04-08 | Stop reason: HOSPADM

## 2025-04-08 RX ORDER — TRAMADOL HYDROCHLORIDE 50 MG/1
50 TABLET ORAL EVERY 6 HOURS SCHEDULED
Status: DISCONTINUED | OUTPATIENT
Start: 2025-04-08 | End: 2025-04-09

## 2025-04-08 RX ORDER — ONDANSETRON 2 MG/ML
INJECTION INTRAMUSCULAR; INTRAVENOUS
Status: COMPLETED
Start: 2025-04-08 | End: 2025-04-08

## 2025-04-08 RX ORDER — ACETAMINOPHEN 500 MG
1000 TABLET ORAL ONCE AS NEEDED
Status: DISCONTINUED | OUTPATIENT
Start: 2025-04-08 | End: 2025-04-08 | Stop reason: HOSPADM

## 2025-04-08 RX ORDER — ONDANSETRON 2 MG/ML
4 INJECTION INTRAMUSCULAR; INTRAVENOUS EVERY 6 HOURS PRN
Status: DISCONTINUED | OUTPATIENT
Start: 2025-04-08 | End: 2025-04-09

## 2025-04-08 RX ORDER — CEPHALEXIN 500 MG/1
500 CAPSULE ORAL EVERY 8 HOURS SCHEDULED
Qty: 18 CAPSULE | Refills: 0 | Status: SHIPPED | OUTPATIENT
Start: 2025-04-09 | End: 2025-04-15

## 2025-04-08 RX ORDER — TRAMADOL HYDROCHLORIDE 50 MG/1
50 TABLET ORAL EVERY 8 HOURS
Qty: 45 TABLET | Refills: 0 | Status: SHIPPED | OUTPATIENT
Start: 2025-04-08 | End: 2025-04-23

## 2025-04-08 RX ORDER — SODIUM CHLORIDE, SODIUM LACTATE, POTASSIUM CHLORIDE, CALCIUM CHLORIDE 600; 310; 30; 20 MG/100ML; MG/100ML; MG/100ML; MG/100ML
INJECTION, SOLUTION INTRAVENOUS CONTINUOUS
Status: DISCONTINUED | OUTPATIENT
Start: 2025-04-08 | End: 2025-04-08 | Stop reason: HOSPADM

## 2025-04-08 RX ORDER — MIDAZOLAM HYDROCHLORIDE 1 MG/ML
INJECTION INTRAMUSCULAR; INTRAVENOUS AS NEEDED
Status: DISCONTINUED | OUTPATIENT
Start: 2025-04-08 | End: 2025-04-08 | Stop reason: SURG

## 2025-04-08 RX ORDER — DIPHENHYDRAMINE HCL 25 MG
25 CAPSULE ORAL EVERY 4 HOURS PRN
Status: DISCONTINUED | OUTPATIENT
Start: 2025-04-08 | End: 2025-04-09

## 2025-04-08 RX ORDER — DEXTROSE MONOHYDRATE 25 G/50ML
50 INJECTION, SOLUTION INTRAVENOUS
Status: DISCONTINUED | OUTPATIENT
Start: 2025-04-08 | End: 2025-04-08 | Stop reason: HOSPADM

## 2025-04-08 RX ORDER — HYDROCODONE BITARTRATE AND ACETAMINOPHEN 5; 325 MG/1; MG/1
2 TABLET ORAL ONCE AS NEEDED
Status: DISCONTINUED | OUTPATIENT
Start: 2025-04-08 | End: 2025-04-08 | Stop reason: HOSPADM

## 2025-04-08 RX ORDER — BUPIVACAINE HYDROCHLORIDE 7.5 MG/ML
INJECTION, SOLUTION INTRASPINAL AS NEEDED
Status: DISCONTINUED | OUTPATIENT
Start: 2025-04-08 | End: 2025-04-08 | Stop reason: SURG

## 2025-04-08 RX ORDER — BISACODYL 10 MG
10 SUPPOSITORY, RECTAL RECTAL
Status: DISCONTINUED | OUTPATIENT
Start: 2025-04-08 | End: 2025-04-09

## 2025-04-08 RX ORDER — ONDANSETRON 2 MG/ML
4 INJECTION INTRAMUSCULAR; INTRAVENOUS EVERY 6 HOURS PRN
Status: DISCONTINUED | OUTPATIENT
Start: 2025-04-08 | End: 2025-04-08 | Stop reason: HOSPADM

## 2025-04-08 RX ORDER — OXYCODONE HYDROCHLORIDE 5 MG/1
5 TABLET ORAL EVERY 4 HOURS PRN
Status: DISCONTINUED | OUTPATIENT
Start: 2025-04-08 | End: 2025-04-09

## 2025-04-08 RX ORDER — SENNOSIDES 8.6 MG
17.2 TABLET ORAL NIGHTLY
Status: DISCONTINUED | OUTPATIENT
Start: 2025-04-08 | End: 2025-04-09

## 2025-04-08 RX ORDER — SODIUM CHLORIDE 9 MG/ML
INJECTION, SOLUTION INTRAVENOUS CONTINUOUS
Status: DISCONTINUED | OUTPATIENT
Start: 2025-04-08 | End: 2025-04-09

## 2025-04-08 RX ORDER — SODIUM PHOSPHATE, DIBASIC AND SODIUM PHOSPHATE, MONOBASIC 7; 19 G/230ML; G/230ML
1 ENEMA RECTAL ONCE AS NEEDED
Status: DISCONTINUED | OUTPATIENT
Start: 2025-04-08 | End: 2025-04-09

## 2025-04-08 RX ORDER — PANTOPRAZOLE SODIUM 40 MG/1
40 TABLET, DELAYED RELEASE ORAL
Status: DISCONTINUED | OUTPATIENT
Start: 2025-04-09 | End: 2025-04-09

## 2025-04-08 RX ORDER — HYDROMORPHONE HYDROCHLORIDE 1 MG/ML
0.2 INJECTION, SOLUTION INTRAMUSCULAR; INTRAVENOUS; SUBCUTANEOUS EVERY 5 MIN PRN
Status: DISCONTINUED | OUTPATIENT
Start: 2025-04-08 | End: 2025-04-08 | Stop reason: HOSPADM

## 2025-04-08 RX ORDER — ACETAMINOPHEN 500 MG
1000 TABLET ORAL EVERY 8 HOURS
Qty: 90 TABLET | Refills: 0 | Status: SHIPPED | OUTPATIENT
Start: 2025-04-08 | End: 2025-04-23

## 2025-04-08 RX ORDER — HYDROMORPHONE HYDROCHLORIDE 1 MG/ML
0.4 INJECTION, SOLUTION INTRAMUSCULAR; INTRAVENOUS; SUBCUTANEOUS EVERY 5 MIN PRN
Status: DISCONTINUED | OUTPATIENT
Start: 2025-04-08 | End: 2025-04-08 | Stop reason: HOSPADM

## 2025-04-08 RX ORDER — NALOXONE HYDROCHLORIDE 0.4 MG/ML
0.08 INJECTION, SOLUTION INTRAMUSCULAR; INTRAVENOUS; SUBCUTANEOUS AS NEEDED
Status: DISCONTINUED | OUTPATIENT
Start: 2025-04-08 | End: 2025-04-08 | Stop reason: HOSPADM

## 2025-04-08 RX ORDER — POLYETHYLENE GLYCOL 3350 17 G/17G
17 POWDER, FOR SOLUTION ORAL DAILY PRN
Status: DISCONTINUED | OUTPATIENT
Start: 2025-04-08 | End: 2025-04-09

## 2025-04-08 RX ORDER — FERROUS SULFATE 325(65) MG
325 TABLET, DELAYED RELEASE (ENTERIC COATED) ORAL DAILY
Status: DISCONTINUED | OUTPATIENT
Start: 2025-04-09 | End: 2025-04-09

## 2025-04-08 RX ORDER — FOLIC ACID 1 MG/1
2 TABLET ORAL DAILY
Status: DISCONTINUED | OUTPATIENT
Start: 2025-04-09 | End: 2025-04-09

## 2025-04-08 RX ORDER — LABETALOL HYDROCHLORIDE 5 MG/ML
5 INJECTION, SOLUTION INTRAVENOUS EVERY 5 MIN PRN
Status: DISCONTINUED | OUTPATIENT
Start: 2025-04-08 | End: 2025-04-08 | Stop reason: HOSPADM

## 2025-04-08 RX ORDER — ACETAMINOPHEN 500 MG
1000 TABLET ORAL EVERY 8 HOURS SCHEDULED
Status: DISCONTINUED | OUTPATIENT
Start: 2025-04-08 | End: 2025-04-09

## 2025-04-08 RX ORDER — DOCUSATE SODIUM 100 MG/1
100 CAPSULE, LIQUID FILLED ORAL 2 TIMES DAILY
Status: DISCONTINUED | OUTPATIENT
Start: 2025-04-08 | End: 2025-04-09

## 2025-04-08 RX ORDER — DIPHENHYDRAMINE HYDROCHLORIDE 50 MG/ML
12.5 INJECTION, SOLUTION INTRAMUSCULAR; INTRAVENOUS EVERY 4 HOURS PRN
Status: DISCONTINUED | OUTPATIENT
Start: 2025-04-08 | End: 2025-04-09

## 2025-04-08 RX ORDER — HYDROMORPHONE HYDROCHLORIDE 1 MG/ML
0.6 INJECTION, SOLUTION INTRAMUSCULAR; INTRAVENOUS; SUBCUTANEOUS EVERY 5 MIN PRN
Status: DISCONTINUED | OUTPATIENT
Start: 2025-04-08 | End: 2025-04-08 | Stop reason: HOSPADM

## 2025-04-08 RX ORDER — DEXAMETHASONE SODIUM PHOSPHATE 4 MG/ML
VIAL (ML) INJECTION AS NEEDED
Status: DISCONTINUED | OUTPATIENT
Start: 2025-04-08 | End: 2025-04-08 | Stop reason: SURG

## 2025-04-08 RX ORDER — NICOTINE POLACRILEX 4 MG
15 LOZENGE BUCCAL
Status: DISCONTINUED | OUTPATIENT
Start: 2025-04-08 | End: 2025-04-08 | Stop reason: HOSPADM

## 2025-04-08 RX ORDER — DEXTROSE MONOHYDRATE 25 G/50ML
50 INJECTION, SOLUTION INTRAVENOUS
Status: DISCONTINUED | OUTPATIENT
Start: 2025-04-08 | End: 2025-04-09

## 2025-04-08 RX ORDER — CEPHALEXIN 500 MG/1
500 CAPSULE ORAL EVERY 8 HOURS SCHEDULED
Status: DISCONTINUED | OUTPATIENT
Start: 2025-04-09 | End: 2025-04-09

## 2025-04-08 RX ORDER — SENNA AND DOCUSATE SODIUM 50; 8.6 MG/1; MG/1
1 TABLET, FILM COATED ORAL 2 TIMES DAILY PRN
Qty: 30 TABLET | Refills: 0 | Status: SHIPPED | OUTPATIENT
Start: 2025-04-08

## 2025-04-08 RX ORDER — ASPIRIN 81 MG/1
81 TABLET ORAL 2 TIMES DAILY
Qty: 80 TABLET | Refills: 0 | Status: SHIPPED | OUTPATIENT
Start: 2025-04-08 | End: 2025-05-18

## 2025-04-08 RX ORDER — ACETAMINOPHEN 500 MG
1000 TABLET ORAL EVERY 8 HOURS SCHEDULED
Status: DISCONTINUED | OUTPATIENT
Start: 2025-04-08 | End: 2025-04-08

## 2025-04-08 RX ORDER — NICOTINE POLACRILEX 4 MG
30 LOZENGE BUCCAL
Status: DISCONTINUED | OUTPATIENT
Start: 2025-04-08 | End: 2025-04-08 | Stop reason: HOSPADM

## 2025-04-08 RX ORDER — OXYCODONE HYDROCHLORIDE 5 MG/1
TABLET ORAL EVERY 4 HOURS PRN
Qty: 40 TABLET | Refills: 0 | Status: SHIPPED | OUTPATIENT
Start: 2025-04-08

## 2025-04-08 RX ORDER — DEXAMETHASONE SODIUM PHOSPHATE 10 MG/ML
8 INJECTION, SOLUTION INTRAMUSCULAR; INTRAVENOUS ONCE
Status: COMPLETED | OUTPATIENT
Start: 2025-04-09 | End: 2025-04-09

## 2025-04-08 RX ORDER — DIPHENHYDRAMINE HYDROCHLORIDE 50 MG/ML
25 INJECTION, SOLUTION INTRAMUSCULAR; INTRAVENOUS ONCE AS NEEDED
Status: ACTIVE | OUTPATIENT
Start: 2025-04-08 | End: 2025-04-08

## 2025-04-08 RX ORDER — METOCLOPRAMIDE HYDROCHLORIDE 5 MG/ML
5 INJECTION INTRAMUSCULAR; INTRAVENOUS EVERY 8 HOURS PRN
Status: DISCONTINUED | OUTPATIENT
Start: 2025-04-08 | End: 2025-04-09

## 2025-04-08 RX ORDER — FERROUS SULFATE 325(65) MG
325 TABLET, DELAYED RELEASE (ENTERIC COATED) ORAL
Status: DISCONTINUED | OUTPATIENT
Start: 2025-04-09 | End: 2025-04-08

## 2025-04-08 RX ORDER — TRANEXAMIC ACID 10 MG/ML
1000 INJECTION, SOLUTION INTRAVENOUS ONCE
Status: DISCONTINUED | OUTPATIENT
Start: 2025-04-08 | End: 2025-04-08 | Stop reason: HOSPADM

## 2025-04-08 RX ORDER — HYDROMORPHONE HYDROCHLORIDE 1 MG/ML
0.4 INJECTION, SOLUTION INTRAMUSCULAR; INTRAVENOUS; SUBCUTANEOUS EVERY 2 HOUR PRN
Status: DISCONTINUED | OUTPATIENT
Start: 2025-04-08 | End: 2025-04-09

## 2025-04-08 RX ORDER — TRAMADOL HYDROCHLORIDE 50 MG/1
50 TABLET ORAL EVERY 6 HOURS SCHEDULED
Status: DISCONTINUED | OUTPATIENT
Start: 2025-04-08 | End: 2025-04-08

## 2025-04-08 RX ORDER — NICOTINE POLACRILEX 4 MG
30 LOZENGE BUCCAL
Status: DISCONTINUED | OUTPATIENT
Start: 2025-04-08 | End: 2025-04-09

## 2025-04-08 RX ORDER — HYDROCODONE BITARTRATE AND ACETAMINOPHEN 5; 325 MG/1; MG/1
1 TABLET ORAL ONCE AS NEEDED
Status: DISCONTINUED | OUTPATIENT
Start: 2025-04-08 | End: 2025-04-08 | Stop reason: HOSPADM

## 2025-04-08 RX ORDER — HYDROMORPHONE HYDROCHLORIDE 1 MG/ML
0.2 INJECTION, SOLUTION INTRAMUSCULAR; INTRAVENOUS; SUBCUTANEOUS EVERY 2 HOUR PRN
Status: DISCONTINUED | OUTPATIENT
Start: 2025-04-08 | End: 2025-04-09

## 2025-04-08 RX ORDER — NICOTINE POLACRILEX 4 MG
15 LOZENGE BUCCAL
Status: DISCONTINUED | OUTPATIENT
Start: 2025-04-08 | End: 2025-04-09

## 2025-04-08 RX ORDER — ATORVASTATIN CALCIUM 10 MG/1
10 TABLET, FILM COATED ORAL DAILY
Status: DISCONTINUED | OUTPATIENT
Start: 2025-04-09 | End: 2025-04-09

## 2025-04-08 RX ORDER — TRANEXAMIC ACID 10 MG/ML
INJECTION, SOLUTION INTRAVENOUS AS NEEDED
Status: DISCONTINUED | OUTPATIENT
Start: 2025-04-08 | End: 2025-04-08 | Stop reason: SURG

## 2025-04-08 RX ORDER — HYDROMORPHONE HYDROCHLORIDE 1 MG/ML
0.4 INJECTION, SOLUTION INTRAMUSCULAR; INTRAVENOUS; SUBCUTANEOUS EVERY 2 HOUR PRN
Status: DISCONTINUED | OUTPATIENT
Start: 2025-04-08 | End: 2025-04-08

## 2025-04-08 RX ORDER — ASPIRIN 81 MG/1
81 TABLET ORAL 2 TIMES DAILY
Status: DISCONTINUED | OUTPATIENT
Start: 2025-04-08 | End: 2025-04-09

## 2025-04-08 RX ORDER — SODIUM CHLORIDE, SODIUM LACTATE, POTASSIUM CHLORIDE, CALCIUM CHLORIDE 600; 310; 30; 20 MG/100ML; MG/100ML; MG/100ML; MG/100ML
INJECTION, SOLUTION INTRAVENOUS CONTINUOUS
Status: DISCONTINUED | OUTPATIENT
Start: 2025-04-08 | End: 2025-04-08

## 2025-04-08 RX ORDER — HYDROMORPHONE HYDROCHLORIDE 1 MG/ML
0.2 INJECTION, SOLUTION INTRAMUSCULAR; INTRAVENOUS; SUBCUTANEOUS EVERY 2 HOUR PRN
Status: DISCONTINUED | OUTPATIENT
Start: 2025-04-08 | End: 2025-04-08

## 2025-04-08 RX ADMIN — DEXAMETHASONE SODIUM PHOSPHATE 4 MG: 4 MG/ML VIAL (ML) INJECTION at 11:56:00

## 2025-04-08 RX ADMIN — SODIUM CHLORIDE, SODIUM LACTATE, POTASSIUM CHLORIDE, CALCIUM CHLORIDE: 600; 310; 30; 20 INJECTION, SOLUTION INTRAVENOUS at 11:56:00

## 2025-04-08 RX ADMIN — SODIUM CHLORIDE, SODIUM LACTATE, POTASSIUM CHLORIDE, CALCIUM CHLORIDE: 600; 310; 30; 20 INJECTION, SOLUTION INTRAVENOUS at 12:19:00

## 2025-04-08 RX ADMIN — SODIUM CHLORIDE, SODIUM LACTATE, POTASSIUM CHLORIDE, CALCIUM CHLORIDE: 600; 310; 30; 20 INJECTION, SOLUTION INTRAVENOUS at 10:06:00

## 2025-04-08 RX ADMIN — MIDAZOLAM HYDROCHLORIDE 2 MG: 1 INJECTION INTRAMUSCULAR; INTRAVENOUS at 10:23:00

## 2025-04-08 RX ADMIN — BUPIVACAINE HYDROCHLORIDE 1.8 ML: 7.5 INJECTION, SOLUTION INTRASPINAL at 10:17:00

## 2025-04-08 RX ADMIN — TRANEXAMIC ACID 1000 MG: 10 INJECTION, SOLUTION INTRAVENOUS at 10:44:00

## 2025-04-08 NOTE — CONSULTS
[facility] HOSPITALIST  CONSULT     Darrin Gerber Patient Status:  Outpatient in a Bed    1960 MRN [id number]   Location [facility] 3SW-A Attending Jacky Bautista MD   Hosp Day # 0 PCP John Botello MD     Reason for consult: Diabetes mellitus, hypertension, dyslipidemia    Requested by: Dr. Bautista    Subjective:   History of Present Illness:     Darrin Gerber is a 65 year old female with a history of essential hypertension, dyslipidemia, diabetes mellitus (type 2) and rheumatoid arthritis who underwent left total knee arthroplasty 2025. Patient seen post-op. No complaints of pain. A bit groggy. Patient with nausea and vomiting in PACU. No chest pain or shortness of breath. On room air.     History/Other:    Past Medical History:  Past Medical History:    Arthritis    Back problem    Spinal stenosis    Brain tumor (benign) (HCC)    Cataract    Diabetes (HCC)    Esophageal reflux    Essential hypertension    Hearing impaired person, bilateral    Bilateral hearing aids    High blood pressure    Hyperlipidemia    Osteoarthritis    Visual impairment     Past Surgical History:   Past Surgical History:   Procedure Laterality Date    Brain surgery      Cataract      Colonoscopy      Excis infratent brain tumor      Knee replacement surgery Right 2025        Upper gi endoscopy,exam        Family History:   Family History   Problem Relation Age of Onset    No Known Problems Father     No Known Problems Mother      Social History:    reports that she has never smoked. She has never used smokeless tobacco. She reports that she does not drink alcohol and does not use drugs.     Allergies: Allergies[1]    Medications:  Medications Ordered Prior to Encounter[2]    Review of Systems:   A comprehensive review of systems was completed.    Pertinent positives and negatives noted in the HPI.    Objective:   Physical Exam:    /66 (BP Location: Left arm)   Pulse 72   Temp 97.2 °F (36.2 °C)  (Temporal)   Resp 17   Ht 5' (1.524 m)   Wt 157 lb (71.2 kg)   SpO2 100%   BMI 30.66 kg/m²   General: No acute distress, Alert  Respiratory: No rhonchi, no wheezes  Cardiovascular: S1, S2. Regular rate and rhythm  Abdomen: Soft, NT/ND, +BS  Extremities: Left knee cooling device in place      Results:    Labs:      Labs Last 24 Hours:  Recent Labs   Lab 04/02/25  1253   WBC 6.0   HGB 10.7*   MCV 92.0   .0       Recent Labs   Lab 04/02/25  1253   *   BUN 19   CREATSERUM 1.04*   CA 9.4   ALB 4.1      K 3.3*   CL 97*   CO2 28.0   ALKPHO 70   AST 24   ALT 15   BILT 0.5   TP 7.1       No results for input(s): \"PTP\", \"INR\" in the last 168 hours.    No results for input(s): \"TROP\", \"CK\" in the last 168 hours.      Imaging: Imaging data reviewed in Epic.    Assessment & Plan:      #Left knee OA sp left TKA 4/8/2025  -Aspirin BID  -Pain control  -Labs in AM  -PT/OT  -Ortho following     #Essential hypertension  -IVF   -Hold hydrochlorothiazide  -Monitor hemodynamics     #Dyslipidemia  -Statin    #Diabetes mellitus, type 2  -Hold Metformin  -Correctional scale 1:40  -Carb scale 1:15    #GERD  -PPI    #Rheumatoid arthritis  -Hold Methotrexate - last dose over the weekend    Plan of care discussed with patient, family and RN.    Paul Costa MD  4/8/2025    The 21st Century Cures Act makes medical notes like these available to patients in the interest of transparency. Please be advised this is a medical document. Medical documents are intended to carry relevant information, facts as evident, and the clinical opinion of the practitioner. The medical note is intended as peer to peer communication and may appear blunt or direct. It is written in medical language and may contain abbreviations or verbiage that are unfamiliar.          [1]   Allergies  Allergen Reactions    Meloxicam SWELLING     Able to tolerate iburprofen, naproxen,    [2]   Current Facility-Administered Medications on File Prior to  Encounter   Medication Dose Route Frequency Provider Last Rate Last Admin    [COMPLETED] ceFAZolin (Ancef) 2g in 10mL IV syringe premix  2 g Intravenous Once Jacky Bautista MD   2 g at 25 1631    [COMPLETED] dexAMETHasone PF (Decadron) 10 MG/ML injection 8 mg  8 mg Intravenous Once Destini Myers MD   8 mg at 25 0949    [] diphenhydrAMINE (Benadryl) 50 mg/mL  injection 25 mg  25 mg Intravenous Once PRN Jacky Bautista MD        [COMPLETED] ceFAZolin (Ancef) 2g in 10mL IV syringe premix  2 g Intravenous Q8H Jacky Bautista MD   Stopped at 25 1100    [COMPLETED] influenza virus trivalent high dose PF (Fluzone HD) 0.5 mL injection (ages >/= 65 years) 0.5 mL  0.5 mL Intramuscular Prior to discharge Grabiel Jackson, DO   0.5 mL at 25 1137     Current Outpatient Medications on File Prior to Encounter   Medication Sig Dispense Refill    Multiple Vitamins-Minerals (MULTI-VITAMIN/MINERALS) Oral Tab Take 1 tablet by mouth daily.      Ferrous Sulfate (FEROSUL) 325 (65 Fe) MG Oral Tab Take 1 tablet (325 mg total) by mouth daily. 90 tablet 3    Omeprazole 40 MG Oral Capsule Delayed Release Take 1 capsule (40 mg total) by mouth daily. 90 capsule 1    Blood Glucose Monitoring Suppl (BLOOD GLUCOSE MONITOR SYSTEM) w/Device Does not apply Kit Please check blood glucose twice a day 1 kit 0    Elastic Bandages & Supports (NEOPRENE KNEE BRACE) Does not apply Misc 1 Application by Does not apply route daily. Left neoprene knee brace for patellofemoral pain syndrome 1 each 0

## 2025-04-08 NOTE — ANESTHESIA PROCEDURE NOTES
Regional Block    Date/Time: 4/8/2025 10:20 AM    Performed by: Nuno Burris MD  Authorized by: Nuno Burris MD      General Information and Staff    Start Time:  4/8/2025 10:20 AM  End Time:  4/8/2025 10:21 AM  Anesthesiologist:  Nuno Burris MD  Performed by:  Anesthesiologist  Patient Location:  OR      Site Identification: real time ultrasound guided and image stored and retrievable    Block site/laterality marked before start: site marked  Reason for Block: at surgeon's request and post-op pain management    Preanesthetic Checklist: 2 patient identifers, IV checked, risks and benefits discussed, monitors and equipment checked, pre-op evaluation, timeout performed, anesthesia consent, sterile technique used, no prohibitive neurological deficits and no local skin infection at insertion site      Procedure Details    Patient Position:  Supine  Prep: ChloraPrep    Monitoring:  Cardiac monitor, continuous pulse ox, blood pressure cuff and heart rate  Block Type:  Adductor canal  Laterality:  Left  Injection Technique:  Single-shot    Needle    Needle Type:  Short-bevel and echogenic  Needle Length:  110 mm  Needle Localization:  Ultrasound guidance  Reason for Ultrasound Use: appropriate spread of the medication was noted in real time and no ultrasound evidence of intravascular and/or intraneural injection            Assessment    Injection Assessment:  Good spread noted, negative resistance, negative aspiration for heme, incremental injection and low pressure  Heart Rate Change: No    - Patient tolerated block procedure well without evidence of immediate block related complications.     Medications  4/8/2025 10:20 AM      Additional Comments    Medication:  Ropivacaine 0.375% 20mL

## 2025-04-08 NOTE — OPERATIVE REPORT
Operative Note    Patient Name/: Darrin Gerber 1960  Date: 2025  Location: [facility]  Preoperative Diagnosis: Left knee osteoarthritis  Postoperative Diagnosis: Same  Operation: Left total knee arthroplasty mechanical alignment Epifanio WOODARD and Jacinto Garcia surgical-assist  Primary Surgeon: Jacky Bautista  Assistant:  Epifanio Garcia surgical assist. Please note a skilled surgical assistant was necessary for this case in order to assist with patient positioning, prepping, draping, incision, exposure, implant placement, wound closure.  Indications: 65-year-old female with end-stage left knee osteoarthritis failed nonsurgical treatment  Surgical Findings: End-stage left knee OA  Operative Report:  After informed consent, the left lower extremity was marked.  Patient was brought to the operating room where spinal anesthesia was induced.  Preoperative exam demonstrated range of motion from 1 degrees short of full extension, gravity flexion 120. The left lower extremity was prepped and draped in sterile fashion.  Timeout was performed to verify correct surgical site and procedure.  2 g of Ancef was given within 1 hour of incision.  Additionally, 1 g tranexamic acid was given intravenously.  Next the limb was gravity exsanguinated and tourniquet inflated.  Incision was made anteriorly from 2 fingerbreadths proximal to the patella down along the medial aspect of the tibial tubercle.  This was carried down to the extensor mechanism.  Medial and lateral flaps were developed.  The VMO muscle belly was identified.  Full-thickness medial parapatellar arthrotomy was made from 2 fingerbreadths proximal to the patella along the VMO muscle belly, around the medial patella and patellar tendon.  The fat pad was dissected free from the patellar tendon and reflected medially.  Subperiosteal dissection was carried out posteriorly about the proximal medial aspect of the tibia.  Osteophytes were removed from the proximal  medial tibia.  The lateral patellofemoral plica was incised, and the patella was everted and denervated.  The synovium over the anterior distal femur was teed open.  Next the knee was flexed up, and remnant of the ACL and lateral meniscus were excised.  I used an intramedullary reamer to open up the femur for our intramedullary alignment guide.  The distal femoral cutting block was placed on the distal femur to take off 9.5 mm of bone.  It sat down on the distal medial femur, measuring to take off more medial bone than lateral.  This was pinned in place.  A marcelino's wing was used to check the cut, and the cut was performed.  The distal medial femur fragment measured 8 mm.  Next the knee was flexed and the tibial crest was marked.  An extra medullary tibial cutting guide was placed above the ankle, measuring to take off a few millimeters off the medial side, pinned in line with the tibial crest in the coronal plane and with a few degrees of posterior slope in the sagittal plane.  The proximal tibia was cut, using a Z retractor to protect MCL, patellar tendon and lateral soft tissues.  The cut measured 6.5 lateral.  After this portion of bone was removed, the knee was brought into extension and a lamina  was used to open up the extension space.  What remained of the medial and lateral menisci were removed.  Spacer block was placed with a T-handle drop-down beatriz to verify appropriate cut angle.  Next the knee was flexed up, and the femoral sizing block with stylus was used.  This sized for a size 2 femur.  Pins were placed, and the sizing block removed.  Pins were noted to be parallel to the epicondylar axis.  The 4-in-1 cutting block was placed and screwed down.  Anterior distal femur was cut, demonstrating a positive grand piano sign.  Posterior condyles were cut, measuring 6.5 on the posterior medial cut.  Chamfers were then cut.  The cutting block was removed, and the knee was flexed with a lamina  to  open up the posterior knee joint.  Notch contents and PCL were removed.  Posterior condylar recesses were opened, and posterior osteophytes chiseled out.  The knee was noted to be tight on the medial side, therefore medial release was performed of the semimembranosus insertion into the proximal medial and posterior tibia, as well as osteophyte removal.  The knee was brought into extension, and sized for a tibial baseplate.  Trials were then placed.  With the 2 femur, the 2 tibia, and a 11 polyethylene liner, the knee was brought to full extension with excellent valgus stability, less than 1 mm varus; in mid flexion, 4-5 varus, less than 0.5 valgus, Lachman stable rotating medially; at 90 degrees anterior drawer less than 5 mm rotating around the medial femoral condyle; Gravity flexion 130 with the patella tracking centrally.  The tibial component rotation was marked, and the tibial trial removed.  The femoral lugs were drilled, and the anterior chamfer finishing guide was placed to cut the finishing anterior chamfer cut.  The knee was then flexed, and the tibia brought forward to place the tibial baseplate trial.  This was pinned in place, and the drill and keel punch were used to prepare the proximal tibia.  The knee was then brought into extension, the patella everted, and it measured only 19 mm therefore was not resurfaced but rather circumferentially denervated and osteophytes were excised.  Next final components were opened, bony surfaces were washed and dried, and periarticular pain cocktail was injected with 30 mL in the posterior capsule.  Cement was then mixed.  The tibial component was cemented along with the proximal tibial bone surface, the component was impacted into place followed by removal of excess cement.  The femur was reduced onto the tibia, and cement was placed on the cut surface of the femur.  The cemented femoral component was then impacted down, with removal of excess cement.  The knee was  brought into full extension and an axial load was held across the joint.  Once cement had hardened, the knee was trialed again.  Optimal stability was achieved with a 11 mm polyethylene liner, with exam demonstrating same as with trials.  The trial poly was removed, the knee was washed out, any loose bodies removed, and final poly was placed.  Betadine lavage was performed.  Remaining pain cocktail was injected with 10 mL into remaining fat pad, 10 mL into medial femoral and tibial periosteum, and 10 mL into extensor mechanism.  Closure was performed with 5 Ethibond for the arthrotomy, 2-0 Vicryl for skin, followed by staples.  A sterile dressing was applied, and the patient was brought to PACU in good condition.  Anesthesia: Spinal plus adductor canal block  Complications: None  Specimens: Bone  Blood loss: 25 mL  Fluids: See anesthesia report  Implants: Medacta GM K sphere size 2 femur, 2 tibia, 11 poly-  Drains: None  Condition: Good  Disposition: Floor    - Weightbearing as tolerated, PT OT  - DVT prophylaxis mechanical plus aspirin twice daily  - Pain control with oral meds  - Perioperative Ancef, 1 week Keflex postop due to diabetes and inflammatory/rheumatoid arthritis    Jacky Bautista MD, FAAOS  [facility]  Phone [phone]  Fax [phone]

## 2025-04-08 NOTE — PHYSICAL THERAPY NOTE
PT chart reviewed and planned to see pt for evaluation. Pt cannot perform a SLR at this time. Will re-attempt when able and appropriate.

## 2025-04-08 NOTE — INTERVAL H&P NOTE
Pre-op Diagnosis: Primary osteoarthritis of left knee [M17.12]    The above referenced H&P was reviewed by Jacky Bautista MD on 4/8/2025, the patient was examined and no significant changes have occurred in the patient's condition since the H&P was performed.  I discussed with the patient and/or legal representative the potential benefits, risks and side effects of this procedure; the likelihood of the patient achieving goals; and potential problems that might occur during recuperation.  I discussed reasonable alternatives to the procedure, including risks, benefits and side effects related to the alternatives and risks related to not receiving this procedure.  We will proceed with procedure as planned.

## 2025-04-08 NOTE — ANESTHESIA POSTPROCEDURE EVALUATION
[facility] Patient Status:  Outpatient in a Bed   Age/Gender 65 year old female MRN [id number]   Location [facility] 3SW-A Attending Jacky Bautista MD   Hosp Day # 0 PCP John Botello MD       Anesthesia Post-op Note    Left total knee arthroplasty    Procedure Summary       Date: 04/08/25 Room / Location:  MAIN OR  /  MAIN OR    Anesthesia Start: 1006 Anesthesia Stop: 1219    Procedure: Left total knee arthroplasty (Left: Knee) Diagnosis:       Primary osteoarthritis of left knee      (Primary osteoarthritis of left knee [M17.12])    Surgeons: Jacky Bautista MD Anesthesiologist: Nuno Burris MD    Anesthesia Type: regional, spinal ASA Status: 3            Anesthesia Type: regional, spinal    Vitals Value Taken Time   /62 04/08/25 1411   Temp 97.5 °F (36.4 °C) 04/08/25 1410   Pulse 77 04/08/25 1612   Resp 16 04/08/25 1410   SpO2 95 % 04/08/25 1612   Vitals shown include unfiled device data.        Patient Location: PACU    Anesthesia Type: spinal    Airway Patency: patent    Postop Pain Control: adequate    Mental Status: preanesthetic baseline    Nausea/Vomiting: none    Cardiopulmonary/Hydration status: stable euvolemic    Complications: no apparent anesthesia related complications    Postop vital signs: stable    Dental Exam: Unchanged from Preop    Patient to be discharged from PACU when criteria met.

## 2025-04-08 NOTE — PROGRESS NOTES
[facility]   part of [facility]     Hospitalist Progress Note     Darrin Gerber Patient Status:  Outpatient in a Bed    1960 MRN [id number]   Location [facility] 3SW-A Attending Jacky Bautista MD   Hosp Day # 0 PCP John Botello MD     Chief Complaint: Left knee pain    Subjective:     Patient states pain is controlled.     Objective:    Review of Systems:   A comprehensive review of systems was completed; pertinent positive and negatives stated in subjective.    Vital signs:  Temp:  [97.2 °F (36.2 °C)-98.9 °F (37.2 °C)] 98.9 °F (37.2 °C)  Pulse:  [66-84] 69  Resp:  [10-18] 16  BP: (102-127)/(50-66) 114/64  SpO2:  [94 %-100 %] 99 %    Physical Exam:    General: No acute distress, awake and alert  Respiratory: No wheezes, no rhonchi  Cardiovascular: S1, S2, regular rate and rhythm  Abdomen: Soft, Non-tender, non-distended, positive bowel sounds  Extremities: No edema. Left knee dressing c/d/i    Diagnostic Data:    Labs:  Recent Labs   Lab 25  1253 25  0447   WBC 6.0 13.0*   HGB 10.7* 8.6*   MCV 92.0 88.8   .0 230.0     Recent Labs   Lab 25  1253 25  0447   * 120*   BUN 19 19   CREATSERUM 1.04* 1.00   CA 9.4 8.4*   ALB 4.1  --     136   K 3.3* 3.8   CL 97* 102   CO2 28.0 27.0   ALKPHO 70  --    AST 24  --    ALT 15  --    BILT 0.5  --    TP 7.1  --      Estimated Glomerular Filtration Rate: 62 mL/min/1.73m2 (result from lab).    No results for input(s): \"TROP\", \"TROPHS\", \"CK\" in the last 168 hours.    No results for input(s): \"PTP\", \"INR\" in the last 168 hours.     Microbiology  No results found for this visit on 25.    Imaging: Reviewed in Epic.    Medications:    acetaminophen  1,000 mg Oral Q8H PANTERA    traMADol  50 mg Oral 4 times per day    atorvastatin  10 mg Oral Daily    ferrous sulfate  325 mg Oral Daily    folic acid  2 mg Oral Daily    pantoprazole  40 mg Oral QAM AC    ketorolac  15 mg Intravenous Q6H    sennosides  17.2 mg Oral Nightly     docusate sodium  100 mg Oral BID    aspirin  81 mg Oral BID    insulin aspart  1-10 Units Subcutaneous TID AC and HS    insulin aspart  1-68 Units Subcutaneous TID CC    cephalexin  500 mg Oral Q8H [facility]       Assessment & Plan:      #Left knee OA s/p left TKA 4/8/2025  -Ortho primary  -Aspirin BID for DVT ppx per ortho  -Pain control  -PT/OT, encourage IS  -Ortho following      #Essential hypertension  -Hold hydrochlorothiazide, resume on discharge     #Dyslipidemia  -Statin     #Diabetes mellitus type 2 with A1c 5.8  -Hold Metformin  -Correctional scale 1:40  -Carb scale 1:15     #GERD  -PPI     #Rheumatoid arthritis  -Hold Methotrexate - last dose over the weekend    #Chronic normocytic anemia, likely due to chronic disease and now lower due to expected post op blood loss as well dilutional component      Marlon Vang, DO    Supplementary Documentation:     Quality:  DVT Mechanical Prophylaxis:   SCDs, Early ambuation  DVT Pharmacologic Prophylaxis   Medication   None         DVT Pharmacologic prophylaxis: Aspirin 81 mg  Code Status: Full  [place]: No urinary catheter in place  GAY: 4/9/2025    Discharge is dependent on: Clnical state, ortho recs  At this point Ms. Gerber is expected to be discharge to: Home    The 21st Century Cures Act makes medical notes like these available to patients in the interest of transparency. Please be advised this is a medical document. Medical documents are intended to carry relevant information, facts as evident, and the clinical opinion of the practitioner. The medical note is intended as peer to peer communication and may appear blunt or direct. It is written in medical language and may contain abbreviations or verbiage that are unfamiliar.

## 2025-04-08 NOTE — PROGRESS NOTES
NURSING ADMISSION NOTE      Patient admitted via bed from PACU.  Oriented to room.  Safety precautions initiated.  Bed in low position.  Call light in reach.  Dr Igor jeffries for hospitalist consult.   2 person skin check completed with Jarvis BECKMAN.   Isabel to L knee C/D/I.  Pain controlled at this time.  PT/OT to see.  POC discussed with spouse and daughter at bedside.

## 2025-04-08 NOTE — ANESTHESIA PROCEDURE NOTES
Spinal Block    Date/Time: 4/8/2025 10:12 AM    Performed by: Nuno Burris MD  Authorized by: Nuno Burris MD      General Information and Staff    Start Time:  4/8/2025 10:12 AM  End Time:  4/8/2025 10:47 AM  Anesthesiologist:  Nuno Burris MD  Performed by:  Anesthesiologist  Site identification: surface landmarks    Reason for Block: at surgeon's request and surgical anesthesia    Preanesthetic Checklist: patient identified, IV checked, risks and benefits discussed, monitors and equipment checked, pre-op evaluation, timeout performed, anesthesia consent and sterile technique used      Procedure Details    Patient Position:  Sitting  Prep: ChloraPrep    Monitoring:  Cardiac monitor, heart rate and continuous pulse ox  Approach:  Midline  Location:  L3-4  Injection Technique:  Single-shot    Needle    Needle Type:  Sprotte  Needle Gauge:  24 G  Needle Length:  3.5 in    Assessment    Sensory Level:   Events: clear CSF, CSF aspirated, well tolerated and blood negative      Additional Comments

## 2025-04-08 NOTE — ANESTHESIA PREPROCEDURE EVALUATION
PRE-OP EVALUATION    Patient Name: Darrin Gerber    Admit Diagnosis: Primary osteoarthritis of both knees [M17.0]    Pre-op Diagnosis: Primary osteoarthritis of both knees [M17.0]    Left total knee arthroplasty    Anesthesia Procedure: Left total knee arthroplasty (Left)    Surgeons and Role:     * Jacky Bautista MD - Primary    Pre-op vitals reviewed.  Temp: 98.4 °F (36.9 °C)  Pulse: 70  Resp: 16  BP: 150/68  SpO2: 100 %  Body mass index is 30.66 kg/m².    Current medications reviewed.  Hospital Medications:   [Transfer Hold] acetaminophen (Tylenol Extra Strength) tab 1,000 mg  1,000 mg Oral Once    [Transfer Hold] glucose (Dex4) 15 GM/59ML oral liquid 15 g  15 g Oral Q15 Min PRN    Or    [Transfer Hold] glucose (Glutose) 40% oral gel 15 g  15 g Oral Q15 Min PRN    Or    [Transfer Hold] glucose-vitamin C (Dex-4) chewable tab 4 tablet  4 tablet Oral Q15 Min PRN    Or    [Transfer Hold] dextrose 50% injection 50 mL  50 mL Intravenous Q15 Min PRN    Or    [Transfer Hold] glucose (Dex4) 15 GM/59ML oral liquid 30 g  30 g Oral Q15 Min PRN    Or    [Transfer Hold] glucose (Glutose) 40% oral gel 30 g  30 g Oral Q15 Min PRN    Or    [Transfer Hold] glucose-vitamin C (Dex-4) chewable tab 8 tablet  8 tablet Oral Q15 Min PRN    lactated ringers infusion   Intravenous Continuous    [Transfer Hold] tranexamic acid in sodium chloride 0.7% (Cyklokapron) 1000 mg/100mL infusion premix 1,000 mg  1,000 mg Intravenous Once    ceFAZolin (Ancef) 2g in 10mL IV syringe premix  2 g Intravenous Once    povidone-iodine (Betadine) 10 % 17.5 mL in sodium chloride 0.9 % 500 mL irrigation   Irrigation Once (Intra-Op)    clonidine/epinephrine/ropivacaine/ketorolac in 0.9% NaCl 60 mL pain cocktail syringe for knee arthroplasty   Infiltration Once (Intra-Op)    [COMPLETED] ceFAZolin (Ancef) 2g in 10mL IV syringe premix  2 g Intravenous Once    [COMPLETED] dexAMETHasone PF (Decadron) 10 MG/ML injection 8 mg  8 mg Intravenous Once    []  diphenhydrAMINE (Benadryl) 50 mg/mL  injection 25 mg  25 mg Intravenous Once PRN    [COMPLETED] ceFAZolin (Ancef) 2g in 10mL IV syringe premix  2 g Intravenous Q8H    [COMPLETED] influenza virus trivalent high dose PF (Fluzone HD) 0.5 mL injection (ages >/= 65 years) 0.5 mL  0.5 mL Intramuscular Prior to discharge       Outpatient Medications:   Prescriptions Prior to Admission[1]    Allergies: Meloxicam      Anesthesia Evaluation    Patient summary reviewed.    Anesthetic Complications  (-) history of anesthetic complications         GI/Hepatic/Renal      (+) GERD                           Cardiovascular                  (+) hypertension                                     Endo/Other      (+) diabetes  type 2, not using insulin                  (+) arthritis  (+) rheumatoid arthritis     Pulmonary    Negative pulmonary ROS.                       Neuro/Psych                                      Past Surgical History:   Procedure Laterality Date    Brain surgery      Cataract      Colonoscopy      Excis infratent brain tumor      Knee replacement surgery Right 02/06/2025        Upper gi endoscopy,exam       Social History     Socioeconomic History    Marital status:    Tobacco Use    Smoking status: Never    Smokeless tobacco: Never    Tobacco comments:     Updated 2/18/25   Vaping Use    Vaping status: Never Used   Substance and Sexual Activity    Alcohol use: Never    Drug use: Never   Other Topics Concern    Caffeine Concern Yes    Exercise No     History   Drug Use Unknown     Available pre-op labs reviewed.  Lab Results   Component Value Date    WBC 6.0 04/02/2025    RBC 3.51 (L) 04/02/2025    HGB 10.7 (L) 04/02/2025    HCT 32.3 (L) 04/02/2025    MCV 92.0 04/02/2025    MCH 30.5 04/02/2025    MCHC 33.1 04/02/2025    RDW 14.7 04/02/2025    .0 04/02/2025     Lab Results   Component Value Date     04/02/2025    K 3.3 (L) 04/02/2025    CL 97 (L) 04/02/2025    CO2 28.0 04/02/2025    BUN  19 04/02/2025    CREATSERUM 1.04 (H) 04/02/2025     (H) 04/02/2025    CA 9.4 04/02/2025     Lab Results   Component Value Date    INR 1.04 03/24/2025         Airway      Mallampati: I  Mouth opening: >3 FB  TM distance: > 6 cm  Neck ROM: full Cardiovascular    Cardiovascular exam normal.  Rhythm: regular  Rate: normal     Dental    Dentition appears grossly intact         Pulmonary    Pulmonary exam normal.                 Other findings            ASA: 3   Plan: spinal and regional  NPO status verified and patient meets guidelines.    Post-procedure pain management plan discussed with surgeon and patient.  Surgeon requests: regional block  Comment: Discussed the risks and benefits of regional and spinal anesthesia with the patient as outlined in the anesthesia consent and the peripheral nerve block procedure note. The paient understands, wishes to proceed and has no further questions.    Plan/risks discussed with: patient              Present on Admission:  **None**           [1]   Medications Prior to Admission   Medication Sig Dispense Refill Last Dose/Taking    leucovorin 5 MG Oral Tab TAKE ONE TABLET BY MOUTH ONCE A WEEK ON SUNDAYS 13 tablet 3 4/7/2025 at  8:00 AM    Multiple Vitamins-Minerals (MULTI-VITAMIN/MINERALS) Oral Tab Take 1 tablet by mouth daily.   4/1/2025    VITAMIN B-12 1000 MCG Sublingual SL Tab PLACE 1 TABLET UNDER THE TONGUE DAILY 270 tablet 0 4/1/2025    methotrexate 2.5 MG Oral Tab Take 8 tablets (20 mg total) by mouth once a week. 104 tablet 0 4/6/2025    ATORVASTATIN 10 MG Oral Tab TAKE 1 TABLET BY MOUTH IN THE MORNING 30 tablet 4 4/5/2025    METFORMIN 500 MG Oral Tab TAKE 1 TABLET (500 MG TOTAL) BY MOUTH NIGHTLY. 90 tablet 1 4/7/2025 at  7:00 PM    HYDROCHLOROTHIAZIDE 25 MG Oral Tab TAKE 1 TABLET (25 MG TOTAL) BY MOUTH DAILY. 90 tablet 1 4/8/2025 at  6:00 AM    folic acid 1 MG Oral Tab Take 2 tablets (2 mg total) by mouth daily. 180 tablet 3 4/7/2025 at  8:00 AM    Ferrous Sulfate  (FEROSUL) 325 (65 Fe) MG Oral Tab Take 1 tablet (325 mg total) by mouth daily. 90 tablet 3 4/7/2025 at  8:00 AM    Omeprazole 40 MG Oral Capsule Delayed Release Take 1 capsule (40 mg total) by mouth daily. 90 capsule 1 4/7/2025    Lancets (ONETOUCH DELICA PLUS [id number]) Does not apply Misc USE TO CHECK BLOOD GLUCOSE LEVELS TWICE A  each 1     Glucose Blood (ONETOUCH ULTRA TEST) In Vitro Strip USE TO TEST TWICE A  strip 1     Blood Glucose Monitoring Suppl (BLOOD GLUCOSE MONITOR SYSTEM) w/Device Does not apply Kit Please check blood glucose twice a day 1 kit 0     Elastic Bandages & Supports (NEOPRENE KNEE BRACE) Does not apply Misc 1 Application by Does not apply route daily. Left neoprene knee brace for patellofemoral pain syndrome 1 each 0

## 2025-04-09 VITALS
SYSTOLIC BLOOD PRESSURE: 114 MMHG | TEMPERATURE: 99 F | OXYGEN SATURATION: 99 % | BODY MASS INDEX: 30.81 KG/M2 | DIASTOLIC BLOOD PRESSURE: 64 MMHG | WEIGHT: 156.94 LBS | RESPIRATION RATE: 16 BRPM | HEIGHT: 60 IN | HEART RATE: 69 BPM

## 2025-04-09 LAB
ANION GAP SERPL CALC-SCNC: 7 MMOL/L (ref 0–18)
BASOPHILS # BLD AUTO: 0.01 X10(3) UL (ref 0–0.2)
BASOPHILS NFR BLD AUTO: 0.1 %
BUN BLD-MCNC: 19 MG/DL (ref 9–23)
CALCIUM BLD-MCNC: 8.4 MG/DL (ref 8.7–10.6)
CHLORIDE SERPL-SCNC: 102 MMOL/L (ref 98–112)
CO2 SERPL-SCNC: 27 MMOL/L (ref 21–32)
CREAT BLD-MCNC: 1 MG/DL (ref 0.55–1.02)
EGFRCR SERPLBLD CKD-EPI 2021: 62 ML/MIN/1.73M2 (ref 60–?)
EOSINOPHIL # BLD AUTO: 0 X10(3) UL (ref 0–0.7)
EOSINOPHIL NFR BLD AUTO: 0 %
ERYTHROCYTE [DISTWIDTH] IN BLOOD BY AUTOMATED COUNT: 14.4 %
GLUCOSE BLD-MCNC: 120 MG/DL (ref 70–99)
GLUCOSE BLD-MCNC: 147 MG/DL (ref 70–99)
GLUCOSE BLD-MCNC: 99 MG/DL (ref 70–99)
HCT VFR BLD AUTO: 24.7 % (ref 35–48)
HGB BLD-MCNC: 8.6 G/DL (ref 12–16)
IMM GRANULOCYTES # BLD AUTO: 0.04 X10(3) UL (ref 0–1)
IMM GRANULOCYTES NFR BLD: 0.3 %
LYMPHOCYTES # BLD AUTO: 0.78 X10(3) UL (ref 1–4)
LYMPHOCYTES NFR BLD AUTO: 6 %
MCH RBC QN AUTO: 30.9 PG (ref 26–34)
MCHC RBC AUTO-ENTMCNC: 34.8 G/DL (ref 31–37)
MCV RBC AUTO: 88.8 FL (ref 80–100)
MONOCYTES # BLD AUTO: 0.3 X10(3) UL (ref 0.1–1)
MONOCYTES NFR BLD AUTO: 2.3 %
NEUTROPHILS # BLD AUTO: 11.82 X10 (3) UL (ref 1.5–7.7)
NEUTROPHILS # BLD AUTO: 11.82 X10(3) UL (ref 1.5–7.7)
NEUTROPHILS NFR BLD AUTO: 91.3 %
OSMOLALITY SERPL CALC.SUM OF ELEC: 285 MOSM/KG (ref 275–295)
PLATELET # BLD AUTO: 230 10(3)UL (ref 150–450)
POTASSIUM SERPL-SCNC: 3.8 MMOL/L (ref 3.5–5.1)
RBC # BLD AUTO: 2.78 X10(6)UL (ref 3.8–5.3)
SODIUM SERPL-SCNC: 136 MMOL/L (ref 136–145)
WBC # BLD AUTO: 13 X10(3) UL (ref 4–11)

## 2025-04-09 PROCEDURE — 99024 POSTOP FOLLOW-UP VISIT: CPT | Performed by: PHYSICIAN ASSISTANT

## 2025-04-09 PROCEDURE — 99214 OFFICE O/P EST MOD 30 MIN: CPT | Performed by: INTERNAL MEDICINE

## 2025-04-09 RX ORDER — POTASSIUM CHLORIDE 1500 MG/1
40 TABLET, EXTENDED RELEASE ORAL ONCE
Status: COMPLETED | OUTPATIENT
Start: 2025-04-09 | End: 2025-04-09

## 2025-04-09 NOTE — CM/SW NOTE
RN informed sw that daughter requesting assist with transport home today.  SW called [employer] through pt's [employer]- they authorized an [employer] for transport which will be here in about 14 minutes. RN aware that pt needs to be down to ER entrance.  Daughter updated.    JACOB DiehlW  /Discharge Planner

## 2025-04-09 NOTE — PHYSICAL THERAPY NOTE
PHYSICAL THERAPY EVALUATION - INPATIENT     Room Number: 355/355-A  Evaluation Date: 4/9/2025  Type of Evaluation: Initial  Physician Order: PT Eval and Treat    Presenting Problem: s/p L TKA 4/8/25  Co-Morbidities : DM, HTN, RA, recent R TKA 2/6/25  Reason for Therapy: Mobility Dysfunction and Discharge Planning    PHYSICAL THERAPY ASSESSMENT   Patient is a 65 year old female admitted 4/8/2025 for elective L TKA.   Patient is currently functioning near baseline with bed mobility, transfers, gait, and stair negotiation. Prior to admission, patient's baseline is MOD I.     Patient will benefit from continued skilled PT Services at discharge to promote prior level of function.  Anticipate patient will return home with home health PT.    PLAN  Patient has been evaluated and presents with no skilled Physical Therapy needs at this time.  Patient discharged from Physical Therapy services.  Please re-order if a new functional limitation presents during this admission.         GOALS  Patient was able to achieve the following goals ...    Patient was able to transfer Safely and independently   Patient able to ambulate on level surfaces Safely and independently     HOME SITUATION  Type of Home: House  Home Layout: One level                     Lives With: Spouse (per , daughter plans to stay initially at discharge)              Prior Level of [place]: Pt lives with spouse in single story home. Pt independent with ADL and mobility. Pt ambulatory with RW. Pt will stay with daughter in 2 story home at discharge.     SUBJECTIVE  \"I have been to the bathroom.\"     OBJECTIVE  Precautions: Bed/chair alarm,  needed  Fall Risk: Standard fall risk    WEIGHT BEARING RESTRICTION  L Lower Extremity: Weight Bearing as Tolerated    PAIN ASSESSMENT  Rating: Unable to rate  Location: L knee  Management Techniques: Activity promotion, Repositioning    COGNITION  Overall Cognitive Status:  WFL - within functional  limits    RANGE OF MOTION AND STRENGTH ASSESSMENT  Upper extremity ROM and strength are within functional limits     Lower extremity ROM is within functional limits except  L knee flex 70 degrees  L knee ext lacking 10 degrees    Lower extremity strength is within functional limits     BALANCE  Static Sitting: Good  Dynamic Sitting: Good  Static Standing: Fair -  Dynamic Standing: Fair -    ADDITIONAL TESTS                                    ACTIVITY TOLERANCE                         O2 WALK       NEUROLOGICAL FINDINGS                        AM-PAC '6-Clicks' INPATIENT SHORT FORM - BASIC MOBILITY  How much difficulty does the patient currently have...  Patient Difficulty: Turning over in bed (including adjusting bedclothes, sheets and blankets)?: A Little   Patient Difficulty: Sitting down on and standing up from a chair with arms (e.g., wheelchair, bedside commode, etc.): A Little   Patient Difficulty: Moving from lying on back to sitting on the side of the bed?: A Little   How much help from another person does the patient currently need...   Help from Another: Moving to and from a bed to a chair (including a wheelchair)?: A Little   Help from Another: Need to walk in hospital room?: A Little   Help from Another: Climbing 3-5 steps with a railing?: A Little       AM-PAC Score:  Raw Score: 18   Approx Degree of Impairment: 46.58%   Standardized Score (AM-PAC Scale): 43.63   CMS Modifier (G-Code): CK    FUNCTIONAL ABILITY STATUS  Gait Assessment   Functional Mobility/Gait Assessment  Gait Assistance: Supervision  Distance (ft): 200  Assistive Device: Rolling walker  Stairs: Stairs  How Many Stairs: 4  Assist: Supervision    Skilled Therapy Provided   Supervision for bed mobility.   VC for sit-stand to RW.   Sit-stand with supervision.   Pt ambulatory with RW and supervision.   Pt ambulates with step to gait pattern and decreased stance time on L LE.   VC for terminal knee ext.   Ascended/descended 4 stairs with  supervision.   VC for sequencing.   Returned to room supine in bed with supervision.     Bed Mobility:  Rolling: supervision  Supine to sit: supervision   Sit to supine: supervision     Transfer Mobility:  Sit to stand: supervision   Stand to sit: supervision  Gait = supervision    Therapist's comments: Reviewed activity recommendations.     Exercise/Education Provided:  Bed mobility  Energy conservation  Functional activity tolerated  Gait training  Posture  Strengthening  Transfer training    Patient End of Session: Up in chair, Needs met, Call light within reach, RN aware of session/findings, All patient questions and concerns addressed, Hospital anti-slip socks, Family present    Patient Evaluation Complexity Level:  History Moderate - 1 or 2 personal factors and/or co-morbidities   Examination of body systems Low -  addressing 1-2 elements   Clinical Presentation Low- Stable   Clinical Decision Making Low Complexity       PT Session Time: 25 minutes  Gait Trainin minutes  Therapeutic Activity:  minutes  Neuromuscular Re-education:  minutes  Therapeutic Exercise:  minutes

## 2025-04-09 NOTE — PLAN OF CARE
Assumed care @ 2200. POD#0 L TKA. AxO4 - Telugu-speaking, able to understand simple English - makes simple requests and follows commands. VSS on RA. 0.9 @ 100 infusing. Denies nausea during shift. Voids w/o issue. Pain controlled w/ repositioning and sched tylenol. Dressing to L knee intact - spandigrip, gel ice maintained. Neurovasc intact - denies N/T, pulses palpable, non-pitting edema noted, able to move LLE. PT/OT to see - preop [facility]. Spouse @ bedside. Updated on POC. Safety measures placed. Care ongoing.

## 2025-04-09 NOTE — CM/SW NOTE
04/09/25 0900   CM/SW Referral Data   Referral Source Physician   Reason for Referral Discharge planning   Informant EMR;Clinical Staff Member     Pt s/p TKA. Pre-op  [facility].  PT recs [facility].    Dc today.    Aranza Tamayo LCSW  /Discharge Planner

## 2025-04-09 NOTE — PLAN OF CARE
A&Ox4. VSS on RA. /IS. SCDs, ankle pumps encouraged, spandagrip to LLE. Tolerating diet, last BM /7, voiding freely. Pain mananged with current medications. Dressing to L knee C/D/I, gel ice applied for pain and swelling. Participated in PT/OT, up standby with walker and gait belt. Pt ready for dc home with [facility] today. Patient and family updated on POC, verbalized agreement. Safety precautions in place, pt instructed to call for assistance, call light within reach.     Pt's prescriptions other than stool softener filled by our outpatient pharmacy. Discharge instructions reviewed, pt has walker and gait belt at home from previous surgery. Dressing change kit and gel ice packs sent home with pt. Pt to be picked up by an [employer] as directed by social work.

## 2025-04-09 NOTE — PROGRESS NOTES
[facility] Progress Note    Subjective: Patient doing well. Pain well controlled on oral medication. Has worked with and cleared PT/OT. Tolerating diet, voiding independently.    Objective: Laying comfortably in hospital chair. Alert and oriented. Firing BL TA/EHL. Dressing C/D/I.    Assessment/Plan: 65 year old female postop day #1 status post left total knee arthroplasty.    - Weightbearing as tolerated, PT OT  - DVT prophylaxis mechanical plus aspirin twice daily  - Pain control with oral meds  - Perioperative Ancef, 1 week Keflex postop due to diabetes and inflammatory/rheumatoid arthritis  Disposition: Home today    Anthony Parks PA-C  [facility]  Phone [phone]  Fax [phone]

## 2025-04-09 NOTE — OCCUPATIONAL THERAPY NOTE
OCCUPATIONAL THERAPY EVALUATION - INPATIENT    Room Number: 355/355-A  Evaluation Date: 4/9/2025     Type of Evaluation: Initial  Presenting Problem: s/p L TKA 4/8/25    Physician Order: IP Consult to Occupational Therapy  Reason for Therapy:  ADL/IADL Dysfunction and Discharge Planning    OCCUPATIONAL THERAPY ASSESSMENT   Patient is a 65 year old female admitted on 4/8/2025 with Presenting Problem: s/p L TKA 4/8/25. Co-Morbidities : DM, HTN, RA, recent R TKA 2/6/25  Patient is currently functioning near baseline with ADLs and functional transfers.  Prior to admission, patient's baseline is independent.  Patient met all OT goals at supervision to Mod I level.  Patient reports no further questions/concerns at this time.     No further skilled OT needs at this time.    Recommendations for nursing staff:   Transfers: 1-person  Toileting location: Toilet    EVALUATION SESSION:  Patient at start of session: supine    FUNCTIONAL TRANSFER ASSESSMENT  Sit to Stand: Edge of Bed  Edge of Bed: Supervision  Toilet Transfer: Supervision (standard height, light use of grab bar, reports also has at home)    BED MOBILITY  Supine to Sit : Modified Independent  Sit to Supine (OT): Modified Independent    BALANCE ASSESSMENT     FUNCTIONAL ADL ASSESSMENT  Eating: Modified Independent  UB Dressing Seated: Modified Independent (simulated)  LB Dressing Seated: Supervision (sufficient reach without AE via forward lean)  LB Dressing Standing: Supervision  Toileting Seated: Supervision  Toileting Standing: Supervision    ACTIVITY TOLERANCE: WFL                         O2 SATURATIONS       COGNITION  Overall Cognitive Status:  WFL - within functional limits    COGNITION ASSESSMENTS     Upper Extremity:   ROM: within functional limits   Strength: is within functional limits     EDUCATION PROVIDED  Patient Education : Role of Occupational Therapy; Functional Transfer Techniques; Fall Prevention; Weight Bear Status  Patient's Response to  Education: Verbalized Understanding ( also present)    Equipment used: RW  Demonstrates functional use    Therapist comments: Patient motivated and participatory. Patient with basic understanding of English,  at bedside assisting with translating as needed; offered ipad , patient declined. Educated on safety/WBAT precautions and incorporation into ADLs and transfers, followed with good return demo. Patient performed all ADLs and functional transfers at Supervision to Mod I level. Patient aware of recommendation for initial supervision at discharge, including supervision for shower transfers and increased assist with IADLs; patient reports will have initial supervision/assist as needed from  and daughter (staying initially to assist) at discharge. Patient reports no further questions or concerns regarding discharge home to ADLs.     Patient End of Session: In bed, Needs met, Call light within reach, RN aware of session/findings, All patient questions and concerns addressed, Hospital anti-slip socks, Ice applied, SCDs in place, Alarm set, Family present    OCCUPATIONAL PROFILE    HOME SITUATION  Type of Home: House  Home Layout: One level  Lives With: Spouse (per , daughter plans to stay initially at discharge)    Toilet and Equipment: Standard height toilet, Grab bar  Shower/Tub and Equipment: Walk-in shower, Shower chair  Other Equipment: None                  Prior Level of Function: Patient reports independent in all BADLs and functional mobility via RW and cane PRN prior to admission.    SUBJECTIVE  \"No pain\"    PAIN ASSESSMENT  Ratin  Location: denies       OBJECTIVE  Precautions: Bed/chair alarm,  needed  Fall Risk: Standard fall risk    WEIGHT BEARING RESTRICTION  L Lower Extremity: Weight Bearing as Tolerated      AM-PAC ‘6-Clicks’ Inpatient Daily Activity Short Form  -   Putting on and taking off regular lower body clothing?: A Little (supervision)  -    Bathing (including washing, rinsing, drying)?: A Little (supervision)  -   Toileting, which includes using toilet, bedpan or urinal? : A Little (supervision)  -   Putting on and taking off regular upper body clothing?: None  -   Taking care of personal grooming such as brushing teeth?: None  -   Eating meals?: None    AM-PAC Score:  Score: 21  Approx Degree of Impairment: 32.79%  Standardized Score (AM-PAC Scale): 44.27    ADDITIONAL TESTS     NEUROLOGICAL FINDINGS      PLAN   Patient has been evaluated and presents with no skilled Occupational Therapy needs at this time.  Patient discharged from Occupational Therapy services.  Please re-order if a new functional limitation presents during this admission.    OT Device Recommendations: None    Patient Evaluation Complexity Level:   Occupational Profile/Medical History LOW - Brief history including review of medical or therapy records    Specific performance deficits impacting engagement in ADL/IADL LOW  1 - 3 performance deficits    Client Assessment/Performance Deficits LOW - No comorbidities nor modifications of tasks    Clinical Decision Making LOW - Analysis of occupational profile, problem-focused assessments, limited treatment options    Overall Complexity LOW     OT Session Time: 15 minutes  Self-Care Home Management: 8 minutes

## 2025-04-15 ENCOUNTER — TELEPHONE (OUTPATIENT)
Dept: ORTHOPEDICS CLINIC | Facility: CLINIC | Age: 65
End: 2025-04-15

## 2025-04-15 NOTE — TELEPHONE ENCOUNTER
[facility] has seen [facility] on Saturday and that was a nurse and no additional visits at the moment.  Patient's daughter wants to know when she will have PT.  Please advise.

## 2025-04-15 NOTE — TELEPHONE ENCOUNTER
Spoke with daughter--patient was never seen by home PT, only the Home health RN x2 visits.     Daughter has outpatient PT visits set up starting 4/22.     Patient states [facility] called but never showed up over the weekend.     RN called [facility], spoke with Tristian and let him know that patient needs to be seen for PT.

## 2025-04-22 ENCOUNTER — OFFICE VISIT (OUTPATIENT)
Facility: CLINIC | Age: 65
End: 2025-04-22
Payer: MEDICAID

## 2025-04-22 DIAGNOSIS — Z96.652 STATUS POST LEFT KNEE REPLACEMENT: Primary | ICD-10-CM

## 2025-04-22 DIAGNOSIS — M17.12 PRIMARY OSTEOARTHRITIS OF LEFT KNEE: ICD-10-CM

## 2025-04-22 PROCEDURE — 99024 POSTOP FOLLOW-UP VISIT: CPT | Performed by: PHYSICIAN ASSISTANT

## 2025-04-22 RX ORDER — TRAMADOL HYDROCHLORIDE 50 MG/1
50 TABLET ORAL EVERY 8 HOURS
Qty: 45 TABLET | Refills: 0 | Status: SHIPPED | OUTPATIENT
Start: 2025-04-22 | End: 2025-05-07

## 2025-04-22 NOTE — PROGRESS NOTES
[facility] Progress Note    Subjective: Patient returns to clinic 2 weeks status post left total knee arthroplasty performed on 4/22/25. They have been taking Tylenol and Tramadol and oxycodone at night for pain control.  They continue to take aspirin for DVT prophylaxis. They are overall satisfied with their progress.  Denies any fevers, chills, night sweats.  No redness or drainage from the incision.concerning for infection. She feels like this recovery is a little more challenging than the right knee, particularly around the shin.    Objective: Patient is seated comfortably in the exam chair. Alert and oriented. Nonlabored breathing. Grossly neurologically intact. Incision is clean, dry, and intact with staples in place without any redness or drainage concerning for infection. Demonstrates active knee extension to 0 and knee flexion to 95 degrees. Calves are soft and nontender.     Assessment/Plan: Staples were removed in clinic today.  The incision was swabbed with Betadine, Mastisol was applied to either side of the incision, and Steri-Strips were applied over the incision.  I instructed the patient to avoid getting the incision wet in the shower for the next 2 days to allow the staple sites to reepithelialize.  I also recommended avoiding soaking the incision in a pool or tub until the patient is 6 weeks postop.  Continue with aspirin for DVT prophylaxis.  Outpatient physical therapy referral written. Follow-up in 4 weeks with Dr. Bautista for routine 6-week postoperative visit or sooner if any concerns.  The patient's questions were sought and answered satisfactorily.  They are happy with the plan and will follow as advised.    X-rays needed at next visit: Postoperative radiographs left knee and full leg length films        Anthony Parks PA-C  [facility]    This note was dictated using Dragon software.  While it was briefly proofread prior to completion, some grammatical,  spelling, and word choice errors due to dictation may still occur.

## 2025-04-23 ENCOUNTER — OFFICE VISIT (OUTPATIENT)
Dept: PHYSICAL THERAPY | Age: 65
End: 2025-04-23
Attending: ORTHOPAEDIC SURGERY
Payer: MEDICAID

## 2025-04-23 DIAGNOSIS — Z96.652 S/P TOTAL KNEE ARTHROPLASTY, LEFT: Primary | ICD-10-CM

## 2025-04-23 PROCEDURE — 97161 PT EVAL LOW COMPLEX 20 MIN: CPT

## 2025-04-23 PROCEDURE — 97110 THERAPEUTIC EXERCISES: CPT

## 2025-04-23 PROCEDURE — 97140 MANUAL THERAPY 1/> REGIONS: CPT

## 2025-04-23 NOTE — PROGRESS NOTES
LOWER EXTREMITY EVALUATION:     Diagnosis:   Status post left knee replacement (Z96.652)  Primary osteoarthritis of left knee (M17.12) Patient:  Darrin Gerber (65 year old, female)        Referring Provider: Jacky Bautista  Today's Date   4/23/2025    Precautions:  None   Date of Evaluation: 04/23/25  Next MD visit: 3/24/25  Date of Surgery: 4/8/25     PATIENT SUMMARY   Summary of chief complaints: L LE pain - knee, calf, ankle  History of current condition: Reports taking tramadol and Tylenol PRN for pain but primary concern is level of pain   Pain level: current 8 /10, at best 3 /10, at worst 8 /10  Description of symptoms: Aching   Occupation: retired   Leisure activities/Hobbies:   N/A  Prior level of function: The patient's  reports that she doesn't do any cleaning or cooking or household chores at home.  Current limitations: weakness, walking longer distances, standing, stairs  Pt goals: to walk without the walker, stairs IND  Past medical history was reviewed with Darrin.  Significant findings include: R TKA  Imaging/Tests: Expected postoperative changes of left knee arthroplasty.   Darrin  has a past medical history of Arthritis, Back problem, Brain tumor (benign) (HCC), Cataract, Diabetes (HCC), Esophageal reflux, Essential hypertension, Hearing impaired person, bilateral, High blood pressure, Hyperlipidemia, Osteoarthritis, and Visual impairment.  She  has a past surgical history that includes excis infratent brain tumor; cataract; Brain Surgery; colonoscopy; upper gi endoscopy,exam; and knee replacement surgery (Right, 02/06/2025).    ASSESSMENT  Darrin presents to physical therapy evaluation with primary c/o L LE pain - knee, calf, ankle. The results of the objective tests and measures show anticipated post-surgical deficits in knee mobility, strength, endurance, and function after TKR surgeruy 2/6/25.. Functional deficits include but are not limited to weakness, walking longer distances,  standing, stairs. Signs and symptoms are consistent with diagnosis of Status post left knee replacement (Z96.652)  Primary osteoarthritis of left knee (M17.12). Pt and PT discussed evaluation findings, pathology, POC and HEP.  Pt voiced understanding and performs HEP correctly without reported pain. Skilled Physical Therapy is medically necessary to address the above impairments and reach functional goals.    OBJECTIVE:    Musculoskeletal  Observation: Incisions is covered with steri strips, no signs of infecion  Palpation: Tender to touch medial and lateral L knee, L quad, L calf, L dorsum of foot     ROM and Strength: (* denotes performed with pain)  Hip   MMT (-/5)    R L     Flex (L2) 4/5 4/5     Ext          Abd         ER         IR        ,   Demos hip strength 3+/5 with transfers and TE      Knee   ROM MMT (-/5)    R L R L     Flex 135 110 5 4+*     Ext (L3) 0 0 4+ 4+*     Balance and Functional Mobility:  Gait: pt ambulates on level ground with assistive device; decreased step length; decreased stance phase; decreased foot clearance; stooped posture/forward lean   Functional Mobility:  MOD I with STS      Today's Treatment and Response:   Pt education was provided on exam findings, treatment diagnosis, treatment plan, expectations, and prognosis.  Today's Treatment       4/23/2025   LE Treatment   Therapeutic Exercise SAQ on foam roll x20 L  SLR x10 on L  Red TB for HL clam x10 L  STS x5  Standing hip flexion/ext x10 at bar L  Standing hip abd x10 at bar L  Calf raise at bar, x10  Standing HS curl x10 at bar L   Manual Therapy STM/MFR to L calf and quad to decrease pain and swelling   Therapeutic Exercise Minutes 15   Manual Therapy Minutes 15   Evaluation Minutes 10   Total Time Of Timed Procedures 30   Total Time Of Service-Based Procedures 10   Total Treatment Time 40   HEP Access Code: [email or web address]  URL: [email or web address]  Date: 04/23/2025  Prepared by: Marion Child  -  Supine Straight Leg Raises  - 1 x daily - 7 x weekly - 2 sets - 10 reps  - Supine Bridge  - 1 x daily - 7 x weekly - 2 sets - 10 reps  - Hooklying Clamshell with Resistance  - 1 x daily - 7 x weekly - 2 sets - 10 reps - red theraband  - Standing Heel Raise  - 1 x daily - 7 x weekly - 1 sets - 10 reps  - Standing Hip Abduction with Counter Support  - 1 x daily - 7 x weekly - 1 sets - 10 reps  - Standing Hip Extension with Counter Support  - 1 x daily - 7 x weekly - 1 sets - 10 reps  - Standing Knee Flexion AROM with Chair Support  - 1 x daily - 7 x weekly - 1 sets - 10 reps        Patient was instructed in and issued a HEP for: Access Code: [email or web address]  URL: [email or web address]  Date: 04/23/2025  Prepared by: Marion Florez    Exercises  - Supine Straight Leg Raises  - 1 x daily - 7 x weekly - 2 sets - 10 reps  - Supine Bridge  - 1 x daily - 7 x weekly - 2 sets - 10 reps  - Hooklying Clamshell with Resistance  - 1 x daily - 7 x weekly - 2 sets - 10 reps - red theraband  - Standing Heel Raise  - 1 x daily - 7 x weekly - 1 sets - 10 reps  - Standing Hip Abduction with Counter Support  - 1 x daily - 7 x weekly - 1 sets - 10 reps  - Standing Hip Extension with Counter Support  - 1 x daily - 7 x weekly - 1 sets - 10 reps  - Standing Knee Flexion AROM with Chair Support  - 1 x daily - 7 x weekly - 1 sets - 10 reps    Charges:  PT EVAL: Low Complexity, 1 man, 1 TE  In agreement with evaluation findings and clinical rationale, this evaluation involved LOW COMPLEXITY decision making due to no personal factors/comorbidities, 1-2 body structures involved/activity limitations, and stable symptoms as documented in the evaluation.                                                                                                                PLAN OF CARE:    Goals: (to be met in 12 visits)    Not Met Progress Toward Partially Met Met   Pt will improve knee extension ROM to 0 deg to allow proper heel strike during  gait and terminal knee extension in stance. [] [] [] []   Pt will improve knee AROM flexion to >120 degrees to improve ability to perform stairs. [] [] [] []   Pt will improve quad strength to 5/5 to ascend 1 flight of stairs reciprocally without UE assist. [] [] [] []   Pt will increase hip and knee strength to grossly 4+/5 to be able to get up and down from the floor safely. [] [] [] []   Pt will demonstrate increased hip ER/ABD strength to 4/5 to perform stepping and squatting activities without excessive femoral IR/ADD. [] [] [] []   Pt will improve SLS to 10s to improve safety with gait on uneven surfaces such as grass and gravel. [] [] [] []   Pt will be independent and compliant with comprehensive HEP to maintain progress achieved in PT. [] [] [] []          Frequency / Duration: Patient will be seen 2x/week or a total of 12  visits over a 90 day period. Treatment will include: Gait training; Manual Therapy; Neuromuscular Re-education; Therapeutic Activities; Therapeutic Exercise; Home Exercise Program instruction    Education or treatment limitation: None   Rehab Potential: good     LEFS Score  LEFS Score: (Patient-Rptd) 51.25 % (4/23/2025  5:00 PM)      Patient/Family/Caregiver was advised of these findings, precautions, and treatment options and has agreed to actively participate in planning and for this course of care.    Thank you for your referral. Please co-sign or sign and return this letter via fax as soon as possible to [phone]. If you have any questions, please contact me at Dept: [phone]    Sincerely,  Electronically signed by therapist: Marion Florez, PT, DPT, PCES  Physician's certification required: Yes  I certify the need for these services furnished under this plan of treatment and while under my care.    X___________________________________________________ Date____________________    Certification From: 4/23/2025  To:7/22/2025

## 2025-04-25 ENCOUNTER — MED REC SCAN ONLY (OUTPATIENT)
Dept: ORTHOPEDICS CLINIC | Facility: CLINIC | Age: 65
End: 2025-04-25

## 2025-04-28 ENCOUNTER — TELEPHONE (OUTPATIENT)
Dept: PHYSICAL THERAPY | Facility: HOSPITAL | Age: 65
End: 2025-04-28

## 2025-04-28 ENCOUNTER — TELEPHONE (OUTPATIENT)
Dept: ORTHOPEDICS CLINIC | Facility: CLINIC | Age: 65
End: 2025-04-28

## 2025-04-28 NOTE — TELEPHONE ENCOUNTER
Daughter Trisha called--patient is c/o increased pain in ankle area.  Leg and ankle are NOT swollen. No areas are hot to touch.   Denies sharp inspiratory pain, denies shortness of breath, denies fever. Breathing is easy and regular while patient is talking.     Patient feels much better after going to PT and having massages.   Daughter asking if she can have more PT visits, since that seems to help patient the most.   Advised daughter to alternate tylenol and tramadol and ice and elevate leg as much as possible when not at therapy or out and about.     Daughter verbalized understanding and will call back if symptoms worsen.     Please advise for order for more PT visits.

## 2025-04-29 ENCOUNTER — TELEPHONE (OUTPATIENT)
Dept: PHYSICAL THERAPY | Facility: HOSPITAL | Age: 65
End: 2025-04-29

## 2025-04-29 ENCOUNTER — OFFICE VISIT (OUTPATIENT)
Dept: PHYSICAL THERAPY | Age: 65
End: 2025-04-29
Attending: ORTHOPAEDIC SURGERY
Payer: MEDICAID

## 2025-04-29 PROCEDURE — 97140 MANUAL THERAPY 1/> REGIONS: CPT

## 2025-04-29 PROCEDURE — 97110 THERAPEUTIC EXERCISES: CPT

## 2025-04-29 NOTE — PROGRESS NOTES
Patient: Darrin Gerber (65 year old, female) Referring Provider:  Insurance:   Diagnosis: Status post left knee replacement (Z96.652)  Primary osteoarthritis of left knee (M17.12) Jacky Lernerlivan  [facility] MEDICAID   Date of Surgery: 4/8/25 Next MD visit:  N/A   Precautions:  None 3/24/25 Referral Information:    Date of Evaluation: Req: 15, Auth: 15, Exp: 6/22/2025 04/23/25 POC Auth Visits:  15       Today's Date   4/29/2025    Subjective  A virtual  was used for today's appt. Patient reports severe pain in her knee that has not reduced below 7/10, seems to be worse recovery for this surgery compared to previous.       Pain: 7/10     Objective  7/10 worst pain since last visit           Assessment  Patient care significantly limited this day due to increased pain symptoms and poor tolerance to exercises, as well as manual intervention with gentle pressures. Explained to patient that limitations in progressions of strength, balance, and endurance of previously surgical side/right leg is most likely contributing to more pain on left now as the right is not able to compensate to the same degree. Patient only able to tolerate a little over 20 minutes of treatment this day, then refused to continue and was advised to ice and rest at home until following visit.    Goals (to be met in 12 visits)     Therapy Goals        The patient will improve LE strength and endurance to facilitate standing for extended periods of time for 30 minutes at a time for household ambulation and chores. Timeframe: 4-6 visits. (Met 03/25).    Patient will improve knee strength and stability to facilitate discontinued use of AD for daily ambulation. Timeframe: 6-8 visits. (Not Met, using SPC for short distances, RW for long distance 03/25).    Long-Term Goals:  The patient will improve LE strength and endurance to facilitate walking distances of 1 mile(s) daily for community ambulation. Timeframe: 8-12 visits. (Progressing, but still  requires Walker/Cane 03/25).    Patient will improve lower motor control to perform double-leg squat with symmetrical loading, without asymmetries, and with proper posterior chain loading to facilitate squatting and lifting ADL's. Timeframe: 8-12 visits. (Not Met/Progressing; 03/25)    Patient will improve LE mobility, strength, and single-leg stabilization to meet strength requirements for reciprocal stair navigation pattern with no asymmetries for ascending and descending 2-3 flights of stairs daily for community integration. Timeframe: 12 visits.     Patient will improve LEFS score by at least 9 points to indicate a true change in improved function for ADL's and restoring PLF. Timeframe: 12 visits.               Plan  Recommend icing to control inflammation, rest as needed, follow up on response next visit. Compliance to HEP?    Treatment Last 4 Visits  Treatment Day: 2       3/27/2025 4/3/2025 4/23/2025 4/29/2025   LE Treatment   Therapeutic Exercise Discussed the patient's remaining symptoms, functional limitations, and concerns to establish updated POC.   PROM - knee flexion x 10 - mild end-range pain   SLR: x 10   Bridging: 1 x 10   LAQ: 1 x 10   DLHR: 1 x 10   Standing marching: 1 x 10 (B)   Standing hip ABD and ext: 1 x 20 (B), requested a sitting rest break when starting repetitions on the right side   Standing HSC: 1 x 10 (B)  Reassess  LEFS  Staggered STS x 5  SAQ on foam roll x20 L  SLR x10 on L  Red TB for HL clam x10 L  STS x5  Standing hip flexion/ext x10 at bar L  Standing hip abd x10 at bar L  Calf raise at bar, x10  Standing HS curl x10 at bar L Heel Slides with Strap x 20   Quad Set 2 x 10 5\" H (L)   Neuro Re-Education Forward karuna walk (step-to): x 2 laps x 5 - 6\" hurdles  Lateral karuna walk (step-to): x 2 laps x 5 - 6\" hurdles Standing Alt Marches x 1 min   Tandem Stance 2 x 30\" ea R/L      Manual Therapy   STM/MFR to L calf and quad to decrease pain and swelling STM (Supine): Posterior  Knee, Proximal Gastroc, Distal HS (L)  Patellar Glides: all 4 Dir    Therapeutic Activity FSU: 1 x 10 (R), 4\" step  LSU: 1 x 10 (R), 4\" step   BW squat: 1 x 10   Sit to stand: 1 x 10       Therapeutic Exercise Minutes 20 35 15 8   Neuro Re-Educ Minutes 8 10     Manual Therapy Minutes   15 15   Therapeutic Activity Minutes 12      Evaluation Minutes   10    Total Time Of Timed Procedures 40 45 30 23   Total Time Of Service-Based Procedures 0 0 10 0   Total Treatment Time 40 45 40 23   HEP  Access Code: [email or web address]  URL: [email or web address]  Date: 04/03/2025  Prepared by: Norma Torres    Exercises  - Standing Tandem Balance with Counter Support  - 1 x daily - 7 x weekly - 3 sets - 30s hold  - Standing March with Counter Support  - 1 x daily - 7 x weekly - 1 sets - 10 reps  - Standing Hip Abduction with Counter Support  - 1 x daily - 7 x weekly - 2 sets - 10 reps  - Standing Hip Extension with Counter Support  - 1 x daily - 7 x weekly - 2 sets - 10 reps  - Staggered Sit-to-Stand  - 1 x daily - 7 x weekly - 3 sets - 5 reps Access Code: [email or web address]  URL: [email or web address]  Date: 04/23/2025  Prepared by: Marion Florez    Exercises  - Supine Straight Leg Raises  - 1 x daily - 7 x weekly - 2 sets - 10 reps  - Supine Bridge  - 1 x daily - 7 x weekly - 2 sets - 10 reps  - Hooklying Clamshell with Resistance  - 1 x daily - 7 x weekly - 2 sets - 10 reps - red theraband  - Standing Heel Raise  - 1 x daily - 7 x weekly - 1 sets - 10 reps  - Standing Hip Abduction with Counter Support  - 1 x daily - 7 x weekly - 1 sets - 10 reps  - Standing Hip Extension with Counter Support  - 1 x daily - 7 x weekly - 1 sets - 10 reps  - Standing Knee Flexion AROM with Chair Support  - 1 x daily - 7 x weekly - 1 sets - 10 reps Access Code: [email or web address]  URL: [email or web address]  Date: 04/23/2025  Prepared by: Marion Florez     Exercises  - Supine Straight Leg Raises  - 1 x daily - 7 x weekly  - 2 sets - 10 reps  - Supine Bridge  - 1 x daily - 7 x weekly - 2 sets - 10 reps  - Hooklying Clamshell with Resistance  - 1 x daily - 7 x weekly - 2 sets - 10 reps - red theraband  - Standing Heel Raise  - 1 x daily - 7 x weekly - 1 sets - 10 reps  - Standing Hip Abduction with Counter Support  - 1 x daily - 7 x weekly - 1 sets - 10 reps  - Standing Hip Extension with Counter Support  - 1 x daily - 7 x weekly - 1 sets - 10 reps  - Standing Knee Flexion AROM with Chair Support  - 1 x daily - 7 x weekly - 1 sets - 10 reps        HEP  Access Code: [email or web address]  URL: [email or web address]  Date: 04/23/2025  Prepared by: Marion Florez     Exercises  - Supine Straight Leg Raises  - 1 x daily - 7 x weekly - 2 sets - 10 reps  - Supine Bridge  - 1 x daily - 7 x weekly - 2 sets - 10 reps  - Hooklying Clamshell with Resistance  - 1 x daily - 7 x weekly - 2 sets - 10 reps - red theraband  - Standing Heel Raise  - 1 x daily - 7 x weekly - 1 sets - 10 reps  - Standing Hip Abduction with Counter Support  - 1 x daily - 7 x weekly - 1 sets - 10 reps  - Standing Hip Extension with Counter Support  - 1 x daily - 7 x weekly - 1 sets - 10 reps  - Standing Knee Flexion AROM with Chair Support  - 1 x daily - 7 x weekly - 1 sets - 10 reps    Charges     Manual x 1, Ther Ex x 1

## 2025-04-30 DIAGNOSIS — M17.12 PRIMARY OSTEOARTHRITIS OF LEFT KNEE: ICD-10-CM

## 2025-04-30 RX ORDER — TRAMADOL HYDROCHLORIDE 50 MG/1
50 TABLET ORAL EVERY 8 HOURS
Qty: 45 TABLET | Refills: 0 | Status: SHIPPED | OUTPATIENT
Start: 2025-04-30 | End: 2025-05-15

## 2025-04-30 RX ORDER — ACETAMINOPHEN 500 MG
1000 TABLET ORAL EVERY 8 HOURS
Qty: 90 TABLET | Refills: 0 | Status: SHIPPED | OUTPATIENT
Start: 2025-04-30 | End: 2025-05-15

## 2025-04-30 NOTE — TELEPHONE ENCOUNTER
Patient would like a refill of traMADol 50 MG Oral Tab and also a prescription for Tylenol. Please advise      [id number] - [place], [place] - [address] AT [facility] [address], [phone], [phone]  [address]  [facility] [address]  Phone: [phone] Fax: [phone]

## 2025-04-30 NOTE — TELEPHONE ENCOUNTER
DOS: 04/08/25  Last OV: 04/22/25    Tramadol 50 ( ordered on 4/22/25 sent to the wrong pharmacy)   Last refill date: 04/22/25     #/refills: 45/0    Acetaminophen  Last refill date: 04/08/25     #/refills: 100/0  04/2/25  AST 24   ALT 15     Patients daughter states medication was sent to the wrong pharmacy and cannot be transferred.  Tramadol     Called and spoke to staff at [facility].  They confirm that the tramadol sent on 4/22/25 was NOT picked up.  This prescription was cancelled at this time.   Upcoming appt:   Future Appointments   Date Time Provider Department Center   5/1/2025 10:30 AM Manatee, Norma, PTA [facility] [place]   5/6/2025 12:15 PM Lausch, Marion, PT [facility] [place]   5/6/2025  3:30 PM John Botello MD [facility] [address] [facility] [address]   5/8/2025  1:15 PM Lausch, Marion, PT [facility] [place]   5/13/2025  2:30 PM Kristy Torresh, PTA [facility] [place]   5/15/2025  2:00 PM Lausch, Marion, PT [facility] [place]   5/19/2025 11:20 AM Jacky Bautista MD [facility] [address]   5/20/2025 11:30 AM Lausch, Marion, PT [facility] [place]   5/22/2025  2:00 PM Lausch, Marion, PT [facility] [place]   5/27/2025 12:45 PM ManateeJason johnNorma, PTA [facility] [place]   5/29/2025  2:00 PM Lausch, Marion, PT [facility] [place]   6/3/2025 12:15 PM Lausch, Marion, PT [facility] [place]   6/5/2025  2:00 PM Lausch, Marion, PT [facility] [place]   7/7/2025  2:15 PM [facility] Fuller [facility] [place]

## 2025-05-01 ENCOUNTER — OFFICE VISIT (OUTPATIENT)
Dept: PHYSICAL THERAPY | Age: 65
End: 2025-05-01
Attending: ORTHOPAEDIC SURGERY
Payer: MEDICAID

## 2025-05-01 PROCEDURE — 97530 THERAPEUTIC ACTIVITIES: CPT

## 2025-05-01 PROCEDURE — 97110 THERAPEUTIC EXERCISES: CPT

## 2025-05-01 PROCEDURE — 97140 MANUAL THERAPY 1/> REGIONS: CPT

## 2025-05-01 NOTE — PROGRESS NOTES
Patient: Darrin Gerber (65 year old, female) Referring Provider:  Insurance:   Diagnosis: Status post left knee replacement (Z96.652)  Primary osteoarthritis of left knee (M17.12) Jacky Lernerlivan  [facility] MEDICAID   Date of Surgery: 4/8/25 Next MD visit:  N/A   Precautions:  None 3/24/25 Referral Information:    Date of Evaluation: Req: 15, Auth: 15, Exp: 6/22/2025 04/23/25 POC Auth Visits:  15       Today's Date   5/1/2025    Subjective  A virtual  was used for today's appt. Patient reports she took a pain killer in preparation for today's PT visit, but otherwise nothing else has changed since last visit       Pain: 7/10     Objective         Assessment  A virtual  was used for patient care this day. Patient returned to PT ambulating with out RW, though walker was still brought to appt by patient's . Patient reports having taken pain medication 2 hours before session today, but otherwise no other changes were made for today's session despite improved tolerance to exercise. Patient was able tolerate more table exercises this day despite reports of similar pain levels, though session was initiated with ice massage surrounding incision and patella.    Goals (to be met in 12 visits)     Therapy Goals        The patient will improve LE strength and endurance to facilitate standing for extended periods of time for 30 minutes at a time for household ambulation and chores. Timeframe: 4-6 visits. (Met 03/25).    Patient will improve knee strength and stability to facilitate discontinued use of AD for daily ambulation. Timeframe: 6-8 visits. (Not Met, using SPC for short distances, RW for long distance 03/25).    Long-Term Goals:  The patient will improve LE strength and endurance to facilitate walking distances of 1 mile(s) daily for community ambulation. Timeframe: 8-12 visits. (Progressing, but still requires Walker/Cane 03/25).    Patient will improve lower motor control to perform double-leg  squat with symmetrical loading, without asymmetries, and with proper posterior chain loading to facilitate squatting and lifting ADL's. Timeframe: 8-12 visits. (Not Met/Progressing; 03/25)    Patient will improve LE mobility, strength, and single-leg stabilization to meet strength requirements for reciprocal stair navigation pattern with no asymmetries for ascending and descending 2-3 flights of stairs daily for community integration. Timeframe: 12 visits.     Patient will improve LEFS score by at least 9 points to indicate a true change in improved function for ADL's and restoring PLF. Timeframe: 12 visits.                   Plan  Progress as tolerated, is patient compliant to HEP every day?    Treatment Last 4 Visits  Treatment Day: 3       4/3/2025 4/23/2025 4/29/2025 5/1/2025   LE Treatment   Therapeutic Exercise Reassess  LEFS  Staggered STS x 5  SAQ on foam roll x20 L  SLR x10 on L  Red TB for HL clam x10 L  STS x5  Standing hip flexion/ext x10 at bar L  Standing hip abd x10 at bar L  Calf raise at bar, x10  Standing HS curl x10 at bar L Heel Slides with Strap x 20   Quad Set 2 x 10 5\" H (L) Supine QS x 20 5\" H   SLR with QS 2 x 5 (L)  Heel Slides with Strap x 20   H/L Hip Add with Ball Squeeze x 20 3\" H (B)  H/L Hip Abd x 15 ea R/L    Neuro Re-Education Standing Alt Marches x 1 min   Tandem Stance 2 x 30\" ea R/L       Manual Therapy  STM/MFR to L calf and quad to decrease pain and swelling STM (Supine): Posterior Knee, Proximal Gastroc, Distal HS (L)  Patellar Glides: all 4 Dir  Ice Massage: x 10 min (Surrounding Patella and Incision)  STM (Supine): Posterior Knee, Quads, Adductors - incr tenderness of adductors and medial knee   Patellar Glides all 4 Dir (L)   Therapeutic Activity    Assess response last visit, symptoms this day, general extensive objective due to difficulty with .   Therapeutic Exercise Minutes 35 15 8 23   Neuro Re-Educ Minutes 10      Manual Therapy Minutes  15 15 10    Therapeutic Activity Minutes    8   Evaluation Minutes  10     Total Time Of Timed Procedures 45 30 23 41   Total Time Of Service-Based Procedures 0 10 0 0   Total Treatment Time 45 40 23 41   HEP Access Code: [email or web address]  URL: [email or web address]  Date: 04/03/2025  Prepared by: Norma Torres    Exercises  - Standing Tandem Balance with Counter Support  - 1 x daily - 7 x weekly - 3 sets - 30s hold  - Standing March with Counter Support  - 1 x daily - 7 x weekly - 1 sets - 10 reps  - Standing Hip Abduction with Counter Support  - 1 x daily - 7 x weekly - 2 sets - 10 reps  - Standing Hip Extension with Counter Support  - 1 x daily - 7 x weekly - 2 sets - 10 reps  - Staggered Sit-to-Stand  - 1 x daily - 7 x weekly - 3 sets - 5 reps Access Code: [email or web address]  URL: [email or web address]  Date: 04/23/2025  Prepared by: Marion Lausch    Exercises  - Supine Straight Leg Raises  - 1 x daily - 7 x weekly - 2 sets - 10 reps  - Supine Bridge  - 1 x daily - 7 x weekly - 2 sets - 10 reps  - Hooklying Clamshell with Resistance  - 1 x daily - 7 x weekly - 2 sets - 10 reps - red theraband  - Standing Heel Raise  - 1 x daily - 7 x weekly - 1 sets - 10 reps  - Standing Hip Abduction with Counter Support  - 1 x daily - 7 x weekly - 1 sets - 10 reps  - Standing Hip Extension with Counter Support  - 1 x daily - 7 x weekly - 1 sets - 10 reps  - Standing Knee Flexion AROM with Chair Support  - 1 x daily - 7 x weekly - 1 sets - 10 reps Access Code: [email or web address]  URL: [email or web address]  Date: 04/23/2025  Prepared by: Marion Lausch     Exercises  - Supine Straight Leg Raises  - 1 x daily - 7 x weekly - 2 sets - 10 reps  - Supine Bridge  - 1 x daily - 7 x weekly - 2 sets - 10 reps  - Hooklying Clamshell with Resistance  - 1 x daily - 7 x weekly - 2 sets - 10 reps - red theraband  - Standing Heel Raise  - 1 x daily - 7 x weekly - 1 sets - 10 reps  - Standing Hip Abduction with Counter  Support  - 1 x daily - 7 x weekly - 1 sets - 10 reps  - Standing Hip Extension with Counter Support  - 1 x daily - 7 x weekly - 1 sets - 10 reps  - Standing Knee Flexion AROM with Chair Support  - 1 x daily - 7 x weekly - 1 sets - 10 reps Access Code: [email or web address]  URL: [email or web address]  Date: 04/23/2025  Prepared by: Marion Lausch     Exercises  - Supine Straight Leg Raises  - 1 x daily - 7 x weekly - 2 sets - 10 reps  - Supine Bridge  - 1 x daily - 7 x weekly - 2 sets - 10 reps  - Hooklying Clamshell with Resistance  - 1 x daily - 7 x weekly - 2 sets - 10 reps - red theraband  - Standing Heel Raise  - 1 x daily - 7 x weekly - 1 sets - 10 reps  - Standing Hip Abduction with Counter Support  - 1 x daily - 7 x weekly - 1 sets - 10 reps  - Standing Hip Extension with Counter Support  - 1 x daily - 7 x weekly - 1 sets - 10 reps  - Standing Knee Flexion AROM with Chair Support  - 1 x daily - 7 x weekly - 1 sets - 10 reps        HEP  Access Code: [email or web address]  URL: [email or web address]  Date: 04/23/2025  Prepared by: Marion Lausch     Exercises  - Supine Straight Leg Raises  - 1 x daily - 7 x weekly - 2 sets - 10 reps  - Supine Bridge  - 1 x daily - 7 x weekly - 2 sets - 10 reps  - Hooklying Clamshell with Resistance  - 1 x daily - 7 x weekly - 2 sets - 10 reps - red theraband  - Standing Heel Raise  - 1 x daily - 7 x weekly - 1 sets - 10 reps  - Standing Hip Abduction with Counter Support  - 1 x daily - 7 x weekly - 1 sets - 10 reps  - Standing Hip Extension with Counter Support  - 1 x daily - 7 x weekly - 1 sets - 10 reps  - Standing Knee Flexion AROM with Chair Support  - 1 x daily - 7 x weekly - 1 sets - 10 reps    Charges     Ther Ex x 2, Manual x 1, Ther Act x 1

## 2025-05-06 ENCOUNTER — OFFICE VISIT (OUTPATIENT)
Dept: PHYSICAL THERAPY | Age: 65
End: 2025-05-06
Attending: ORTHOPAEDIC SURGERY
Payer: MEDICAID

## 2025-05-06 ENCOUNTER — OFFICE VISIT (OUTPATIENT)
Dept: FAMILY MEDICINE CLINIC | Facility: CLINIC | Age: 65
End: 2025-05-06
Payer: MEDICAID

## 2025-05-06 VITALS
DIASTOLIC BLOOD PRESSURE: 78 MMHG | HEART RATE: 72 BPM | HEIGHT: 60 IN | BODY MASS INDEX: 30.04 KG/M2 | TEMPERATURE: 98 F | WEIGHT: 153 LBS | OXYGEN SATURATION: 98 % | RESPIRATION RATE: 16 BRPM | SYSTOLIC BLOOD PRESSURE: 128 MMHG

## 2025-05-06 DIAGNOSIS — E55.9 VITAMIN D DEFICIENCY: ICD-10-CM

## 2025-05-06 DIAGNOSIS — M79.605 PAIN OF LEFT LOWER EXTREMITY: ICD-10-CM

## 2025-05-06 DIAGNOSIS — I10 ESSENTIAL HYPERTENSION: ICD-10-CM

## 2025-05-06 DIAGNOSIS — M25.562 ACUTE PAIN OF LEFT KNEE: ICD-10-CM

## 2025-05-06 DIAGNOSIS — Z12.11 SCREEN FOR COLON CANCER: ICD-10-CM

## 2025-05-06 DIAGNOSIS — E78.2 MIXED HYPERLIPIDEMIA: ICD-10-CM

## 2025-05-06 DIAGNOSIS — E11.65 TYPE 2 DIABETES MELLITUS WITH HYPERGLYCEMIA, WITHOUT LONG-TERM CURRENT USE OF INSULIN (HCC): Primary | ICD-10-CM

## 2025-05-06 DIAGNOSIS — Z96.652 S/P TKR (TOTAL KNEE REPLACEMENT), LEFT: ICD-10-CM

## 2025-05-06 DIAGNOSIS — Z12.31 ENCOUNTER FOR SCREENING MAMMOGRAM FOR MALIGNANT NEOPLASM OF BREAST: ICD-10-CM

## 2025-05-06 PROCEDURE — 99215 OFFICE O/P EST HI 40 MIN: CPT | Performed by: FAMILY MEDICINE

## 2025-05-06 PROCEDURE — 97140 MANUAL THERAPY 1/> REGIONS: CPT

## 2025-05-06 PROCEDURE — 97110 THERAPEUTIC EXERCISES: CPT

## 2025-05-06 NOTE — PROGRESS NOTES
Patient: Darrin Gerber (65 year old, female) Referring Provider:  Insurance:   Diagnosis: Status post left knee replacement (Z96.652)  Primary oste[facility] of left knee (M17.12) [facility]   Date of Surgery: 4/8/25 Next MD visit:  N/A   Precautions:  None 3/24/25 Referral Information:    Date of Evaluation: Req: 15, Auth: 15, Exp: 6/22/2025 04/23/25 POC Auth Visits:  15       Today's Date   5/6/2025    Subjective  Cont to require pain medications daily. Fine when she is taking the medications.  6-7/10 without pain meds. Currently 4-5/10.  Amb with cane in clinic but able to amb without the cane.  HEP 1x per day.       Pain: 5/10     Objective       Observation: Incisions is covered with steri strips, no signs of infecion  Palpation: Tender to touch medial and lateral L knee, L quad, L calf, L dorsum of foot             ROM and Strength: (* denotes performed with pain)       Hip    MMT (-/5)    R L     Flex (L2) 4/5 4/5               ,   Demos hip strength 3+/5 with transfers and TE            Knee    ROM MMT (-/5)    R L R L     Flex 135 110 5 4+*     Ext (L3) 0 0 4+ 4+*      Balance and Functional Mobility:  Gait: pt ambulates on level ground with assistive device; decreased step length; decreased stance phase; decreased foot clearance; stooped posture/forward lean   Functional Mobility:  MOD I with STS      Assessment   assisted with translation this session.  Pt also able to communiate with full sentences but intermittently author need to pause and obtain eye contact be to sure that pt could understand.  Cont to demonstrate L quad weakness with extensor lag in supine and SL positions but overall tolerated progressions well. Reported relief this session.  Pt's  helped to report that HEP is being completed 1x per day and pt is talking walks outside without the cane.    Goals (to be met in 12 visits)      Not Met Progress Toward Partially Met Met   Pt will improve knee  extension ROM to 0 deg to allow proper heel strike during gait and terminal knee extension in stance. [] [] [] []   Pt will improve knee AROM flexion to >120 degrees to improve ability to perform stairs. [] [] [] []   Pt will improve quad strength to 5/5 to ascend 1 flight of stairs reciprocally without UE assist. [] [] [] []   Pt will increase hip and knee strength to grossly 4+/5 to be able to get up and down from the floor safely. [] [] [] []   Pt will demonstrate increased hip ER/ABD strength to 4/5 to perform stepping and squatting activities without excessive femoral IR/ADD. [] [] [] []   Pt will improve SLS to 10s to improve safety with gait on uneven surfaces such as grass and gravel. [] [] [] []   Pt will be independent and compliant with comprehensive HEP to maintain progress achieved in PT. [] [] [] []              Plan  Progress as tolerated    Treatment Last 4 Visits  Treatment Day: 4       4/23/2025 4/29/2025 5/1/2025 5/6/2025   LE Treatment   Therapeutic Exercise SAQ on foam roll x20 L  SLR x10 on L  Red TB for HL clam x10 L  STS x5  Standing hip flexion/ext x10 at bar L  Standing hip abd x10 at bar L  Calf raise at bar, x10  Standing HS curl x10 at bar L Heel Slides with Strap x 20   Quad Set 2 x 10 5\" H (L) Supine QS x 20 5\" H   SLR with QS 2 x 5 (L)  Heel Slides with Strap x 20   H/L Hip Add with Ball Squeeze x 20 3\" H (B)  H/L Hip Abd x 15 ea R/L  Supine QS x 20 5\" L  SAQ x20 L  SLR with QS 2 x 5 (L)  Heel Slides with Strap x 3 min --> max cues for form but able to bend >110d  SL hip abd B x15 - max verbal and tacitle cues to straighten knee, unable to perform  Clamshell, x15 B  Calf raises at elevated mat table x20   Manual Therapy STM/MFR to L calf and quad to decrease pain and swelling STM (Supine): Posterior Knee, Proximal Gastroc, Distal HS (L)  Patellar Glides: all 4 Dir  Ice Massage: x 10 min (Surrounding Patella and Incision)  STM (Supine): Posterior Knee, Quads, Adductors - incr  tenderness of adductors and medial knee   Patellar Glides all 4 Dir (L) STM/MFR to L calf and quad to decrease pain and swelling   Therapeutic Activity   Assess response last visit, symptoms this day, general extensive objective due to difficulty with .    Therapeutic Exercise Minutes 15 8 23 25   Manual Therapy Minutes 15 15 10 15   Therapeutic Activity Minutes   8    Evaluation Minutes 10      Total Time Of Timed Procedures 30 23 41 40   Total Time Of Service-Based Procedures 10 0 0 0   Total Treatment Time 40 23 41 40   HEP Access Code: [email or web address]  URL: [email or web address]  Date: 04/23/2025  Prepared by: Marion Lausch    Exercises  - Supine Straight Leg Raises  - 1 x daily - 7 x weekly - 2 sets - 10 reps  - Supine Bridge  - 1 x daily - 7 x weekly - 2 sets - 10 reps  - Hooklying Clamshell with Resistance  - 1 x daily - 7 x weekly - 2 sets - 10 reps - red theraband  - Standing Heel Raise  - 1 x daily - 7 x weekly - 1 sets - 10 reps  - Standing Hip Abduction with Counter Support  - 1 x daily - 7 x weekly - 1 sets - 10 reps  - Standing Hip Extension with Counter Support  - 1 x daily - 7 x weekly - 1 sets - 10 reps  - Standing Knee Flexion AROM with Chair Support  - 1 x daily - 7 x weekly - 1 sets - 10 reps Access Code: [email or web address]  URL: [email or web address]  Date: 04/23/2025  Prepared by: Marion Lausch     Exercises  - Supine Straight Leg Raises  - 1 x daily - 7 x weekly - 2 sets - 10 reps  - Supine Bridge  - 1 x daily - 7 x weekly - 2 sets - 10 reps  - Hooklying Clamshell with Resistance  - 1 x daily - 7 x weekly - 2 sets - 10 reps - red theraband  - Standing Heel Raise  - 1 x daily - 7 x weekly - 1 sets - 10 reps  - Standing Hip Abduction with Counter Support  - 1 x daily - 7 x weekly - 1 sets - 10 reps  - Standing Hip Extension with Counter Support  - 1 x daily - 7 x weekly - 1 sets - 10 reps  - Standing Knee Flexion AROM with Chair Support  - 1 x daily - 7 x weekly -  1 sets - 10 reps Access Code: [email or web address]  URL: [email or web address]  Date: 04/23/2025  Prepared by: Marion Lausch     Exercises  - Supine Straight Leg Raises  - 1 x daily - 7 x weekly - 2 sets - 10 reps  - Supine Bridge  - 1 x daily - 7 x weekly - 2 sets - 10 reps  - Hooklying Clamshell with Resistance  - 1 x daily - 7 x weekly - 2 sets - 10 reps - red theraband  - Standing Heel Raise  - 1 x daily - 7 x weekly - 1 sets - 10 reps  - Standing Hip Abduction with Counter Support  - 1 x daily - 7 x weekly - 1 sets - 10 reps  - Standing Hip Extension with Counter Support  - 1 x daily - 7 x weekly - 1 sets - 10 reps  - Standing Knee Flexion AROM with Chair Support  - 1 x daily - 7 x weekly - 1 sets - 10 reps         HEP  Access Code: [email or web address]  URL: [email or web address]  Date: 04/23/2025  Prepared by: Marion Lausch     Exercises  - Supine Straight Leg Raises  - 1 x daily - 7 x weekly - 2 sets - 10 reps  - Supine Bridge  - 1 x daily - 7 x weekly - 2 sets - 10 reps  - Hooklying Clamshell with Resistance  - 1 x daily - 7 x weekly - 2 sets - 10 reps - red theraband  - Standing Heel Raise  - 1 x daily - 7 x weekly - 1 sets - 10 reps  - Standing Hip Abduction with Counter Support  - 1 x daily - 7 x weekly - 1 sets - 10 reps  - Standing Hip Extension with Counter Support  - 1 x daily - 7 x weekly - 1 sets - 10 reps  - Standing Knee Flexion AROM with Chair Support  - 1 x daily - 7 x weekly - 1 sets - 10 reps    Charges     1 man, 2 TE

## 2025-05-06 NOTE — PATIENT INSTRUCTIONS
Thank you for choosing John Botello MD at [facility]  To Do: Darrin Gerber  1. Please see below   Call [phone] to schedule the appointment.   Please signup for [employer], which is electronic access to your record if you have not done so.  All your results will post on there.  [email or web address]   You can NOW use [employer] to book your appointments with us, or consider using open access scheduling which is through the [employer] website [email or web address] and type in John Botello MD and follow the links for \"Schedule Online Now\"    To schedule Imaging or tests at [place] call Central Scheduling [phone], Go to [facility] ER Building (For example: CT scans, X rays, Ultrasound, MRI)  Cardiac Testing in ER building Building A second floor Cardiac Testing [phone] (For example: Holter Monitor, Cardiac Stress tests,Event Monitor, or 2D Echocardiograms)  [facility] call [phone] usually in [facility]  Walk in Clinic in [place] at [address] Mon-Fri at 8am-7:30 p.m., and Sat/Sun 9:00a.m.-4:30 p.m.  Also at [address] [place] [address]  Call [phone] for info     Please call our office about any questions regarding your treatment/medicines/tests as a result of today's visit.  For your safety, read the entire package insert of all medicines prescribed to you and be aware of all of the risks of treatment even beyond those discussed today.  All therapies have potential risk of harm or side effects or medication interactions.  It is your duty and for your safety to discuss with the pharmacist and our office with questions, and to notify us and stop treatment if problems arise, but know that our intention is that the benefits outweigh those potential risks and we strive to make you healthier and to improve your quality of life.    Referrals, and Radiology Information:    If your insurance requires a referral to a specialist, please allow 5 business days to process your  referral request.    If John Botello MD orders a CT or MRI, it may take up to 10 business days to receive approval from your insurance company. Once our office has called informing you that the insurance company approved your testing, please call Central Scheduling at [phone]  Please allow our office 5 business days to contact you regarding any testing results.    Refill policies:   Allow 3 business days for refills; controlled substances may take longer and must be picked up from the office in person.  Narcotic medications can only be filled in 30 day increments and must be refilled at an office visit only.  If your prescription is due for a refill, you may be due for a follow-up appointment.  We cannot refill your maintenance medications at a preventative wellness visit.  To best provide you care, patients receiving maintenance medications need to be seen at least twice a year.

## 2025-05-06 NOTE — PROGRESS NOTES
Subjective:   Patient ID: Darrin Gerber is a 65 year old female.    HPI  Ms. Gerber is a 65-year-old female with history of diabetes mellitus, rheumatoid arthritis, arthritis of both knees status post right knee arthroplasty and recently left knee arthroplasty, hypertension, hyperlipidemia here today for follow-up appointment.  She has been experiencing left knee pain and swelling.  She has been receiving physical therapy but despite this has persistent pain despite taking tramadol and Tylenol.  She also reports left calf pain.  No recent injury or trauma.  She has been feeling tired.  No fever no cough no chest pain no shortness of breath no nausea no vomiting no abdominal pain.    I had reviewed past medical and family histories together with allergy and medication lists documented.    History/Other:   Review of Systems  Constitutional:  Negative for fever.   HENT:  Negative for sore throat and trouble swallowing.    Respiratory:  Negative for cough and shortness of breath.    Cardiovascular:  Negative for chest pain.   Gastrointestinal:  Negative for abdominal pain, constipation, diarrhea, nausea and vomiting.   Genitourinary:  Negative for difficulty urinating.   Neurological:  Negative for dizziness and weakness  Current Medications[1]  Allergies:Allergies[2]    Objective:   Physical Exam  Vitals reviewed.   Constitutional:       General: She is not in acute distress.  HENT:      Right Ear: Tympanic membrane, ear canal and external ear normal.      Left Ear: Tympanic membrane, ear canal and external ear normal.      Nose: Nose normal.      Mouth/Throat:      Mouth: Mucous membranes are moist.      Pharynx: Oropharynx is clear.   Eyes:      General: No scleral icterus.     Conjunctiva/sclera: Conjunctivae normal.   Cardiovascular:      Rate and Rhythm: Normal rate and regular rhythm.      Heart sounds: Normal heart sounds. No murmur heard.  Pulmonary:      Effort: Pulmonary effort is normal. No respiratory distress.       Breath sounds: Normal breath sounds. No wheezing or rales.   Abdominal:      General: Bowel sounds are normal. There is no distension.      Palpations: Abdomen is soft.      Tenderness: There is no abdominal tenderness.   Musculoskeletal:      Cervical back: Neck supple.      Right lower leg: No edema.      Left lower leg: Mild tenderness to palpation on the calf muscle.  No erythema no warmth no swelling.  Intact sensation  Examination of the left knee reveals mild erythema and swelling mainly on the anterior aspect with tenderness and warm.  Lymphadenopathy:      Cervical: No cervical adenopathy.   Skin:     General: Skin is warm.   Neurological:      General: No focal deficit present.      Mental Status: She is alert.   Psychiatric:         Mood and Affect: Mood normal.         Behavior: Behavior normal.         Thought Content: Thought content normal.   Assessment & Plan:   1. Type 2 diabetes mellitus with hyperglycemia, without long-term current use of insulin (HCC)   -Generally under good control  - Continue meds  - Will check labs   2. Essential hypertension   -Controlled  - Continue med   3. Mixed hyperlipidemia   -Will check lipid panel  - Continue meds   4. Encounter for screening mammogram for malignant neoplasm of breast    5. Screen for colon cancer    6. S/P TKR (total knee replacement), left   -Continue physical therapy   7. Vitamin D deficiency   -Check vitamin D level   8. Acute pain of left knee   -Will check x-ray of the left knee to rule out underlying infection   9. Pain of left lower extremity   - Will order venous ultrasound of the left lower extremity to rule out DVT     Discussed with daughter over the phone.  We shall notify them once we get test results and provide recommendations.  Go to the emergency room if with respiratory distress and if condition worsens  - Follow-up after 3 months or as needed    This note was prepared using [employer] voice recognition dictation software.  As a result errors may occur. When identified these errors have been corrected. While every attempt is made to correct errors during dictation discrepancies may still exist          Orders Placed This Encounter   Procedures    CBC W Differential W Platelet [E]    Comp Metabolic Panel (14) [E]    Lipid Panel [E]    Hemoglobin A1C [E]    Vitamin D [E]       Meds This Visit:  Requested Prescriptions      No prescriptions requested or ordered in this encounter       Imaging & Referrals:  OPHTHALMOLOGY - INTERNAL  SURGERY - INTERNAL  GEORGINA DANIEL 2D+3D SCREENING BILAT (CPT=77067/03708)  XR KNEE (1 OR 2 VIEWS), LEFT (CPT=73560)  US VENOUS DOPPLER LEG LEFT - DIAG IMG (CPT=93971)         [1]   Current Outpatient Medications   Medication Sig Dispense Refill    traMADol 50 MG Oral Tab Take 1 tablet (50 mg total) by mouth every 8 (eight) hours for 15 days. 45 tablet 0    acetaminophen 500 MG Oral Tab Take 2 tablets (1,000 mg total) by mouth every 8 (eight) hours for 15 days. 90 tablet 0    senna-docusate 8.6-50 MG Oral Tab Take 1 tablet by mouth 2 (two) times daily as needed. 30 tablet 0    aspirin 81 MG Oral Tab EC Take 1 tablet (81 mg total) by mouth in the morning and 1 tablet (81 mg total) before bedtime. 80 tablet 0    oxyCODONE 5 MG Oral Tab Take 0.5-1 tablets (2.5-5 mg total) by mouth every 4 (four) hours as needed. (Patient not taking: Reported on 4/22/2025) 40 tablet 0    leucovorin 5 MG Oral Tab TAKE ONE TABLET BY MOUTH ONCE A WEEK ON SUNDAYS 13 tablet 3    Multiple Vitamins-Minerals (MULTI-VITAMIN/MINERALS) Oral Tab Take 1 tablet by mouth daily.      VITAMIN B-12 1000 MCG Sublingual SL Tab PLACE 1 TABLET UNDER THE TONGUE DAILY 270 tablet 0    Lancets (ONETOUCH DELICA PLUS [id number]) Does not apply Misc USE TO CHECK BLOOD GLUCOSE LEVELS TWICE A  each 1    Glucose Blood (ONETOUCH ULTRA TEST) In Vitro Strip USE TO TEST TWICE A  strip 1    methotrexate 2.5 MG Oral Tab Take 8 tablets (20 mg total) by mouth once  a week. 104 tablet 0    ATORVASTATIN 10 MG Oral Tab TAKE 1 TABLET BY MOUTH IN THE MORNING 30 tablet 4    METFORMIN 500 MG Oral Tab TAKE 1 TABLET (500 MG TOTAL) BY MOUTH NIGHTLY. 90 tablet 1    HYDROCHLOROTHIAZIDE 25 MG Oral Tab TAKE 1 TABLET (25 MG TOTAL) BY MOUTH DAILY. 90 tablet 1    folic acid 1 MG Oral Tab Take 2 tablets (2 mg total) by mouth daily. 180 tablet 3    Ferrous Sulfate (FEROSUL) 325 (65 Fe) MG Oral Tab Take 1 tablet (325 mg total) by mouth daily. 90 tablet 3    Blood Glucose Monitoring Suppl (BLOOD GLUCOSE MONITOR SYSTEM) w/Device Does not apply Kit Please check blood glucose twice a day 1 kit 0    Elastic Bandages & Supports (NEOPRENE KNEE BRACE) Does not apply Misc 1 Application by Does not apply route daily. Left neoprene knee brace for patellofemoral pain syndrome 1 each 0   [2]   Allergies  Allergen Reactions    Meloxicam SWELLING     Able to tolerate iburprofen, naproxen,

## 2025-05-08 ENCOUNTER — TELEPHONE (OUTPATIENT)
Dept: PHYSICAL THERAPY | Facility: HOSPITAL | Age: 65
End: 2025-05-08

## 2025-05-08 ENCOUNTER — HOSPITAL ENCOUNTER (OUTPATIENT)
Dept: GENERAL RADIOLOGY | Age: 65
Discharge: HOME | End: 2025-05-08
Attending: FAMILY MEDICINE
Payer: MEDICAID

## 2025-05-08 ENCOUNTER — OFFICE VISIT (OUTPATIENT)
Dept: PHYSICAL THERAPY | Age: 65
End: 2025-05-08
Attending: ORTHOPAEDIC SURGERY
Payer: MEDICAID

## 2025-05-08 DIAGNOSIS — M25.562 ACUTE PAIN OF LEFT KNEE: ICD-10-CM

## 2025-05-08 PROCEDURE — 97140 MANUAL THERAPY 1/> REGIONS: CPT

## 2025-05-08 PROCEDURE — 73560 X-RAY EXAM OF KNEE 1 OR 2: CPT | Performed by: FAMILY MEDICINE

## 2025-05-08 PROCEDURE — 97110 THERAPEUTIC EXERCISES: CPT

## 2025-05-08 NOTE — PROGRESS NOTES
Patient: Darrin Gerber (65 year old, female) Referring Provider:  Insurance:   Diagnosis: Status post left knee replacement (Z96.652)  Primary osteoarthritis of left knee (M17.12) Jacky Bautista  [facility] MEDICAID   Date of Surgery: 4/8/25 Next MD visit:  N/A   Precautions:  None 3/24/25 Referral Information:    Date of Evaluation: Req: 15, Auth: 15, Exp: 6/22/2025 04/23/25 POC Auth Visits:  15       Today's Date   5/8/2025    Subjective  Attends without .  Only a little bit of pain in medial side of L knee.  HEP adherent.  Took pain killer 2x per day since last session.  Too much pain without the medications.  Mild with the meds.  Has been going on walks outside of her apartment up and down the street.       Pain: pain not reported     Objective          Observation: Incisions is covered with steri strips, no signs of infecion  Palpation: Tender to touch medial and lateral L knee, L quad, L calf, L dorsum of foot             ROM and Strength: (* denotes performed with pain)       Hip    MMT (-/5)    R L     Flex (L2) 4/5 4/5               ,   Demos hip strength 3+/5 with transfers and TE            Knee    ROM MMT (-/5)    R L R L     Flex 135 135 5 4+*     Ext (L3) 0 0 4+ 4+*      Balance and Functional Mobility:  Gait: pt ambulates on level ground with assistive device; decreased step length; decreased stance phase; decreased foot clearance; stooped posture/forward lean   Functional Mobility:  MOD I with STS      Assessment  Session completed without translation assistance.  Communication at baseline Attends with straight cane and able to amb in clinic without it.  Also reports amb in community for exercise and cont HEP adherence.  ROM in objective.  Able to tolerate progressions as noted below, no report of pain and author checked in with every set.    Pt escorted to x-ray following session    Goals (to be met in 12 visits)      Not Met Progress Toward Partially Met Met   Pt will improve knee extension  ROM to 0 deg to allow proper heel strike during gait and terminal knee extension in stance. [] [] [] []   Pt will improve knee AROM flexion to >120 degrees to improve ability to perform stairs. [] [] [] []   Pt will improve quad strength to 5/5 to ascend 1 flight of stairs reciprocally without UE assist. [] [] [] []   Pt will increase hip and knee strength to grossly 4+/5 to be able to get up and down from the floor safely. [] [] [] []   Pt will demonstrate increased hip ER/ABD strength to 4/5 to perform stepping and squatting activities without excessive femoral IR/ADD. [] [] [] []   Pt will improve SLS to 10s to improve safety with gait on uneven surfaces such as grass and gravel. [] [] [] []   Pt will be independent and compliant with comprehensive HEP to maintain progress achieved in PT. [] [] [] []                  Plan  Progress as tolerated    Treatment Last 4 Visits  Treatment Day: 5 4/29/2025 5/1/2025 5/6/2025 5/8/2025   LE Treatment   Therapeutic Exercise Heel Slides with Strap x 20   Quad Set 2 x 10 5\" H (L) Supine QS x 20 5\" H   SLR with QS 2 x 5 (L)  Heel Slides with Strap x 20   H/L Hip Add with Ball Squeeze x 20 3\" H (B)  H/L Hip Abd x 15 ea R/L  Supine QS x 20 5\" L  SAQ x20 L  SLR with QS 2 x 5 (L)  Heel Slides with Strap x 3 min --> max cues for form but able to bend >110d  SL hip abd B x15 - max verbal and tacitle cues to straighten knee, unable to perform  Clamshell, x15 B  Calf raises at elevated mat table x20 SAQ x10 L at body weight, x10 B with 2# ankle weight  SLR 2x10 with 1# ankle weight B  Heel Slides with Strap x10 prior to AROM measure as above - performed with lifting foot off table  SL hip abd B x20  STS with hands on thighs, 2x10  Seated LAQ, x10 B  Seated march x10 B  Step up, 4\" x10 B  Standing hip abd at bar, 2x10 B  Standing HS curl 2x10 B  Amb in clinic with min use of straight cane x200ft   Manual Therapy STM (Supine): Posterior Knee, Proximal Gastroc, Distal HS  (L)  Patellar Glides: all 4 Dir  Ice Massage: x 10 min (Surrounding Patella and Incision)  STM (Supine): Posterior Knee, Quads, Adductors - incr tenderness of adductors and medial knee   Patellar Glides all 4 Dir (L) STM/MFR to L calf and quad to decrease pain and swelling STM/MFR to L calf and quad to decrease pain and swelling   Therapeutic Activity  Assess response last visit, symptoms this day, general extensive objective due to difficulty with .     Therapeutic Exercise Minutes 8 23 25 25   Manual Therapy Minutes 15 10 15 15   Therapeutic Activity Minutes  8     Total Time Of Timed Procedures 23 41 40 40   Total Time Of Service-Based Procedures 0 0 0 0   Total Treatment Time 23 41 40 40   HEP Access Code: [email or web address]  URL: [email or web address]  Date: 04/23/2025  Prepared by: Marion Lajenah     Exercises  - Supine Straight Leg Raises  - 1 x daily - 7 x weekly - 2 sets - 10 reps  - Supine Bridge  - 1 x daily - 7 x weekly - 2 sets - 10 reps  - Hooklying Clamshell with Resistance  - 1 x daily - 7 x weekly - 2 sets - 10 reps - red theraband  - Standing Heel Raise  - 1 x daily - 7 x weekly - 1 sets - 10 reps  - Standing Hip Abduction with Counter Support  - 1 x daily - 7 x weekly - 1 sets - 10 reps  - Standing Hip Extension with Counter Support  - 1 x daily - 7 x weekly - 1 sets - 10 reps  - Standing Knee Flexion AROM with Chair Support  - 1 x daily - 7 x weekly - 1 sets - 10 reps Access Code: [email or web address]  URL: [email or web address]  Date: 04/23/2025  Prepared by: Marion Lausch     Exercises  - Supine Straight Leg Raises  - 1 x daily - 7 x weekly - 2 sets - 10 reps  - Supine Bridge  - 1 x daily - 7 x weekly - 2 sets - 10 reps  - Hooklying Clamshell with Resistance  - 1 x daily - 7 x weekly - 2 sets - 10 reps - red theraband  - Standing Heel Raise  - 1 x daily - 7 x weekly - 1 sets - 10 reps  - Standing Hip Abduction with Counter Support  - 1 x daily - 7 x weekly - 1 sets - 10  reps  - Standing Hip Extension with Counter Support  - 1 x daily - 7 x weekly - 1 sets - 10 reps  - Standing Knee Flexion AROM with Chair Support  - 1 x daily - 7 x weekly - 1 sets - 10 reps          HEP  Access Code: [email or web address]  URL: [email or web address]  Date: 04/23/2025  Prepared by: Marion Florez     Exercises  - Supine Straight Leg Raises  - 1 x daily - 7 x weekly - 2 sets - 10 reps  - Supine Bridge  - 1 x daily - 7 x weekly - 2 sets - 10 reps  - Hooklying Clamshell with Resistance  - 1 x daily - 7 x weekly - 2 sets - 10 reps - red theraband  - Standing Heel Raise  - 1 x daily - 7 x weekly - 1 sets - 10 reps  - Standing Hip Abduction with Counter Support  - 1 x daily - 7 x weekly - 1 sets - 10 reps  - Standing Hip Extension with Counter Support  - 1 x daily - 7 x weekly - 1 sets - 10 reps  - Standing Knee Flexion AROM with Chair Support  - 1 x daily - 7 x weekly - 1 sets - 10 reps    Charges     2 TE, 1 man

## 2025-05-12 ENCOUNTER — LAB ENCOUNTER (OUTPATIENT)
Dept: LAB | Age: 65
End: 2025-05-12
Attending: FAMILY MEDICINE
Payer: MEDICAID

## 2025-05-12 DIAGNOSIS — M25.522 BILATERAL ELBOW JOINT PAIN: ICD-10-CM

## 2025-05-12 DIAGNOSIS — M05.742 RHEUMATOID ARTHRITIS INVOLVING BOTH HANDS WITH POSITIVE RHEUMATOID FACTOR (HCC): ICD-10-CM

## 2025-05-12 DIAGNOSIS — M54.2 CERVICALGIA: ICD-10-CM

## 2025-05-12 DIAGNOSIS — M25.521 BILATERAL ELBOW JOINT PAIN: ICD-10-CM

## 2025-05-12 DIAGNOSIS — M05.741 RHEUMATOID ARTHRITIS INVOLVING BOTH HANDS WITH POSITIVE RHEUMATOID FACTOR (HCC): ICD-10-CM

## 2025-05-12 DIAGNOSIS — E11.65 TYPE 2 DIABETES MELLITUS WITH HYPERGLYCEMIA, WITHOUT LONG-TERM CURRENT USE OF INSULIN (HCC): ICD-10-CM

## 2025-05-12 DIAGNOSIS — E55.9 VITAMIN D DEFICIENCY: ICD-10-CM

## 2025-05-12 DIAGNOSIS — G62.9 NEUROPATHY: ICD-10-CM

## 2025-05-12 LAB
ALBUMIN SERPL-MCNC: 4.2 G/DL (ref 3.2–4.8)
ALBUMIN/GLOB SERPL: 1.4 {RATIO} (ref 1–2)
ALP LIVER SERPL-CCNC: 80 U/L (ref 55–142)
ALT SERPL-CCNC: 13 U/L (ref 10–49)
ANION GAP SERPL CALC-SCNC: 10 MMOL/L (ref 0–18)
AST SERPL-CCNC: 25 U/L (ref ?–34)
BASOPHILS # BLD AUTO: 0.03 X10(3) UL (ref 0–0.2)
BASOPHILS NFR BLD AUTO: 0.4 %
BILIRUB SERPL-MCNC: 0.5 MG/DL (ref 0.2–1.1)
BUN BLD-MCNC: 15 MG/DL (ref 9–23)
CALCIUM BLD-MCNC: 9.5 MG/DL (ref 8.7–10.6)
CHLORIDE SERPL-SCNC: 98 MMOL/L (ref 98–112)
CHOLEST SERPL-MCNC: 143 MG/DL (ref ?–200)
CO2 SERPL-SCNC: 27 MMOL/L (ref 21–32)
CREAT BLD-MCNC: 0.97 MG/DL (ref 0.55–1.02)
EGFRCR SERPLBLD CKD-EPI 2021: 64 ML/MIN/1.73M2 (ref 60–?)
EOSINOPHIL # BLD AUTO: 0.09 X10(3) UL (ref 0–0.7)
EOSINOPHIL NFR BLD AUTO: 1.3 %
ERYTHROCYTE [DISTWIDTH] IN BLOOD BY AUTOMATED COUNT: 15.6 %
EST. AVERAGE GLUCOSE BLD GHB EST-MCNC: 111 MG/DL (ref 68–126)
FASTING PATIENT LIPID ANSWER: YES
FASTING STATUS PATIENT QL REPORTED: YES
GLOBULIN PLAS-MCNC: 3 G/DL (ref 2–3.5)
GLUCOSE BLD-MCNC: 110 MG/DL (ref 70–99)
HBA1C MFR BLD: 5.5 % (ref ?–5.7)
HCT VFR BLD AUTO: 32.8 % (ref 35–48)
HDLC SERPL-MCNC: 57 MG/DL (ref 40–59)
HGB BLD-MCNC: 10.7 G/DL (ref 12–16)
IMM GRANULOCYTES # BLD AUTO: 0.02 X10(3) UL (ref 0–1)
IMM GRANULOCYTES NFR BLD: 0.3 %
LDLC SERPL CALC-MCNC: 64 MG/DL (ref ?–100)
LYMPHOCYTES # BLD AUTO: 1.77 X10(3) UL (ref 1–4)
LYMPHOCYTES NFR BLD AUTO: 24.6 %
MCH RBC QN AUTO: 31 PG (ref 26–34)
MCHC RBC AUTO-ENTMCNC: 32.6 G/DL (ref 31–37)
MCV RBC AUTO: 95.1 FL (ref 80–100)
MONOCYTES # BLD AUTO: 0.69 X10(3) UL (ref 0.1–1)
MONOCYTES NFR BLD AUTO: 9.6 %
NEUTROPHILS # BLD AUTO: 4.6 X10 (3) UL (ref 1.5–7.7)
NEUTROPHILS # BLD AUTO: 4.6 X10(3) UL (ref 1.5–7.7)
NEUTROPHILS NFR BLD AUTO: 63.8 %
NONHDLC SERPL-MCNC: 86 MG/DL (ref ?–130)
OSMOLALITY SERPL CALC.SUM OF ELEC: 281 MOSM/KG (ref 275–295)
PLATELET # BLD AUTO: 320 10(3)UL (ref 150–450)
POTASSIUM SERPL-SCNC: 3.6 MMOL/L (ref 3.5–5.1)
PROT SERPL-MCNC: 7.2 G/DL (ref 5.7–8.2)
RBC # BLD AUTO: 3.45 X10(6)UL (ref 3.8–5.3)
SODIUM SERPL-SCNC: 135 MMOL/L (ref 136–145)
TRIGL SERPL-MCNC: 123 MG/DL (ref 30–149)
VIT D+METAB SERPL-MCNC: 29.8 NG/ML (ref 30–100)
VLDLC SERPL CALC-MCNC: 18 MG/DL (ref 0–30)
WBC # BLD AUTO: 7.2 X10(3) UL (ref 4–11)

## 2025-05-12 PROCEDURE — 82306 VITAMIN D 25 HYDROXY: CPT

## 2025-05-12 PROCEDURE — 36415 COLL VENOUS BLD VENIPUNCTURE: CPT

## 2025-05-12 PROCEDURE — 85025 COMPLETE CBC W/AUTO DIFF WBC: CPT

## 2025-05-12 PROCEDURE — 83036 HEMOGLOBIN GLYCOSYLATED A1C: CPT

## 2025-05-12 PROCEDURE — 80053 COMPREHEN METABOLIC PANEL: CPT

## 2025-05-12 PROCEDURE — 80061 LIPID PANEL: CPT

## 2025-05-13 ENCOUNTER — OFFICE VISIT (OUTPATIENT)
Dept: PHYSICAL THERAPY | Age: 65
End: 2025-05-13
Attending: ORTHOPAEDIC SURGERY
Payer: MEDICAID

## 2025-05-13 PROCEDURE — 97110 THERAPEUTIC EXERCISES: CPT

## 2025-05-13 PROCEDURE — 97140 MANUAL THERAPY 1/> REGIONS: CPT

## 2025-05-13 NOTE — PROGRESS NOTES
Patient: Darrin Gerber (65 year old, female) Referring Provider:  Insurance:   Diagnosis: Status post left knee replacement (Z96.652)  Primary osteoarthritis of left knee (M17.12) [facility]   Date of Surgery: 4/8/25 Next MD visit:  N/A   Precautions:  None 3/24/25 Referral Information:    Date of Evaluation: Req: 15, Auth: 15, Exp: 6/22/2025 04/23/25 POC Auth Visits:  15       Today's Date   5/13/2025    Subjective  Patient reports pain at worst 5-6/10 usually at night, pain currently 4-5/10. Patient reports she has been walking/exercising more, still has difficulty with stairs but was able to perform descending with reciropcal pattern.       Pain: 5/10     Objective         Assessment  Patient care began with manual intervention to address left lower leg due to reports of muscle pain/discomfort. Patient was then instructed through continued hip and knee strengthening, was encouraged to challenge herself with step ups but still tends to rely heavily on HHA. Patient was able to complete 1-2 reps on surgical leg with minimal UE or physical assist this day.    Goals (to be met in 12 visits)     Therapy Goals        The patient will improve LE strength and endurance to facilitate standing for extended periods of time for 30 minutes at a time for household ambulation and chores. Timeframe: 4-6 visits. (Met 03/25).    Patient will improve knee strength and stability to facilitate discontinued use of AD for daily ambulation. Timeframe: 6-8 visits. (Not Met, using SPC for short distances, RW for long distance 03/25).    Long-Term Goals:  The patient will improve LE strength and endurance to facilitate walking distances of 1 mile(s) daily for community ambulation. Timeframe: 8-12 visits. (Progressing, but still requires Walker/Cane 03/25).    Patient will improve lower motor control to perform double-leg squat with symmetrical loading, without asymmetries, and with proper posterior chain loading to  facilitate squatting and lifting ADL's. Timeframe: 8-12 visits. (Not Met/Progressing; 03/25)    Patient will improve LE mobility, strength, and single-leg stabilization to meet strength requirements for reciprocal stair navigation pattern with no asymmetries for ascending and descending 2-3 flights of stairs daily for community integration. Timeframe: 12 visits.     Patient will improve LEFS score by at least 9 points to indicate a true change in improved function for ADL's and restoring PLF. Timeframe: 12 visits.                       Plan  Continue to build patient confidence with stair nevigation, reducing UE support    Treatment Last 4 Visits  Treatment Day: 6 5/1/2025 5/6/2025 5/8/2025 5/13/2025   LE Treatment   Therapeutic Exercise Supine QS x 20 5\" H   SLR with QS 2 x 5 (L)  Heel Slides with Strap x 20   H/L Hip Add with Ball Squeeze x 20 3\" H (B)  H/L Hip Abd x 15 ea R/L  Supine QS x 20 5\" L  SAQ x20 L  SLR with QS 2 x 5 (L)  Heel Slides with Strap x 3 min --> max cues for form but able to bend >110d  SL hip abd B x15 - max verbal and tacitle cues to straighten knee, unable to perform  Clamshell, x15 B  Calf raises at elevated mat table x20 SAQ x10 L at body weight, x10 B with 2# ankle weight  SLR 2x10 with 1# ankle weight B  Heel Slides with Strap x10 prior to AROM measure as above - performed with lifting foot off table  SL hip abd B x20  STS with hands on thighs, 2x10  Seated LAQ, x10 B  Seated march x10 B  Step up, 4\" x10 B  Standing hip abd at bar, 2x10 B  Standing HS curl 2x10 B  Amb in clinic with min use of straight cane x200ft SAQ 2 x 10 (L)  SLR 2 x 10 (L)  AROM Heel Slides x 10 (L)  S/L Hip Abd 2 x 10 (L)  STS with hands on thighs, 2 x 10  Step up, 4\" x10 ea R/L   Manual Therapy Ice Massage: x 10 min (Surrounding Patella and Incision)  STM (Supine): Posterior Knee, Quads, Adductors - incr tenderness of adductors and medial knee   Patellar Glides all 4 Dir (L) STM/MFR to L calf and quad to  decrease pain and swelling STM/MFR to L calf and quad to decrease pain and swelling Cupping/STM (Prone): Gastroc (L)   Therapeutic Activity Assess response last visit, symptoms this day, general extensive objective due to difficulty with .      Therapeutic Exercise Minutes 23 25 25 28   Manual Therapy Minutes 10 15 15 15   Therapeutic Activity Minutes 8      Total Time Of Timed Procedures 41 40 40 43   Total Time Of Service-Based Procedures 0 0 0 0   Total Treatment Time 41 40 40 43   HEP Access Code: [email or web address]  URL: [email or web address]  Date: 04/23/2025  Prepared by: Marion Lausch     Exercises  - Supine Straight Leg Raises  - 1 x daily - 7 x weekly - 2 sets - 10 reps  - Supine Bridge  - 1 x daily - 7 x weekly - 2 sets - 10 reps  - Hooklying Clamshell with Resistance  - 1 x daily - 7 x weekly - 2 sets - 10 reps - red theraband  - Standing Heel Raise  - 1 x daily - 7 x weekly - 1 sets - 10 reps  - Standing Hip Abduction with Counter Support  - 1 x daily - 7 x weekly - 1 sets - 10 reps  - Standing Hip Extension with Counter Support  - 1 x daily - 7 x weekly - 1 sets - 10 reps  - Standing Knee Flexion AROM with Chair Support  - 1 x daily - 7 x weekly - 1 sets - 10 reps   Access Code: [email or web address]  URL: [email or web address]  Date: 04/23/2025  Prepared by: Marion Lausch     Exercises  - Supine Straight Leg Raises  - 1 x daily - 7 x weekly - 2 sets - 10 reps  - Supine Bridge  - 1 x daily - 7 x weekly - 2 sets - 10 reps  - Hooklying Clamshell with Resistance  - 1 x daily - 7 x weekly - 2 sets - 10 reps - red theraband  - Standing Heel Raise  - 1 x daily - 7 x weekly - 1 sets - 10 reps  - Standing Hip Abduction with Counter Support  - 1 x daily - 7 x weekly - 1 sets - 10 reps  - Standing Hip Extension with Counter Support  - 1 x daily - 7 x weekly - 1 sets - 10 reps  - Standing Knee Flexion AROM with Chair Support  - 1 x daily - 7 x weekly - 1 sets - 10 reps        HEP  Access  Code: [email or web address]  URL: [email or web address]  Date: 04/23/2025  Prepared by: Marion Florez     Exercises  - Supine Straight Leg Raises  - 1 x daily - 7 x weekly - 2 sets - 10 reps  - Supine Bridge  - 1 x daily - 7 x weekly - 2 sets - 10 reps  - Hooklying Clamshell with Resistance  - 1 x daily - 7 x weekly - 2 sets - 10 reps - red theraband  - Standing Heel Raise  - 1 x daily - 7 x weekly - 1 sets - 10 reps  - Standing Hip Abduction with Counter Support  - 1 x daily - 7 x weekly - 1 sets - 10 reps  - Standing Hip Extension with Counter Support  - 1 x daily - 7 x weekly - 1 sets - 10 reps  - Standing Knee Flexion AROM with Chair Support  - 1 x daily - 7 x weekly - 1 sets - 10 reps    Charges     Ther Ex x 2, Manual x 1

## 2025-05-15 ENCOUNTER — OFFICE VISIT (OUTPATIENT)
Dept: PHYSICAL THERAPY | Age: 65
End: 2025-05-15
Attending: ORTHOPAEDIC SURGERY
Payer: MEDICAID

## 2025-05-15 PROCEDURE — 97110 THERAPEUTIC EXERCISES: CPT

## 2025-05-15 PROCEDURE — 97140 MANUAL THERAPY 1/> REGIONS: CPT

## 2025-05-15 NOTE — PROGRESS NOTES
Patient: Darrin Gerber (65 year old, female) Referring Provider:  Insurance:   Diagnosis: Status post left knee replacement (Z96.652)  Primary osteoarthritis of left knee (M17.12) [facility]   Date of Surgery: 4/8/25 Next MD visit:  N/A   Precautions:  None 3/24/25 Referral Information:    Date of Evaluation: Req: 15, Auth: 15, Exp: 6/22/2025 04/23/25 POC Auth Visits:  15       Today's Date   5/15/2025    Subjective  Yesterday went up and down the stairs x3 total.  Also about to walk 5 min back and forth and will repeat 3-5 times. Also cont HEP adherence.  Discussed discharge with pt but she wants to cont for 3 more sessions       Pain: 6/10     Objective             Observation: Incisions is covered with steri strips, no signs of infecion  Palpation: Tender to touch medial and lateral L knee, L quad, L calf, L dorsum of foot             ROM and Strength: (* denotes performed with pain)       Hip    MMT (-/5)    R L     Flex (L2) 4/5 4/5               ,   Demos hip strength 3+/5 with transfers and TE            Knee    ROM MMT (-/5)    R L R L     Flex 135 135 5 4+*     Ext (L3) 0 0 4+ 4+*      Balance and Functional Mobility:  Gait: pt ambulates on level ground with assistive device; decreased step length; decreased stance phase; decreased foot clearance; stooped posture/forward lean   Functional Mobility:  MOD I with STS        Assessment  Cont manual therapy for pain management. Most marked restriction in L mid calf.  Cont to tolerate functional TE and reports that she is doing her usual IADLs without pain.    Goals (to be met in 12 visits)      Not Met Progress Toward Partially Met Met   Pt will improve knee extension ROM to 0 deg to allow proper heel strike during gait and terminal knee extension in stance. [] [] [] []   Pt will improve knee AROM flexion to >120 degrees to improve ability to perform stairs. [] [] [] []   Pt will improve quad strength to 5/5 to ascend 1 flight of stairs  reciprocally without UE assist. [] [] [] []   Pt will increase hip and knee strength to grossly 4+/5 to be able to get up and down from the floor safely. [] [] [] []   Pt will demonstrate increased hip ER/ABD strength to 4/5 to perform stepping and squatting activities without excessive femoral IR/ADD. [] [] [] []   Pt will improve SLS to 10s to improve safety with gait on uneven surfaces such as grass and gravel. [] [] [] []   Pt will be independent and compliant with comprehensive HEP to maintain progress achieved in PT. [] [] [] []        Plan  Cont 3 sessions for pain management    Treatment Last 4 Visits  Treatment Day: 7       5/6/2025 5/8/2025 5/13/2025 5/15/2025   LE Treatment   Therapeutic Exercise Supine QS x 20 5\" L  SAQ x20 L  SLR with QS 2 x 5 (L)  Heel Slides with Strap x 3 min --> max cues for form but able to bend >110d  SL hip abd B x15 - max verbal and tacitle cues to straighten knee, unable to perform  Clamshell, x15 B  Calf raises at elevated mat table x20 SAQ x10 L at body weight, x10 B with 2# ankle weight  SLR 2x10 with 1# ankle weight B  Heel Slides with Strap x10 prior to AROM measure as above - performed with lifting foot off table  SL hip abd B x20  STS with hands on thighs, 2x10  Seated LAQ, x10 B  Seated march x10 B  Step up, 4\" x10 B  Standing hip abd at bar, 2x10 B  Standing HS curl 2x10 B  Amb in clinic with min use of straight cane x200ft SAQ 2 x 10 (L)  SLR 2 x 10 (L)  AROM Heel Slides x 10 (L)  S/L Hip Abd 2 x 10 (L)  STS with hands on thighs, 2 x 10  Step up, 4\" x10 ea R/L Calf stretch at wall, 2x30s B  Step up, 6\" box x10 B  Hip abd at bar, x10 B  Calf raises x20   Manual Therapy STM/MFR to L calf and quad to decrease pain and swelling STM/MFR to L calf and quad to decrease pain and swelling Cupping/STM (Prone): Gastroc (L) STM/MFR to L calf including trigger pt release   Therapeutic Exercise Minutes 25 25 28 10   Manual Therapy Minutes 15 15 15 30   Total Time Of Timed  Procedures 40 40 43 40   Total Time Of Service-Based Procedures 0 0 0 0   Total Treatment Time 40 40 43 40   HEP   Access Code: [email or web address]  URL: [email or web address]  Date: 04/23/2025  Prepared by: Marion Lausch     Exercises  - Supine Straight Leg Raises  - 1 x daily - 7 x weekly - 2 sets - 10 reps  - Supine Bridge  - 1 x daily - 7 x weekly - 2 sets - 10 reps  - Hooklying Clamshell with Resistance  - 1 x daily - 7 x weekly - 2 sets - 10 reps - red theraband  - Standing Heel Raise  - 1 x daily - 7 x weekly - 1 sets - 10 reps  - Standing Hip Abduction with Counter Support  - 1 x daily - 7 x weekly - 1 sets - 10 reps  - Standing Hip Extension with Counter Support  - 1 x daily - 7 x weekly - 1 sets - 10 reps  - Standing Knee Flexion AROM with Chair Support  - 1 x daily - 7 x weekly - 1 sets - 10 reps         HEP  Access Code: [email or web address]  URL: [email or web address]  Date: 04/23/2025  Prepared by: Marion Lausch     Exercises  - Supine Straight Leg Raises  - 1 x daily - 7 x weekly - 2 sets - 10 reps  - Supine Bridge  - 1 x daily - 7 x weekly - 2 sets - 10 reps  - Hooklying Clamshell with Resistance  - 1 x daily - 7 x weekly - 2 sets - 10 reps - red theraband  - Standing Heel Raise  - 1 x daily - 7 x weekly - 1 sets - 10 reps  - Standing Hip Abduction with Counter Support  - 1 x daily - 7 x weekly - 1 sets - 10 reps  - Standing Hip Extension with Counter Support  - 1 x daily - 7 x weekly - 1 sets - 10 reps  - Standing Knee Flexion AROM with Chair Support  - 1 x daily - 7 x weekly - 1 sets - 10 reps    Charges     2 man, 1 TE

## 2025-05-19 ENCOUNTER — HOSPITAL ENCOUNTER (OUTPATIENT)
Dept: GENERAL RADIOLOGY | Age: 65
Discharge: HOME | End: 2025-05-19
Attending: ORTHOPAEDIC SURGERY
Payer: MEDICAID

## 2025-05-19 ENCOUNTER — OFFICE VISIT (OUTPATIENT)
Facility: CLINIC | Age: 65
End: 2025-05-19
Payer: MEDICAID

## 2025-05-19 DIAGNOSIS — Z96.652 STATUS POST LEFT KNEE REPLACEMENT: Primary | ICD-10-CM

## 2025-05-19 DIAGNOSIS — Z96.652 STATUS POST LEFT KNEE REPLACEMENT: ICD-10-CM

## 2025-05-19 PROCEDURE — 77073 BONE LENGTH STUDIES: CPT | Performed by: ORTHOPAEDIC SURGERY

## 2025-05-19 PROCEDURE — 99024 POSTOP FOLLOW-UP VISIT: CPT | Performed by: ORTHOPAEDIC SURGERY

## 2025-05-19 PROCEDURE — 73562 X-RAY EXAM OF KNEE 3: CPT | Performed by: ORTHOPAEDIC SURGERY

## 2025-05-19 RX ORDER — METHOTREXATE 2.5 MG/1
TABLET ORAL
COMMUNITY
Start: 2025-05-13

## 2025-05-19 RX ORDER — OMEPRAZOLE 20 MG/1
CAPSULE, DELAYED RELEASE ORAL
COMMUNITY
Start: 2025-05-07

## 2025-05-19 NOTE — PROGRESS NOTES
[facility] Progress Note    Subjective: 65-year-old female returns to clinic today 6 weeks postop from left knee replacement.  She states she is doing well, has very minimal pain per her report.  She only takes Tylenol as needed, states that she does not need it all the time.  She has been working with therapy, documented range of motion 0-1 35.  She uses a cane outside the house but otherwise ambulating without assist device.  She is happy with her recovery and outcome.    Objective: Patient appears comfortable, very pleasant.  Present with spouse.  Left knee incision is well-healed.  She demonstrates full extension, flexion past 120.  Knee appropriate stable to varus and valgus stress throughout range of motion.      Imaging: X-rays of the left knee personally viewed, independently interpreted and radiology report read.  Full-length films demonstrate improvement in hip-knee-ankle axis, from preoperatively at the junction of the medial compartment and medial trochlear groove, to postoperatively through the center of the trochlear groove.  Dedicated knee films with tibial component within a degree or 2 of neutral AP alignment, touch valgus.  Femoral component and 7 degrees valgus.  On resurfaced patella tracking centrally.  5 degrees posterior slope to the tibial component on the lateral.      Assessment/Plan: 65-year-old female 6 weeks postop status post left total knee arthroplasty.  Patient is doing very well.  Continue activities as tolerated.  No restrictions on incision.  Done with aspirin DVT prophylaxis.  Only taking Tylenol as needed.  She will continue with physical therapy, wean out into home exercise program.  Advised follow-up at 1 year from date of left knee replacement for assessment of both the right and left knees.  All questions were answered.    Jacky Bautista MD, FAAOS  [facility] Orthopaedic Surgery  Phone [phone]  Fax [phone]

## 2025-05-20 ENCOUNTER — OFFICE VISIT (OUTPATIENT)
Dept: PHYSICAL THERAPY | Age: 65
End: 2025-05-20
Attending: ORTHOPAEDIC SURGERY
Payer: MEDICAID

## 2025-05-20 PROCEDURE — 97110 THERAPEUTIC EXERCISES: CPT

## 2025-05-20 PROCEDURE — 97140 MANUAL THERAPY 1/> REGIONS: CPT

## 2025-05-20 NOTE — PROGRESS NOTES
Patient: Darrin Gerber (65 year old, female) Referring Provider:  Insurance:   Diagnosis: Status post left knee replacement (Z96.652)  Primary osteoarthritis of left knee (M17.12) Jacky Bautista  [facility] MEDICAID   Date of Surgery: 4/8/25 Next MD visit:  N/A   Precautions:  None 3/24/25 Referral Information:    Date of Evaluation: Req: 15, Auth: 15, Exp: 6/22/2025 04/23/25 POC Auth Visits:  15       Today's Date   5/20/2025    Subjective  Reports that she's having pain in popliteal fossa into mid calf.  HEP adherent daily and daily walks weather pending.       Pain: 6/10     Objective         Observation: Incisions appropriate stages of healing, no signs of infecion  Palpation: Tender to touch medial and lateral L knee, L quad, L calf, L dorsum of foot             ROM and Strength: (* denotes performed with pain)  Demos hip strength 3+/5 with transfers and TE            Knee    ROM MMT (-/5)    R L R L     Flex 135 135 5 4+*     Ext (L3) 0 0 4+ 4+*      Balance and Functional Mobility:  Gait: pt ambulates on level ground with assistive device; decreased step length; decreased stance phase; decreased foot clearance; stooped posture/forward lean   Functional Mobility:  MOD I with STS        Assessment  UTT progression to 6\" step on L LE this session.  Also demos severe indigestion.    Goals (to be met in 12 visits)      Not Met Progress Toward Partially Met Met   Pt will improve knee extension ROM to 0 deg to allow proper heel strike during gait and terminal knee extension in stance. [] [] [] []   Pt will improve knee AROM flexion to >120 degrees to improve ability to perform stairs. [] [] [] []   Pt will improve quad strength to 5/5 to ascend 1 flight of stairs reciprocally without UE assist. [] [] [] []   Pt will increase hip and knee strength to grossly 4+/5 to be able to get up and down from the floor safely. [] [] [] []   Pt will demonstrate increased hip ER/ABD strength to 4/5 to perform stepping and  squatting activities without excessive femoral IR/ADD. [] [] [] []   Pt will improve SLS to 10s to improve safety with gait on uneven surfaces such as grass and gravel. [] [] [] []   Pt will be independent and compliant with comprehensive HEP to maintain progress achieved in PT. [] [] [] []            Plan  Discharge visit 10, cont POC and progression as tolerated    Treatment Last 4 Visits  Treatment Day: 8       5/8/2025 5/13/2025 5/15/2025 5/20/2025   LE Treatment   Therapeutic Exercise SAQ x10 L at body weight, x10 B with 2# ankle weight  SLR 2x10 with 1# ankle weight B  Heel Slides with Strap x10 prior to AROM measure as above - performed with lifting foot off table  SL hip abd B x20  STS with hands on thighs, 2x10  Seated LAQ, x10 B  Seated march x10 B  Step up, 4\" x10 B  Standing hip abd at bar, 2x10 B  Standing HS curl 2x10 B  Amb in clinic with min use of straight cane x200ft SAQ 2 x 10 (L)  SLR 2 x 10 (L)  AROM Heel Slides x 10 (L)  S/L Hip Abd 2 x 10 (L)  STS with hands on thighs, 2 x 10  Step up, 4\" x10 ea R/L Calf stretch at wall, 2x30s B  Step up, 6\" box x10 B  Hip abd at bar, x10 B  Calf raises x20 QS x20 into towel roll  SLR x20, extensor lag at last few reps  SL hip abd x20  Seated LAQ, 2# ankle weight, x20  6\" step up, UTT   4\" step up, x10 B  DF at wall, x20  Calf stretch at wall, 2x30s B  Calf raises x20   Manual Therapy STM/MFR to L calf and quad to decrease pain and swelling Cupping/STM (Prone): Gastroc (L) STM/MFR to L calf including trigger pt release STM/MFR to L calf including trigger pt release  Patellar mobilization grade 3, all planes x60s   Therapeutic Exercise Minutes 25 28 10 15   Manual Therapy Minutes 15 15 30 25   Total Time Of Timed Procedures 40 43 40 40   Total Time Of Service-Based Procedures 0 0 0 0   Total Treatment Time 40 43 40 40   HEP  Access Code: [email or web address]  URL: [email or web address]  Date: 04/23/2025  Prepared by: Marion Child  - Supine  Straight Leg Raises  - 1 x daily - 7 x weekly - 2 sets - 10 reps  - Supine Bridge  - 1 x daily - 7 x weekly - 2 sets - 10 reps  - Hooklying Clamshell with Resistance  - 1 x daily - 7 x weekly - 2 sets - 10 reps - red theraband  - Standing Heel Raise  - 1 x daily - 7 x weekly - 1 sets - 10 reps  - Standing Hip Abduction with Counter Support  - 1 x daily - 7 x weekly - 1 sets - 10 reps  - Standing Hip Extension with Counter Support  - 1 x daily - 7 x weekly - 1 sets - 10 reps  - Standing Knee Flexion AROM with Chair Support  - 1 x daily - 7 x weekly - 1 sets - 10 reps  Access Code: [email or web address]  URL: [email or web address]  Date: 05/20/2025  Prepared by: Marion Lausch    Exercises  - Supine Straight Leg Raises  - 1 x daily - 7 x weekly - 2 sets - 10 reps  - Supine Bridge  - 1 x daily - 7 x weekly - 2 sets - 10 reps  - Sidelying Hip Abduction  - 1 x daily - 4 x weekly - 3 sets - 10 reps  - Standing Heel Raise  - 1 x daily - 7 x weekly - 1 sets - 10 reps  - Standing Hip Extension with Counter Support  - 1 x daily - 7 x weekly - 1 sets - 10 reps  - Standing Hip Abduction with Counter Support  - 1 x daily - 7 x weekly - 3 sets - 10 reps  - Standing Knee Flexion AROM with Chair Support  - 1 x daily - 7 x weekly - 1 sets - 10 reps  - Toe Raise With Back Against Wall  - 1 x daily - 7 x weekly - 2 sets - 20 reps  - Gastroc Stretch with Foot at Wall  - 7 x weekly - 30s hold        HEP  Access Code: [email or web address]  URL: [email or web address]  Date: 05/20/2025  Prepared by: Marion Lausch    Exercises  - Supine Straight Leg Raises  - 1 x daily - 7 x weekly - 2 sets - 10 reps  - Supine Bridge  - 1 x daily - 7 x weekly - 2 sets - 10 reps  - Sidelying Hip Abduction  - 1 x daily - 4 x weekly - 3 sets - 10 reps  - Standing Heel Raise  - 1 x daily - 7 x weekly - 1 sets - 10 reps  - Standing Hip Extension with Counter Support  - 1 x daily - 7 x weekly - 1 sets - 10 reps  - Standing Hip Abduction with Counter  Support  - 1 x daily - 7 x weekly - 3 sets - 10 reps  - Standing Knee Flexion AROM with Chair Support  - 1 x daily - 7 x weekly - 1 sets - 10 reps  - Toe Raise With Back Against Wall  - 1 x daily - 7 x weekly - 2 sets - 20 reps  - Gastroc Stretch with Foot at Wall  - 7 x weekly - 30s hold    Charges     2 man, 1 TE

## 2025-05-22 ENCOUNTER — OFFICE VISIT (OUTPATIENT)
Dept: PHYSICAL THERAPY | Age: 65
End: 2025-05-22
Attending: ORTHOPAEDIC SURGERY
Payer: MEDICAID

## 2025-05-22 DIAGNOSIS — M05.741 RHEUMATOID ARTHRITIS INVOLVING BOTH HANDS WITH POSITIVE RHEUMATOID FACTOR (HCC): Primary | ICD-10-CM

## 2025-05-22 DIAGNOSIS — M05.742 RHEUMATOID ARTHRITIS INVOLVING BOTH HANDS WITH POSITIVE RHEUMATOID FACTOR (HCC): Primary | ICD-10-CM

## 2025-05-22 PROCEDURE — 97140 MANUAL THERAPY 1/> REGIONS: CPT

## 2025-05-22 PROCEDURE — 97110 THERAPEUTIC EXERCISES: CPT

## 2025-05-22 RX ORDER — METHOTREXATE 2.5 MG/1
TABLET ORAL
Qty: 104 TABLET | Refills: 0 | Status: SHIPPED | OUTPATIENT
Start: 2025-05-22

## 2025-05-22 NOTE — PROGRESS NOTES
Patient: Darrin Gerber (65 year old, female) Referring Provider:  Insurance:   Diagnosis: Status post left knee replacement (Z96.652)  Primary osteoarthritis of left knee (M17.12) Jacky Bautista  [facility] MEDICAID   Date of Surgery: 4/8/25 Next MD visit:  N/A   Precautions:  None 3/24/25 Referral Information:    Date of Evaluation: Req: 15, Auth: 15, Exp: 6/22/2025 04/23/25 POC Auth Visits:  15       Today's Date   5/22/2025    Subjective  Cont to report pain in superior portion of L calf.  \"I don't know when I will ever feel better\".  Cont HEP and walking daily.       Pain: 4/10     Objective          Observation: new onset redness, 2 small bumps with visible incision coming to surface  Palpation: Tender to touch medial and lateral L knee, L superior calf     ROM and Strength: (* denotes performed with pain)  Demos hip strength 3+/5 with transfers and TE            Knee    ROM MMT (-/5)    R L R L     Flex 135 135 5 4+*     Ext (L3) 0 0 4+ 4+*      Balance and Functional Mobility:  Gait: pt ambulates on level ground with assistive device; decreased step length; decreased stance phase; decreased foot clearance; stooped posture/forward lean   Functional Mobility:  MOD I with STS              Assessment  Cont to tolerate progressions but cont to have strength difficulties with 6\" step up    Goals (to be met in 12 visits)      Not Met Progress Toward Partially Met Met   Pt will improve knee extension ROM to 0 deg to allow proper heel strike during gait and terminal knee extension in stance. [] [] [] []   Pt will improve knee AROM flexion to >120 degrees to improve ability to perform stairs. [] [] [] []   Pt will improve quad strength to 5/5 to ascend 1 flight of stairs reciprocally without UE assist. [] [] [] []   Pt will increase hip and knee strength to grossly 4+/5 to be able to get up and down from the floor safely. [] [] [] []   Pt will demonstrate increased hip ER/ABD strength to 4/5 to perform stepping and  squatting activities without excessive femoral IR/ADD. [] [] [] []   Pt will improve SLS to 10s to improve safety with gait on uneven surfaces such as grass and gravel. [] [] [] []   Pt will be independent and compliant with comprehensive HEP to maintain progress achieved in PT. [] [] [] []                Plan  Discharge next session    Treatment Last 4 Visits  Treatment Day: 9       5/13/2025 5/15/2025 5/20/2025 5/22/2025   LE Treatment   Therapeutic Exercise SAQ 2 x 10 (L)  SLR 2 x 10 (L)  AROM Heel Slides x 10 (L)  S/L Hip Abd 2 x 10 (L)  STS with hands on thighs, 2 x 10  Step up, 4\" x10 ea R/L Calf stretch at wall, 2x30s B  Step up, 6\" box x10 B  Hip abd at bar, x10 B  Calf raises x20 QS x20 into towel roll  SLR x20, extensor lag at last few reps  SL hip abd x20  Seated LAQ, 2# ankle weight, x20  6\" step up, UTT   4\" step up, x10 B  DF at wall, x20  Calf stretch at wall, 2x30s B  Calf raises x20 QS x20 on L into towel roll  SLR x20 B, extensor lag at last few reps  SL hip abd x20 on L  4\" step up, 2x10 B  DF at wall, x20  Calf stretch at wall, 2x30s B  Calf raises x20   Manual Therapy Cupping/STM (Prone): Gastroc (L) STM/MFR to L calf including trigger pt release STM/MFR to L calf including trigger pt release  Patellar mobilization grade 3, all planes x60s STM/MFR to L calf including trigger pt release  Patellar mobilization grade 3, all planes x60s   Therapeutic Exercise Minutes 28 10 15 25   Manual Therapy Minutes 15 30 25 15   Total Time Of Timed Procedures 43 40 40 40   Total Time Of Service-Based Procedures 0 0 0 0   Total Treatment Time 43 40 40 40   HEP Access Code: [email or web address]  URL: [email or web address]  Date: 04/23/2025  Prepared by: Marion Florez     Exercises  - Supine Straight Leg Raises  - 1 x daily - 7 x weekly - 2 sets - 10 reps  - Supine Bridge  - 1 x daily - 7 x weekly - 2 sets - 10 reps  - Hooklying Clamshell with Resistance  - 1 x daily - 7 x weekly - 2 sets - 10 reps - red  theraband  - Standing Heel Raise  - 1 x daily - 7 x weekly - 1 sets - 10 reps  - Standing Hip Abduction with Counter Support  - 1 x daily - 7 x weekly - 1 sets - 10 reps  - Standing Hip Extension with Counter Support  - 1 x daily - 7 x weekly - 1 sets - 10 reps  - Standing Knee Flexion AROM with Chair Support  - 1 x daily - 7 x weekly - 1 sets - 10 reps  Access Code: [email or web address]  URL: [email or web address]  Date: 05/20/2025  Prepared by: Marion Lausch    Exercises  - Supine Straight Leg Raises  - 1 x daily - 7 x weekly - 2 sets - 10 reps  - Supine Bridge  - 1 x daily - 7 x weekly - 2 sets - 10 reps  - Sidelying Hip Abduction  - 1 x daily - 4 x weekly - 3 sets - 10 reps  - Standing Heel Raise  - 1 x daily - 7 x weekly - 1 sets - 10 reps  - Standing Hip Extension with Counter Support  - 1 x daily - 7 x weekly - 1 sets - 10 reps  - Standing Hip Abduction with Counter Support  - 1 x daily - 7 x weekly - 3 sets - 10 reps  - Standing Knee Flexion AROM with Chair Support  - 1 x daily - 7 x weekly - 1 sets - 10 reps  - Toe Raise With Back Against Wall  - 1 x daily - 7 x weekly - 2 sets - 20 reps  - Gastroc Stretch with Foot at Wall  - 7 x weekly - 30s hold Access Code: [email or web address]  URL: [email or web address]  Date: 05/22/2025  Prepared by: Marion Lausch    Exercises  - Supine Straight Leg Raises  - 1 x daily - 7 x weekly - 2 sets - 10 reps  - Supine Bridge  - 1 x daily - 7 x weekly - 2 sets - 10 reps  - Sidelying Hip Abduction  - 1 x daily - 4 x weekly - 3 sets - 10 reps  - Standing Heel Raise  - 1 x daily - 7 x weekly - 1 sets - 10 reps  - Standing Hip Extension with Counter Support  - 1 x daily - 7 x weekly - 1 sets - 10 reps  - Standing Hip Abduction with Counter Support  - 1 x daily - 7 x weekly - 3 sets - 10 reps  - Standing Knee Flexion AROM with Chair Support  - 1 x daily - 7 x weekly - 1 sets - 10 reps  - Toe Raise With Back Against Wall  - 1 x daily - 7 x weekly - 2 sets - 20 reps  -  Gastroc Stretch with Foot at Wall  - 7 x weekly - 30s hold        HEP  Access Code: [email or web address]  URL: [email or web address]  Date: 05/22/2025  Prepared by: Marion Florez    Exercises  - Supine Straight Leg Raises  - 1 x daily - 7 x weekly - 2 sets - 10 reps  - Supine Bridge  - 1 x daily - 7 x weekly - 2 sets - 10 reps  - Sidelying Hip Abduction  - 1 x daily - 4 x weekly - 3 sets - 10 reps  - Standing Heel Raise  - 1 x daily - 7 x weekly - 1 sets - 10 reps  - Standing Hip Extension with Counter Support  - 1 x daily - 7 x weekly - 1 sets - 10 reps  - Standing Hip Abduction with Counter Support  - 1 x daily - 7 x weekly - 3 sets - 10 reps  - Standing Knee Flexion AROM with Chair Support  - 1 x daily - 7 x weekly - 1 sets - 10 reps  - Toe Raise With Back Against Wall  - 1 x daily - 7 x weekly - 2 sets - 20 reps  - Gastroc Stretch with Foot at Wall  - 7 x weekly - 30s hold    Charges     1 man, 2 TE

## 2025-05-22 NOTE — TELEPHONE ENCOUNTER
Last office visit: 02/04/2025    Next Rheum Apt:7/7/2025 Estela Fuller MD    Last fill: 05/13/2025    QTY: 8    Refills: 0    Labs:   Lab Results   Component Value Date    CREATSERUM 0.97 05/12/2025    GFR 60 06/22/2017    ALKPHO 80 05/12/2025    AST 25 05/12/2025    ALT 13 05/12/2025    BILT 0.5 05/12/2025    TP 7.2 05/12/2025    ALB 4.2 05/12/2025       Lab Results   Component Value Date    WBC 7.2 05/12/2025    HGB 10.7 (L) 05/12/2025    .0 05/12/2025    NEPRELIM 4.60 05/12/2025    NEPERCENT 63.8 05/12/2025    LYPERCENT 24.6 05/12/2025    NE 4.60 05/12/2025    LYMABS 1.77 05/12/2025

## 2025-05-27 ENCOUNTER — APPOINTMENT (OUTPATIENT)
Dept: PHYSICAL THERAPY | Age: 65
End: 2025-05-27
Attending: ORTHOPAEDIC SURGERY
Payer: MEDICAID

## 2025-05-27 RX ORDER — HYDROCHLOROTHIAZIDE 25 MG/1
25 TABLET ORAL DAILY
Qty: 90 TABLET | Refills: 1 | Status: SHIPPED | OUTPATIENT
Start: 2025-05-27

## 2025-05-27 NOTE — TELEPHONE ENCOUNTER
Requesting Hydrochlorothiazide 25mg  LOV: 5/6/25  RTC: 3 months  Last Relevant Labs: 5/12/25  Filled: 12/12/24 #90 with 1 refills    Future Appointments   Date Time Provider Department Center   5/29/2025  2:00 PM Marion Florez, PT [facility] [place]   6/3/2025 12:15 PM Marion Florez, PT [facility] [place]   6/5/2025  2:00 PM Marion Florez, PT [facility] [place]   7/7/2025  2:15 PM Estela Fuller MD [facility] [place]     Hypertension Medications Protocol [id number]/26/2025 10:46 AM   Protocol Details   CMP or BMP in past 12 months    Last BP reading less than 140/90    In person appointment or virtual visit in the past 12 mos or appointment in next 3 mos    EGFRCR or GFRNAA > 50    Medication is active on med list     Rx sent to pharmacy per protocol

## 2025-05-29 ENCOUNTER — APPOINTMENT (OUTPATIENT)
Dept: PHYSICAL THERAPY | Age: 65
End: 2025-05-29
Attending: ORTHOPAEDIC SURGERY
Payer: MEDICAID

## 2025-06-02 ENCOUNTER — TELEPHONE (OUTPATIENT)
Dept: PHYSICAL THERAPY | Facility: HOSPITAL | Age: 65
End: 2025-06-02

## 2025-06-03 ENCOUNTER — APPOINTMENT (OUTPATIENT)
Dept: PHYSICAL THERAPY | Age: 65
End: 2025-06-03
Attending: ORTHOPAEDIC SURGERY
Payer: MEDICAID

## 2025-06-05 ENCOUNTER — APPOINTMENT (OUTPATIENT)
Dept: PHYSICAL THERAPY | Age: 65
End: 2025-06-05
Attending: ORTHOPAEDIC SURGERY
Payer: MEDICAID

## 2025-06-11 ENCOUNTER — TELEPHONE (OUTPATIENT)
Dept: PHYSICAL THERAPY | Facility: HOSPITAL | Age: 65
End: 2025-06-11

## 2025-06-11 ENCOUNTER — APPOINTMENT (OUTPATIENT)
Dept: PHYSICAL THERAPY | Age: 65
End: 2025-06-11
Attending: ORTHOPAEDIC SURGERY
Payer: MEDICAID

## 2025-06-11 ENCOUNTER — TELEPHONE (OUTPATIENT)
Dept: PHYSICAL THERAPY | Age: 65
End: 2025-06-11

## 2025-06-17 ENCOUNTER — APPOINTMENT (OUTPATIENT)
Dept: PHYSICAL THERAPY | Age: 65
End: 2025-06-17
Attending: ORTHOPAEDIC SURGERY
Payer: MEDICAID

## 2025-06-19 ENCOUNTER — APPOINTMENT (OUTPATIENT)
Dept: PHYSICAL THERAPY | Age: 65
End: 2025-06-19
Attending: ORTHOPAEDIC SURGERY
Payer: MEDICAID

## 2025-06-25 ENCOUNTER — TELEPHONE (OUTPATIENT)
Dept: PAIN CLINIC | Facility: CLINIC | Age: 65
End: 2025-06-25

## 2025-06-25 ENCOUNTER — APPOINTMENT (OUTPATIENT)
Dept: PHYSICAL THERAPY | Age: 65
End: 2025-06-25
Attending: ORTHOPAEDIC SURGERY
Payer: MEDICAID

## 2025-06-25 NOTE — TELEPHONE ENCOUNTER
Contacted the patient's daughter and advised that the MRI order for the lumbar and neck are both good for a year. The lumbar will not be  until 2025, and the neck is good until 3/20/26. Advised  Trisha to call central scheduling to schedule.

## 2025-06-25 NOTE — TELEPHONE ENCOUNTER
Patient's daughter Trisha called stating that the MRI order that Dr gave to patient has since  and daughter was requesting a new one.    Patient's daughter was asking for a call back.

## 2025-06-27 DIAGNOSIS — D50.9 IRON DEFICIENCY ANEMIA, UNSPECIFIED IRON DEFICIENCY ANEMIA TYPE: ICD-10-CM

## 2025-06-27 RX ORDER — LIDOCAINE HYDROCHLORIDE 20 MG/ML
1 SOLUTION ORAL; TOPICAL DAILY
Qty: 90 TABLET | Refills: 3 | OUTPATIENT
Start: 2025-06-27

## 2025-06-30 ENCOUNTER — APPOINTMENT (OUTPATIENT)
Dept: PHYSICAL THERAPY | Age: 65
End: 2025-06-30
Attending: ORTHOPAEDIC SURGERY
Payer: MEDICAID

## 2025-07-01 RX ORDER — ATORVASTATIN CALCIUM 10 MG/1
10 TABLET, FILM COATED ORAL EVERY MORNING
Qty: 30 TABLET | Refills: 4 | Status: SHIPPED | OUTPATIENT
Start: 2025-07-01

## 2025-07-01 RX ORDER — OMEPRAZOLE 20 MG/1
20 CAPSULE, DELAYED RELEASE ORAL 2 TIMES DAILY
Qty: 60 CAPSULE | Refills: 0 | Status: SHIPPED | OUTPATIENT
Start: 2025-07-01

## 2025-07-01 NOTE — TELEPHONE ENCOUNTER
Requesting Atorvastatin 10mg  Filled: 1/15/25 #30 with 4 refills  Cholesterol Medication Protocol [id number]/27/2025 11:26 AM   Protocol Details   ALT < 80    ALT resulted within past year    Lipid panel within past 12 months    In person appointment or virtual visit in the past 12 mos or appointment in next 3 mos    Medication is active on med list     Requesting Omeprazole 20mg  Filled: 1/15/25 #60 with 4 refills  Gastrointestional Medication Protocol [id number]/01/2025 10:17 AM   Protocol Details   Medication is active on med list    In person appointment or virtual visit in the past 12 mos or appointment in next 3 mos     LOV: 5/6/25  RTC: 3 months  Last Relevant Labs: 5/12/25    Future Appointments   Date Time Provider Department Center   7/7/2025  2:15 PM Estela Fuller MD [facility] [place]   7/23/2025 10:15 AM PF MRI RM1 (1.5T) PF MRI [place]   7/23/2025 11:00 AM PF MRI RM1 (1.5T) PF [facility] [place]   8/13/2025 12:20 PM PF GEORGINA RM2 PF [facility] [place]     Rx sent to pharmacy per protocol

## 2025-07-02 ENCOUNTER — APPOINTMENT (OUTPATIENT)
Dept: PHYSICAL THERAPY | Age: 65
End: 2025-07-02
Attending: ORTHOPAEDIC SURGERY

## 2025-07-03 ENCOUNTER — APPOINTMENT (OUTPATIENT)
Dept: PHYSICAL THERAPY | Age: 65
End: 2025-07-03
Attending: ORTHOPAEDIC SURGERY

## 2025-07-07 ENCOUNTER — APPOINTMENT (OUTPATIENT)
Dept: PHYSICAL THERAPY | Age: 65
End: 2025-07-07
Attending: ORTHOPAEDIC SURGERY

## 2025-07-09 ENCOUNTER — APPOINTMENT (OUTPATIENT)
Dept: PHYSICAL THERAPY | Age: 65
End: 2025-07-09
Attending: ORTHOPAEDIC SURGERY

## 2025-07-10 DIAGNOSIS — D50.9 IRON DEFICIENCY ANEMIA, UNSPECIFIED IRON DEFICIENCY ANEMIA TYPE: ICD-10-CM

## 2025-07-11 RX ORDER — LIDOCAINE HYDROCHLORIDE 20 MG/ML
1 SOLUTION ORAL; TOPICAL DAILY
Qty: 90 TABLET | Refills: 10 | OUTPATIENT
Start: 2025-07-11

## 2025-07-16 DIAGNOSIS — D50.9 IRON DEFICIENCY ANEMIA, UNSPECIFIED IRON DEFICIENCY ANEMIA TYPE: ICD-10-CM

## 2025-07-16 RX ORDER — LIDOCAINE HYDROCHLORIDE 20 MG/ML
1 SOLUTION ORAL; TOPICAL DAILY
Qty: 90 TABLET | Refills: 10 | OUTPATIENT
Start: 2025-07-16

## 2025-07-19 ENCOUNTER — MOBILE ENCOUNTER (OUTPATIENT)
Dept: RHEUMATOLOGY | Facility: CLINIC | Age: 65
End: 2025-07-19

## 2025-07-19 RX ORDER — METHYLPREDNISOLONE 4 MG/1
TABLET ORAL
Qty: 1 EACH | Refills: 0 | Status: SHIPPED | OUTPATIENT
Start: 2025-07-19

## 2025-07-21 ENCOUNTER — TELEPHONE (OUTPATIENT)
Facility: CLINIC | Age: 65
End: 2025-07-21

## 2025-07-21 NOTE — TELEPHONE ENCOUNTER
Call or msg pt to f/u on RA status. She received medrol dose pack from Dr. Cates over the weekend.

## 2025-07-21 NOTE — TELEPHONE ENCOUNTER
Called pt to get an condition update. No answer. Left detailed message to call the office back. MCM also sent with the same information.

## 2025-07-22 ENCOUNTER — TELEPHONE (OUTPATIENT)
Dept: PHYSICAL THERAPY | Age: 65
End: 2025-07-22

## 2025-07-22 NOTE — TELEPHONE ENCOUNTER
Daughter reports that pt cont to have limitations in walking because of pain.  Cont to have radicular sx as well.  Recommended one order to tx LBP pending MRI results from tomorrow.  Will wait for daughter to call back to R/S for new POC   Impulsivity/Severe anxiety, agitation or panic

## 2025-07-23 ENCOUNTER — LAB ENCOUNTER (OUTPATIENT)
Dept: LAB | Age: 65
End: 2025-07-23
Attending: ANESTHESIOLOGY
Payer: MEDICAID

## 2025-07-23 ENCOUNTER — HOSPITAL ENCOUNTER (OUTPATIENT)
Dept: MRI IMAGING | Age: 65
Discharge: HOME | End: 2025-07-23
Attending: ANESTHESIOLOGY
Payer: MEDICAID

## 2025-07-23 DIAGNOSIS — M54.16 LUMBAR RADICULITIS: ICD-10-CM

## 2025-07-23 DIAGNOSIS — K11.8 PAROTID MASS: ICD-10-CM

## 2025-07-23 DIAGNOSIS — M05.741 RHEUMATOID ARTHRITIS INVOLVING BOTH HANDS WITH POSITIVE RHEUMATOID FACTOR (HCC): ICD-10-CM

## 2025-07-23 DIAGNOSIS — M05.742 RHEUMATOID ARTHRITIS INVOLVING BOTH HANDS WITH POSITIVE RHEUMATOID FACTOR (HCC): ICD-10-CM

## 2025-07-23 DIAGNOSIS — D84.821 IMMUNODEFICIENCY DUE TO DRUG THERAPY (HCC): ICD-10-CM

## 2025-07-23 DIAGNOSIS — Z79.899 IMMUNODEFICIENCY DUE TO DRUG THERAPY (HCC): ICD-10-CM

## 2025-07-23 LAB
ALBUMIN SERPL-MCNC: 4.1 G/DL (ref 3.2–4.8)
ALBUMIN/GLOB SERPL: 1.4 {RATIO} (ref 1–2)
ALP LIVER SERPL-CCNC: 77 U/L (ref 55–142)
ALT SERPL-CCNC: 13 U/L (ref 10–49)
ANION GAP SERPL CALC-SCNC: 9 MMOL/L (ref 0–18)
AST SERPL-CCNC: 21 U/L (ref ?–34)
BASOPHILS # BLD AUTO: 0.02 X10(3) UL (ref 0–0.2)
BASOPHILS NFR BLD AUTO: 0.2 %
BILIRUB SERPL-MCNC: 0.4 MG/DL (ref 0.2–1.1)
BUN BLD-MCNC: 15 MG/DL (ref 9–23)
CALCIUM BLD-MCNC: 9.8 MG/DL (ref 8.7–10.6)
CHLORIDE SERPL-SCNC: 95 MMOL/L (ref 98–112)
CO2 SERPL-SCNC: 29 MMOL/L (ref 21–32)
CREAT BLD-MCNC: 1.03 MG/DL (ref 0.55–1.02)
EGFRCR SERPLBLD CKD-EPI 2021: 60 ML/MIN/1.73M2 (ref 60–?)
EOSINOPHIL # BLD AUTO: 0.11 X10(3) UL (ref 0–0.7)
EOSINOPHIL NFR BLD AUTO: 1.3 %
ERYTHROCYTE [DISTWIDTH] IN BLOOD BY AUTOMATED COUNT: 13.8 %
FASTING STATUS PATIENT QL REPORTED: YES
GLOBULIN PLAS-MCNC: 3 G/DL (ref 2–3.5)
GLUCOSE BLD-MCNC: 96 MG/DL (ref 70–99)
HCT VFR BLD AUTO: 32.1 % (ref 35–48)
HGB BLD-MCNC: 10.9 G/DL (ref 12–16)
IMM GRANULOCYTES # BLD AUTO: 0.02 X10(3) UL (ref 0–1)
IMM GRANULOCYTES NFR BLD: 0.2 %
LYMPHOCYTES # BLD AUTO: 2.04 X10(3) UL (ref 1–4)
LYMPHOCYTES NFR BLD AUTO: 25 %
MCH RBC QN AUTO: 30.9 PG (ref 26–34)
MCHC RBC AUTO-ENTMCNC: 34 G/DL (ref 31–37)
MCV RBC AUTO: 90.9 FL (ref 80–100)
MONOCYTES # BLD AUTO: 0.69 X10(3) UL (ref 0.1–1)
MONOCYTES NFR BLD AUTO: 8.5 %
NEUTROPHILS # BLD AUTO: 5.27 X10 (3) UL (ref 1.5–7.7)
NEUTROPHILS # BLD AUTO: 5.27 X10(3) UL (ref 1.5–7.7)
NEUTROPHILS NFR BLD AUTO: 64.8 %
OSMOLALITY SERPL CALC.SUM OF ELEC: 277 MOSM/KG (ref 275–295)
PLATELET # BLD AUTO: 374 10(3)UL (ref 150–450)
POTASSIUM SERPL-SCNC: 4.1 MMOL/L (ref 3.5–5.1)
PROT SERPL-MCNC: 7.1 G/DL (ref 5.7–8.2)
RBC # BLD AUTO: 3.53 X10(6)UL (ref 3.8–5.3)
SODIUM SERPL-SCNC: 133 MMOL/L (ref 136–145)
WBC # BLD AUTO: 8.2 X10(3) UL (ref 4–11)

## 2025-07-23 PROCEDURE — 80053 COMPREHEN METABOLIC PANEL: CPT

## 2025-07-23 PROCEDURE — 72148 MRI LUMBAR SPINE W/O DYE: CPT | Performed by: ANESTHESIOLOGY

## 2025-07-23 PROCEDURE — 70543 MRI ORBT/FAC/NCK W/O &W/DYE: CPT | Performed by: ANESTHESIOLOGY

## 2025-07-23 PROCEDURE — 36415 COLL VENOUS BLD VENIPUNCTURE: CPT

## 2025-07-23 PROCEDURE — A9575 INJ GADOTERATE MEGLUMI 0.1ML: HCPCS | Performed by: ANESTHESIOLOGY

## 2025-07-23 PROCEDURE — 85025 COMPLETE CBC W/AUTO DIFF WBC: CPT

## 2025-07-23 RX ORDER — GADOTERATE MEGLUMINE 376.9 MG/ML
20 INJECTION INTRAVENOUS
Status: COMPLETED | OUTPATIENT
Start: 2025-07-23 | End: 2025-07-23

## 2025-07-23 RX ADMIN — GADOTERATE MEGLUMINE 20 ML: 376.9 INJECTION INTRAVENOUS at 11:58:00

## 2025-07-29 RX ORDER — OMEPRAZOLE 20 MG/1
20 CAPSULE, DELAYED RELEASE ORAL 2 TIMES DAILY
Qty: 60 CAPSULE | Refills: 0 | Status: SHIPPED | OUTPATIENT
Start: 2025-07-29

## 2025-08-19 DIAGNOSIS — M05.742 RHEUMATOID ARTHRITIS INVOLVING BOTH HANDS WITH POSITIVE RHEUMATOID FACTOR (HCC): Primary | ICD-10-CM

## 2025-08-19 DIAGNOSIS — M05.741 RHEUMATOID ARTHRITIS INVOLVING BOTH HANDS WITH POSITIVE RHEUMATOID FACTOR (HCC): Primary | ICD-10-CM

## 2025-08-19 RX ORDER — METHOTREXATE 2.5 MG/1
TABLET ORAL
Qty: 104 TABLET | Refills: 0 | Status: SHIPPED | OUTPATIENT
Start: 2025-08-19

## 2025-08-24 DIAGNOSIS — M05.742 RHEUMATOID ARTHRITIS INVOLVING BOTH HANDS WITH POSITIVE RHEUMATOID FACTOR (HCC): Primary | ICD-10-CM

## 2025-08-24 DIAGNOSIS — M05.741 RHEUMATOID ARTHRITIS INVOLVING BOTH HANDS WITH POSITIVE RHEUMATOID FACTOR (HCC): Primary | ICD-10-CM

## 2025-08-25 RX ORDER — FOLIC ACID 1 MG/1
2 TABLET ORAL DAILY
Qty: 180 TABLET | Refills: 3 | Status: SHIPPED | OUTPATIENT
Start: 2025-08-25

## (undated) DIAGNOSIS — M05.742 RHEUMATOID ARTHRITIS INVOLVING BOTH HANDS WITH POSITIVE RHEUMATOID FACTOR (HCC): Primary | ICD-10-CM

## (undated) DIAGNOSIS — M05.741 RHEUMATOID ARTHRITIS INVOLVING BOTH HANDS WITH POSITIVE RHEUMATOID FACTOR (HCC): Primary | ICD-10-CM

## (undated) DIAGNOSIS — Z51.81 THERAPEUTIC DRUG MONITORING: ICD-10-CM

## (undated) DIAGNOSIS — E53.8 LOW VITAMIN B12 LEVEL: ICD-10-CM

## (undated) DIAGNOSIS — M48.061 SPINAL STENOSIS OF LUMBAR REGION, UNSPECIFIED WHETHER NEUROGENIC CLAUDICATION PRESENT: Primary | ICD-10-CM

## (undated) DEVICE — TOTAL HIP CDS: Brand: MEDLINE INDUSTRIES, INC.

## (undated) DEVICE — GLOVE SUR 8.5 SENSICARE PI PIP GRN PWD F

## (undated) DEVICE — STRYKER PERFORMANCE SERIES SAGITTAL BLADE: Brand: STRYKER PERFORMANCE SERIES

## (undated) DEVICE — AVANOS* TUOHY EPIDURAL NEEDLE: Brand: AVANOS

## (undated) DEVICE — SYRINGE MED 30ML STD CLR PLAS LL TIP N CTRL

## (undated) DEVICE — SOLUTION PREP 4OZ 10% POVIDONE IOD SCR TOP

## (undated) DEVICE — GLOVE SURG SENSICARE SZ 7-1/2

## (undated) DEVICE — SKIN MARKER DUAL TIP WITH RULER CAP AND LABELS: Brand: DEVON

## (undated) DEVICE — GLOVE SURG SENSICARE SZ 7

## (undated) DEVICE — SKIN REG/FINE DUAL MARKER, RULER, LABELS: Brand: MEDLINE

## (undated) DEVICE — BANDAID COVERLET 1X3

## (undated) DEVICE — GOWN,SIRUS,FABRIC-REINFORCED,X-LARGE: Brand: MEDLINE

## (undated) DEVICE — GLOVE SUR 7.5 SENSICARE PI PIP CRM PWD F

## (undated) DEVICE — THREADED PINS PACK: Brand: KNEE INSTRUMENTS

## (undated) DEVICE — PAIN TRAY: Brand: MEDLINE INDUSTRIES, INC.

## (undated) DEVICE — SCREWS PACK: Brand: KNEE INSTRUMENTS

## (undated) DEVICE — 3M™ IOBAN™ 2 ANTIMICROBIAL INCISE DRAPE 6651EZ: Brand: IOBAN™ 2

## (undated) DEVICE — SWORD PIN PACK: Brand: KNEE INSTRUMENTS

## (undated) DEVICE — REMOVER LOT 4OZ N IRRIG UNSCNT SFT MOIST LIQ

## (undated) DEVICE — DRESSING ANTIMIC 3.5 X 12 IN AQCEL AG ADVNTG

## (undated) DEVICE — DRAPE,U/SHT,SPLIT,FILM,60X84,STERILE: Brand: MEDLINE

## (undated) DEVICE — NEEDLE SPINAL 22X5 405148

## (undated) DEVICE — NEEDLE SPINAL 22X3-1/2 BLK

## (undated) DEVICE — PREMIUM WET SKIN PREP TRAY: Brand: MEDLINE INDUSTRIES, INC.

## (undated) DEVICE — TIP CLEANER: Brand: VALLEYLAB

## (undated) DEVICE — WRAP COMPR UNIV KNEE HOT CLD GEL MICWV AND

## (undated) DEVICE — SYRINGE 10ML SLIP TIP LOSS OF RESIST PLAS

## (undated) DEVICE — PADDING CAST 4INX4YD 100% COT SFT SLF BOND

## (undated) DEVICE — ANTISEPTIC 4OZ 70% ISO ALC

## (undated) DEVICE — REMOVER PREP SOLUTION 4OZ

## (undated) DEVICE — GLOVE SUR 7.5 SENSICARE PIP WHT PWD F

## (undated) DEVICE — NEEDLE SPNL 18GA L3.5IN PNK QNCKE STYL DISP

## (undated) DEVICE — NEPTUNE E-SEP SMOKE EVACUATION PENCIL, COATED, 70MM BLADE, PUSH BUTTON SWITCH: Brand: NEPTUNE E-SEP

## (undated) DEVICE — BANDAGE,COHESIVE,TAN,4X5YD,LF,STRL: Brand: MEDLINE

## (undated) DEVICE — RECIPROCATING BLADE, DOUBLE SIDED, OFFSET  (70.0 X 1.0 X 12.5MM)

## (undated) DEVICE — HOOD: Brand: FLYTE

## (undated) DEVICE — MARKER SKIN 2 TIP

## (undated) DEVICE — LAPAROTOMY SPONGE - RF AND X-RAY DETECTABLE PRE-WASHED: Brand: SITUATE

## (undated) DEVICE — SUT ETHBND XL 5 30IN V-37 NABSRB GRN 40MM 1/2

## (undated) DEVICE — ANTIBACTERIAL UNDYED BRAIDED (POLYGLACTIN 910), SYNTHETIC ABSORBABLE SUTURE: Brand: COATED VICRYL

## (undated) DEVICE — Device

## (undated) DEVICE — GLOVE SURG SENSICARE SZ 6-1/2

## (undated) DEVICE — GOWN,SIRUS,FABRNF,XL,20/CS: Brand: MEDLINE

## (undated) DEVICE — REMOVER DURAPREP 3M

## (undated) DEVICE — GLOVE SUR 8 SENSICARE PI PIP CRM PWD F

## (undated) DEVICE — HOOD, PEEL-AWAY: Brand: FLYTE

## (undated) DEVICE — TOWEL,OR,DSP,ST,BLUE,DLX,2/PK,40PK/CS: Brand: MEDLINE

## (undated) DEVICE — GLOVE SUR 7.5 SENSICARE PI PIP GRN PWD F

## (undated) DEVICE — BANDAGE ADH 1INX3IN NAT FAB N ADH PD CURAD

## (undated) DEVICE — GLOVE SUR 6.5 SENSICARE PIP WHT PWD F

## (undated) DEVICE — CONTAINER,SPECIMEN,PNEU TUBE,4OZ,OR STRL: Brand: MEDLINE

## (undated) DEVICE — SYRINGE 10ML LL CONTRL SYRINGE

## (undated) DEVICE — DISPOSABLE TOURNIQUET CUFF SINGLE BLADDER, DUAL PORT AND QUICK CONNECT CONNECTOR: Brand: COLOR CUFF

## (undated) DEVICE — DRAPE,TOWEL,LARGE,INVISISHIELD: Brand: MEDLINE

## (undated) DEVICE — BANDAID CURAD 3IN X 1IN

## (undated) DEVICE — NEEDLE SPNL 22GA L3.5IN BLK QNCKE STYL DISP

## (undated) DEVICE — SLEEVE COMPR MD KNEE LEN SGL USE KENDALL SCD

## (undated) DEVICE — SMOOTH PINS PACK: Brand: KNEE INSTRUMENTS

## (undated) DEVICE — SOLUTION IRRIG 3000ML 0.9% NACL FLX CONT

## (undated) DEVICE — SOLUTION SCRB 4OZ 4% CHG ANTISEPSIS HIBICLN

## (undated) DEVICE — DRESSING AQUACEL W/ SILVER 3.5 X12 IN

## (undated) DEVICE — COVER,LIGHT,CAMERA,HARD,1/PK,STRL: Brand: MEDLINE

## (undated) DEVICE — SHORT THREADED PINS PACK: Brand: KNEE INSTRUMENTS

## (undated) DEVICE — GLOVE,SURG,SENSICARE,ALOE,LF,PF,7: Brand: MEDLINE

## (undated) DEVICE — 3 BONE CEMENT MIXER: Brand: MIXEVAC

## (undated) NOTE — LETTER
5/7/2021        May 7, 2021      [place], [address] [place] [address] [address]  Fax: [phone]    Dear Dr. Malik Schreiber: Thank you for referring your patient to me for an evaluation.  Please see my attached note for my findings and Review of Systems:  General: Denies any fevers, weight loss  Respiratory: Denies any SOB, SAMPSON, cough  Cardiovascular: Denies any chest pain, palpitations   MSK: see HPI  Skin: Denies any rash or nodules       14 point ROS negative except noted above    Med Disp: 90 tablet, Rfl: 1      Modified Medications    No medications on file     There are no discontinued medications.   Past Medical History:  Past Medical History:   Diagnosis Date   • Arthritis    • Brain tumor (benign) ([facility] [address]    • Esophageal reflux    • Lab Results   Component Value Date    WBC 9.5 03/08/2021    RBC 4.36 03/08/2021    HGB 12.1 03/08/2021    HCT 37.4 03/08/2021    .0 03/08/2021    MCV 85.8 03/08/2021    MCH 27.8 03/08/2021    MCHC 32.4 03/08/2021    RDW 14.3 03/08/2021    NEPRELIM central canal stenosis at   L4-5.  Varying degrees of neural foraminal stenosis present as described above.           7/2020 L-spine XR  CONCLUSION:  L1-2, L2-3 and L4-5 degenerative disc disease, with grade 1 degenerative spondylolisthesis at L4-5.  Lower Patient previously was on methotrexate and doing well. The last few years she has been off immunomodulators and arthritis is worse. Patient with significant difficulty in ambulation. Major issue today is the left knee . No evidence of septic joint.   Bi mg total) by mouth daily.  -     EXT DME WALKER ROLLATOR  -     Hydroxychloroquine Sulfate 200 MG Oral Tab; Take 1 tablet (200 mg total) by mouth 2 (two) times daily. -     predniSONE 5 MG Oral Tab;  Take 2 tablets (10 mg total) by mouth daily for 7 days, syndrome        Return in about 5 weeks (around 6/11/2021). The above plan of care, diagnosis, orders, and follow-up were discussed with the patient. Questions related to this recommended plan of care were answered.     Thank you for referring this del

## (undated) NOTE — LETTER
8/3/2020    Patient: Cherelle Galindo  YOB: 1960  Subscriber ID: [id number] - (Medicaid [employer]vantage)  Reason for Appeal: Denial of MRI cervical spine  Case #: [id number]    RE: Request for: Approval of MRI cervical spine      To Whom it may concer approve the above requested MRI cervical spine, which has been denied. Thank you in advance for your hard work and reconsideration of this case.   I implore you to see that Darrin Gerber, your member, was in need of the above MRI cervical spine for evaluation

## (undated) NOTE — LETTER
[address]  [address], [address] [facility]  Authorization for Surgical Operation and Procedure     Date:___________                                                                                                         Time:__________ potential risks that can occur: fever and allergic reactions, hemolytic reactions, transmission of diseases such as Hepatitis, AIDS and Cytomegalovirus (CMV) and fluid overload.   In the event that I wish to have an autologous transfusion of my own blood, o attending physician will determine when the applicable recovery period ends for purposes of reinstating the DNAR order.   10. Patients having a sterilization procedure: I understand that if the procedure is successful the results will be permanent and it wi a. Allow the anesthesiologist (anesthesia doctor) to give me medicine and do additional procedures as necessary.  Some examples are: Starting or using an “IV” to give me medicine, fluids or blood during my procedure, and having a breathing tube placed to he 7. Regional Anesthesia (“spinal”, “epidural”, & “nerve blocks”): I understand that rare but potential complications include headache, bleeding, infection, seizure, irregular heart rhythms, and nerve injury.     I can change my mind about having anesthesia

## (undated) NOTE — LETTER
DECLINATION FORM  [address] provides interpreters for patients who are deaf, hearing impaired, or have Limited [place] Proficiency in order to ensure these patients an equal opportunity to benefit from form located on the intranet  *For all other languages, please complete this form with the assistance of a telephone .       confirmation number: ___________________________    Patient Name: Anamaria Moya     : 1960    Page 1 of 1

## (undated) NOTE — LETTER
Orthopedic Surgery   Pre-Operative Clearance Request    Patient Name:   Darrin Gerber             :   1960    Surgeon: Dr. Bautista             Date of Surgery: 2024    Surgical Procedure: Right total knee arthroplasty       MUST COMPLETE ALL OF THE FOLLOWING 2-3 WEEKS PRIOR TO YOUR SURGERY TO AVOID CANCELLATION, DUE TO THE RULE THEIR WILL BE NO EXCEPTIONS!      [x]  History and Physical      [x]  Medical  Clearance                   Required pre-op testing to be ordered:                      [x]  CBC W/Diff                                                                 [x]  CMP                                                                                                                                                                                                                                        [x]  PT/INR  [x]  PTT  [x]  Type and Screen                         **Please fax test results, H&P, and clearance to [phone] and to P.A.T at [phone]**

## (undated) NOTE — LETTER
24      Orthopedic Surgery   Pre-Operative Clearance Request    Patient Name:   Darrin Gerber             :   1960    Surgeon: Dr. Bautista             Date of Surgery: 2025    Surgical Procedure: Left total knee arthroplasty       MUST COMPLETE ALL OF THE FOLLOWING 2-3 WEEKS PRIOR TO YOUR SURGERY TO AVOID CANCELLATION, DUE TO THE RULE THEIR WILL BE NO EXCEPTIONS!      [x]  History and Physical      [x]  Medical  Clearance                   Required pre-op testing to be ordered:                      [x]  CBC W/Diff                                                                 [x]  CMP                                                                                                                                                                                                                                       [x]  PT/INR  [x]  PTT  [x]  Type and Screen                         **Please fax test results, H&P, and clearance to [phone] and to P.A.T at [phone]**